# Patient Record
Sex: FEMALE | Race: BLACK OR AFRICAN AMERICAN | NOT HISPANIC OR LATINO | Employment: FULL TIME | URBAN - METROPOLITAN AREA
[De-identification: names, ages, dates, MRNs, and addresses within clinical notes are randomized per-mention and may not be internally consistent; named-entity substitution may affect disease eponyms.]

---

## 2017-02-13 ENCOUNTER — OFFICE VISIT (OUTPATIENT)
Dept: FAMILY MEDICINE | Facility: CLINIC | Age: 27
End: 2017-02-13
Payer: MEDICAID

## 2017-02-13 VITALS
SYSTOLIC BLOOD PRESSURE: 116 MMHG | HEIGHT: 64 IN | HEART RATE: 86 BPM | TEMPERATURE: 98 F | OXYGEN SATURATION: 97 % | DIASTOLIC BLOOD PRESSURE: 82 MMHG | WEIGHT: 172.38 LBS | BODY MASS INDEX: 29.43 KG/M2

## 2017-02-13 DIAGNOSIS — K64.4 EXTERNAL HEMORRHOID: ICD-10-CM

## 2017-02-13 DIAGNOSIS — Z23 FLU VACCINE NEED: ICD-10-CM

## 2017-02-13 DIAGNOSIS — J30.9 ALLERGIC RHINITIS, CAUSE UNSPECIFIED: ICD-10-CM

## 2017-02-13 DIAGNOSIS — S83.91XA SPRAIN OF RIGHT KNEE, UNSPECIFIED LIGAMENT, INITIAL ENCOUNTER: Primary | ICD-10-CM

## 2017-02-13 PROCEDURE — 99214 OFFICE O/P EST MOD 30 MIN: CPT | Mod: S$PBB,,, | Performed by: INTERNAL MEDICINE

## 2017-02-13 PROCEDURE — 99999 PR PBB SHADOW E&M-EST. PATIENT-LVL IV: CPT | Mod: PBBFAC,,, | Performed by: INTERNAL MEDICINE

## 2017-02-13 PROCEDURE — 90686 IIV4 VACC NO PRSV 0.5 ML IM: CPT | Mod: PBBFAC,PO | Performed by: INTERNAL MEDICINE

## 2017-02-13 PROCEDURE — 99214 OFFICE O/P EST MOD 30 MIN: CPT | Mod: PBBFAC,PO | Performed by: INTERNAL MEDICINE

## 2017-02-13 RX ORDER — FLUTICASONE PROPIONATE 50 MCG
1 SPRAY, SUSPENSION (ML) NASAL DAILY
Qty: 1 BOTTLE | Refills: 3 | Status: SHIPPED | OUTPATIENT
Start: 2017-02-13 | End: 2019-11-11

## 2017-02-13 RX ORDER — DICLOFENAC SODIUM 50 MG/1
50 TABLET, DELAYED RELEASE ORAL 2 TIMES DAILY
Qty: 60 TABLET | Refills: 1 | Status: SHIPPED | OUTPATIENT
Start: 2017-02-13 | End: 2019-07-01

## 2017-02-13 NOTE — MR AVS SNAPSHOT
[facility]  [address]  [place] [address]  Phone: [phone]  Fax: [phone]                  Mone Chandler   2017 3:00 PM   Office Visit    Description:  Female : 1990   Provider:  Myke Gardner MD   Department:  [facility]           Reason for Visit     Knee Pain     Hemorrhoids     Swelling           Diagnoses this Visit        Comments    Sprain of right knee, unspecified ligament, initial encounter    -  Primary     External hemorrhoid         Allergic rhinitis, cause unspecified         Flu vaccine need                To Do List           Goals (5 Years of Data)     None      Follow-Up and Disposition     Return if symptoms worsen or fail to improve.       These Medications        Disp Refills Start End    diclofenac (VOLTAREN) 50 MG EC tablet 60 tablet 1 2017     Take 1 tablet (50 mg total) by mouth 2 (two) times daily. Take with food - Oral    Pharmacy: [facility]/pharmacy #[id number] - [place] - [address] Ph #: [phone]       fluticasone (FLONASE) 50 mcg/actuation nasal spray 1 Bottle 3 2017     1 spray by Each Nare route once daily. - Each Nare    Pharmacy: [facility]/pharmacy #[id number] - [place] - [address] [facility] Ph #: [phone]         [facility] On [facility]     [facility] On [facility] Nurse Care Line - 24/7 Assistance  Registered nurses in the [facility] provide clinical advisement, health education, appointment booking, and other advisory services.  Call for this free service at [phone].             Medications           Message regarding Medications     Verify the changes and/or additions to your medication regime listed below are the same as discussed with your clinician today.  If any of these changes or additions are incorrect, please notify your healthcare provider.        START taking these NEW medications        Refills    diclofenac (VOLTAREN) 50 MG EC tablet 1    Sig: Take 1 tablet (50 mg total) by  "mouth 2 (two) times daily. Take with food    Class: Normal    Route: Oral    fluticasone (FLONASE) 50 mcg/actuation nasal spray 3    Si spray by Each Nare route once daily.    Class: Normal    Route: Each Nare           Verify that the below list of medications is an accurate representation of the medications you are currently taking.  If none reported, the list may be blank. If incorrect, please contact your healthcare provider. Carry this list with you in case of emergency.           Current Medications     diclofenac (VOLTAREN) 50 MG EC tablet Take 1 tablet (50 mg total) by mouth 2 (two) times daily. Take with food    fluticasone (FLONASE) 50 mcg/actuation nasal spray 1 spray by Each Nare route once daily.    ibuprofen (ADVIL,MOTRIN) 600 MG tablet Take 1 tablet (600 mg total) by mouth every 6 (six) hours as needed.    ORTHO TRI-CYCLEN LO, 28, 0.18/0.215 mg/ 0.25 mg-25 mcg tablet Take 1 tablet by mouth every morning.           Clinical Reference Information           Your Vitals Were     BP Pulse Temp Height Weight Last Period    116/82 (BP Location: Left arm, Patient Position: Sitting, BP Method: Manual) 86 97.9 °F (36.6 °C) (Oral) 5' 4" (1.626 m) 78.2 kg (172 lb 6.4 oz) 2017    SpO2 BMI             97% 29.59 kg/m2         Blood Pressure          Most Recent Value    BP  116/82      Allergies as of 2017     No Known Allergies      Immunizations Administered on Date of Encounter - 2017     Name Date Dose VIS Date Route    influenza - Quadrivalent - PF (ADULT)  Incomplete 0.5 mL 2015 Intramuscular      Orders Placed During Today's Visit      Normal Orders This Visit    Influenza - Quadrivalent (3 years & older) (PF)       Instructions    Use pre-bottled nasal saline at least once a day but as often as desired for comfort (if you are mixing your own solution, you MUST use either distilled water or boiled water that has been allowed to cool).  Blow your nose after you spray each nostril.  " AFTER using the nasal saline, use the nasal steroid in the following manner:    Place the tip of the bottle into your nostril aiming towards the outside corner of each eye.  You may find it beneficial to use the opposite hand for the nostril that you are spraying. Do not aim the bottle straight up your nose. [place] the medicine but do not perform a hard sniff when you do.  If you can taste the medication, then you have sniffed too hard.  Dab any medication that drips out of your nose but do not blow your nose as this will expel the medication.    You can try metamucil or Miralax to help with having regular BMs.      Ice the knee for 20 minutes once or twice a day and immediately after the march.  I would use a slip on compression knee brace to help with the swelling.  Stop the ibuprofen and start the diclofenac.             Language Assistance Services     ATTENTION: Language assistance services are available, free of charge. Please call [phone].      ATENCIÓN: Si alicia jay, tiene a nichols disposición servicios gratuitos de asistencia lingüística. Llame al [phone].     [facility] Ý: N?u b?n nói Ti?ng Vi?t, có các d?ch v? h? tr? ngôn ng? mi?n phí dành cho b?n. G?i s? [phone].         [facility] complies with applicable Federal civil rights laws and does not discriminate on the basis of race, color, national origin, age, disability, or sex.

## 2017-02-13 NOTE — PATIENT INSTRUCTIONS
Use pre-bottled nasal saline at least once a day but as often as desired for comfort (if you are mixing your own solution, you MUST use either distilled water or boiled water that has been allowed to cool).  Blow your nose after you spray each nostril.  AFTER using the nasal saline, use the nasal steroid in the following manner:    Place the tip of the bottle into your nostril aiming towards the outside corner of each eye.  You may find it beneficial to use the opposite hand for the nostril that you are spraying. Do not aim the bottle straight up your nose. [place] the medicine but do not perform a hard sniff when you do.  If you can taste the medication, then you have sniffed too hard.  Dab any medication that drips out of your nose but do not blow your nose as this will expel the medication.    You can try metamucil or Miralax to help with having regular BMs.      Ice the knee for 20 minutes once or twice a day and immediately after the march.  I would use a slip on compression knee brace to help with the swelling.  Stop the ibuprofen and start the diclofenac.

## 2017-02-13 NOTE — PROGRESS NOTES
"Chief Complaint  Chief Complaint   Patient presents with    Knee Pain     Right/ 2 weeks    Hemorrhoids    Swelling     Hand/ left       HPI  Mone Chandler is a 26 y.o. female with medical diagnoses as listed in the medical history and problem list that presents for R knee pain, cough, congestion, & hemmorhoids.  Pt is known to me with her last appointment 8/30/2016.  Pt states her main complaint is R knee pain s/p fall 1.5wks ago. Pt has used ice and ibuprofen at home. Pt states [place] and wanting to make sure knee is ok. Pt also complaining of hemorrhoid pain when wiping with toilet tissue x1 week ago which is now improved. Denies any blood in stool or in toilet paper. Pt denies pain when using the restroom, but only painful when wiping. Pt reports she does strain sometimes. Reports combination of both hard stools and diarrhea. Pt complaining of cough, congestion, and runny nose x2weeks. Pt states she coughed up dark red blood x3 occurrences. Also complaining of swelling to L thumb which has never gone down since trigger finger surgery in 2014. Pt also complains of a intermittent "twisting" throat and chest pain that lasts a couple of seconds at a time and comes and goes for the past two weeks now. Pt states this is an achy pain, which is staying about the same and not getting worse. Pt denies any burning in chest or pain after eating. Pt denies SOB, fever, diaphoresis.       PAST MEDICAL HISTORY:  Past Medical History   Diagnosis Date    Allergic rhinitis, cause unspecified 6/17/2015    ADRIANO (generalized anxiety disorder) 6/17/2015     ADRIANO-7 score of 17        PAST SURGICAL HISTORY:  Past Surgical History   Procedure Laterality Date    Thumb Bilateral     Trigger finger release Bilateral      thumbs       SOCIAL HISTORY:  Social History     Social History    Marital status: Single     Spouse name: N/A    Number of children: N/A    Years of education: N/A "     Occupational History    Not on file.     Social History Main Topics    Smoking status: Never Smoker    Smokeless tobacco: Not on file    Alcohol use No    Drug use: No    Sexual activity: Not on file     Other Topics Concern    Not on file     Social History Narrative    No narrative on file       FAMILY HISTORY:  Family History   Problem Relation Age of Onset    Hypertension Mother     Diverticulitis Father        ALLERGIES AND MEDICATIONS: updated and reviewed.  Review of patient's allergies indicates:  No Known Allergies  Current Outpatient Prescriptions   Medication Sig Dispense Refill    diclofenac (VOLTAREN) 50 MG EC tablet Take 1 tablet (50 mg total) by mouth 2 (two) times daily. Take with food 60 tablet 1    fluticasone (FLONASE) 50 mcg/actuation nasal spray 1 spray by Each Nare route once daily. 1 Bottle 3    ibuprofen (ADVIL,MOTRIN) 600 MG tablet Take 1 tablet (600 mg total) by mouth every 6 (six) hours as needed. 30 tablet 0    ORTHO TRI-CYCLEN LO, 28, 0.18/0.215 mg/ 0.25 mg-25 mcg tablet Take 1 tablet by mouth every morning.  5     No current facility-administered medications for this visit.          ROS  Review of Systems   Constitutional: Negative for chills, fever and unexpected weight change.   HENT: Positive for congestion, postnasal drip, rhinorrhea and sinus pressure. Negative for ear pain, hearing loss, nosebleeds, sore throat and trouble swallowing.    Eyes: Negative for discharge, redness and visual disturbance.   Respiratory: Positive for cough and chest tightness (with anxiety). Negative for shortness of breath and wheezing.    Cardiovascular: Negative for chest pain, palpitations and leg swelling.   Gastrointestinal: Positive for constipation and diarrhea. Negative for abdominal pain, nausea and vomiting.   Endocrine: Negative for polydipsia, polyphagia and polyuria.   Genitourinary: Negative for decreased urine volume, dysuria and hematuria.   Musculoskeletal: Positive for  "arthralgias (R knee ), joint swelling (R knee & L thumb) and myalgias (R knee).   Skin: Negative for color change and rash.   Neurological: Negative for dizziness, weakness, light-headedness and headaches.   Psychiatric/Behavioral: Negative for decreased concentration, dysphoric mood, sleep disturbance and suicidal ideas. The patient is nervous/anxious ("around certain people").            Physical Exam  Vitals:    02/13/17 1526   BP: 116/82   BP Location: Left arm   Patient Position: Sitting   BP Method: Manual   Pulse: 86   Temp: 97.9 °F (36.6 °C)   TempSrc: Oral   SpO2: 97%   Weight: 78.2 kg (172 lb 6.4 oz)   Height: 5' 4" (1.626 m)    Body mass index is 29.59 kg/(m^2).  Weight: 78.2 kg (172 lb 6.4 oz)   Height: 5' 4" (162.6 cm)   General appearance: alert, appears stated age and cooperative  Head: Normocephalic, without obvious abnormality, atraumatic  Eyes: PERRL, conjunctiva clear, EOMI  Ears: normal TM's and external ear canals both ears  Nose: clear discharge, moderate congestion, turbinates red, swollen, edematous, inflamed  Throat: lips, mucosa, and tongue normal; teeth and gums normal  Neck: no adenopathy, no carotid bruit, no JVD, supple, symmetrical, trachea midline and thyroid not enlarged, symmetric, no tenderness/mass/nodules  Lungs: clear to auscultation bilaterally  Heart: regular rate and rhythm, S1, S2 normal, no murmur, click, rub or gallop  Abdomen: soft, non-tender; bowel sounds normal; no masses,  no organomegaly  Pulses: 2+ and symmetric  Skin: Skin color, texture, turgor normal. No rashes or lesions  Neurologic: Alert and oriented X 3, normal strength and tone. Normal symmetric reflexes. Normal coordination and gait  Rectum: External skin tag present      Health Maintenance       Date Due Completion Date    Pap Smear 9/6/2011 ---    Influenza Vaccine 8/1/2016 ---    TETANUS VACCINE 7/1/2018 7/1/2008            Assessment & Plan  Problem List Items Addressed This Visit        Pulmonary    " Allergic rhinitis, cause unspecified  I taught the patient the correct technique for nasal spray use.      Relevant Medications    fluticasone (FLONASE) 50 mcg/actuation nasal spray      Other Visit Diagnoses     Sprain of right knee, unspecified ligament, initial encounter    -  Primary  Ice 20 minutes BID & immediately after marching in [facility]. Diclofenac ordered for pain and inflammation. Educated to  compression knee brace.    Relevant Medications    diclofenac (VOLTAREN) 50 MG EC tablet    External hemorrhoid      Monitor. No need for removal. No pain at this time.     Flu vaccine need      Flu Vacc given.    Relevant Orders    Influenza - Quadrivalent (3 years & older) (PF)            Health Maintenance reviewed, flu vacc given today.    Follow-up: Return if symptoms worsen or fail to improve.

## 2017-07-18 ENCOUNTER — TELEPHONE (OUTPATIENT)
Dept: FAMILY MEDICINE | Facility: CLINIC | Age: 27
End: 2017-07-18

## 2017-07-18 NOTE — TELEPHONE ENCOUNTER
----- Message from Audra Brown sent at 7/18/2017  2:30 PM CDT -----  Contact: self  Pt needs to speak with a nurse in regards to getting a physical... Please call [phone].. Thanks

## 2017-08-09 ENCOUNTER — OFFICE VISIT (OUTPATIENT)
Dept: FAMILY MEDICINE | Facility: CLINIC | Age: 27
End: 2017-08-09
Payer: MEDICAID

## 2017-08-09 ENCOUNTER — LAB VISIT (OUTPATIENT)
Dept: LAB | Facility: HOSPITAL | Age: 27
End: 2017-08-09
Attending: INTERNAL MEDICINE
Payer: MEDICAID

## 2017-08-09 VITALS
DIASTOLIC BLOOD PRESSURE: 70 MMHG | BODY MASS INDEX: 29.27 KG/M2 | SYSTOLIC BLOOD PRESSURE: 120 MMHG | WEIGHT: 171.44 LBS | HEART RATE: 80 BPM | OXYGEN SATURATION: 99 % | HEIGHT: 64 IN | TEMPERATURE: 98 F

## 2017-08-09 DIAGNOSIS — Z11.3 ROUTINE SCREENING FOR STI (SEXUALLY TRANSMITTED INFECTION): ICD-10-CM

## 2017-08-09 DIAGNOSIS — M25.562 LEFT KNEE PAIN, UNSPECIFIED CHRONICITY: Primary | ICD-10-CM

## 2017-08-09 DIAGNOSIS — E28.2 PCOS (POLYCYSTIC OVARIAN SYNDROME): ICD-10-CM

## 2017-08-09 PROCEDURE — 99214 OFFICE O/P EST MOD 30 MIN: CPT | Mod: S$PBB,,, | Performed by: INTERNAL MEDICINE

## 2017-08-09 PROCEDURE — 86703 HIV-1/HIV-2 1 RESULT ANTBDY: CPT

## 2017-08-09 PROCEDURE — 99999 PR PBB SHADOW E&M-EST. PATIENT-LVL III: CPT | Mod: PBBFAC,,, | Performed by: INTERNAL MEDICINE

## 2017-08-09 PROCEDURE — 3008F BODY MASS INDEX DOCD: CPT | Mod: ,,, | Performed by: INTERNAL MEDICINE

## 2017-08-09 PROCEDURE — 36415 COLL VENOUS BLD VENIPUNCTURE: CPT | Mod: PO

## 2017-08-09 PROCEDURE — 86592 SYPHILIS TEST NON-TREP QUAL: CPT

## 2017-08-09 NOTE — ASSESSMENT & PLAN NOTE
Not currently taking any medications for this.  Counseled on need for strict barrier method even with PCOS

## 2017-08-09 NOTE — PROGRESS NOTES
Chief Complaint  Chief Complaint   Patient presents with    Annual Exam       HPI  Moneodette Chandler is a 26 y.o. female with medical diagnoses as listed in the medical history and problem list that presents for routine physical.  Pt is known to me with her last appointment 2/13/2017.  She has some questions about PCOS and Plan B.  She is still having some right knee pain that is overall improved from the last visit.  She has needed to use Plan B in the past but none recently.  She informed me today that she has PCOS and is trying to avoid taking OCPs for that reason.  She is currently celibate.  Would like routin screening done.  No dysuria, hematuria, vaginal discharge, genital lesions.        PAST MEDICAL HISTORY:  Past Medical History:   Diagnosis Date    Allergic rhinitis, cause unspecified 6/17/2015    ADRIANO (generalized anxiety disorder) 6/17/2015    ADRIANO-7 score of 17     PCOS (polycystic ovarian syndrome)        PAST SURGICAL HISTORY:  Past Surgical History:   Procedure Laterality Date    thumb Bilateral     TRIGGER FINGER RELEASE Bilateral     thumbs       SOCIAL HISTORY:  Social History     Social History    Marital status: Single     Spouse name: N/A    Number of children: N/A    Years of education: N/A     Occupational History    Not on file.     Social History Main Topics    Smoking status: Never Smoker    Smokeless tobacco: Never Used    Alcohol use No    Drug use: No    Sexual activity: Not on file     Other Topics Concern    Not on file     Social History Narrative    No narrative on file       FAMILY HISTORY:  Family History   Problem Relation Age of Onset    Hypertension Mother     Diverticulitis Father        ALLERGIES AND MEDICATIONS: updated and reviewed.  Review of patient's allergies indicates:  No Known Allergies  Current Outpatient Prescriptions   Medication Sig Dispense Refill    diclofenac (VOLTAREN) 50 MG EC tablet Take 1 tablet (50 mg total) by mouth 2 (two) times daily.  "Take with food 60 tablet 1    fluticasone (FLONASE) 50 mcg/actuation nasal spray 1 spray by Each Nare route once daily. 1 Bottle 3    ibuprofen (ADVIL,MOTRIN) 600 MG tablet Take 1 tablet (600 mg total) by mouth every 6 (six) hours as needed. 30 tablet 0     No current facility-administered medications for this visit.          ROS  Review of Systems   Constitutional: Negative for chills, fever and unexpected weight change.   HENT: Negative for congestion, ear pain, hearing loss, rhinorrhea, sore throat and trouble swallowing.    Eyes: Negative for discharge, redness and visual disturbance.   Respiratory: Negative for cough, chest tightness, shortness of breath and wheezing.    Cardiovascular: Negative for chest pain, palpitations and leg swelling.   Gastrointestinal: Negative for abdominal pain, constipation, diarrhea, nausea and vomiting.   Endocrine: Negative for polydipsia, polyphagia and polyuria.   Genitourinary: Negative for decreased urine volume, dysuria and hematuria.   Musculoskeletal: Positive for arthralgias. Negative for joint swelling and myalgias.   Skin: Negative for color change and rash.   Neurological: Negative for dizziness, weakness, light-headedness and headaches.   Psychiatric/Behavioral: Negative for decreased concentration, dysphoric mood, sleep disturbance and suicidal ideas.           Physical Exam  Vitals:    08/09/17 1134   BP: 120/70   BP Location: Left arm   Patient Position: Sitting   BP Method: Manual   Pulse: 80   Temp: 98.3 °F (36.8 °C)   SpO2: 99%   Weight: 77.7 kg (171 lb 6.5 oz)   Height: 5' 4" (1.626 m)    Body mass index is 29.42 kg/m².  Weight: 77.7 kg (171 lb 6.5 oz)   Height: 5' 4" (162.6 cm)   General appearance: alert, appears stated age, cooperative and no distress  Head: Normocephalic, without obvious abnormality, atraumatic  Eyes: PERRL EOMI conjunctiva clear  Neck: no adenopathy, no carotid bruit, no JVD, supple, symmetrical, trachea midline and thyroid not enlarged, " symmetric, no tenderness/mass/nodules  Back: symmetric, no curvature. ROM normal. No CVA tenderness.  Lungs: clear to auscultation bilaterally  Heart: regular rate and rhythm, S1, S2 normal, no murmur, click, rub or gallop  Abdomen: soft, non-tender; bowel sounds normal; no masses,  no organomegaly  Extremities: extremities normal, atraumatic, no cyanosis or edema  Pulses: 2+ and symmetric  Neurologic: Grossly normal      Health Maintenance       Date Due Completion Date    Pap Smear 09/06/2011 ---    Influenza Vaccine 08/01/2017 2/13/2017    TETANUS VACCINE 07/01/2018 7/1/2008            Assessment & Plan  Problem List Items Addressed This Visit        Endocrine    PCOS (polycystic ovarian syndrome)    Current Assessment & Plan     Not currently taking any medications for this.  Counseled on need for strict barrier method even with PCOS           Other Visit Diagnoses     Left knee pain, unspecified chronicity    -  Primary  -    Continue with PT that she already has set up.  NSAIDs PRN    Routine screening for STI (sexually transmitted infection)    -  Routine screen per CDC guidelines.     Relevant Orders    RPR    HIV-1 and HIV-2 antibodies    C. trachomatis/N. gonorrhoeae by AMP DNA Urine            Health Maintenance reviewed, as above.  Flu shot in oct.    Follow-up: Return in about 1 year (around 8/9/2018) for Routine Physical.

## 2017-08-10 LAB
HIV 1+2 AB+HIV1 P24 AG SERPL QL IA: NEGATIVE
RPR SER QL: NORMAL

## 2017-08-10 NOTE — PROGRESS NOTES
Your lab results are normal.  Please continue your current medications and doses.  Please feel free to contact me with any questions or concerns.    Sincerely,  Myke Gardner  [email or web address]

## 2017-11-21 ENCOUNTER — NURSE TRIAGE (OUTPATIENT)
Dept: ADMINISTRATIVE | Facility: CLINIC | Age: 27
End: 2017-11-21

## 2017-11-21 NOTE — TELEPHONE ENCOUNTER
"    Reason for Disposition   Patient wants to be seen    Protocols used: ST LYMPH NODES - [id number]    Pt reports some swelling around her neck- from what I can gather she is talking about swollen lymph nodes. Denies sore throat or swelling of throat or tongue. Denies difficulty breathing. Pt states that she feels like she has a "balloon in her neck". Denies fever. Pt is requesting to be seen today. Sent to scheduling for assistance.    Please contact patient to advise  "

## 2017-11-21 NOTE — TELEPHONE ENCOUNTER
Spoke with pt she states that the nurse that she spoke with was not helpful and she didn't feel like the nurse should have told her that her complaints were not an emergency and that she should not have been offered an appt in Feb.  States that the nurse just should have told her to go to the urgent care if should was not able to see Dr. Gardner today, pt states that she will go to the urgent care if she is still have symptoms

## 2018-08-22 ENCOUNTER — TELEPHONE (OUTPATIENT)
Dept: FAMILY MEDICINE | Facility: CLINIC | Age: 28
End: 2018-08-22

## 2018-08-22 NOTE — TELEPHONE ENCOUNTER
"Spoke with patient to schedule an appointment with Shellie Huerta NP due to no available appts with dr Gardner til January. Patient stated " If she could not get an appointment to see Dr Gardner. She will be changing her PCP".  "

## 2018-08-22 NOTE — TELEPHONE ENCOUNTER
----- Message from Sabrina Metz sent at 8/22/2018  9:30 AM CDT -----  Contact: Self/[phone]  Patient called upset because there's not a sooner appointment for her Physical. She would like to speak to the staff to schedule a sooner appointment. Thank you.

## 2018-09-17 ENCOUNTER — OFFICE VISIT (OUTPATIENT)
Dept: URGENT CARE | Facility: CLINIC | Age: 28
End: 2018-09-17
Payer: MEDICAID

## 2018-09-17 ENCOUNTER — CLINICAL SUPPORT (OUTPATIENT)
Dept: OCCUPATIONAL MEDICINE | Facility: CLINIC | Age: 28
End: 2018-09-17
Payer: MEDICAID

## 2018-09-17 VITALS
TEMPERATURE: 98 F | HEART RATE: 80 BPM | HEIGHT: 64 IN | DIASTOLIC BLOOD PRESSURE: 78 MMHG | SYSTOLIC BLOOD PRESSURE: 118 MMHG | WEIGHT: 163 LBS | RESPIRATION RATE: 19 BRPM | OXYGEN SATURATION: 98 % | BODY MASS INDEX: 27.83 KG/M2

## 2018-09-17 DIAGNOSIS — S80.12XA CONTUSION OF LEFT LEG, INITIAL ENCOUNTER: ICD-10-CM

## 2018-09-17 DIAGNOSIS — Z11.1 ENCOUNTER FOR PPD TEST: ICD-10-CM

## 2018-09-17 DIAGNOSIS — S80.12XA LEG HEMATOMA, LEFT, INITIAL ENCOUNTER: Primary | ICD-10-CM

## 2018-09-17 PROCEDURE — 86580 TB INTRADERMAL TEST: CPT | Mod: S$GLB,,, | Performed by: PHYSICIAN ASSISTANT

## 2018-09-17 PROCEDURE — 99214 OFFICE O/P EST MOD 30 MIN: CPT | Mod: S$GLB,,, | Performed by: PHYSICIAN ASSISTANT

## 2018-09-17 RX ORDER — NAPROXEN 500 MG/1
500 TABLET ORAL 2 TIMES DAILY WITH MEALS
Qty: 20 TABLET | Refills: 0 | Status: SHIPPED | OUTPATIENT
Start: 2018-09-17 | End: 2018-09-27

## 2018-09-17 NOTE — PROGRESS NOTES
"Subjective:       Patient ID: Mone Chandler is a 28 y.o. female.    Vitals:  height is 5' 4" (1.626 m) and weight is 73.9 kg (163 lb). Her oral temperature is 98.2 °F (36.8 °C). Her blood pressure is 118/78 and her pulse is 80. Her respiration is 19 and oxygen saturation is 98%.     Chief Complaint: PPD Placement and Leg Pain (lower left leg )    Leg Pain    The incident occurred more than 1 week ago (2 weeks ago). The incident occurred at home. There was no injury mechanism (pulled into chair). The pain is present in the left leg (lower). Quality: stinging  The pain is at a severity of 8/10. The pain is severe. The pain has been constant since onset. She reports no foreign bodies present. Nothing aggravates the symptoms. She has tried acetaminophen for the symptoms. The treatment provided no relief.     Review of Systems   Constitution: Negative for chills and fever.   HENT: Negative for sore throat.    Eyes: Negative for blurred vision.   Cardiovascular: Negative for chest pain.   Respiratory: Negative for shortness of breath.    Skin: Negative for rash.   Musculoskeletal: Negative for back pain and joint pain.   Gastrointestinal: Negative for abdominal pain, diarrhea, nausea and vomiting.   Neurological: Negative for headaches.   Psychiatric/Behavioral: The patient is not nervous/anxious.        Objective:      Physical Exam   Constitutional: She is oriented to person, place, and time. She appears well-developed and well-nourished. She is cooperative.  Non-toxic appearance. She does not appear ill. No distress.   HENT:   Head: Normocephalic and atraumatic. Head is without abrasion, without contusion and without laceration.   Right Ear: Hearing, tympanic membrane, external ear and ear canal normal. No hemotympanum.   Left Ear: Hearing, tympanic membrane, external ear and ear canal normal. No hemotympanum.   Nose: Nose normal. No mucosal edema, rhinorrhea or nasal deformity. No epistaxis. Right sinus exhibits no " maxillary sinus tenderness and no frontal sinus tenderness. Left sinus exhibits no maxillary sinus tenderness and no frontal sinus tenderness.   Mouth/Throat: Uvula is midline, oropharynx is clear and moist and mucous membranes are normal. No trismus in the jaw. Normal dentition. No uvula swelling. No posterior oropharyngeal erythema.   Eyes: Conjunctivae, EOM and lids are normal. Pupils are equal, round, and reactive to light. Right eye exhibits no discharge. Left eye exhibits no discharge. No scleral icterus.   Sclera clear bilat   Neck: Trachea normal, normal range of motion, full passive range of motion without pain and phonation normal. Neck supple. No spinous process tenderness and no muscular tenderness present. No neck rigidity. No tracheal deviation present.   Cardiovascular: Normal rate, regular rhythm, normal heart sounds, intact distal pulses and normal pulses.   Pulmonary/Chest: Effort normal and breath sounds normal. No respiratory distress.   Abdominal: Soft. Normal appearance and bowel sounds are normal. She exhibits no distension, no pulsatile midline mass and no mass. There is no tenderness.   Musculoskeletal: Normal range of motion. She exhibits no deformity.        Left knee: She exhibits normal range of motion, no swelling, no effusion, no ecchymosis, no deformity, no laceration, no erythema, normal alignment, no LCL laxity, normal patellar mobility, no bony tenderness, normal meniscus and no MCL laxity. No tenderness found.        Left ankle: She exhibits normal range of motion, no swelling, no ecchymosis, no deformity, no laceration and normal pulse. No tenderness. Achilles tendon normal.        Left lower leg: She exhibits tenderness and edema. She exhibits no bony tenderness, no swelling, no deformity and no laceration.        Legs:       Left foot: There is normal range of motion, no tenderness, no bony tenderness, no swelling, normal capillary refill, no crepitus, no deformity and no  laceration.   Neurological: She is alert and oriented to person, place, and time. She has normal strength. No cranial nerve deficit or sensory deficit. She exhibits normal muscle tone. She displays no seizure activity. Coordination normal. GCS eye subscore is 4. GCS verbal subscore is 5. GCS motor subscore is 6.   Skin: Skin is warm, dry and intact. Capillary refill takes less than 2 seconds. No abrasion, no bruising, no burn, no ecchymosis and no laceration noted. She is not diaphoretic. No pallor.   Psychiatric: She has a normal mood and affect. Her speech is normal and behavior is normal. Judgment and thought content normal. Cognition and memory are normal.   Nursing note and vitals reviewed.      Assessment:       1. Leg hematoma, left, initial encounter    2. Contusion of left leg, initial encounter        Plan:         Leg hematoma, left, initial encounter    Contusion of left leg, initial encounter  -     naproxen (NAPROSYN) 500 MG tablet; Take 1 tablet (500 mg total) by mouth 2 (two) times daily with meals. for 10 days  Dispense: 20 tablet; Refill: 0          Lower Extremity Contusion  You have a contusion (bruise) of a lower extremity (leg, knee, ankle, foot, or toe). Symptoms include pain, swelling, and skin discoloration. No bones are broken. This injury may take from a few days to a few weeks to heal.  During that time, the bruise may change from reddish in color, to purple-blue, to green-yellow, to yellow-brown.  Home care  · Unless another medicine was prescribed, you can take acetaminophen, ibuprofen, or naproxen to control pain. (If you have chronic liver or kidney disease or ever had a stomach ulcer or gastrointestinal bleeding, talk with your doctor before using these medicines.)  · Elevate the injured area to reduce pain and swelling. As much as possible, sit or lie down with the injured area raised about the level of your heart. This is especially important during the first 48 hours.  · Ice the  injured area to help reduce pain and swelling. Wrap a cold source (ice pack or ice cubes in a plastic bag) in a thin towel. Apply to the bruised area for 20 minutes every 1 to 2 hours the first day. Continue this 3 to 4 times a day until the pain and swelling goes away.  · If crutches have been advised, do not bear full weight on the injured leg until you can do so without pain. You may return to sports when you are able to put full weight and impact on the injured leg without pain.  Follow up  Follow up with your healthcare provider or our staff as advised. Call if you are not improving within the next 1 to 2 weeks.  When to seek medical advice   Call your healthcare provider right away if any of these occur:  · Increased pain or swelling  · Foot or toes become cold, blue, numb or tingly  · Signs of infection: Warmth, drainage, or increased redness or pain around the injury  · Inability to move the injured area   · Frequent bruising for unknown reasons  Date Last Reviewed: 2/1/2017 © 2000-2017 [employer]. [address] [place] [address]. All rights reserved. This information is not intended as a substitute for professional medical care. Always follow your healthcare professional's instructions.      Please follow up with your Primary care provider within 2-5 days if your signs and symptoms have not resolved or worsen.     If your condition worsens or fails to improve we recommend that you receive another evaluation at the emergency room immediately or contact your primary medical clinic to discuss your concerns.   You must understand that you have received an Urgent Care treatment only and that you may be released before all of your medical problems are known or treated. You, the patient, will arrange for follow up care as instructed.     RED FLAGS/WARNING SYMPTOMS DISCUSSED WITH PATIENT THAT WOULD WARRANT EMERGENT MEDICAL ATTENTION. PATIENT VERBALIZED UNDERSTANDING.       09/20/2018 3:55  PM Patient return to clinic to have PPD test read with all paperwork. PPD was administered at this visit but the order was not placed. Placing order now, adding results, and scanned in the paperwork into media. Lise Hernandez

## 2018-09-20 ENCOUNTER — TELEPHONE (OUTPATIENT)
Dept: URGENT CARE | Facility: CLINIC | Age: 28
End: 2018-09-20

## 2018-09-20 LAB
TB INDURATION - 48 HR READ: NORMAL MM
TB INDURATION - 72 HR READ: 0 MM
TB SKIN TEST - 48 HR READ: NORMAL
TB SKIN TEST - 72 HR READ: NEGATIVE

## 2018-10-11 ENCOUNTER — OFFICE VISIT (OUTPATIENT)
Dept: URGENT CARE | Facility: CLINIC | Age: 28
End: 2018-10-11
Payer: MEDICAID

## 2018-10-11 VITALS
HEART RATE: 87 BPM | BODY MASS INDEX: 27.83 KG/M2 | HEIGHT: 64 IN | TEMPERATURE: 98 F | RESPIRATION RATE: 18 BRPM | SYSTOLIC BLOOD PRESSURE: 129 MMHG | WEIGHT: 163 LBS | DIASTOLIC BLOOD PRESSURE: 84 MMHG | OXYGEN SATURATION: 100 %

## 2018-10-11 DIAGNOSIS — Z20.2 ENCOUNTER FOR ASSESSMENT OF STD EXPOSURE: Primary | ICD-10-CM

## 2018-10-11 DIAGNOSIS — K64.4 EXTERNAL HEMORRHOID: ICD-10-CM

## 2018-10-11 LAB
B-HCG UR QL: NEGATIVE
BILIRUB UR QL STRIP: NEGATIVE
CTP QC/QA: YES
GLUCOSE UR QL STRIP: NEGATIVE
KETONES UR QL STRIP: NEGATIVE
LEUKOCYTE ESTERASE UR QL STRIP: POSITIVE
PH, POC UA: 7.5 (ref 5–8)
POC BLOOD, URINE: NEGATIVE
POC NITRATES, URINE: NEGATIVE
PROT UR QL STRIP: NEGATIVE
SP GR UR STRIP: 1.01 (ref 1–1.03)
UROBILINOGEN UR STRIP-ACNC: NORMAL (ref 0.1–1.1)

## 2018-10-11 PROCEDURE — 81025 URINE PREGNANCY TEST: CPT | Mod: S$GLB,,, | Performed by: PHYSICIAN ASSISTANT

## 2018-10-11 PROCEDURE — 81003 URINALYSIS AUTO W/O SCOPE: CPT | Mod: QW,S$GLB,, | Performed by: PHYSICIAN ASSISTANT

## 2018-10-11 PROCEDURE — 99214 OFFICE O/P EST MOD 30 MIN: CPT | Mod: 25,S$GLB,, | Performed by: PHYSICIAN ASSISTANT

## 2018-10-11 PROCEDURE — 99000 SPECIMEN HANDLING OFFICE-LAB: CPT | Mod: S$GLB,,, | Performed by: PHYSICIAN ASSISTANT

## 2018-10-11 RX ORDER — HYDROCORTISONE ACETATE 25 MG/1
25 SUPPOSITORY RECTAL 2 TIMES DAILY PRN
Qty: 4 SUPPOSITORY | Refills: 0 | Status: SHIPPED | OUTPATIENT
Start: 2018-10-11 | End: 2019-10-11

## 2018-10-11 RX ORDER — NITROFURANTOIN 25; 75 MG/1; MG/1
100 CAPSULE ORAL 2 TIMES DAILY
Qty: 14 CAPSULE | Refills: 0 | Status: SHIPPED | OUTPATIENT
Start: 2018-10-11 | End: 2018-10-18

## 2018-10-11 RX ORDER — HYDROCORTISONE 25 MG/G
CREAM TOPICAL 2 TIMES DAILY
Qty: 20 G | Refills: 0 | Status: SHIPPED | OUTPATIENT
Start: 2018-10-11 | End: 2020-10-15

## 2018-10-11 NOTE — PROGRESS NOTES
"Subjective:       Patient ID: Mone Chandler is a 28 y.o. female.    Vitals:  height is 5' 4" (1.626 m) and weight is 73.9 kg (163 lb). Her temperature is 98.3 °F (36.8 °C). Her blood pressure is 129/84 and her pulse is 87. Her respiration is 18 and oxygen saturation is 100%.     Chief Complaint: Hemorrhoids and Exposure to STD    Concerned about STD exposure and wants hemorrhoids checked.       Exposure to STD    The patient's primary symptoms include genital lesions. The patient's pertinent negatives include no discharge, dyspareunia, dysuria, genital itching, genital rash or pelvic pain. This is a new problem. The current episode started 1 to 4 weeks ago. The problem has been gradually worsening. The vaginal discharge was normal. Pertinent negatives include no abdominal pain, fever or sore throat. Treatments tried: Reymundo Pearls. The treatment provided mild relief. Risk factors include history of STDs.     Review of Systems   Constitution: Negative for chills and fever.   HENT: Negative for sore throat.    Eyes: Negative for blurred vision.   Cardiovascular: Negative for chest pain.   Respiratory: Negative for shortness of breath.    Skin: Positive for itching. Negative for rash.   Musculoskeletal: Negative for back pain and joint pain.   Gastrointestinal: Negative for abdominal pain, diarrhea, nausea and vomiting.   Genitourinary: Positive for genital sores. Negative for dyspareunia, dysuria, missed menses, non-menstrual bleeding and pelvic pain.   Neurological: Negative for headaches.   Psychiatric/Behavioral: The patient is not nervous/anxious.        Objective:      Physical Exam   Constitutional: She is oriented to person, place, and time. She appears well-developed and well-nourished. She is cooperative.  Non-toxic appearance. She does not appear ill. No distress.   HENT:   Head: Normocephalic and atraumatic.   Right Ear: Hearing, tympanic membrane, external ear and ear canal normal.   Left Ear: Hearing, " tympanic membrane, external ear and ear canal normal.   Nose: Nose normal. No mucosal edema, rhinorrhea or nasal deformity. No epistaxis. Right sinus exhibits no maxillary sinus tenderness and no frontal sinus tenderness. Left sinus exhibits no maxillary sinus tenderness and no frontal sinus tenderness.   Mouth/Throat: Uvula is midline, oropharynx is clear and moist and mucous membranes are normal. No trismus in the jaw. Normal dentition. No uvula swelling. No posterior oropharyngeal erythema.   Eyes: Conjunctivae and lids are normal. Right eye exhibits no discharge. Left eye exhibits no discharge. No scleral icterus.   Sclera clear bilat   Neck: Trachea normal, normal range of motion, full passive range of motion without pain and phonation normal. Neck supple.   Cardiovascular: Normal rate, regular rhythm, normal heart sounds, intact distal pulses and normal pulses.   Pulmonary/Chest: Effort normal and breath sounds normal. No respiratory distress.   Abdominal: Soft. Normal appearance and bowel sounds are normal. She exhibits no distension, no pulsatile midline mass and no mass. There is no tenderness. There is no CVA tenderness. Hernia confirmed negative in the right inguinal area and confirmed negative in the left inguinal area.   Genitourinary: Vagina normal. Rectal exam shows external hemorrhoid. Rectal exam shows no internal hemorrhoid, no fissure, no mass, no tenderness, anal tone normal and guaiac negative stool.       Pelvic exam was performed with patient supine. No labial fusion. There is no rash, tenderness, lesion or injury on the right labia. There is no rash, tenderness, lesion or injury on the left labia. Right adnexum displays no mass, no tenderness and no fullness. Left adnexum displays no mass, no tenderness and no fullness.   Musculoskeletal: Normal range of motion. She exhibits no edema or deformity.   Lymphadenopathy: No inguinal adenopathy noted on the right or left side.   Neurological: She  is alert and oriented to person, place, and time. She exhibits normal muscle tone. Coordination normal.   Skin: Skin is warm, dry and intact. She is not diaphoretic. No pallor.   Psychiatric: She has a normal mood and affect. Her speech is normal and behavior is normal. Judgment and thought content normal. Cognition and memory are normal.   Nursing note and vitals reviewed.      Assessment:       1. Encounter for assessment of STD exposure    2. External hemorrhoid        Plan:         Encounter for assessment of STD exposure  -     POCT Urinalysis, Dipstick, Automated, W/O Scope  -     POCT urine pregnancy  -     C. trachomatis/N. gonorrhoeae by AMP DNA    External hemorrhoid  -     hydrocortisone (ANUSOL-HC) 25 mg suppository; Place 1 suppository (25 mg total) rectally 2 (two) times daily as needed for Hemorrhoids.  Dispense: 4 suppository; Refill: 0  -     hydrocortisone 2.5 % cream; Apply topically 2 (two) times daily. for 10 days  Dispense: 20 g; Refill: 0          Treating Hemorrhoids: Self-Care    Follow your healthcare providers advice about caring for your hemorrhoids at home. Some treatments help relieve symptoms right away. Others involve making changes in your diet and exercise habits. These can help ease constipation and prevent hemorrhoid symptoms from coming back.  Relieving symptoms  Your healthcare provider may prescribe anti-inflammatory medicine to help ease your symptoms. The following tips will also help relieve pain and swelling.  · Take sitz baths. Taking a sitz bath means sitting in a few inches of warm bath water. Soaking for 10 minutes twice a day can provide welcome relief from painful hemorrhoids. It can also help the area stay clean.  · Develop good bowel habits. Use the bathroom when you need to. Dont ignore the urge to move your bowels. This can lead to constipation, hard stools, and straining. Also, dont read while on the toilet. Sit only as long as needed. Wipe gently with soft,  unscented toilet tissue or baby wipes.  · Use ice packs. Placing an ice pack on a thrombosed external hemorrhoid can help relieve pain right away. It will also help reduce the blood clot. Use the ice for 15 to 20 minutes at a time. Keep a cloth between the ice and your skin to prevent skin damage.  · Use other measures. Laxatives and enemas can help ease constipation. But use them only on your healthcare providers advice. For symptom relief, try using cotton pads soaked in witch hazel. These are available at most drugstores. Over-the-counter hemorrhoid ointments and petroleum jelly can also provide relief.  Add fiber to your diet  Adding fiber to your diet can help relieve constipation by making stools softer and easier to pass. To increase your fiber intake, your healthcare provider may recommend a bulking agent, such as psyllium. This is a high-fiber supplement available at most grocery stores and drugstores. Eating more fiber-rich foods will also help. There are two types of fiber:  · Insoluble fiber is the main ingredient in bulking agents. Its also found in foods such as wheat bran, whole-grain breads, fresh fruits, and vegetables.  · Soluble fiber is found in foods such as oat bran. Although soluble fiber is good for you, it may not ease constipation as much as foods high in insoluble fiber.  Drink more water  Along with a high-fiber diet, drinking more water can help ease constipation. This is because insoluble fiber absorbs water, making stools soft and bulky. Be sure to drink plenty of water throughout the day. Drinking fruit juices, such as prune juice or apple juice, can also help prevent constipation.  Get more exercise  Regular exercise aids digestion and helps prevent constipation. Its also great for your health. So talk with your healthcare provider about starting an exercise program. Low-impact activities, such as swimming or walking, are good places to start. Take it easy at first. And remember  to drink plenty of water when you exercise.  High-fiber foods  High-fiber foods offer many benefits. By making your stools softer, they help heal and prevent swollen hemorrhoids. They may also help reduce the risk of colon and rectal cancer. Best of all, theyre usually low in calories and taste great. Here are some examples of fiber-rich foods.  · Whole grains, such as wheat bran, corn bran, and brown rice.  · Vegetables, especially carrots, broccoli, cabbage, and peas.  · Fruits, such as apples, bananas, raisins, peaches, and pears.  · Nuts and legumes, especially peanuts, lentils, and kidney beans.  Easy ways to add fiber  The tips below offer some simple ways to add more high-fiber foods to your meals.  · Start your day with a high-fiber breakfast. Eat a wheat bran cereal along with a sliced banana. Or, try peanut butter on whole-wheat toast.  · Eat carrot sticks for snacks. Theyre easy to prepare, taste great, and are low in calories.  · Use whole-grain breads instead of white bread for sandwiches.  · Eat fruits for treats. Try an apple and some raisins instead of a candy bar.   Date Last Reviewed: 7/1/2016  © 7088-7173 [employer]. [address] [place] [address], [place] [address]. All rights reserved. This information is not intended as a substitute for professional medical care. Always follow your healthcare professional's instructions.      Please follow up with your Primary care provider within 2-5 days if your signs and symptoms have not resolved or worsen.     If your condition worsens or fails to improve we recommend that you receive another evaluation at the emergency room immediately or contact your primary medical clinic to discuss your concerns.   You must understand that you have received an Urgent Care treatment only and that you may be released before all of your medical problems are known or treated. You, the patient, will arrange for follow up care as instructed.     RED FLAGS/WARNING  SYMPTOMS DISCUSSED WITH PATIENT THAT WOULD WARRANT EMERGENT MEDICAL ATTENTION. PATIENT VERBALIZED UNDERSTANDING.

## 2018-10-11 NOTE — PATIENT INSTRUCTIONS
Treating Hemorrhoids: Self-Care    Follow your healthcare providers advice about caring for your hemorrhoids at home. Some treatments help relieve symptoms right away. Others involve making changes in your diet and exercise habits. These can help ease constipation and prevent hemorrhoid symptoms from coming back.  Relieving symptoms  Your healthcare provider may prescribe anti-inflammatory medicine to help ease your symptoms. The following tips will also help relieve pain and swelling.  · Take sitz baths. Taking a sitz bath means sitting in a few inches of warm bath water. Soaking for 10 minutes twice a day can provide welcome relief from painful hemorrhoids. It can also help the area stay clean.  · Develop good bowel habits. Use the bathroom when you need to. Dont ignore the urge to move your bowels. This can lead to constipation, hard stools, and straining. Also, dont read while on the toilet. Sit only as long as needed. Wipe gently with soft, unscented toilet tissue or baby wipes.  · Use ice packs. Placing an ice pack on a thrombosed external hemorrhoid can help relieve pain right away. It will also help reduce the blood clot. Use the ice for 15 to 20 minutes at a time. Keep a cloth between the ice and your skin to prevent skin damage.  · Use other measures. Laxatives and enemas can help ease constipation. But use them only on your healthcare providers advice. For symptom relief, try using cotton pads soaked in witch hazel. These are available at most drugstores. Over-the-counter hemorrhoid ointments and petroleum jelly can also provide relief.  Add fiber to your diet  Adding fiber to your diet can help relieve constipation by making stools softer and easier to pass. To increase your fiber intake, your healthcare provider may recommend a bulking agent, such as psyllium. This is a high-fiber supplement available at most grocery stores and drugstores. Eating more fiber-rich foods will also help. There are two  types of fiber:  · Insoluble fiber is the main ingredient in bulking agents. Its also found in foods such as wheat bran, whole-grain breads, fresh fruits, and vegetables.  · Soluble fiber is found in foods such as oat bran. Although soluble fiber is good for you, it may not ease constipation as much as foods high in insoluble fiber.  Drink more water  Along with a high-fiber diet, drinking more water can help ease constipation. This is because insoluble fiber absorbs water, making stools soft and bulky. Be sure to drink plenty of water throughout the day. Drinking fruit juices, such as prune juice or apple juice, can also help prevent constipation.  Get more exercise  Regular exercise aids digestion and helps prevent constipation. Its also great for your health. So talk with your healthcare provider about starting an exercise program. Low-impact activities, such as swimming or walking, are good places to start. Take it easy at first. And remember to drink plenty of water when you exercise.  High-fiber foods  High-fiber foods offer many benefits. By making your stools softer, they help heal and prevent swollen hemorrhoids. They may also help reduce the risk of colon and rectal cancer. Best of all, theyre usually low in calories and taste great. Here are some examples of fiber-rich foods.  · Whole grains, such as wheat bran, corn bran, and brown rice.  · Vegetables, especially carrots, broccoli, cabbage, and peas.  · Fruits, such as apples, bananas, raisins, peaches, and pears.  · Nuts and legumes, especially peanuts, lentils, and kidney beans.  Easy ways to add fiber  The tips below offer some simple ways to add more high-fiber foods to your meals.  · Start your day with a high-fiber breakfast. Eat a wheat bran cereal along with a sliced banana. Or, try peanut butter on whole-wheat toast.  · Eat carrot sticks for snacks. Theyre easy to prepare, taste great, and are low in calories.  · Use whole-grain breads  instead of white bread for sandwiches.  · Eat fruits for treats. Try an apple and some raisins instead of a candy bar.   Date Last Reviewed: 7/1/2016  © 7542-7694 [employer]. [address] [place] [address], [place] [address]. All rights reserved. This information is not intended as a substitute for professional medical care. Always follow your healthcare professional's instructions.      Please follow up with your Primary care provider within 2-5 days if your signs and symptoms have not resolved or worsen.     If your condition worsens or fails to improve we recommend that you receive another evaluation at the emergency room immediately or contact your primary medical clinic to discuss your concerns.   You must understand that you have received an Urgent Care treatment only and that you may be released before all of your medical problems are known or treated. You, the patient, will arrange for follow up care as instructed.     RED FLAGS/WARNING SYMPTOMS DISCUSSED WITH PATIENT THAT WOULD WARRANT EMERGENT MEDICAL ATTENTION. PATIENT VERBALIZED UNDERSTANDING.

## 2018-10-13 LAB
C TRACH RRNA SPEC QL NAA+PROBE: NEGATIVE
N GONORRHOEA RRNA SPEC QL NAA+PROBE: NEGATIVE

## 2018-10-15 ENCOUNTER — TELEPHONE (OUTPATIENT)
Dept: URGENT CARE | Facility: CLINIC | Age: 28
End: 2018-10-15

## 2018-10-15 NOTE — TELEPHONE ENCOUNTER
----- Message from Orly Coburn PA-C sent at 10/13/2018  1:53 PM CDT -----  Please call the patient regarding her negative gonorrhea and chlamydia

## 2019-03-11 ENCOUNTER — TELEPHONE (OUTPATIENT)
Dept: FAMILY MEDICINE | Facility: CLINIC | Age: 29
End: 2019-03-11

## 2019-03-11 NOTE — TELEPHONE ENCOUNTER
----- Message from Sabrina Metz sent at 3/11/2019 12:36 PM CDT -----  Contact: Self/[phone]  Type:  Sooner Appointment Request    Patient is requesting a sooner appointment.  Patient declined first available appointment listed as well as another facility and provider .  Patient will not accept being placed on the waitlist and is requesting a message be sent to doctor.    Name of Caller: Patient     When is the first available appointment? 04/29/19    Symptoms: Annual    Best Call Back Number: [phone]    Thank you.

## 2019-04-29 ENCOUNTER — LAB VISIT (OUTPATIENT)
Dept: LAB | Facility: HOSPITAL | Age: 29
End: 2019-04-29
Attending: INTERNAL MEDICINE
Payer: COMMERCIAL

## 2019-04-29 ENCOUNTER — OFFICE VISIT (OUTPATIENT)
Dept: FAMILY MEDICINE | Facility: CLINIC | Age: 29
End: 2019-04-29
Payer: COMMERCIAL

## 2019-04-29 VITALS
OXYGEN SATURATION: 98 % | WEIGHT: 158.31 LBS | HEIGHT: 64 IN | TEMPERATURE: 98 F | DIASTOLIC BLOOD PRESSURE: 70 MMHG | BODY MASS INDEX: 27.03 KG/M2 | SYSTOLIC BLOOD PRESSURE: 100 MMHG | HEART RATE: 91 BPM

## 2019-04-29 DIAGNOSIS — F41.1 GAD (GENERALIZED ANXIETY DISORDER): ICD-10-CM

## 2019-04-29 DIAGNOSIS — Z00.00 ROUTINE MEDICAL EXAM: Primary | ICD-10-CM

## 2019-04-29 DIAGNOSIS — Z11.3 ROUTINE SCREENING FOR STI (SEXUALLY TRANSMITTED INFECTION): ICD-10-CM

## 2019-04-29 DIAGNOSIS — M79.644 BILATERAL THUMB PAIN: ICD-10-CM

## 2019-04-29 DIAGNOSIS — M79.645 BILATERAL THUMB PAIN: ICD-10-CM

## 2019-04-29 PROCEDURE — 86592 SYPHILIS TEST NON-TREP QUAL: CPT

## 2019-04-29 PROCEDURE — 99395 PREV VISIT EST AGE 18-39: CPT | Mod: S$GLB,,, | Performed by: INTERNAL MEDICINE

## 2019-04-29 PROCEDURE — 86703 HIV-1/HIV-2 1 RESULT ANTBDY: CPT

## 2019-04-29 PROCEDURE — 87491 CHLMYD TRACH DNA AMP PROBE: CPT

## 2019-04-29 PROCEDURE — 99999 PR PBB SHADOW E&M-EST. PATIENT-LVL III: CPT | Mod: PBBFAC,,, | Performed by: INTERNAL MEDICINE

## 2019-04-29 PROCEDURE — 99999 PR PBB SHADOW E&M-EST. PATIENT-LVL III: ICD-10-PCS | Mod: PBBFAC,,, | Performed by: INTERNAL MEDICINE

## 2019-04-29 PROCEDURE — 99395 PR PREVENTIVE VISIT,EST,18-39: ICD-10-PCS | Mod: S$GLB,,, | Performed by: INTERNAL MEDICINE

## 2019-04-29 PROCEDURE — 84443 ASSAY THYROID STIM HORMONE: CPT

## 2019-04-29 NOTE — PATIENT INSTRUCTIONS
"Below is a list of free Apps that you may find helpful (some of them may not apply to you since this is a general list of helpful Apps):    Android & Apple users:  EatFit - Created by [facility] nutritionists.  Tons of great recipes, links to >100 restaurants in town with healthy choices, and shopping guides.    Fooducate - Helps with healthy diet and weight loss  Shop Well - Scan barcodes to foods to see if it is healthy or not.  It will also suggest healthier alternatives  Lose It - Calorie tracking and goal setting for weight loss.  Peer support is also available  Calorie Counter and Diet Tracker by MyFitnessPal - Helps count calories for food intake and calories burned during exercise  PopsuCoull Active - has pictures and videos of preloaded workout routines    Headspace - Guides your through meditation.  The first 10 programs are free.  Stop, Think, and Breath - customizable meditation tyron with ability to perform "check-ins"          "

## 2019-04-29 NOTE — LETTER
April 29, 2019    Dr. Yair Rg               [facility]  [address]  [place] [address]  Phone: [phone]  Fax: [phone] April 29, 2019     Patient: Mone Chandler    YOB: 1990   Date of Visit: 4/29/2019       To Whom It May Concern:    We are seeing Mone Chandler in the clinic at [facility]. Myke Gardner  is the patient's PCP.  She/He has an outstanding lab/procedure at the time we reviewed his/her chart.  To help with our Health Maintenance records will you please supply the following report(s)        (  )  MAMMOGRAM                                             (  )  COLONOSCOPY        ( X )  PAP SMEAR                                                 (  )  OUTSIDE LAB RESULTS      (  )  DEXA SCAN                                                 (  )  EYE EXAM            (  )  FOOT EXAM                                                 (  )  ENTIRE RECORD      (  )  OUTSIDE IMMUNIZATIONS                        (  )  _______________          Please Fax to [facility], [address], [phone]      If any questions please contact [place] at [phone]        If you have any questions or concerns, please don't hesitate to call.    Sincerely,    Myke Gardner MD

## 2019-04-29 NOTE — PROGRESS NOTES
Assessment & Plan  Problem List Items Addressed This Visit        Psychiatric    ADRIANO (generalized anxiety disorder)    Overview     ADRIANO-7 score of 19         Current Assessment & Plan     Discussed that ADRIANO-7 score is suggestive of a need for medication but the patient would like to get back into her coping mechanisms instead of medication at this time.  Will follow this up in 3 months           Other Visit Diagnoses     Routine medical exam    -  Primary  -    Discussed healthy diet, regular exercise, necessary labs, age appropriate cancer screening, and routine vaccinations.       Routine screening for STI (sexually transmitted infection)    -  Screen for STI    Relevant Orders    C. trachomatis/N. gonorrhoeae by AMP DNA    HIV 1/2 Ag/Ab (4th Gen)    RPR    Bilateral thumb pain    -  Referred for PT/OT eval.  H/o bilateral thumb trigger release in 2014 with some persistent symptoms.     Relevant Orders    Ambulatory Referral to Physical/Occupational Therapy            Health Maintenance reviewed, requesting Pap.    Follow-up: Follow up in about 3 months (around 7/29/2019) for anxiety follow up.  ______________________________________________________________________    Chief Complaint  Chief Complaint   Patient presents with    Annual Exam       HPI  Mone June Chandler is a 28 y.o. female with medical diagnoses as listed in the medical history and problem list that presents for routine physical.  Pt is known to me with her last appointment 08/2017.      Feels she has some issues with feeling a knot when she swallows that is in her neck.  No true dysphagia and not particular food items that are identified.  No unintentional weight loss or odynophagia.     Had some blood with BMs.  Feels her hemorrhoid is flaring some.  Some constipation.  Some diarrhea that seems to be related to lactose intake.  Never bloody diarrhea    Would like STI screening today.      Anxiety has not been well controlled.  Reports she was  doing well in the past with coping mechanisms and herbal remedies. Has not been doing these recently.     GAD7 4/29/2019   ADRIANO-7 Score 19       PAST MEDICAL HISTORY:  Past Medical History:   Diagnosis Date    Allergic rhinitis, cause unspecified 6/17/2015    ADRIANO (generalized anxiety disorder) 6/17/2015    ADRIANO-7 score of 17     PCOS (polycystic ovarian syndrome)        PAST SURGICAL HISTORY:  Past Surgical History:   Procedure Laterality Date    thumb Bilateral     TRIGGER FINGER RELEASE Bilateral     thumbs       SOCIAL HISTORY:  Social History     Socioeconomic History    Marital status: Single     Spouse name: Not on file    Number of children: Not on file    Years of education: Not on file    Highest education level: Not on file   Occupational History    Not on file   Social Needs    Financial resource strain: Not on file    Food insecurity:     Worry: Not on file     Inability: Not on file    Transportation needs:     Medical: Not on file     Non-medical: Not on file   Tobacco Use    Smoking status: Never Smoker    Smokeless tobacco: Never Used   Substance and Sexual Activity    Alcohol use: No    Drug use: No    Sexual activity: Not on file   Lifestyle    Physical activity:     Days per week: Not on file     Minutes per session: Not on file    Stress: Not on file   Relationships    Social connections:     Talks on phone: Not on file     Gets together: Not on file     Attends Yazidi service: Not on file     Active member of club or organization: Not on file     Attends meetings of clubs or organizations: Not on file     Relationship status: Not on file   Other Topics Concern    Not on file   Social History Narrative    Not on file       FAMILY HISTORY:  Family History   Problem Relation Age of Onset    Hypertension Mother     Diverticulitis Father        ALLERGIES AND MEDICATIONS: updated and reviewed.  Review of patient's allergies indicates:  No Known Allergies  Current Outpatient  "Medications   Medication Sig Dispense Refill    diclofenac (VOLTAREN) 50 MG EC tablet Take 1 tablet (50 mg total) by mouth 2 (two) times daily. Take with food 60 tablet 1    fluticasone (FLONASE) 50 mcg/actuation nasal spray 1 spray by Each Nare route once daily. 1 Bottle 3    hydrocortisone (ANUSOL-HC) 25 mg suppository Place 1 suppository (25 mg total) rectally 2 (two) times daily as needed for Hemorrhoids. 4 suppository 0    hydrocortisone 2.5 % cream Apply topically 2 (two) times daily. for 10 days 20 g 0    ibuprofen (ADVIL,MOTRIN) 600 MG tablet Take 1 tablet (600 mg total) by mouth every 6 (six) hours as needed. 30 tablet 0     No current facility-administered medications for this visit.          ROS  Review of Systems   Constitutional: Negative for chills, fever and unexpected weight change.   HENT: Negative for congestion, ear pain, hearing loss, rhinorrhea, sore throat and trouble swallowing.    Eyes: Negative for discharge, redness and visual disturbance.   Respiratory: Negative for cough, chest tightness, shortness of breath and wheezing.    Cardiovascular: Negative for chest pain, palpitations and leg swelling.   Gastrointestinal: Positive for anal bleeding, constipation and diarrhea (with lactose). Negative for abdominal pain, nausea and vomiting.   Endocrine: Negative for polydipsia, polyphagia and polyuria.   Genitourinary: Negative for decreased urine volume, dysuria and hematuria.   Musculoskeletal: Negative for arthralgias, joint swelling and myalgias.   Skin: Negative for color change and rash.   Neurological: Negative for dizziness, weakness, light-headedness and headaches.   Psychiatric/Behavioral: Negative for decreased concentration, dysphoric mood, sleep disturbance and suicidal ideas. The patient is nervous/anxious.            Physical Exam  Vitals:    04/29/19 1524   BP: 100/70   Pulse: 91   Temp: 98.3 °F (36.8 °C)   SpO2: 98%   Weight: 71.8 kg (158 lb 4.6 oz)   Height: 5' 4" (1.626 m) " "   Body mass index is 27.17 kg/m².  Weight: 71.8 kg (158 lb 4.6 oz)   Height: 5' 4" (162.6 cm)   Physical Exam   Constitutional: She is oriented to person, place, and time. She appears well-developed and well-nourished. No distress.   HENT:   Head: Normocephalic and atraumatic.   Right Ear: Tympanic membrane, external ear and ear canal normal.   Left Ear: Tympanic membrane, external ear and ear canal normal.   Nose: Nose normal. Right sinus exhibits no maxillary sinus tenderness and no frontal sinus tenderness. Left sinus exhibits no maxillary sinus tenderness and no frontal sinus tenderness.   Mouth/Throat: Uvula is midline, oropharynx is clear and moist and mucous membranes are normal. No tonsillar exudate.   Eyes: Pupils are equal, round, and reactive to light. Conjunctivae, EOM and lids are normal. No scleral icterus.   Neck: Full passive range of motion without pain. Neck supple. No JVD present. No spinous process tenderness and no muscular tenderness present. Carotid bruit is not present. No thyromegaly present.   Cardiovascular: Normal rate, regular rhythm, S1 normal, S2 normal and intact distal pulses. Exam reveals no S3, no S4 and no friction rub.   No murmur heard.  Pulmonary/Chest: Effort normal and breath sounds normal. She has no wheezes. She has no rhonchi. She has no rales.   Abdominal: Soft. Bowel sounds are normal. She exhibits no distension. There is no hepatosplenomegaly. There is no tenderness. There is no rebound and no CVA tenderness.   Musculoskeletal: Normal range of motion. She exhibits no edema or tenderness.   Lymphadenopathy:        Head (right side): No submental and no submandibular adenopathy present.        Head (left side): No submental and no submandibular adenopathy present.     She has no cervical adenopathy.   Neurological: She is alert and oriented to person, place, and time. Coordination normal.   Motor grossly intact.  Sensory grossly intact.  Symmetric facial movements palate " elevated symmetrically tongue midline     Skin: Skin is warm and dry. No rash noted. No cyanosis. Nails show no clubbing.   Psychiatric: She has a normal mood and affect. Her speech is normal and behavior is normal. Thought content normal. Cognition and memory are normal.           Health Maintenance       Date Due Completion Date    TETANUS VACCINE 07/01/2018 7/1/2008    Influenza Vaccine 08/01/2018 2/13/2017    Pap Smear 01/11/2019 1/11/2016

## 2019-04-29 NOTE — ASSESSMENT & PLAN NOTE
Discussed that ADRIANO-7 score is suggestive of a need for medication but the patient would like to get back into her coping mechanisms instead of medication at this time.  Will follow this up in 3 months   Sukhi Guevara), Internal Medicine; Nephrology  [address] [place] [address]  Phone: [phone]  Fax: [phone]    Haris Shafer), Cardiovascular Disease; Internal Medicine  [address] [place] [address]  Phone: [phone]  Fax: [phone]    PMD: SHARAN Singer,   Phone: (   )    -  Fax: (   )    -

## 2019-04-30 LAB
HIV 1+2 AB+HIV1 P24 AG SERPL QL IA: NEGATIVE
RPR SER QL: NORMAL
TSH SERPL DL<=0.005 MIU/L-ACNC: 1.26 UIU/ML (ref 0.4–4)

## 2019-04-30 NOTE — PROGRESS NOTES
Your lab results are normal.  Please feel free to contact me with any questions or concerns.    Sincerely,  Myke Gardner  [email or web address]

## 2019-05-01 LAB
C TRACH DNA SPEC QL NAA+PROBE: NOT DETECTED
N GONORRHOEA DNA SPEC QL NAA+PROBE: NOT DETECTED

## 2019-05-01 NOTE — PROGRESS NOTES
Your urine results are normal.  Please feel free to contact me with any questions or concerns.    Sincerely,  Myke Gardner  [email or web address]

## 2019-05-03 ENCOUNTER — PATIENT MESSAGE (OUTPATIENT)
Dept: FAMILY MEDICINE | Facility: CLINIC | Age: 29
End: 2019-05-03

## 2019-05-03 DIAGNOSIS — E66.3 OVERWEIGHT (BMI 25.0-29.9): Primary | ICD-10-CM

## 2019-05-03 DIAGNOSIS — F41.1 GAD (GENERALIZED ANXIETY DISORDER): ICD-10-CM

## 2019-05-06 ENCOUNTER — PATIENT MESSAGE (OUTPATIENT)
Dept: FAMILY MEDICINE | Facility: CLINIC | Age: 29
End: 2019-05-06

## 2019-05-14 ENCOUNTER — CLINICAL SUPPORT (OUTPATIENT)
Dept: REHABILITATION | Facility: HOSPITAL | Age: 29
End: 2019-05-14
Attending: INTERNAL MEDICINE
Payer: COMMERCIAL

## 2019-05-14 DIAGNOSIS — G89.29 CHRONIC THUMB PAIN, BILATERAL: ICD-10-CM

## 2019-05-14 DIAGNOSIS — M79.645 CHRONIC THUMB PAIN, BILATERAL: ICD-10-CM

## 2019-05-14 DIAGNOSIS — M79.644 CHRONIC THUMB PAIN, BILATERAL: ICD-10-CM

## 2019-05-14 PROCEDURE — 97140 MANUAL THERAPY 1/> REGIONS: CPT | Mod: PO

## 2019-05-14 PROCEDURE — 97165 OT EVAL LOW COMPLEX 30 MIN: CPT | Mod: PO

## 2019-05-14 NOTE — PLAN OF CARE
[facility] Occupational Therapy  Hand and Wrist  Evaluation     Date: 5/14/2019  Name: Mone Chandler  Clinic Number: [id number]    Therapy Diagnosis:   Encounter Diagnosis   Name Primary?    Chronic thumb pain, bilateral      Physician: Myke Gardner MD    Physician Orders: Evaluate and treat ; 2x/wk x 6 wks , Modalities prn  Medical Diagnosis: M79.644,M79.645 (ICD-10-CM) - Bilateral thumb pain  Evaluation Date: 5/14/2019  Insurance Authorization Expiration date : 12-31-19  Plan of Care Certification Period: 5-14-19 to 6-15-19   Date of Return to MD: 7-30-19     Visit # / Visits authorized: 1 / 20  Time In:5pm  Time Out: 6pm  Total Billable Time: 60 minutes    Precautions:  Standard    Subjective       Involved Side: (B) thumbs  Dominant Side: Right  Date of Onset: patient had (B)  trigger thumb in 2014 by Dr. Cabello   History of Current Condition/Mechanism of Injury: patient reports having trigger thumb as a child and finally had surgery in 2014 , but feel she still has knots and afraid to do anything that will  Cause her thumbs   Imaging: please see image report   Surgical Procedure and Date: 2014     Past Medical History/Physical Systems Review:   Mone Chandler  has a past medical history of Allergic rhinitis, cause unspecified, ADRIANO (generalized anxiety disorder), and PCOS (polycystic ovarian syndrome).    Mone Chandler  has a past surgical history that includes thumb (Bilateral) and Trigger finger release (Bilateral).    Mone has a current medication list which includes the following prescription(s): diclofenac, fluticasone propionate, hydrocortisone, hydrocortisone, and ibuprofen.    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     Current FunctionalStatus   Home/Living environment : lives with their family      Limitation of Functional Status as follows:   ADLs/IADLs:     - Feeding: l    - Bathing: l    - Dressing/Grooming: l    - Driving: l     Leisure: Pole dancing - had to stop due to fear of  re-injury   Objective       Observation:   Skin intact    Sensation: Median Nerve Distribution   5/14/2019 5/14/2019    Left Right   Monofilament Testing     Normal 1.65-2.83 Present Present   Diminished Light Touch 3.22-3.61 Present Present   Diminished Protective 3.84-4.31 Present Present   Loss of Protective 4.56-6.65 Present Present   Untestable >6.65 Present Present       Wound Assessment:   Healed scars , significant scar tissue on A2 pulleys of thumb         Range of Motion:    Active   5/14/2019   Thumb- right                           MP Ext/Flex wnl/wnl                         IP Ext/Flex Wnl/80       THUMB- left                           MP Ext/Flex wnl/wnl                         IP Ext/Flex wnl/75                         Anders ABD wnl                         Radial ABD wnl                         Opposition wnl     Comments:  EPL: 3/5  MMT       Strength: (PEDRO LUIS Dynamometer in lbs.) Average 3 trials, Position II:     5/14/2019 5/14/2019   Rung2 Right  Left   PEDRO LUIS # 1 35# 312#       Pinch Strength (Measured in psi)     5/14/2019 5/14/2019    Right Left   Key Pinch 5 psi 8 psi   3pt Pinch 2 psi 2 psi   2pt Pinch 0 psi 1 psi       Outcome Measure: FOTO- not supplied from      Treatment     Treatment Time In/out  (separate from total time) : 10 minutes at the end of the hour     Home Exercise Program/Education:  Issued HEP (see patient instructions in EMR) and educated on modality use for pain management .  "Exercises were reviewed and Mone was able to demonstrate them prior to the end of the session.   Pt received a written copy of exercises to perform at home. Mone demonstrated fair  understanding of the education provided.  Pt was advised to perform these exercises free of pain, and to stop performing them if pain occurs.    Patient/Family Education: role of OT, goals for OT, double booking , scheduling/cancellations - pt verbalized understanding. Discussed insurance limitations with patient.    Additional Education provided: role of CHT/OT, goals for CHT/OT, discussed insurance limitation with patient- patient verbalized understanding         Assessment     This 28 y.o. female referred to Outpatient Occupational Therapy with diagnosis of   Encounter Diagnosis   Name Primary?    Chronic thumb pain, bilateral     presents with limitations as described in problem list. Patient can benefit from Occupational Therapy services for Ultrasound, moist heat, PROM, AAROM, AROM, Theraputic exercises, home exercise program provied with written instructions and strengthening and Orthosis, if deemed necessary . The following goals were discussed with the patient and she is in agreement with them as to be addressed in the treatment plan.     Problem List:   Decreased function of Right and left thumb , Decreased ROM, surgical  Significant scar tissue in volar thumb   Patient was  fearful during pole dancing that her thumbs would "pop out of place" so she discontinued.     Anticipated barriers to occupational therapy: chronic trigger thumb, surgery 2014 and therapy received,      Pt has no cultural, educational or language barriers to learning provided.        Profile and History Assessment of Occupational Performance Level of Clinical Decision Making Complexity Score   Occupational Profile:   Mone Chandler is a 28 y.o. female who lives with their family and is currently employed Mone Chandler has difficulty " with  ADLs and IADLs as listed previously, which  affecting his/her daily functional abilities.      Comorbidities:    has a past medical history of Allergic rhinitis, cause unspecified, ADRIANO (generalized anxiety disorder), and PCOS (polycystic ovarian syndrome).    Medical and Therapy History Review:   Brief               Performance Deficits    Physical:  Joint Mobility   Strength  Pinch Strength  Pain    Cognitive:  No Deficits    Psychosocial:    No Deficits     Clinical Decision Making:  low    Assessment Process:  Problem-Focused Assessments    Modification/Need for Assistance:  Not Necessary    Intervention Selection:  Limited Treatment Options       low  Based on PMHX, co morbidities , data from assessments and functional level of assistance required with task and clinical presentation directly impacting function.         The following goals were discussed with the patient and patient is in agreement with them as to be addressed in the treatment plan.     Goals:       Goals to be met in 4  weeks:    1) Patient to be IND with HEP and modalities for pain managment.  2) Patient will  Increase Active Range of motion 5-10 degrees in hand/wrist to increase functional hand use for work related task.   3)Pt will increase  strength 10-20 lbs. to improve functional grasp during pole  dancing   4) Pt will increase pinch strength in all 3 limited positions by 1-3 psi to assist with her grasping and opening bottles        Plan     Plan    Certification Period/Plan of care expiration: 5/14/2019 to 6/14/19 .    Outpatient Occupational Therapy 1 times weekly for 4 weeks to include the following interventions: Paraffin, Fluidotherapy, Manual therapy/joint mobilizations, US 3 mhz, Therapeutic exercises/activities., Strengthening and Scar Management.      Heidi Baltazar, MOT,  OTR/L, CHT  Occupational therapist, Certified Hand Therapist

## 2019-05-14 NOTE — PATIENT INSTRUCTIONS
[facility]  OCCUPATIONAL THERAPY  HOME EXERCISE PROGRAM         Place pad of fingertip on scar area. Apply steady downward pressure while moving in circular fashion. Use another fin-duke on top to assist. Repeat until entire scar has been covered.  Repeat for 5-10 minutes. Do 3-5 sessions per day.  Copyright © [facility]. All rights reserved.     -supplied tennis ball for scar massage       IP Flexion (Active Blocked)        Brace thumb below tip joint. Bend joint as far as possible.  Repeat __15__ times. Do __5__ sessions per day.    Copyright © [employer]. All rights reserved.   Composite Extension (Active)        Bring thumb up and out in hitchhiker position. Hold __3__ seconds.  Repeat ___15_ times. Do __5__ sessions per day.    Copyright © [employer]. All rights reserved.   MP Extension (Active)        With palm on table, lift thumb up. Hold _3___ seconds. Relax and lower thumb.  Repeat __15__ times. Do _5___ sessions per day.

## 2019-05-23 ENCOUNTER — PATIENT MESSAGE (OUTPATIENT)
Dept: FAMILY MEDICINE | Facility: CLINIC | Age: 29
End: 2019-05-23

## 2019-05-27 ENCOUNTER — PATIENT MESSAGE (OUTPATIENT)
Dept: FAMILY MEDICINE | Facility: CLINIC | Age: 29
End: 2019-05-27

## 2019-06-04 ENCOUNTER — CLINICAL SUPPORT (OUTPATIENT)
Dept: REHABILITATION | Facility: HOSPITAL | Age: 29
End: 2019-06-04
Attending: INTERNAL MEDICINE
Payer: COMMERCIAL

## 2019-06-04 DIAGNOSIS — M79.644 CHRONIC THUMB PAIN, BILATERAL: ICD-10-CM

## 2019-06-04 DIAGNOSIS — M79.645 CHRONIC THUMB PAIN, BILATERAL: ICD-10-CM

## 2019-06-04 DIAGNOSIS — G89.29 CHRONIC THUMB PAIN, BILATERAL: ICD-10-CM

## 2019-06-04 PROCEDURE — 97110 THERAPEUTIC EXERCISES: CPT | Mod: PO

## 2019-06-04 NOTE — PROGRESS NOTES
"                            Occupational Therapy Daily Treatment Note       Date: 6/4/2019  Name: Mone Chandler  Clinic Number: [id number]    Therapy Diagnosis:   Encounter Diagnosis   Name Primary?    Chronic thumb pain, bilateral      Physician: Myke Gardner MD       Physician Orders: Evaluate and treat ; 2x/wk x 6 wks , Modalities prn  Medical Diagnosis: M79.644,M79.645 (ICD-10-CM) - Bilateral thumb pain  Evaluation Date: 5/14/2019  Insurance Authorization Expiration date : 12-31-19  Plan of Care Certification Period: 5-14-19 to 6-15-19   Date of Return to MD: 7-30-19      Visit # / Visits authorized: 1 / 20    Time In: 4:30 pm   Time Out:  5pm  Total Billable Time: 30 minutes     Precautions:  Standard     Subjective     Pt reports:  " I have not been doing the exercises like I should. I would like me to show me how to do the exercises."   she was not compliant with home exercise program given last session.   Response to previous treatment: no difference in motion or scar.       Pain: 0/10  Location: { (B) thumbs     Objective          Mone   received the following manual therapy techniques to increase joint mobilization and soft tissue mobilization for  20 minutes:     -Performed scar massage to (B) thumbs area for 15 minutes with min vibrator to decrease dense adhesions and improve tensile glide.      -Performed manual therapy techniques for thumb extension area including joint mobilizations  5 minutes to increase joint mobility.     Mone  participated in dynamic functional therapeutic exercises to improve functional performance while increasing strength, endurance, ROM,  and flexibility  for 15  minutes, including:  - went over HEP from last week   - went over new HEP        Home Exercises and Education Provided     Education provided:  - discussed insurance limitation  - Progress towards goals   - continue prior HEP and new HEP supplied 6/4/19.       Written Home Exercises Provided: " yes.  Exercises were reviewed and Mone was able to demonstrate them prior to the end of the session.  Mone demonstrated good  understanding of the HEP provided.   .   See EMR under Patient Instructions for exercises provided 6/4/2019. Continue with prior HEP      Assessment     Pt would continue to benefit from skilled OT. Pt has not been compliant with HEP. Supplied putty today with new HEP to attempt to increase pinch and  strength. Pt was highly encouraged to perform HEP . Pt reported to  Start today.     Mone is not  progressing well towards her goals and there are no updates to goals at this time. Pt prognosis is Good.   The patient demonstrated proper understanding  of each exercise.Pt required verbal and tactile cues for all HEP .  Pt  will continue to benefit from skilled OT intervention.     Anticipated barriers to occupational therapy: not performing HEP, concern with her thumbs in childhood. Trigger finger repair in 2014 and now has significant amount of scar tissue. Never went to therapy due to money constraints.     Pt's spiritual, cultural and educational needs considered and pt agreeable to plan of care and goals.    Goals:    Goals to be met in 4  weeks:     1) Patient to be IND with HEP and modalities for pain managment.  2) Patient will  Increase Active Range of motion 5-10 degrees in hand/wrist to increase functional hand use for work related task.   3)Pt will increase  strength 10-20 lbs. to improve functional grasp during pole  dancing   4) Pt will increase pinch strength in all 3 limited positions by 1-3 psi to assist with her grasping and opening bottles         Plan      Certification period: 5-14-19  to 6-14-19 for  to be seen  for  4 weeks   Updates/Grading for next session:  and pinch strengthening       Heidi Baltazar, MOT, OTR/L, CHT

## 2019-06-18 ENCOUNTER — CLINICAL SUPPORT (OUTPATIENT)
Dept: REHABILITATION | Facility: HOSPITAL | Age: 29
End: 2019-06-18
Attending: INTERNAL MEDICINE
Payer: COMMERCIAL

## 2019-06-18 DIAGNOSIS — M79.645 CHRONIC THUMB PAIN, BILATERAL: ICD-10-CM

## 2019-06-18 DIAGNOSIS — G89.29 CHRONIC THUMB PAIN, BILATERAL: ICD-10-CM

## 2019-06-18 DIAGNOSIS — M79.644 CHRONIC THUMB PAIN, BILATERAL: ICD-10-CM

## 2019-06-18 PROCEDURE — 97018 PARAFFIN BATH THERAPY: CPT | Mod: PO,59

## 2019-06-18 PROCEDURE — 97140 MANUAL THERAPY 1/> REGIONS: CPT | Mod: PO

## 2019-06-18 PROCEDURE — 97110 THERAPEUTIC EXERCISES: CPT | Mod: PO

## 2019-06-18 NOTE — PROGRESS NOTES
"                            Occupational Therapy Daily Treatment Note       Date: 6/18/2019  Name: Mone Chandler  Clinic Number: [id number]    Therapy Diagnosis:   Encounter Diagnosis   Name Primary?    Chronic thumb pain, bilateral      Physician: Myke Gardner MD       Physician Orders: Evaluate and treat ; 2x/wk x 6 wks , Modalities prn  Medical Diagnosis: M79.644,M79.645 (ICD-10-CM) - Bilateral thumb pain  Evaluation Date: 5/14/2019  Insurance Authorization Expiration date : 12-31-19  Plan of Care Certification Period: 5-14-19 to 6-15-19   Date of Return to MD: 7-30-19      Visit # / Visits authorized: 3 / 20    Time In: 4:30 pm   Time Out:  5:30 pm  Total Billable Time: 30 minutes     Precautions:  Standard     Subjective     Pt reports:  " I feel like the knots change after I am done with you. They feel smoother. I just have not had time to do my exercises."   she was not compliant with home exercise program.   Response to previous treatment: no difference in motion or scar.       Pain: 0/10  Location:  (B) thumbs     Objective     Patient received paraffin bath to (B)  hand(s) for 8 minutes to increase blood flow, circulation, pain management and for tissue elasticity prior to therex.        Mone   received the following manual therapy techniques to increase joint mobilization and soft tissue mobilization for  20 minutes:     -Performed scar massage with MV , then scar extractor  to (B) thumbs area for 15 minutes with min vibrator to decrease dense adhesions and improve tensile glide.      -Performed manual therapy techniques for thumb extension area including joint mobilizations  5 minutes to increase joint mobility.     Mone  participated in dynamic functional therapeutic exercises to improve functional performance while increasing strength, endurance, ROM,  and flexibility  for 15  minutes, including:  - red power web for finger extension x 2 minutes  - CHG @ 35# x 2 minutes  - red putty " for gripping x 2 minutes   - 2pt, 3pt, lateral pinch x 2 minutes  - IP flexion and then MP flexion using red clothespin x 2 minutes     Home Exercises and Education Provided     Education provided:  - discussed insurance limitation  - Progress towards goals   - continue prior HEP and new HEP supplied 6/4/19.       Written Home Exercises Provided: yes.  Exercises were reviewed and Mone was able to demonstrate them prior to the end of the session.  Mone demonstrated good  understanding of the HEP provided.    .   See EMR under Patient Instructions for exercises provided 6/4/2019. Continue with prior HEP      Assessment     Pt would continue to benefit from skilled OT. Pt has not been compliant with HEP, has not used putty .  Supplied firm red sponge for  strength. Pt with better performance today and less nodules in thumbs after exercises.     Mone is not  progressing well towards her goals and there are no updates to goals at this time. Pt prognosis is Good.   The patient demonstrated proper understanding  of each exercise.Pt required verbal and tactile cues for all HEP .  Pt  will continue to benefit from skilled OT intervention.     Anticipated barriers to occupational therapy: not performing HEP, concern with her thumbs in childhood. Trigger finger repair in 2014 and now has significant amount of scar tissue. Never went to therapy due to money constraints.     Pt's spiritual, cultural and educational needs considered and pt agreeable to plan of care and goals.    Goals:    Goals to be met in 4  weeks:     1) Patient to be IND with HEP and modalities for pain managment.  2) Patient will  Increase Active Range of motion 5-10 degrees in hand/wrist to increase functional hand use for work related task.   3)Pt will increase  strength 10-20 lbs. to improve functional grasp during pole  dancing   4) Pt will increase pinch strength in all 3 limited positions by 1-3 psi to assist with her grasping and  opening bottles         Plan      Certification period: 5-14-19  to 6-14-19 for  to be seen  for  4 weeks. Pt has missed 2 visits and rescheduled    Updates/Grading for next session:  and pinch strengthening       Heidi Baltazar, MOT, OTR/L, CHT

## 2019-06-26 ENCOUNTER — TELEPHONE (OUTPATIENT)
Dept: ORTHOPEDICS | Facility: CLINIC | Age: 29
End: 2019-06-26

## 2019-06-26 NOTE — TELEPHONE ENCOUNTER
Called patient in regard to upcoming appt with Dr. St on 7/1/19 at [facility]. Informed her of the need for x-ray prior to the appt and she refused saying that she has no time between now and the appointment to get the x-ray done. Did confirm that she will be present for the clinic appt.

## 2019-06-28 ENCOUNTER — PATIENT OUTREACH (OUTPATIENT)
Dept: ADMINISTRATIVE | Facility: OTHER | Age: 29
End: 2019-06-28

## 2019-07-01 ENCOUNTER — OFFICE VISIT (OUTPATIENT)
Dept: ORTHOPEDICS | Facility: CLINIC | Age: 29
End: 2019-07-01
Payer: MEDICAID

## 2019-07-01 VITALS
DIASTOLIC BLOOD PRESSURE: 72 MMHG | HEIGHT: 64 IN | HEART RATE: 93 BPM | WEIGHT: 158.31 LBS | BODY MASS INDEX: 27.03 KG/M2 | SYSTOLIC BLOOD PRESSURE: 111 MMHG

## 2019-07-01 DIAGNOSIS — G89.29 CHRONIC THUMB PAIN, BILATERAL: Primary | ICD-10-CM

## 2019-07-01 DIAGNOSIS — M79.644 CHRONIC THUMB PAIN, BILATERAL: Primary | ICD-10-CM

## 2019-07-01 DIAGNOSIS — M79.645 CHRONIC THUMB PAIN, BILATERAL: Primary | ICD-10-CM

## 2019-07-01 PROCEDURE — 99203 PR OFFICE/OUTPT VISIT, NEW, LEVL III, 30-44 MIN: ICD-10-PCS | Mod: S$PBB,,, | Performed by: ORTHOPAEDIC SURGERY

## 2019-07-01 PROCEDURE — 99203 OFFICE O/P NEW LOW 30 MIN: CPT | Mod: S$PBB,,, | Performed by: ORTHOPAEDIC SURGERY

## 2019-07-01 PROCEDURE — 99213 OFFICE O/P EST LOW 20 MIN: CPT | Mod: PBBFAC | Performed by: ORTHOPAEDIC SURGERY

## 2019-07-01 PROCEDURE — 99999 PR PBB SHADOW E&M-EST. PATIENT-LVL III: CPT | Mod: PBBFAC,,, | Performed by: ORTHOPAEDIC SURGERY

## 2019-07-01 PROCEDURE — 99999 PR PBB SHADOW E&M-EST. PATIENT-LVL III: ICD-10-PCS | Mod: PBBFAC,,, | Performed by: ORTHOPAEDIC SURGERY

## 2019-07-01 RX ORDER — DICLOFENAC SODIUM 10 MG/G
2 GEL TOPICAL DAILY
Qty: 100 G | Refills: 0 | Status: SHIPPED | OUTPATIENT
Start: 2019-07-01 | End: 2019-08-07 | Stop reason: SDUPTHER

## 2019-07-01 NOTE — PROGRESS NOTES
[facility]  History & Physical  Orthopedics    SUBJECTIVE:      Chief Complaint: s/p trigger finger releases of b/l thumbs in 2014    Referring Provider: Self, Aaareferral       History of Present Illness:  Patient is a 28 y.o. right hand dominant female who presents today with complaints of pain and hesitation to fully range her bilateral thumbs. She had bilateral trigger thumb releases in 2014. She states that she did not go to therapy for a while afterward because she could not afford it, but that she has been going to OT recently due to pain in her thumbs. She states that she does not feel the thumbs trigger as they had in the past, but that she does not feel she has full ROM. She has hesitation to full range her thumbs due to fear of pain. She was told by the therapist that she has a buildup of scar tissue and she is wondering what options she has for treatment. She refused xrays today.     The patient is a/an student.    Onset of symptoms/DOI was 5 years ago.    Symptoms are aggravated by activity and movement.    Symptoms are alleviated by rest.    Symptoms consist of pain and decreased ROM.    The patient rates their pain as a 3/10.    Attempted treatment(s) and/or interventions include physical and/or occupational therapy and surgical intervention.     The patient denies any fevers, chills, N/V, D/C and presents for evaluation.       Past Medical History:   Diagnosis Date    Allergic rhinitis, cause unspecified 6/17/2015    ADRIANO (generalized anxiety disorder) 6/17/2015    ADRIANO-7 score of 17     PCOS (polycystic ovarian syndrome)      Past Surgical History:   Procedure Laterality Date    thumb Bilateral     TRIGGER FINGER RELEASE Bilateral     thumbs     Review of patient's allergies indicates:  No Known Allergies  Social History     Social History Narrative    Not on file     Family History   Problem Relation Age of Onset    Hypertension Mother     Diverticulitis Father   "        Current Outpatient Medications:     diclofenac (VOLTAREN) 50 MG EC tablet, Take 1 tablet (50 mg total) by mouth 2 (two) times daily. Take with food, Disp: 60 tablet, Rfl: 1    fluticasone (FLONASE) 50 mcg/actuation nasal spray, 1 spray by Each Nare route once daily., Disp: 1 Bottle, Rfl: 3    hydrocortisone (ANUSOL-HC) 25 mg suppository, Place 1 suppository (25 mg total) rectally 2 (two) times daily as needed for Hemorrhoids., Disp: 4 suppository, Rfl: 0    hydrocortisone 2.5 % cream, Apply topically 2 (two) times daily. for 10 days, Disp: 20 g, Rfl: 0    ibuprofen (ADVIL,MOTRIN) 600 MG tablet, Take 1 tablet (600 mg total) by mouth every 6 (six) hours as needed., Disp: 30 tablet, Rfl: 0      Review of Systems:  Constitutional: no fever or chills  Eyes: no visual changes  ENT: no nasal congestion or sore throat  Respiratory: no cough or shortness of breath  Cardiovascular: no chest pain  Gastrointestinal: no nausea or vomiting, tolerating diet  Musculoskeletal: pain, soreness and decreased ROM    OBJECTIVE:      Vital Signs (Most Recent):  Vitals:    07/01/19 1412   BP: 111/72   BP Location: Left arm   Patient Position: Sitting   BP Method: Medium (Automatic)   Pulse: 93   Weight: 71.8 kg (158 lb 4.6 oz)   Height: 5' 4" (1.626 m)     Body mass index is 27.17 kg/m².      Physical Exam:  Constitutional: The patient appears well-developed and well-nourished. No distress.   Head: Normocephalic and atraumatic.   Nose: Nose normal.   Eyes: Conjunctivae and EOM are normal.   Neck: No tracheal deviation present.   Cardiovascular: Normal rate and intact distal pulses.    Pulmonary/Chest: Effort normal. No respiratory distress.   Abdominal: There is no guarding.   Neurological: The patient is alert.   Psychiatric: The patient has a normal mood and affect.     Bilateral Hand/Wrist Examination:    Observation/Inspection:  Swelling  none    Deformity  none  Discoloration  none "     Scars   present    Atrophy  none    HAND/WRIST EXAMINATION:  Finkelstein's Test   Neg  Snuff box tenderness   Neg  Paredes's Test    Neg  Hook of Hamate Tenderness  Neg  CMC grind    Neg  Circumduction test   Neg    Neurovascular Exam:  Digits WWP, brisk CR < 3s throughout  NVI motor/LTS to M/R/U nerves, radial pulse 2+  Tinel's Test - Carpal Tunnel  Neg  Tinel's Test - Cubital Tunnel  Neg  Phalen's Test    Neg  Median Nerve Compression Test Neg    ROM hand/wrist/elbow full, painless      Diagnostic Results:     Xray - none  EMG - none    ASSESSMENT/PLAN:      28 y.o. yo female with bilateral thumb pain s/p bilateral trigger release in 2014  1) Continue OT  2) NSAIDs for pain  3) Patient willing to give OT and NSAIDs trial a chance before undergoing injections  4) follow up prn        Paul St M.D.

## 2019-07-02 ENCOUNTER — OFFICE VISIT (OUTPATIENT)
Dept: FAMILY MEDICINE | Facility: CLINIC | Age: 29
End: 2019-07-02
Payer: COMMERCIAL

## 2019-07-02 VITALS
DIASTOLIC BLOOD PRESSURE: 82 MMHG | OXYGEN SATURATION: 97 % | BODY MASS INDEX: 27.18 KG/M2 | WEIGHT: 159.19 LBS | HEART RATE: 96 BPM | HEIGHT: 64 IN | SYSTOLIC BLOOD PRESSURE: 110 MMHG | TEMPERATURE: 98 F

## 2019-07-02 DIAGNOSIS — M25.561 CHRONIC PAIN OF RIGHT KNEE: Primary | ICD-10-CM

## 2019-07-02 DIAGNOSIS — G89.29 CHRONIC PAIN OF RIGHT KNEE: Primary | ICD-10-CM

## 2019-07-02 PROCEDURE — 99999 PR PBB SHADOW E&M-EST. PATIENT-LVL IV: CPT | Mod: PBBFAC,,, | Performed by: NURSE PRACTITIONER

## 2019-07-02 PROCEDURE — 3008F PR BODY MASS INDEX (BMI) DOCUMENTED: ICD-10-PCS | Mod: CPTII,S$GLB,, | Performed by: NURSE PRACTITIONER

## 2019-07-02 PROCEDURE — 3008F BODY MASS INDEX DOCD: CPT | Mod: CPTII,S$GLB,, | Performed by: NURSE PRACTITIONER

## 2019-07-02 PROCEDURE — 99214 PR OFFICE/OUTPT VISIT, EST, LEVL IV, 30-39 MIN: ICD-10-PCS | Mod: S$GLB,,, | Performed by: NURSE PRACTITIONER

## 2019-07-02 PROCEDURE — 99214 OFFICE O/P EST MOD 30 MIN: CPT | Mod: S$GLB,,, | Performed by: NURSE PRACTITIONER

## 2019-07-02 PROCEDURE — 99999 PR PBB SHADOW E&M-EST. PATIENT-LVL IV: ICD-10-PCS | Mod: PBBFAC,,, | Performed by: NURSE PRACTITIONER

## 2019-07-02 NOTE — PROGRESS NOTES
History of Present Illness   Mone Chandler is a 28 y.o. woman with medical history as listed below who presents today for evaluation of right knee pain, chronic. The pain is intermittent and is worse after physical activity. She does exercise regular and has been a runner and dancer for some years now. She reports hearing popping at times when she initiates walking. She will have swelling and tenderness after activity. She has tried using a brace but has not found one that is comfortable. She denies obvious deformity, redness, warmth, numbness, tingling, or new injury or trauma. She has not tried OTC medication for symptoms. She has no additional complaints and is otherwise healthy on today's visit.    Past Medical History:   Diagnosis Date    Allergic rhinitis, cause unspecified 6/17/2015    ADRIANO (generalized anxiety disorder) 6/17/2015    ADRIANO-7 score of 17     PCOS (polycystic ovarian syndrome)        Past Surgical History:   Procedure Laterality Date    thumb Bilateral     TRIGGER FINGER RELEASE Bilateral     thumbs       Social History     Socioeconomic History    Marital status: Single     Spouse name: Not on file    Number of children: Not on file    Years of education: Not on file    Highest education level: Not on file   Occupational History    Not on file   Social Needs    Financial resource strain: Not on file    Food insecurity:     Worry: Not on file     Inability: Not on file    Transportation needs:     Medical: Not on file     Non-medical: Not on file   Tobacco Use    Smoking status: Never Smoker    Smokeless tobacco: Never Used   Substance and Sexual Activity    Alcohol use: No    Drug use: No    Sexual activity: Not on file   Lifestyle    Physical activity:     Days per week: Not on file     Minutes per session: Not on file    Stress: Not on file   Relationships    Social connections:     Talks on phone: Not on file     Gets together: Not on file     Attends Congregational  "service: Not on file     Active member of club or organization: Not on file     Attends meetings of clubs or organizations: Not on file     Relationship status: Not on file   Other Topics Concern    Not on file   Social History Narrative    Not on file       Family History   Problem Relation Age of Onset    Hypertension Mother     Diverticulitis Father        Review of Systems  Review of Systems   Constitutional: Negative for fever.   Musculoskeletal: Positive for joint pain. Negative for falls.   Skin: Negative for itching and rash.   Neurological: Negative for tingling, sensory change and focal weakness.     A complete review of systems was otherwise negative.    Physical Exam  /82   Pulse 96   Temp 98.4 °F (36.9 °C) (Oral)   Ht 5' 4" (1.626 m)   Wt 72.2 kg (159 lb 2.8 oz)   LMP 06/24/2019   SpO2 97%   BMI 27.32 kg/m²   General appearance: alert, appears stated age, cooperative and no distress  Lungs: clear to auscultation bilaterally  Heart: regular rate and rhythm, S1, S2 normal, no murmur, click, rub or gallop  Extremities: extremities normal, atraumatic, no cyanosis or edema  Pulses: 2+ and symmetric  Skin: Skin color, texture, turgor normal. No rashes or lesions  Neurologic: Grossly normal  Musculoskeletal: Bilateral knee with no swelling, obvious deformity, crepitus, or TTP. Negative for instability, negative anterior and posterior drawer sign. Distal sensation, ROM, and pulses intact.    Assessment/Plan  Chronic pain of right knee  Provided patient with knee brace to wear during activity.  We discussed maintaining proper body mechanics when exercising and lifting weights.  After running try elevation, ice, and NSAID for the pain.  If no improvement, or becomes more persistent, consider imaging and/or physical therapy.  RTC PRN.    Patient has verbalized understanding and is in agreement with plan of care.    Follow up if symptoms worsen or fail to improve.  "

## 2019-07-02 NOTE — LETTER
July 2, 2019      [facility]  [address] [place] [address]  Phone: [phone]  Fax: [phone]       Patient: Mone Chandler   YOB: 1990  Date of Visit: 07/02/2019    To Whom It May Concern:    Lucía Chandler  was at [facility] on 07/02/2019. She may return to work/school on 07/02/2019 with no restrictions. If you have any questions or concerns, or if I can be of further assistance, please do not hesitate to contact me.    Sincerely,      Shellie Huerta NP

## 2019-07-02 NOTE — PATIENT INSTRUCTIONS
Knee Pain  Knee pain is very common. Its especially common in active people who put a lot of pressure on their knees, like runners. It affects women more often than men.  Your kneecap (patella) is a thick, round bone. It covers and protects the front portion of your knee joint. It moves along a groove in your thighbone (femur) as part of the patellofemoral joint. A layer of cartilage surrounds the underside of your kneecap. This layer protects it from grinding against your femur.  When this cartilage softens and breaks down, it can cause knee pain. This is partly because of repetitive stress. The stress irritates the lining of the joint. This causes pain in the underlying bone.  What causes knee pain?  Many things can cause knee pain. You may have more than one cause. Some of these include:  · Overuse of the knee joint  · The kneecap doesnt line up with the tissue around it  · Damage to small nerves in the area  · Damage to the ligament-like structure that holds the kneecap in place (retinaculum)  · Breakdown of the bone under the cartilage  · Swelling in the soft tissues around the kneecap  · Injury  You might be more likely to have knee pain if you:  · Exercise a lot  · Recently increased the intensity of your workouts  · Have a body mass index (BMI) greater than 25  · Have poor alignment of your kneecap  · Walk with your feet turned overly outward or inward  · Have weakness in surrounding muscle groups (inner quad or hip adductor muscles)  · Have too much tightness in surrounding muscle groups (hamstrings or iliotibial band)  · Have a recent history of injury to the area  · Are female  Symptoms of knee pain  This type of knee pain is a dull, aching pain in the front of the knee in the area under and around the kneecap. This pain may start quickly or slowly. Your pain might be worse when you squat, run, or sit for a long time. You might also sometimes feel like your knee is giving out. You may have symptoms in  one or both of your knees.  Diagnosing knee pain  Your healthcare provider will ask about your medical history and your symptoms. Be sure to describe any activities that make your knee pain worse. He or she will look at your knee. This will include tests of your range of motion, strength, and areas of pain of your knee. Your knee alignment will be checked.  Your healthcare provider will need to rule out other causes of your knee pain, such as arthritis. You may need an imaging test, such as an X-ray or MRI.  Treatment for knee pain  Treatments that can help ease your symptoms may include:  · Avoiding activities for a while that make your pain worse, returning to activity over time  · Icing the outside of your knee when it causes you pain  · Taking over-the-counter pain medicine  · Wearing a knee brace or taping your knee to support it  · Wearing special shoe inserts to help keep your feet in the proper alignment  · Doing special exercises to stretch and strengthen the muscles around your hip and your knee  These steps help most people manage knee pain. But some cases of knee pain need to be treated with surgery. You may need surgery right away. Or you may need it later if other treatments dont work. Your healthcare provider may refer you to an orthopedic surgeon. He or she will talk with you about your choices.  Preventing knee pain  Losing weight and correcting excess muscle tightness or muscle weakness may help lower your risk.  In some cases, you can prevent knee pain. To help prevent a flare-up of knee pain, you do these things:  · Regularly do all the exercises your doctor or physical therapist advises  · Support your knee as advised by your doctor or physical therapist  · Increase training gradually, and ease up on training when needed  · Have an expert check your gait for running or other sporting activities  · Stretch properly before and after exercise  · Replace your running shoes regularly  · Lose excess  weight     When to call your healthcare provider  Call your healthcare provider right away if:  · Your symptoms dont get better after a few weeks of treatment  · You have any new symptoms   Date Last Reviewed: 4/1/2017  © 7462-2105 [employer]. [address] [place] [address], [place] [address]. All rights reserved. This information is not intended as a substitute for professional medical care. Always follow your healthcare professional's instructions.

## 2019-07-30 ENCOUNTER — OFFICE VISIT (OUTPATIENT)
Dept: FAMILY MEDICINE | Facility: CLINIC | Age: 29
End: 2019-07-30
Payer: COMMERCIAL

## 2019-07-30 ENCOUNTER — DOCUMENTATION ONLY (OUTPATIENT)
Dept: REHABILITATION | Facility: HOSPITAL | Age: 29
End: 2019-07-30

## 2019-07-30 VITALS
HEIGHT: 64 IN | OXYGEN SATURATION: 98 % | HEART RATE: 89 BPM | TEMPERATURE: 98 F | DIASTOLIC BLOOD PRESSURE: 80 MMHG | SYSTOLIC BLOOD PRESSURE: 110 MMHG | WEIGHT: 161.81 LBS | BODY MASS INDEX: 27.63 KG/M2

## 2019-07-30 DIAGNOSIS — K59.01 SLOW TRANSIT CONSTIPATION: Primary | ICD-10-CM

## 2019-07-30 DIAGNOSIS — K64.9 HEMORRHOIDS, UNSPECIFIED HEMORRHOID TYPE: ICD-10-CM

## 2019-07-30 DIAGNOSIS — F41.1 GAD (GENERALIZED ANXIETY DISORDER): ICD-10-CM

## 2019-07-30 DIAGNOSIS — Z23 NEED FOR TDAP VACCINATION: ICD-10-CM

## 2019-07-30 PROCEDURE — 90715 TDAP VACCINE 7 YRS/> IM: CPT | Mod: S$GLB,,, | Performed by: INTERNAL MEDICINE

## 2019-07-30 PROCEDURE — 99999 PR PBB SHADOW E&M-EST. PATIENT-LVL III: ICD-10-PCS | Mod: PBBFAC,,, | Performed by: INTERNAL MEDICINE

## 2019-07-30 PROCEDURE — 90471 TDAP VACCINE GREATER THAN OR EQUAL TO 7YO IM: ICD-10-PCS | Mod: S$GLB,,, | Performed by: INTERNAL MEDICINE

## 2019-07-30 PROCEDURE — 3008F BODY MASS INDEX DOCD: CPT | Mod: CPTII,S$GLB,, | Performed by: INTERNAL MEDICINE

## 2019-07-30 PROCEDURE — 3008F PR BODY MASS INDEX (BMI) DOCUMENTED: ICD-10-PCS | Mod: CPTII,S$GLB,, | Performed by: INTERNAL MEDICINE

## 2019-07-30 PROCEDURE — 90715 TDAP VACCINE GREATER THAN OR EQUAL TO 7YO IM: ICD-10-PCS | Mod: S$GLB,,, | Performed by: INTERNAL MEDICINE

## 2019-07-30 PROCEDURE — 99999 PR PBB SHADOW E&M-EST. PATIENT-LVL III: CPT | Mod: PBBFAC,,, | Performed by: INTERNAL MEDICINE

## 2019-07-30 PROCEDURE — 99214 PR OFFICE/OUTPT VISIT, EST, LEVL IV, 30-39 MIN: ICD-10-PCS | Mod: 25,S$GLB,, | Performed by: INTERNAL MEDICINE

## 2019-07-30 PROCEDURE — 90471 IMMUNIZATION ADMIN: CPT | Mod: S$GLB,,, | Performed by: INTERNAL MEDICINE

## 2019-07-30 PROCEDURE — 99214 OFFICE O/P EST MOD 30 MIN: CPT | Mod: 25,S$GLB,, | Performed by: INTERNAL MEDICINE

## 2019-07-30 NOTE — PATIENT INSTRUCTIONS
If hydration and increased dietary fiber doesn't fix the constipation, or if it causes bloating, you can use docusate sodium tablets or Mirilax to soften the stools.

## 2019-07-30 NOTE — PROGRESS NOTES
"Assessment & Plan  Problem List Items Addressed This Visit        Psychiatric    ADRIANO (generalized anxiety disorder) (Chronic)    Overview     ADRIANO-7 score of 19 ->> 13         Current Assessment & Plan     Improved.  Will follow up in 3 months to decide about SSRI.             GI    Slow transit constipation - Primary (Chronic)    Current Assessment & Plan     Start stool softening regimen with increased hydration & dietary fiber.  If this doesn't work or causes bloating, change to stool softeners           Other Visit Diagnoses     Hemorrhoids, unspecified hemorrhoid type    -  Counseled on stool softening as above.  Discussed Epsom salt soaks as well.      Need for Tdap vaccination    -  vaccinate    Relevant Orders    Tdap Vaccine            Health Maintenance reviewed, Had recent Pap smear with Dr. Rg at [facility].    Follow-up: Follow up in about 3 months (around 10/30/2019) for ADRIANO follow up.  ______________________________________________________________________    Chief Complaint  Chief Complaint   Patient presents with    Hemorrhoids    Anxiety       HPI  Mone June Chandler is a 28 y.o. female with medical diagnoses as listed in the medical history and problem list that presents for anxiety and hemorrhoids follow up.  Pt is known to me with her last appointment 7/2/2019.      We discussed her GAD7 score of 19 at the LOV and she elected to work on strengthening her coping mechanisms. She has left her job and is now in a new living environment which she feels is better.  Now working on her career decision.  She is concerned about her "focus" issue.  Reports that she has noticed problems with focus for cooking and readings.  She is trying a natural supplement from [employer] (Brahmi bacopa).    GAD7 7/30/2019   ADRIANO-7 Score 13       She is still having hemorrhoidal issues.  She is having some pain that was associated with bathing.  She has been straining for BMs still.  No stool softening regimen or sitz " baths.        PAST MEDICAL HISTORY:  Past Medical History:   Diagnosis Date    Allergic rhinitis, cause unspecified 6/17/2015    ADRIANO (generalized anxiety disorder) 6/17/2015    ADRIANO-7 score of 17     PCOS (polycystic ovarian syndrome)        PAST SURGICAL HISTORY:  Past Surgical History:   Procedure Laterality Date    thumb Bilateral     TRIGGER FINGER RELEASE Bilateral     thumbs       SOCIAL HISTORY:  Social History     Socioeconomic History    Marital status: Single     Spouse name: Not on file    Number of children: Not on file    Years of education: Not on file    Highest education level: Not on file   Occupational History    Not on file   Social Needs    Financial resource strain: Not on file    Food insecurity:     Worry: Not on file     Inability: Not on file    Transportation needs:     Medical: Not on file     Non-medical: Not on file   Tobacco Use    Smoking status: Never Smoker    Smokeless tobacco: Never Used   Substance and Sexual Activity    Alcohol use: No    Drug use: No    Sexual activity: Not on file   Lifestyle    Physical activity:     Days per week: Not on file     Minutes per session: Not on file    Stress: Not on file   Relationships    Social connections:     Talks on phone: Not on file     Gets together: Not on file     Attends Shinto service: Not on file     Active member of club or organization: Not on file     Attends meetings of clubs or organizations: Not on file     Relationship status: Not on file   Other Topics Concern    Not on file   Social History Narrative    Not on file       FAMILY HISTORY:  Family History   Problem Relation Age of Onset    Hypertension Mother     Diverticulitis Father        ALLERGIES AND MEDICATIONS: updated and reviewed.  Review of patient's allergies indicates:  No Known Allergies  Current Outpatient Medications   Medication Sig Dispense Refill    diclofenac sodium (VOLTAREN) 1 % Gel Apply 2 g topically once daily. 100 g 0     "fluticasone (FLONASE) 50 mcg/actuation nasal spray 1 spray by Each Nare route once daily. 1 Bottle 3    hydrocortisone (ANUSOL-HC) 25 mg suppository Place 1 suppository (25 mg total) rectally 2 (two) times daily as needed for Hemorrhoids. 4 suppository 0    hydrocortisone 2.5 % cream Apply topically 2 (two) times daily. for 10 days 20 g 0    ibuprofen (ADVIL,MOTRIN) 600 MG tablet Take 1 tablet (600 mg total) by mouth every 6 (six) hours as needed. 30 tablet 0     No current facility-administered medications for this visit.          ROS  Review of Systems   Constitutional: Negative for chills, fever and unexpected weight change.   Respiratory: Negative for cough, chest tightness, shortness of breath and wheezing.    Cardiovascular: Negative for chest pain, palpitations and leg swelling.   Gastrointestinal: Positive for constipation and rectal pain. Negative for abdominal pain, diarrhea, nausea and vomiting.   Genitourinary: Negative for decreased urine volume, dysuria and hematuria.   Musculoskeletal: Negative for arthralgias, joint swelling and myalgias.   Skin: Negative for color change and rash.   Neurological: Negative for dizziness, weakness, light-headedness and headaches.   Psychiatric/Behavioral: Positive for confusion. Negative for decreased concentration, dysphoric mood, sleep disturbance and suicidal ideas. The patient is nervous/anxious.            Physical Exam  Vitals:    07/30/19 0815   BP: 110/80   Pulse: 89   Temp: 98.3 °F (36.8 °C)   SpO2: 98%   Weight: 73.4 kg (161 lb 13.1 oz)   Height: 5' 4" (1.626 m)    Body mass index is 27.78 kg/m².  Weight: 73.4 kg (161 lb 13.1 oz)   Height: 5' 4" (162.6 cm)   Physical Exam   Constitutional: She is oriented to person, place, and time. She appears well-developed and well-nourished. No distress.   Cardiovascular: Normal rate.   Pulmonary/Chest: Effort normal. No respiratory distress.   Genitourinary: Rectal exam shows external hemorrhoid. Rectal exam shows no " fissure.   Genitourinary Comments: ABEL Mcfarland present for exam   Neurological: She is alert and oriented to person, place, and time.   Symmetric facial movements palate elevated symmetrically tongue midline            Health Maintenance       Date Due Completion Date    TETANUS VACCINE 07/01/2018 7/1/2008    Pap Smear 01/11/2019 1/11/2016    Influenza Vaccine 09/13/2019 (Originally 8/1/2019) 2/13/2017

## 2019-07-30 NOTE — ASSESSMENT & PLAN NOTE
Start stool softening regimen with increased hydration & dietary fiber.  If this doesn't work or causes bloating, change to stool softeners

## 2019-08-01 ENCOUNTER — TELEPHONE (OUTPATIENT)
Dept: FAMILY MEDICINE | Facility: CLINIC | Age: 29
End: 2019-08-01

## 2019-08-01 NOTE — TELEPHONE ENCOUNTER
----- Message from Emani Fajardo sent at 8/1/2019  4:06 PM CDT -----  Contact: Patient ph [phone]  Type: Patient Call Back    Who called: Patient    What is the request in detail: Patient had a tetanus shot on 07-30-19. States she notice a bump and itching where she received the shot. She's wondering if this is a normal reaction. Would like to speak to nurse about this. Please call.    Would the patient rather a call back or a response via [facility]? Call back    Best call back number: [phone]

## 2019-08-01 NOTE — TELEPHONE ENCOUNTER
Informed pt bump and itching site is normal per Dr Coyle. To use an ice pack. If itching worsen to call office back.  Pt verbalized understanding.

## 2019-08-07 DIAGNOSIS — M79.644 CHRONIC THUMB PAIN, BILATERAL: ICD-10-CM

## 2019-08-07 DIAGNOSIS — G89.29 CHRONIC THUMB PAIN, BILATERAL: ICD-10-CM

## 2019-08-07 DIAGNOSIS — M79.645 CHRONIC THUMB PAIN, BILATERAL: ICD-10-CM

## 2019-08-07 RX ORDER — DICLOFENAC SODIUM 10 MG/G
2 GEL TOPICAL DAILY
Qty: 100 G | Refills: 0 | Status: SHIPPED | OUTPATIENT
Start: 2019-08-07 | End: 2022-04-18

## 2019-08-16 ENCOUNTER — OFFICE VISIT (OUTPATIENT)
Dept: FAMILY MEDICINE | Facility: CLINIC | Age: 29
End: 2019-08-16
Payer: MEDICAID

## 2019-08-16 VITALS
HEIGHT: 64 IN | HEART RATE: 79 BPM | DIASTOLIC BLOOD PRESSURE: 80 MMHG | WEIGHT: 158.75 LBS | OXYGEN SATURATION: 99 % | BODY MASS INDEX: 27.1 KG/M2 | SYSTOLIC BLOOD PRESSURE: 120 MMHG | TEMPERATURE: 98 F

## 2019-08-16 DIAGNOSIS — R59.1 LYMPHADENOPATHY: Primary | ICD-10-CM

## 2019-08-16 DIAGNOSIS — Z83.6: ICD-10-CM

## 2019-08-16 PROCEDURE — 99999 PR PBB SHADOW E&M-EST. PATIENT-LVL III: ICD-10-PCS | Mod: PBBFAC,,, | Performed by: FAMILY MEDICINE

## 2019-08-16 PROCEDURE — 99213 PR OFFICE/OUTPT VISIT, EST, LEVL III, 20-29 MIN: ICD-10-PCS | Mod: S$PBB,,, | Performed by: FAMILY MEDICINE

## 2019-08-16 PROCEDURE — 99213 OFFICE O/P EST LOW 20 MIN: CPT | Mod: S$PBB,,, | Performed by: FAMILY MEDICINE

## 2019-08-16 PROCEDURE — 99213 OFFICE O/P EST LOW 20 MIN: CPT | Mod: PBBFAC,PO | Performed by: FAMILY MEDICINE

## 2019-08-16 PROCEDURE — 99999 PR PBB SHADOW E&M-EST. PATIENT-LVL III: CPT | Mod: PBBFAC,,, | Performed by: FAMILY MEDICINE

## 2019-08-16 NOTE — PROGRESS NOTES
Office Visit    Patient Name: Mone Chandler    : 1990  MRN: [id number]      Assessment/Plan:  Mone Chandler is a 28 y.o. female who presents today for :    Lymphadenopathy  Family history of URI (upper respiratory infection)    -AFVSS - reassurance provided and advised supportive care, should be self limited and resolve on its own.  f/u as needed     This note was created by combination of typed  and Dragon dictation.  Transcription errors may be present.  If there are any questions, please contact me.      ----------------------------------------------------------------------------------------------------------------------      HPI:      Mone is a 28 y.o. female with      Patient Active Problem List   Diagnosis    ADRIANO (generalized anxiety disorder)    Allergic rhinitis, cause unspecified    PCOS (polycystic ovarian syndrome)    Chronic thumb pain, bilateral    Slow transit constipation     This patient is new to me      Patient presents today for :  Mass  in her left throat that started this morning after she woke up. She has no recent URI Sx but states that she's not sure if her boyfriend may have been sick. She denies any Sx today - no F/C/N/V/sore throat/ear pain/coughing/nasal congestion. She just wants to get checked to make sure she doesn't have any issues.       Additional ROS    No dysphagia  No CP/SOB/palpitations/swelling  No nausea/vomiting/abd pain/no diarrhea  No rashes      Patient Active Problem List   Diagnosis    ADRIANO (generalized anxiety disorder)    Allergic rhinitis, cause unspecified    PCOS (polycystic ovarian syndrome)    Chronic thumb pain, bilateral    Slow transit constipation       Current Medications  Medications reviewed and updated.       Current Outpatient Medications:     diclofenac sodium (VOLTAREN) 1 % Gel, APPLY 2 G TOPICALLY ONCE DAILY., Disp: 100 g, Rfl: 0    fluticasone (FLONASE) 50 mcg/actuation nasal spray, 1 spray by Each Nare route  once daily., Disp: 1 Bottle, Rfl: 3    hydrocortisone (ANUSOL-HC) 25 mg suppository, Place 1 suppository (25 mg total) rectally 2 (two) times daily as needed for Hemorrhoids., Disp: 4 suppository, Rfl: 0    hydrocortisone 2.5 % cream, Apply topically 2 (two) times daily. for 10 days, Disp: 20 g, Rfl: 0    ibuprofen (ADVIL,MOTRIN) 600 MG tablet, Take 1 tablet (600 mg total) by mouth every 6 (six) hours as needed., Disp: 30 tablet, Rfl: 0    Past Surgical History:   Procedure Laterality Date    thumb Bilateral     TRIGGER FINGER RELEASE Bilateral     thumbs       Family History   Problem Relation Age of Onset    Hypertension Mother     Diverticulitis Father        Social History     Socioeconomic History    Marital status: Single     Spouse name: Not on file    Number of children: Not on file    Years of education: Not on file    Highest education level: Not on file   Occupational History    Not on file   Social Needs    Financial resource strain: Not on file    Food insecurity:     Worry: Not on file     Inability: Not on file    Transportation needs:     Medical: Not on file     Non-medical: Not on file   Tobacco Use    Smoking status: Never Smoker    Smokeless tobacco: Never Used   Substance and Sexual Activity    Alcohol use: No    Drug use: No    Sexual activity: Not on file   Lifestyle    Physical activity:     Days per week: Not on file     Minutes per session: Not on file    Stress: Not on file   Relationships    Social connections:     Talks on phone: Not on file     Gets together: Not on file     Attends Bahai service: Not on file     Active member of club or organization: Not on file     Attends meetings of clubs or organizations: Not on file     Relationship status: Not on file   Other Topics Concern    Not on file   Social History Narrative    Not on file             Allergies   Review of patient's allergies indicates:  No Known Allergies          Review of Systems  See  "HPI      Physical Exam  /80   Pulse 79   Temp 97.8 °F (36.6 °C) (Oral)   Ht 5' 4" (1.626 m)   Wt 72 kg (158 lb 11.7 oz)   SpO2 99%   BMI 27.25 kg/m²     GEN: NAD, well developed  HEENT: NCAT, PERRLA, EOMI, sclera clear, anicteric, TM clear bilaterally with normal light reflex, mild nasal turbinate swelling, MMM with no lesions, O/P clear - no tonsillar swelling/discharge, +mild drainage, +minimal clear nasal discharge, no frontal/maxillary TTP, No trismus/uvula deviation  NECK: normal, supple with midline trachea, + left sided LAD with mild TTP, no thyromegaly  LUNGS: CTAB, no w/r/r, no increased work of breathing  HEART: RRR, normal S1 and S2, no m/r/g  ABD: s/nt/nd, NABS  SKIN: normal turgor, no rashes, no other lesions.   PSYCH: AOx3, appropriate mood and affect    "

## 2019-09-05 ENCOUNTER — OFFICE VISIT (OUTPATIENT)
Dept: FAMILY MEDICINE | Facility: CLINIC | Age: 29
End: 2019-09-05
Payer: MEDICAID

## 2019-09-05 VITALS
OXYGEN SATURATION: 99 % | DIASTOLIC BLOOD PRESSURE: 70 MMHG | HEART RATE: 79 BPM | BODY MASS INDEX: 27.25 KG/M2 | SYSTOLIC BLOOD PRESSURE: 110 MMHG | TEMPERATURE: 99 F | WEIGHT: 159.63 LBS | HEIGHT: 64 IN

## 2019-09-05 DIAGNOSIS — H61.20 IMPACTED CERUMEN, UNSPECIFIED LATERALITY: ICD-10-CM

## 2019-09-05 DIAGNOSIS — H61.21 HEARING LOSS OF RIGHT EAR DUE TO CERUMEN IMPACTION: Primary | ICD-10-CM

## 2019-09-05 PROCEDURE — 99214 OFFICE O/P EST MOD 30 MIN: CPT | Mod: S$PBB,,, | Performed by: NURSE PRACTITIONER

## 2019-09-05 PROCEDURE — 99999 PR PBB SHADOW E&M-EST. PATIENT-LVL IV: ICD-10-PCS | Mod: PBBFAC,,, | Performed by: NURSE PRACTITIONER

## 2019-09-05 PROCEDURE — 99214 OFFICE O/P EST MOD 30 MIN: CPT | Mod: PBBFAC,PO | Performed by: NURSE PRACTITIONER

## 2019-09-05 PROCEDURE — 99999 PR PBB SHADOW E&M-EST. PATIENT-LVL IV: CPT | Mod: PBBFAC,,, | Performed by: NURSE PRACTITIONER

## 2019-09-05 PROCEDURE — 99214 PR OFFICE/OUTPT VISIT, EST, LEVL IV, 30-39 MIN: ICD-10-PCS | Mod: S$PBB,,, | Performed by: NURSE PRACTITIONER

## 2019-09-05 RX ORDER — CIPROFLOXACIN HYDROCHLORIDE 3 MG/ML
1 SOLUTION/ DROPS OPHTHALMIC EVERY 4 HOURS
Qty: 10 ML | Refills: 0 | Status: SHIPPED | OUTPATIENT
Start: 2019-09-05 | End: 2022-04-18

## 2019-09-05 NOTE — PROGRESS NOTES
Subjective:       Patient ID: Mone Chandler is a 29 y.o. female.    Chief Complaint: Otalgia    Ear Fullness    There is pain in the right ear. This is a new problem. The current episode started yesterday. The problem occurs constantly. The problem has been unchanged. There has been no fever. The pain is at a severity of 1/10. The pain is mild. Pertinent negatives include no abdominal pain, coughing, diarrhea, ear discharge, headaches, hearing loss, neck pain, rash, rhinorrhea, sore throat or vomiting. She has tried nothing for the symptoms.     Review of Systems   Constitutional: Negative for chills, diaphoresis, fatigue and fever.   HENT: Negative for ear discharge, ear pain, hearing loss, rhinorrhea and sore throat.         Ear fullness of right ear      Respiratory: Negative for cough.    Gastrointestinal: Negative for abdominal pain, diarrhea and vomiting.   Musculoskeletal: Negative for neck pain.   Skin: Negative for rash.   Neurological: Negative for weakness and headaches.   Hematological: Negative for adenopathy. Does not bruise/bleed easily.       Objective:      Physical Exam   Constitutional: She is oriented to person, place, and time. Vital signs are normal. She appears well-developed and well-nourished.   HENT:   Head: Normocephalic and atraumatic.   Right Ear: Tympanic membrane is erythematous.   Left Ear: Tympanic membrane, external ear and ear canal normal.   Nose: Nose normal.   Mouth/Throat: Oropharynx is clear and moist. No oropharyngeal exudate.   Auditory canal(s) of right ear is  completely obstructed with cerumen. After ear irrigation TM appears red      Cardiovascular: Normal rate, regular rhythm and normal heart sounds.   Pulmonary/Chest: Effort normal and breath sounds normal.   Neurological: She is alert and oriented to person, place, and time.   Skin: Skin is warm, dry and intact.   Psychiatric: She has a normal mood and affect.       Assessment:       1. Hearing loss of right ear  due to cerumen impaction    2. Impacted cerumen, unspecified laterality        Plan:       Mone was seen today for otalgia.    Diagnoses and all orders for this visit:    Hearing loss of right ear due to cerumen impaction  -     carbamide peroxide (DEBROX) 6.5 % otic solution; Place 5 drops into the right ear 2 (two) times daily.  -     Ambulatory referral to ENT  -     Ear wax removal    Impacted cerumen, unspecified laterality  -     Ear wax removal    Other orders  -     ciprofloxacin HCl (CILOXAN) 0.3 % ophthalmic solution; Place 1 drop into the left eye every 4 (four) hours.

## 2019-09-05 NOTE — PATIENT INSTRUCTIONS
Impacted Earwax    Impacted earwax is a buildup of the natural wax in the ear (cerumen). Impacted earwax is very common. It can cause symptoms such as hearing loss. It can also stop a doctor doing an exam of your ear.  Understanding earwax  Tiny glands in your ear make substances that combine with dead skin cells to form earwax. Earwax helps protect your ear canal from water, dirt, infection, and injury. Over time, earwax travels from the inner part of your ear canal to the entrance of the canal. Then it falls away naturally. But in some cases, it cant travel to the entrance of the canal. This may be because of a health condition or objects put in the ear. With age, earwax tends to become harder and less fluid. Older adults are more likely to have problems with earwax buildup.  What causes impacted earwax?  Earwax can build up because of many health conditions. Some cause a physical blockage. Others cause too much earwax to be made. Health conditions that can cause earwax buildup include:  · Bony blockage in the ear (osteoma or exostoses)  · Infections, such as swimmers ear (external otitis)  · Skin disease, such as eczema  · Autoimmune diseases, such as lupus  · A narrowed ear canal from birth, chronic inflammation, or injury  · Too much earwax because of injury  · Too much earwax because of  water in the ear canal  Objects repeatedly placed in the ear can also cause impacted earwax. For example, putting cotton swabs in the ear may push the wax deeper into the ear. Over time, this may cause blockage. Hearing aids, swimming plugs, and swim molds can cause the same problem when used again and again.  In some cases, the cause of impacted earwax is not known.  Symptoms of impacted earwax  Excess earwax usually does not cause any symptoms, unless there is a large amount of buildup. Then it may cause symptoms such as:  · Hearing loss  · Earache  · Sense of ear fullness  · Itching in the ear  · Dizziness  · Ringing in  the ears  · Cough  Treatment for impacted earwax  If you dont have symptoms, you may not need treatment. Often the earwax goes away on its own with time. If you have symptoms, you may have 1 or more treatments such as:  · Ear drops. These help to soften the earwax. This helps it leave the ear over time.  · Rinsing (irrigation) of the ear canal with water. This is done in a doctors office.  · Removal of the earwax with small tools. This is also done in a doctors office.  In rare cases, some treatments for earwax removal may cause complications such as:  · Swimmers ear (otitis external)  · Earache  · Short-term hearing loss  · Dizziness  · Water trapped in the ear canal  · Hole in the eardrum  · Ringing in the ears  · Bleeding from the ear  Talk with your health care provider about which risks apply most to you.  Dont use these at home  Health care providers do not advise use of ear candles or ear vacuum kits. These methods are not shown to work.   Preventing impacted earwax  You may not be able to prevent impacted earwax if you have a health condition that causes it, such as eczema. In other cases, you may be able to prevent earwax buildup by:  · Using ear drops once a week  · Having routine cleaning of the ear about every 6 months  · Not using cotton swabs in the ear  When to call the health care provider  Call your health care provider right away if you have severe symptoms after earwax removal. These may include bleeding or severe ear pain.   Date Last Reviewed: 3/19/2015  © 6177-7805 [employer]. [address] [place] [address], [place] [address]. All rights reserved. This information is not intended as a substitute for professional medical care. Always follow your healthcare professional's instructions.

## 2019-09-06 ENCOUNTER — OFFICE VISIT (OUTPATIENT)
Dept: FAMILY MEDICINE | Facility: CLINIC | Age: 29
End: 2019-09-06
Payer: MEDICAID

## 2019-09-06 VITALS
TEMPERATURE: 99 F | OXYGEN SATURATION: 99 % | SYSTOLIC BLOOD PRESSURE: 128 MMHG | HEIGHT: 64 IN | WEIGHT: 159.63 LBS | HEART RATE: 69 BPM | DIASTOLIC BLOOD PRESSURE: 70 MMHG | BODY MASS INDEX: 27.25 KG/M2

## 2019-09-06 DIAGNOSIS — N89.8 VAGINAL DISCHARGE: ICD-10-CM

## 2019-09-06 DIAGNOSIS — N76.0 ACUTE VAGINITIS: Primary | ICD-10-CM

## 2019-09-06 PROCEDURE — 87491 CHLMYD TRACH DNA AMP PROBE: CPT | Mod: 59

## 2019-09-06 PROCEDURE — 99214 OFFICE O/P EST MOD 30 MIN: CPT | Mod: PBBFAC,PO | Performed by: NURSE PRACTITIONER

## 2019-09-06 PROCEDURE — 87481 CANDIDA DNA AMP PROBE: CPT | Mod: 59

## 2019-09-06 PROCEDURE — 87661 TRICHOMONAS VAGINALIS AMPLIF: CPT

## 2019-09-06 PROCEDURE — 99999 PR PBB SHADOW E&M-EST. PATIENT-LVL IV: ICD-10-PCS | Mod: PBBFAC,,, | Performed by: NURSE PRACTITIONER

## 2019-09-06 PROCEDURE — 99214 OFFICE O/P EST MOD 30 MIN: CPT | Mod: S$PBB,,, | Performed by: NURSE PRACTITIONER

## 2019-09-06 PROCEDURE — 99999 PR PBB SHADOW E&M-EST. PATIENT-LVL IV: CPT | Mod: PBBFAC,,, | Performed by: NURSE PRACTITIONER

## 2019-09-06 PROCEDURE — 99214 PR OFFICE/OUTPT VISIT, EST, LEVL IV, 30-39 MIN: ICD-10-PCS | Mod: S$PBB,,, | Performed by: NURSE PRACTITIONER

## 2019-09-06 PROCEDURE — 87801 DETECT AGNT MULT DNA AMPLI: CPT

## 2019-09-06 RX ORDER — FLUCONAZOLE 150 MG/1
150 TABLET ORAL ONCE
Qty: 1 TABLET | Refills: 0 | Status: SHIPPED | OUTPATIENT
Start: 2019-09-06 | End: 2019-09-06

## 2019-09-06 RX ORDER — NYSTATIN 100000 U/G
CREAM TOPICAL 2 TIMES DAILY
Qty: 30 G | Refills: 0 | Status: SHIPPED | OUTPATIENT
Start: 2019-09-06 | End: 2022-04-18

## 2019-09-06 NOTE — PATIENT INSTRUCTIONS
Candida Vaginal Infection    You have a Candida vaginal infection. This is also known as a yeast infection. It is most often caused by a type of yeast (fungus) called Candida. Candida are normally found in the vagina. But if they increase in number, this can lead to infection and cause symptoms.  Symptoms of a yeast infection can include:  · Clumpy or thin, white discharge, which may look like cottage cheese  · Itching or burning  · Burning with urination  Certain factors can make a yeast infection more likely. These can include:  · Taking certain medicines, such as antibiotics or birth control pills  · Pregnancy  · Diabetes  · Weakened immune system  A yeast infection is most often treated with antifungal medicine. This may be given as a vaginal cream or pills you take by mouth. Treatment may last for about 1 to 7 days. Women with severe or recurrent infections may need longer courses of treatment.  Home care  · If youre prescribed medicine, be sure to use it as directed. Finish all of the medicine, even if your symptoms go away. Note: Dont try to treat yourself using over-the-counter products without talking to your provider first. He or she will let you know if this is a good option for you.  · Ask your provider what steps you can take to help reduce your risk of having a yeast infection in the future.  Follow-up care  Follow up with your healthcare provider, or as directed.  When to seek medical advice  Call your healthcare provider right away if:  · You have a fever of 100.4ºF (38ºC) or higher, or as directed by your provider.  · Your symptoms worsen, or they dont go away within a few days of starting treatment.  · You have new pain in the lower belly or pelvic region.  · You have side effects that bother you or a reaction to the cream or pills youre prescribed.  · You or any partners you have sex with have new symptoms, such as a rash, joint pain, or sores.  Date Last Reviewed: 7/30/2015  © 9007-7675 [employer]. [address] [place] [address], [place] [address]. All rights reserved. This information is not intended as a substitute for professional medical care. Always follow your healthcare professional's instructions.

## 2019-09-06 NOTE — LETTER
September 6, 2019      [facility]  [address]  [place] [id number]  Phone: [phone]  Fax: [phone]       Patient: Mone Chandler   YOB: 1990  Date of Visit: 09/06/2019    To Whom It May Concern:    Lucía Chandler  was at [facility] on 09/06/2019. She may return to work/school on 09/06/2019 with no restrictions. If you have any questions or concerns, or if I can be of further assistance, please do not hesitate to contact me.    Sincerely,      Ellen morales MA

## 2019-09-06 NOTE — PROGRESS NOTES
Subjective:       Patient ID: Mone Chandler is a 29 y.o. female.    Chief Complaint: Vaginal Itching    Vaginal Itching   The patient's primary symptoms include genital itching and vaginal discharge. The patient's pertinent negatives include no genital lesions, genital odor, genital rash, missed menses, pelvic pain or vaginal bleeding. This is a new problem. The current episode started in the past 7 days. The problem occurs constantly. The problem has been unchanged. The patient is experiencing no pain. The problem affects both sides. She is not pregnant. Pertinent negatives include no abdominal pain, anorexia, back pain, chills, constipation, diarrhea, discolored urine, dysuria, fever, flank pain, frequency, headaches, hematuria, joint pain, joint swelling, nausea, painful intercourse, rash, sore throat, urgency or vomiting. Associated symptoms comments: Labia irritation   . The vaginal discharge was white, thick and copious. There has been no bleeding. She has not been passing clots. She has not been passing tissue. Nothing aggravates the symptoms. She has tried nothing for the symptoms. The treatment provided no relief. She is sexually active. No, her partner does not have an STD. Her menstrual history has been regular.     Review of Systems   Constitutional: Negative for chills and fever.   HENT: Negative for sore throat.    Gastrointestinal: Negative for abdominal pain, anorexia, constipation, diarrhea, nausea and vomiting.   Genitourinary: Positive for vaginal discharge. Negative for dysuria, flank pain, frequency, hematuria, missed menses, pelvic pain and urgency.   Musculoskeletal: Negative for back pain and joint pain.   Skin: Negative for rash.   Neurological: Negative for headaches.       Objective:      Physical Exam   Constitutional: She is oriented to person, place, and time. Vital signs are normal. She appears well-developed and well-nourished.   Cardiovascular: Normal rate, regular rhythm and  normal heart sounds.   Pulmonary/Chest: Effort normal and breath sounds normal.   Abdominal: Soft. Bowel sounds are normal. There is no tenderness.   Genitourinary: Uterus normal. Pelvic exam was performed with patient supine. There is no rash or tenderness on the right labia. There is no rash or tenderness on the left labia. Cervix exhibits discharge. Cervix exhibits no motion tenderness and no friability. Right adnexum displays no tenderness. Left adnexum displays no tenderness. There is erythema in the vagina. No tenderness or bleeding in the vagina. Vaginal discharge found.   Neurological: She is alert and oriented to person, place, and time.   Skin: Skin is warm, dry and intact. Capillary refill takes less than 2 seconds.   Psychiatric: She has a normal mood and affect.       Assessment:       1. Acute vaginitis    2. Vaginal discharge        Plan:       Mone was seen today for vaginal itching.    Diagnoses and all orders for this visit:    Acute vaginitis  -     Vaginosis Screen by DNA Probe  -     nystatin (MYCOSTATIN) cream; Apply topically 2 (two) times daily.  -     fluconazole (DIFLUCAN) 150 MG Tab; Take 1 tablet (150 mg total) by mouth once. for 1 dose  -     C. trachomatis/N. gonorrhoeae by AMP DNA [facility]; Urine    Vaginal discharge  -     Vaginosis Screen by DNA Probe  -     nystatin (MYCOSTATIN) cream; Apply topically 2 (two) times daily.  -     fluconazole (DIFLUCAN) 150 MG Tab; Take 1 tablet (150 mg total) by mouth once. for 1 dose  -     C. trachomatis/N. gonorrhoeae by AMP DNA [facility]; Urine    Home care  · If youre prescribed medicine, be sure to use it as directed. Finish all of the medicine, even if your symptoms go away. Note: Dont try to treat yourself using over-the-counter products without talking to your provider first. He or she will let you know if this is a good option for you.  · Ask your provider what steps you can take to help reduce your risk of having a yeast infection in  the future.  Follow-up care  Follow up with your healthcare provider, or as directed.  When to seek medical advice  Call your healthcare provider right away if:  · You have a fever of 100.4ºF (38ºC) or higher, or as directed by your provider.  · Your symptoms worsen, or they dont go away within a few days of starting treatment.  · You have new pain in the lower belly or pelvic region.  · You have side effects that bother you or a reaction to the cream or pills youre prescribed.  · You or any partners you have sex with have new symptoms, such as a rash, joint pain, or sores.  Date Last Reviewed: 7/30/2015  © 4570-8876 [employer]. [address] [place] [address], [place] [address]. All rights reserved. This information is not intended as a substitute for professional medical care. Always follow your healthcare professional's instructions.

## 2019-09-07 LAB
BACTERIAL VAGINOSIS DNA: NEGATIVE
C TRACH DNA SPEC QL NAA+PROBE: NOT DETECTED
CANDIDA GLABRATA DNA: NEGATIVE
CANDIDA KRUSEI DNA: NEGATIVE
CANDIDA RRNA VAG QL PROBE: NEGATIVE
N GONORRHOEA DNA SPEC QL NAA+PROBE: NOT DETECTED
T VAGINALIS RRNA GENITAL QL PROBE: NEGATIVE

## 2019-10-12 ENCOUNTER — PATIENT OUTREACH (OUTPATIENT)
Dept: ADMINISTRATIVE | Facility: OTHER | Age: 29
End: 2019-10-12

## 2019-10-15 ENCOUNTER — OFFICE VISIT (OUTPATIENT)
Dept: OTOLARYNGOLOGY | Facility: CLINIC | Age: 29
End: 2019-10-15
Payer: MEDICAID

## 2019-10-15 ENCOUNTER — CLINICAL SUPPORT (OUTPATIENT)
Dept: AUDIOLOGY | Facility: CLINIC | Age: 29
End: 2019-10-15
Payer: MEDICAID

## 2019-10-15 VITALS — HEIGHT: 64 IN | WEIGHT: 165 LBS | BODY MASS INDEX: 28.17 KG/M2

## 2019-10-15 DIAGNOSIS — R09.81 NASAL CONGESTION: ICD-10-CM

## 2019-10-15 DIAGNOSIS — J30.9 ALLERGIC RHINITIS, UNSPECIFIED SEASONALITY, UNSPECIFIED TRIGGER: Primary | ICD-10-CM

## 2019-10-15 DIAGNOSIS — R09.A2 GLOBUS PHARYNGEUS: ICD-10-CM

## 2019-10-15 DIAGNOSIS — J35.2 ADENOID HYPERTROPHY: ICD-10-CM

## 2019-10-15 DIAGNOSIS — J34.3 HYPERTROPHY OF INFERIOR NASAL TURBINATE: ICD-10-CM

## 2019-10-15 DIAGNOSIS — H93.293 ABNORMAL AUDITORY PERCEPTION, BILATERAL: Primary | ICD-10-CM

## 2019-10-15 DIAGNOSIS — R09.82 POSTNASAL DRIP: ICD-10-CM

## 2019-10-15 DIAGNOSIS — R49.0 HOARSE VOICE QUALITY: ICD-10-CM

## 2019-10-15 PROCEDURE — 99204 OFFICE O/P NEW MOD 45 MIN: CPT | Mod: 25,S$GLB,, | Performed by: OTOLARYNGOLOGY

## 2019-10-15 PROCEDURE — 31575 DIAGNOSTIC LARYNGOSCOPY: CPT | Mod: S$GLB,,, | Performed by: OTOLARYNGOLOGY

## 2019-10-15 PROCEDURE — 92550 TYMPANOMETRY & REFLEX THRESH: CPT | Mod: S$GLB,,, | Performed by: AUDIOLOGIST

## 2019-10-15 PROCEDURE — 99204 PR OFFICE/OUTPT VISIT, NEW, LEVL IV, 45-59 MIN: ICD-10-PCS | Mod: 25,S$GLB,, | Performed by: OTOLARYNGOLOGY

## 2019-10-15 PROCEDURE — 31575 LARYNGOSCOPY: ICD-10-PCS | Mod: S$GLB,,, | Performed by: OTOLARYNGOLOGY

## 2019-10-15 PROCEDURE — 92550 PR TYMPANOMETRY AND REFLEX THRESHOLD MEASUREMENTS: ICD-10-PCS | Mod: S$GLB,,, | Performed by: AUDIOLOGIST

## 2019-10-15 PROCEDURE — 92557 COMPREHENSIVE HEARING TEST: CPT | Mod: S$GLB,,, | Performed by: AUDIOLOGIST

## 2019-10-15 PROCEDURE — 31231 NASAL ENDOSCOPY DX: CPT | Mod: 59,S$GLB,, | Performed by: OTOLARYNGOLOGY

## 2019-10-15 PROCEDURE — 92557 PR COMPREHENSIVE HEARING TEST: ICD-10-PCS | Mod: S$GLB,,, | Performed by: AUDIOLOGIST

## 2019-10-15 PROCEDURE — 31231 NASAL/SINUS ENDOSCOPY: ICD-10-PCS | Mod: 59,S$GLB,, | Performed by: OTOLARYNGOLOGY

## 2019-10-15 RX ORDER — FLUTICASONE PROPIONATE 50 MCG
2 SPRAY, SUSPENSION (ML) NASAL DAILY
Qty: 16 G | Refills: 2 | Status: SHIPPED | OUTPATIENT
Start: 2019-10-15 | End: 2019-11-11

## 2019-10-15 NOTE — LETTER
October 15, 2019      Jossue Dutta, NP  [address] [facility] [address]           [facility]  [address]  [place] [address]  Phone: [phone]          Patient: Mone Chandler   MR Number: [id number]   YOB: 1990   Date of Visit: 10/15/2019       Dear Jossue Dutta:    Thank you for referring Mone Chandler to me for evaluation. Attached you will find relevant portions of my assessment and plan of care.    If you have questions, please do not hesitate to call me. I look forward to following Mone Chandler along with you.    Sincerely,    Pancho Cm MD    Enclosure  CC:  No Recipients    If you would like to receive this communication electronically, please contact [email or web address] or [phone] to request more information on [employer] access.    For providers and/or their staff who would like to refer a patient to [facility], please contact us through our one-stop-shop provider referral line, [facility] , at [phone].    If you feel you have received this communication in error or would no longer like to receive these types of communications, please e-mail [email or web address]

## 2019-10-15 NOTE — PROGRESS NOTES
Click on link to view Audiogram  Document on 10/15/2019  3:42 PM by Yue Ruano MA CCC-A: Audiogram    Mone Chandler was seen today for an Audiologic evaluation for hearing loss.    Tympanometry revealed a Type As tymp, AD and a Type A tymp, AS.  The Audiogram revealed hearing within normal limits bilaterally.  It is recommended that the patient follow up if she notices any changes in her hearing.

## 2019-10-15 NOTE — PROGRESS NOTES
Subjective:      Mone Chandler is a 29 y.o. female who was referred to me by Jossue Dutta in consultation initially for concern of bilateral cerumen impaction, but this was cleaned out at an urgent care prior to her visit today.  She would still like to have her ears checked to make sure all the cerumen was removed. But given that this problem was seemingly addressed she has specifically interested in evaluation of some hoarseness of her voice, feeling of a lump in her throat and excessive mucus when swallowing, nasal congestion with rhinorrhea and postnasal drip, and concern for possible allergies contributing to her symptoms.     She has had no drainage from her ears, ear pain, significant decrease in her hearing, dizziness, or any history of ear surgeries.  She experiences some ear fullness bilaterally. She has had audiometric evaluation which was performed today and reviewed demonstrating normal hearing bilaterally.    She reports no significant history of past sinus infections and denies discolored nasal drainage as well as facial pressure and pain.  She reports previously having a balloon sinuplasty procedure performed which she fell may have helped her breathe somewhat better through her nose for temporary of time afterwards.  She is currently on no sinonasal medications including no nasal sprays or irrigations.  She reports being told in the past that she may have allergies and she is unsure of whether she had allergy testing.  She does report that an oral antihistamine had been recommended to her in the past but she has not been taking this.  She does not notice any clear seasonal or environmental triggers for symptoms.  She denies a history of asthma.    In regard to her throat she constantly feels that there is mucus draining in her throat which she attributes to postnasal drip but also feels frequently like there is a lump in her throat when swallowing.  She denies significant issues with  gastroesophageal reflux and has not had any treatment with medication for reflux.  She specifically denies symptoms of heartburn.  She has never had an EGD.  She has noted some hoarseness of her voice which occurs along with these other symptoms of globus sensation, excess mucus in her throat and sensation of postnasal drip.  She enjoys singing and therefore is particularly concerned with symptoms related to her voice.    SNOT-22 score = 80, NOSE score = 13%, ETDQ-7 score = 21     Patient reported quality of life symptoms are dominated by sleep and psychologic domains.     Past Medical History  She has a past medical history of Allergic rhinitis, cause unspecified, ADRIANO (generalized anxiety disorder), and PCOS (polycystic ovarian syndrome).    Past Surgical History  She has a past surgical history that includes thumb (Bilateral); Trigger finger release (Bilateral); and Nasal endoscopy w/ ballon sinuplasty (Bilateral, 2017).    Family History  Her family history includes Diverticulitis in her father; Hypertension in her mother.    Social History  She reports that she has never smoked. She has never used smokeless tobacco. She reports that she does not drink alcohol or use drugs.    Allergies  She has No Known Allergies.    Medications   She has a current medication list which includes the following prescription(s): diclofenac sodium, hydrocortisone, ibuprofen, carbamide peroxide, ciprofloxacin hcl, fluticasone propionate, fluticasone propionate, and nystatin.    Review of Systems  Review of Systems   Constitutional: Negative.    HENT: Positive for dental problem, postnasal drip, rhinorrhea, tinnitus, trouble swallowing and voice change.    Eyes: Negative.    Respiratory: Negative.    Cardiovascular: Negative.    Gastrointestinal: Negative.    Endocrine: Negative.    Genitourinary: Negative.    Musculoskeletal: Positive for arthralgias and neck pain.   Skin: Negative.    Allergic/Immunologic: Negative.    Neurological:  "Negative.    Hematological: Negative.    Psychiatric/Behavioral: Positive for decreased concentration and sleep disturbance. The patient is nervous/anxious.        Objective:     Ht 5' 4" (1.626 m)   Wt 74.8 kg (165 lb)   BMI 28.32 kg/m²        Constitutional:   Vital signs are normal. She appears well-developed and well-nourished. No distress. Normal speech.      Head:  Normocephalic and atraumatic. No skin lesions. Salivary glands normal.  Facial strength is normal.      Ears:  Hearing normal to normal and whispered voice; external ear normal without scars, lesions, or masses; ear canal, tympanic membrane, and middle ear normal..   Right Ear: No drainage. Tympanic membrane is not perforated, not retracted and not bulging. No middle ear effusion.   Left Ear: No drainage. Tympanic membrane is not perforated, not retracted and not bulging.  No middle ear effusion.     Nose:  Nose normal including turbinates, nasal mucosa, sinuses and nasal septum. Mucosal edema and rhinorrhea present. No polyps. Turbinate hypertrophy.  Right sinus exhibits no maxillary sinus tenderness and no frontal sinus tenderness. Left sinus exhibits no maxillary sinus tenderness and no frontal sinus tenderness.     Mouth/Throat  Oropharynx clear and moist without lesions or asymmetry, normal uvula midline and lips, teeth, and gums normal.     Neck:  Neck normal without thyromegaly masses, asymmetry, normal tracheal structure, crepitus, and tenderness, thyroid normal, trachea normal, phonation normal, full range of motion with neck supple and no adenopathy. No stridor present.     Pulmonary/Chest:   Effort normal. No stridor.     Psychiatric:   She has a normal mood and affect. Her speech is normal and behavior is normal.     Neurological:   She has neurological normal, alert and oriented.     Skin:   No abrasions, lacerations, lesions, or rashes.       Procedure    Nasal endoscopy performed.  See procedure note.    Flexible laryngoscopy " performed.  See procedure note.    Data Reviewed    WBC (K/uL)   Date Value   08/30/2016 4.93     Eosinophil% (%)   Date Value   08/30/2016 0.6     Eos # (K/uL)   Date Value   08/30/2016 0.0     Platelets (K/uL)   Date Value   08/30/2016 278     Glucose (mg/dL)   Date Value   08/30/2016 74     No results found for: IGE    No sinus imaging available.    Audiogram performed today was personally reviewed and interpreted demonstrating normal hearing bilaterally with right sided Type As tymp, and left sided Type A tymp.  The results of this audiometric testing are copied below followed by report of the audiologist which was also reviewed.          Mone Chandler was seen today for an Audiologic evaluation for hearing loss.     Tympanometry revealed a Type As tymp, AD and a Type A tymp, AS.  The Audiogram revealed hearing within normal limits bilaterally.  It is recommended that the patient follow up if she notices any changes in her hearing.     Assessment:     1. Allergic rhinitis, unspecified seasonality, unspecified trigger    2. Nasal congestion    3. Hypertrophy of inferior nasal turbinate    4. Adenoid hypertrophy    5. Postnasal drip    6. Globus pharyngeus    7. Hoarse voice quality      Plan:     I had a long discussion with the patient regarding her condition and the further workup and management options.      Regarding her concern for cerumen impaction this appears to have already been completely removed bilaterally and her ears are clean on examination today.  Furthermore she has no evidence of hearing loss with normal hearing bilaterally on her audiometric testing performed today.    Regarding her symptoms of nasal congestion, rhinorrhea, and postnasal drip she is found to have bilateral inferior turbinate hypertrophy, mucosal edema consistent with rhinitis, and overall narrow internal valve area bilaterally. Notably she also has large adenoid tissue present which contributes to approximately obstruction of  2/3 of her nasopharyngeal airway and although not completely obstructing her eustachian tube orifice on either side may also contribute to some eustachian tube dysfunction.  She has an unclear history of allergy but given the appearance of her nasal mucosa which is boggy and edematous with clear mucoid drainage and no apparent allergy workup, the option of pursuing further allergy testing was discussed.  The patient would like to proceed with this allergy evaluation and consult has been placed. I have advised initiating appropriate medical therapy with topical nasal fluticasone to be used daily as well as nasal saline irrigations daily with instructions on use explained to the patient.  Additions of potential allergy focused medical management will be informed by allergy testing and further workup.    Regarding her globus sensation and hoarse voice, there are some findings on laryngoscopy today which are associated with gastroesophageal reflux disease, specifically post cricoid edema and erythema.  These findings by laryngoscopy are mild to moderate and although suggestive, certainly not diagnostic of gastroesophageal reflux disease. I discussed these findings with the patient and the potential contribution of reflux to her symptoms despite her lack of experiencing heartburn.  The option of a dressing reflux was discussed including medication and lifestyle modification.  At this time given her multiple issues evaluated today with significant nasal findings, she prefers to focus on the further workup and management of her sinonasal issues and reconsider reflux depending on her response to the medical therapies we have initiated.     I have advised the patient to return for re-evaluation and discussion of ongoing management plans once she has obtained allergy testing and completed an adequate trial of medical therapy for chronic rhinitis which may be allergic in nature.  Her enlarged adenoid was also discussed as a  potential contribution to her symptoms which may need to be considered if surgical options are necessary due to failure to obtain adequate improvement with medical management.    No follow-ups on file.

## 2019-10-16 NOTE — PROCEDURES
Nasal/sinus endoscopy  Date/Time: 10/15/2019 4:00 PM  Performed by: Pancho Cm MD  Authorized by: Pancho Cm MD     Consent Done?:  Yes (Verbal)  Anesthesia:     Local anesthetic:  4% Xylocaine spray with Srinivasa-Synephrine    Patient tolerance:  Patient tolerated the procedure well with no immediate complications  Nose:     Procedure Performed:  Nasal Endoscopy  External:      No external nasal deformity  Intranasal:      Mucosa no polyps     Mucosa ulcers not present     No mucosa lesions present     Enlarged turbinates     Septum gross deformity  Nasopharynx:      No mucosa lesions     Adenoids present     Posterior choanae patent     Eustachian tube patent     Diagnostic Nasal Endoscopy   The flexible fiberoptic endoscope was utilized to evaluate the sinonasal cavities, mucosa, sinus ostia and turbinates. Verbal consent was obtained after describing the indication for this diagnostic procedure and potential risks. Topical anesthetic and decongestant spray was applied in the bilateral nares. The scope was placed in the patient's left anterior nares and advanced through the nasal cavity carefully examining structures including the nasal septum, nasal turbinates, osteomeatal complex, olfactory recess, sinus ostia, as well as the nasopharynx and eustachian tube orifice at the torus tubarius. The scope was then placed in the patient's right anterior nares and an identical diagnostic evaluation performed on this contralateral side. The patient tolerated the procedure well. Findings were explained to the patient, with all questions answered, and documented below.     Right nasal endoscopy:   -mucosal edema  -inferior turbinate hypertrophy present  -no purulence, but mucoid clear nasal discharge  -no nasal polyps present  -no septal perforation  -no eustachian tube obstruction  -large adenoid partially obstructing nasopharynx     Left nasal endoscopy:  -septal deviation impacting middle meatus  -mucosal  edema  -inferior turbinate hypertrophy present  -no purulence, but mucoid clear nasal discharge  -no nasal polyps present  -no septal perforation  -no eustachian tube obstruction  -large adenoid partially obstructing nasopharynx     Summary of key findings:  Diffuse mucosal edema is present with mucoid nasal secretions bilaterally.  Bilateral inferior turbinate hypertrophy is present.  There is some septal deviation present.  There are no polyps or purulence with no evidence of acute sinusitis.  Large adenoid is noted.

## 2019-10-16 NOTE — PROCEDURES
Laryngoscopy  Date/Time: 10/15/2019 4:00 PM  Performed by: Pancho Cm MD  Authorized by: Pancho Cm MD     Consent Done?:  Yes (Verbal)  Anesthesia:     Local anesthetic:  4% Xylocaine spray with Srinivasa-Synephrine    Patient tolerance:  Patient tolerated the procedure well with no immediate complications    Decongestion performed?: Yes    Laryngoscopy:     Areas examined:  Nasopharynx, oropharynx, hypopharynx, larynx, vocal cords and nasal cavities    Laryngoscope size:  3 mm  Nose External:      No external nasal deformity  Nose Intranasal:      Mucosa no polyps     Mucosa ulcers not present     Enlarged turbinates  Nasopharynx:      No mucosa lesions     Adenoids present     Posterior choanae patent     Eustachian tube patent  Larynx/hypopharynx:      No epiglottis lesions     No epiglottis edema     No AE folds lesions     No vocal cord polyps     Equal and normal bilateral     No hypopharynx lesions     No piriform sinus pooling     No piriform sinus lesions     Post cricoid edema     Post cricoid erythema     After appropriate consent, a fiberoptic laryngoscope was introduced into the right side of the nose and the nasal passage was examined en route to the nasopharynx which was then examined. The nasopharynx contained a large hypertrophied adenoid with partial obstruction of the superior portion of the nasopharyngeal airway; no other concerning findings. The scope was flexed to examine the eustachian tube orifice which was patent ipsilaterally as well as contralaterally. Further examination revealed the base of tongue, vallecula, piriform sinuses, laryngeal and lingual surfaces of the epiglottis, arytenoids and interarytenoid area, posterior pharyngeal wall, true vocal cords, false vocal cords, and ventricle as well as the upper trachea which could be seen all to be normal with exception of post cricoid edema and erythema which was noted. The patient was asked to vocalize to examine the adduction  and symmetry of vocal fold mobility and closure, then to sniff alowing examination of vocal fold abduction. Tongue protrusion allowed for complete evaluation of the valleculae. The exam was well tolerated. Findings were explained to the patient.    Summary of findings:  Large adenoid tissue with partial obstruction of the nasopharynx.  Mild to moderate post cricoid edema and erythema suggestive of gastroesophageal reflux.  Exam with otherwise normal findings.

## 2019-11-11 ENCOUNTER — PATIENT OUTREACH (OUTPATIENT)
Dept: ADMINISTRATIVE | Facility: OTHER | Age: 29
End: 2019-11-11

## 2019-11-11 ENCOUNTER — OFFICE VISIT (OUTPATIENT)
Dept: FAMILY MEDICINE | Facility: CLINIC | Age: 29
End: 2019-11-11
Payer: MEDICAID

## 2019-11-11 VITALS
DIASTOLIC BLOOD PRESSURE: 80 MMHG | WEIGHT: 166.69 LBS | BODY MASS INDEX: 28.46 KG/M2 | OXYGEN SATURATION: 99 % | HEART RATE: 94 BPM | HEIGHT: 64 IN | SYSTOLIC BLOOD PRESSURE: 110 MMHG | TEMPERATURE: 98 F

## 2019-11-11 DIAGNOSIS — J02.9 SORE THROAT: Primary | ICD-10-CM

## 2019-11-11 DIAGNOSIS — R09.82 PND (POST-NASAL DRIP): ICD-10-CM

## 2019-11-11 DIAGNOSIS — R05.9 COUGH: ICD-10-CM

## 2019-11-11 LAB
CTP QC/QA: YES
S PYO RRNA THROAT QL PROBE: NEGATIVE

## 2019-11-11 PROCEDURE — 99213 OFFICE O/P EST LOW 20 MIN: CPT | Mod: PBBFAC,PO | Performed by: FAMILY MEDICINE

## 2019-11-11 PROCEDURE — 87880 STREP A ASSAY W/OPTIC: CPT | Mod: PBBFAC,PO | Performed by: FAMILY MEDICINE

## 2019-11-11 PROCEDURE — 99999 PR PBB SHADOW E&M-EST. PATIENT-LVL III: ICD-10-PCS | Mod: PBBFAC,,, | Performed by: FAMILY MEDICINE

## 2019-11-11 PROCEDURE — 99214 PR OFFICE/OUTPT VISIT, EST, LEVL IV, 30-39 MIN: ICD-10-PCS | Mod: S$PBB,,, | Performed by: FAMILY MEDICINE

## 2019-11-11 PROCEDURE — 99999 PR PBB SHADOW E&M-EST. PATIENT-LVL III: CPT | Mod: PBBFAC,,, | Performed by: FAMILY MEDICINE

## 2019-11-11 PROCEDURE — 99214 OFFICE O/P EST MOD 30 MIN: CPT | Mod: S$PBB,,, | Performed by: FAMILY MEDICINE

## 2019-11-11 RX ORDER — LORATADINE 10 MG/1
10 TABLET ORAL DAILY
Qty: 30 TABLET | Refills: 2 | Status: SHIPPED | OUTPATIENT
Start: 2019-11-11 | End: 2021-12-13 | Stop reason: ALTCHOICE

## 2019-11-11 RX ORDER — PENICILLIN V POTASSIUM 500 MG/1
500 TABLET, FILM COATED ORAL 2 TIMES DAILY
Qty: 20 TABLET | Refills: 0 | Status: SHIPPED | OUTPATIENT
Start: 2019-11-11 | End: 2022-04-18

## 2019-11-11 RX ORDER — FLUTICASONE PROPIONATE 50 MCG
2 SPRAY, SUSPENSION (ML) NASAL DAILY
Qty: 1 BOTTLE | Refills: 2 | Status: SHIPPED | OUTPATIENT
Start: 2019-11-11 | End: 2021-12-13

## 2019-11-11 RX ORDER — PROMETHAZINE HYDROCHLORIDE AND DEXTROMETHORPHAN HYDROBROMIDE 6.25; 15 MG/5ML; MG/5ML
5 SYRUP ORAL 3 TIMES DAILY PRN
Qty: 118 ML | Refills: 1 | Status: SHIPPED | OUTPATIENT
Start: 2019-11-11 | End: 2022-04-18

## 2019-11-11 NOTE — LETTER
November 11, 2019      Mone Chandler  [address]  [place] [address]             [place] [id number] [facility] [address]  Phone: [phone]  Fax: [phone] Patient: Mone Chandler  MRN: [id number]  YOB: 1990  Date of Visit: 11/11/2019    To Whom It May Concern:    Mone Giles was seen in the Clinic on 11/11/2019. She may return to work/school on 11/14/2019 with no restrictions. If you have any questions or concerns, or if I can be of further assistance, please do not hesitate to contact my office.    Sincerely,          Tha Davidson MD  [facility]

## 2019-11-11 NOTE — PROGRESS NOTES
Office Visit    Patient Name: Mone Chandler    : 1990  MRN: [id number]      Assessment/Plan:  Mone Chandler is a 29 y.o. female who presents today for :    Sore throat  -  neg   POCT rapid strep A  -     benzocaine-menthol 15-3.6 mg Lozg; 1 each by Mucous Membrane route every 4 to 6 hours as needed.  Dispense: 18 lozenge; Refill: 1  -     phenol (THROAT SPRAY) 1.4 % SprA; by Mucous Membrane route every 2 (two) hours.  Dispense: 177 mL; Refill: 0  -     penicillin v potassium (VEETID) 500 MG tablet; Take 1 tablet (500 mg total) by mouth 2 (two) times daily.  Dispense: 20 tablet; Refill: 0  PND (post-nasal drip)  -     loratadine (CLARITIN) 10 mg tablet; Take 1 tablet (10 mg total) by mouth once daily.  Dispense: 30 tablet; Refill: 2  -     fluticasone propionate (FLONASE) 50 mcg/actuation nasal spray; 2 sprays (100 mcg total) by Each Nostril route once daily.  Dispense: 1 Bottle; Refill: 2  Cough  -     promethazine-dextromethorphan (PROMETHAZINE-DM) 6.25-15 mg/5 mL Syrp; Take 5 mLs by mouth 3 (three) times daily as needed (cough).  Dispense: 118 mL; Refill: 1  -neg rapid strep, but given duration and Sx, concern for bacterial infection - will start empiric Abx. Counseled pt that cough may persist for up to 2-3 weeks. Advised to continue supportive therapy and symptom management. May try Nasal Saline Rinses. Cepacol prn for sore throat. Claritin PRN for drainage  -advised to use air humidifier/filter at home, changing AC filters regularly, avoid second-hand smoking  -     Tylenol every 4-6 hours as needed for fever, headaches, sore throat, ear pain, bodyaches, and/or nasal/sinus inflammation  -RTC if Sx worsens or call clinic back if pt has any concerns.              Follow up for worsening Sx. Urgent care/ED precautions provided.       This note was created by combination of typed  and Dragon dictation.  Transcription errors may be present.  If there are any questions, please contact  me.      ----------------------------------------------------------------------------------------------------------------------    HPI:  Patient Care Team:  Myke Gardner MD as PCP - General (Internal Medicine)  Yair Rg MD as Obstetrician (Obstetrics)    Mone is a 29 y.o. female with      Patient Active Problem List   Diagnosis    ADRIANO (generalized anxiety disorder)    Allergic rhinitis, cause unspecified    PCOS (polycystic ovarian syndrome)    Chronic thumb pain, bilateral    Slow transit constipation         Patient presents today for :  Sore Throat  and congestion/runny nose for the past 3-4 days, with occasional   mild productive cough of clear phlegm, +drainage. Pt denies facial pressure and pain, but feels that it hurts to swallow solid foods. She denies any sick contact with similar Sx.  She has not tried OTC meds at home. +b/l ear discomfort and subjective fever and chills, as well as generalized body aches.    Additional ROS    + dysphagia  No CP/SOB/palpitations/swelling  No nausea/vomiting/abd pain/no diarrhea  No rashes      Patient Active Problem List   Diagnosis    ADRIANO (generalized anxiety disorder)    Allergic rhinitis, cause unspecified    PCOS (polycystic ovarian syndrome)    Chronic thumb pain, bilateral    Slow transit constipation       Current Medications  Medications reviewed and updated.       Current Outpatient Medications:     carbamide peroxide (DEBROX) 6.5 % otic solution, Place 5 drops into the right ear 2 (two) times daily., Disp: 22 mL, Rfl: 0    ciprofloxacin HCl (CILOXAN) 0.3 % ophthalmic solution, Place 1 drop into the left eye every 4 (four) hours., Disp: 10 mL, Rfl: 0    diclofenac sodium (VOLTAREN) 1 % Gel, APPLY 2 G TOPICALLY ONCE DAILY., Disp: 100 g, Rfl: 0    hydrocortisone 2.5 % cream, Apply topically 2 (two) times daily. for 10 days, Disp: 20 g, Rfl: 0    ibuprofen (ADVIL,MOTRIN) 600 MG tablet, Take 1 tablet (600 mg total) by mouth every 6  (six) hours as needed., Disp: 30 tablet, Rfl: 0    nystatin (MYCOSTATIN) cream, Apply topically 2 (two) times daily., Disp: 30 g, Rfl: 0    benzocaine-menthol 15-3.6 mg Lozg, 1 each by Mucous Membrane route every 4 to 6 hours as needed., Disp: 18 lozenge, Rfl: 1    fluticasone propionate (FLONASE) 50 mcg/actuation nasal spray, 2 sprays (100 mcg total) by Each Nostril route once daily., Disp: 1 Bottle, Rfl: 2    loratadine (CLARITIN) 10 mg tablet, Take 1 tablet (10 mg total) by mouth once daily., Disp: 30 tablet, Rfl: 2    penicillin v potassium (VEETID) 500 MG tablet, Take 1 tablet (500 mg total) by mouth 2 (two) times daily., Disp: 20 tablet, Rfl: 0    phenol (THROAT SPRAY) 1.4 % SprA, by Mucous Membrane route every 2 (two) hours., Disp: 177 mL, Rfl: 0    promethazine-dextromethorphan (PROMETHAZINE-DM) 6.25-15 mg/5 mL Syrp, Take 5 mLs by mouth 3 (three) times daily as needed (cough)., Disp: 118 mL, Rfl: 1    Past Surgical History:   Procedure Laterality Date    NASAL ENDOSCOPY W/ BALLON SINUPLASTY Bilateral 2017    thumb Bilateral     TRIGGER FINGER RELEASE Bilateral     thumbs       Family History   Problem Relation Age of Onset    Hypertension Mother     Diverticulitis Father        Social History     Socioeconomic History    Marital status: Single     Spouse name: Not on file    Number of children: Not on file    Years of education: Not on file    Highest education level: Not on file   Occupational History    Not on file   Social Needs    Financial resource strain: Not on file    Food insecurity:     Worry: Not on file     Inability: Not on file    Transportation needs:     Medical: Not on file     Non-medical: Not on file   Tobacco Use    Smoking status: Never Smoker    Smokeless tobacco: Never Used   Substance and Sexual Activity    Alcohol use: No    Drug use: No    Sexual activity: Not on file   Lifestyle    Physical activity:     Days per week: Not on file     Minutes per session:  "Not on file    Stress: Not on file   Relationships    Social connections:     Talks on phone: Not on file     Gets together: Not on file     Attends [employer] service: Not on file     Active member of club or organization: Not on file     Attends meetings of clubs or organizations: Not on file     Relationship status: Not on file   Other Topics Concern    Not on file   Social History Narrative    Not on file             Allergies   Review of patient's allergies indicates:  No Known Allergies          Review of Systems  See HPI      Physical Exam  /80 (BP Location: Left arm, Patient Position: Sitting, BP Method: Medium (Manual))   Pulse 94   Temp 98 °F (36.7 °C) (Oral)   Ht 5' 4" (1.626 m)   Wt 75.6 kg (166 lb 10.7 oz)   LMP 10/31/2019   SpO2 99%   BMI 28.61 kg/m²       GEN: NAD, well developed  HEENT: NCAT, PERRLA, EOMI, sclera clear, anicteric, TM clear bilaterally with normal light reflex, mild nasal turbinate swelling, MMM with no lesions, O/P with moderate erythema but no tonsillar swelling/discharge,no trismus, no uvula deviation, +mild drainage  NECK: normal, supple with midline trachea, no LAD, no thyromegaly  LUNGS: CTAB, no w/r/r, no increased work of breathing  HEART: RRR, normal S1 and S2, no m/r/g  ABD: s/nt/nd, NABS  SKIN: normal turgor, no rashes, no other lesions.   PSYCH: AOx3, appropriate mood and affect    "

## 2019-11-24 ENCOUNTER — OFFICE VISIT (OUTPATIENT)
Dept: URGENT CARE | Facility: CLINIC | Age: 29
End: 2019-11-24
Payer: MEDICAID

## 2019-11-24 VITALS
TEMPERATURE: 98 F | OXYGEN SATURATION: 100 % | HEIGHT: 64 IN | RESPIRATION RATE: 18 BRPM | HEART RATE: 91 BPM | BODY MASS INDEX: 28.34 KG/M2 | SYSTOLIC BLOOD PRESSURE: 120 MMHG | DIASTOLIC BLOOD PRESSURE: 75 MMHG | WEIGHT: 166 LBS

## 2019-11-24 DIAGNOSIS — J02.9 VIRAL PHARYNGITIS: Primary | ICD-10-CM

## 2019-11-24 DIAGNOSIS — N89.8 VAGINAL IRRITATION: ICD-10-CM

## 2019-11-24 PROCEDURE — 99214 OFFICE O/P EST MOD 30 MIN: CPT | Mod: S$GLB,,, | Performed by: NURSE PRACTITIONER

## 2019-11-24 PROCEDURE — 99214 PR OFFICE/OUTPT VISIT, EST, LEVL IV, 30-39 MIN: ICD-10-PCS | Mod: S$GLB,,, | Performed by: NURSE PRACTITIONER

## 2019-11-24 NOTE — PROGRESS NOTES
"Subjective:       Patient ID: Mone Chandler is a 29 y.o. female.    Vitals:  height is 5' 4" (1.626 m) and weight is 75.3 kg (166 lb). Her temperature is 97.6 °F (36.4 °C). Her blood pressure is 120/75 and her pulse is 91. Her respiration is 18 and oxygen saturation is 100%.     Chief Complaint: Vaginal Pain    Pt c/o possible ingrown hair on the left side that has been hurting since yesterday. She states she was with an ex not too long ago and did use protection but then this came about and she is unsure if it's an ingrown hair or something more. She states she had a friend look and he thinks it looks like an ingrown hair. She also states she see's a little discharge yeast like that she normally gets after sexual activity and it is normal for her.  Patient reports she do not want to do any STD testing but just want to be checked out  Pt also c/o sore throat not going away for which she was seen for and given a few medications. She was swabbed for flu and strep, all negative but it's still hurting.  Denies any sore throat today    Vaginal Pain   The patient's primary symptoms include vaginal discharge. This is a new problem. The current episode started yesterday. The problem occurs constantly. The pain is mild. The problem affects the left side. She is not pregnant. Associated symptoms include a sore throat. Pertinent negatives include no chills, diarrhea, dysuria, fever, frequency, headaches, nausea, rash, urgency or vomiting. There has been no bleeding. She has not been passing clots. She has not been passing tissue. She has tried nothing for the symptoms. It is unknown whether or not her partner has an STD.       Constitution: Negative for chills, fatigue and fever.   HENT: Positive for sore throat. Negative for congestion.    Neck: Negative for painful lymph nodes.   Cardiovascular: Negative for chest pain and leg swelling.   Eyes: Negative for double vision and blurred vision.   Respiratory: Negative for " cough and shortness of breath.    Gastrointestinal: Negative for nausea, vomiting and diarrhea.   Genitourinary: Positive for vaginal pain and vaginal discharge. Negative for dysuria, frequency, urgency and history of kidney stones.   Musculoskeletal: Negative for joint pain, joint swelling, muscle cramps and muscle ache.   Skin: Negative for color change, pale, rash and bruising.   Allergic/Immunologic: Negative for seasonal allergies.   Neurological: Negative for dizziness, history of vertigo, light-headedness, passing out and headaches.   Hematologic/Lymphatic: Negative for swollen lymph nodes.   Psychiatric/Behavioral: Negative for nervous/anxious, sleep disturbance and depression. The patient is not nervous/anxious.        Objective:      Physical Exam   Constitutional: She is oriented to person, place, and time. She appears well-developed and well-nourished.   HENT:   Head: Normocephalic and atraumatic.   Right Ear: External ear normal.   Left Ear: External ear normal.   Nose: Nose normal. No mucosal edema, rhinorrhea or nasal deformity. No epistaxis. Right sinus exhibits no maxillary sinus tenderness and no frontal sinus tenderness. Left sinus exhibits no maxillary sinus tenderness and no frontal sinus tenderness.   Mouth/Throat: Oropharynx is clear and moist and mucous membranes are normal. No oropharyngeal exudate, posterior oropharyngeal edema, posterior oropharyngeal erythema, tonsillar abscesses or cobblestoning.   Eyes: Lids are normal.   Neck: Trachea normal, normal range of motion and phonation normal. Neck supple.   Cardiovascular: Normal pulses.   Pulmonary/Chest: Effort normal.   Abdominal: Soft. Normal appearance and bowel sounds are normal. She exhibits no distension. There is no tenderness. There is no CVA tenderness.   Genitourinary: Pelvic exam was performed with patient in the knee-chest position. No labial fusion. There is no rash, tenderness, lesion or injury on the right labia. There is  tenderness (mild, no erythema, warmth or swelling or lesion noted) on the left labia. There is no rash, lesion or injury on the left labia.   Genitourinary Comments: No active discharge noted  Cherry in room for exam     Lymphadenopathy:     She has no cervical adenopathy.        Right cervical: No superficial cervical adenopathy present.       Left cervical: No superficial cervical adenopathy present.   Neurological: She is alert and oriented to person, place, and time.   Skin: Skin is warm, dry and intact.   Psychiatric: She has a normal mood and affect. Her speech is normal and behavior is normal. Cognition and memory are normal.   Nursing note and vitals reviewed.      Instructed patient to follow up with primary/OB gyn if symptom worsens or does not improve. patient verbalized understanding and in agreement with current plan of care.  Assessment:       1. Viral pharyngitis    2. Vaginal irritation        Plan:         Viral pharyngitis    Vaginal irritation      Patient Instructions     Sore throat recommendations: Warm fluids, warm salt water gargles, throat lozenges, tea, honey, soup, rest, hydration.  Please return here or go to the Emergency Department for any concerns or worsening of condition.  If you were prescribed antibiotics, please take them to completion.  If you were prescribed a narcotic medication, do not drive or operate heavy equipment or machinery while taking these medications.  Please follow up with your primary care doctor or specialist as needed.  If you  smoke, please stop smoking.  Self-Care for Sore Throats    Sore throats happen for many reasons, such as colds, allergies, and infections caused by viruses or bacteria. In any case, your throat becomes red and sore. Your goal for self-care is to reduce your discomfort while giving your throat a chance to heal.  Moisten and soothe your throat  Tips include the following:  · Try a sip of water first thing after waking up.  · Keep your throat  moist by drinking 6 or more glasses of clear liquids every day.  · Run a cool-air humidifier in your room overnight.  · Avoid cigarette smoke.   · Suck on throat lozenges, cough drops, hard candy, ice chips, or frozen fruit-juice bars. Use the sugar-free versions if your diet or medical condition requires them.  Gargle to ease irritation  Gargling every hour or 2 can ease irritation. Try gargling with 1 of these solutions:  · 1/4 teaspoon of salt in 1/2 cup of warm water  · An over-the-counter anesthetic gargle  Use medicine for more relief  Over-the-counter medicine can reduce sore throat symptoms. Ask your pharmacist if you have questions about which medicine to use:  · Ease pain with anesthetic sprays. Aspirin or an aspirin substitute also helps. Remember, never give aspirin to anyone 18 or younger, or if you are already taking blood thinners.   · For sore throats caused by allergies, try antihistamines to block the allergic reaction.  · Remember: unless a sore throat is caused by a bacterial infection, antibiotics wont help you.  Prevent future sore throats  Prevention tips include the following:  · Stop smoking or reduce contact with secondhand smoke. Smoke irritates the tender throat lining.  · Limit contact with pets and with allergy-causing substances, such as pollen and mold.  · When youre around someone with a sore throat or cold, wash your hands often to keep viruses or bacteria from spreading.  · Dont strain your vocal cords.  Call your healthcare provider  Contact your healthcare provider if you have:  · A temperature over 101°F (38.3°C)  · White spots on the throat  · Great difficulty swallowing  · Trouble breathing  · A skin rash  · Recent exposure to someone else with strep bacteria  · Severe hoarseness and swollen glands in the neck or jaw   Date Last Reviewed: 8/1/2016  © 2593-8652 [employer]. [address] [place] [address], [place] [address]. All rights reserved. This information is not  intended as a substitute for professional medical care. Always follow your healthcare professional's instructions.        Vaginal Infection: Understanding the Vaginal Environment  The vagina is a canal. It connects the uterus (womb) to the outside of the body. It is home to many types of bacteria and other tiny organisms. These different bacteria most often stay balanced in number. This keeps the vagina healthy. If the balance changes, it can cause infection.   A healthy environment  Many types of bacteria are present in a healthy vagina. When balanced, they dont cause problems. Small amounts of yeast may also be present without causing problems. The most common type of bacteria in the vagina is lactobacillus. It helps keep the vagina at a low pH. A low pH keeps bad bacteria from taking over.  Normal vaginal discharge  The vagina makes fluid. It is sent out as discharge. This also keeps the vagina healthy. Normal discharge can be clear, white, or yellowish. Most women find that normal discharge varies in amount and color through the month.  An unhealthy environment  The vaginal environment may get out of balance. This may result in a vaginal infection. There are a few reasons this can happen. The pH may have changed. The amount of one organism, such as yeast, may increase. Or an outside organism may get into the vagina and throw off the balance:  · Bacterial vaginosis (BV). BV is due to an imbalance in the normal bacteria in the vagina. Lactobacillus bacteria decrease. As a result, the numbers of bad bacteria increase.  · Candidiasis (yeast infection). Yeast is a type of fungus. A yeast infection occurs when yeast cells in the vagina increase. They then attack vaginal tissues. A type of yeast called Candida albicans is often involved.  · Trichomoniasis (trich). Trich is a parasite. It is passed from one person to another during sex. Men with trich often dont have any symptoms. In women, it can take weeks or  months before symptoms appear.  Date Last Reviewed: 3/1/2017  © 1418-4714 [employer], [employer]. [address] [place] [address], [place] [address]. All rights reserved. This information is not intended as a substitute for professional medical care. Always follow your healthcare professional's instructions.

## 2019-11-24 NOTE — PATIENT INSTRUCTIONS
Sore throat recommendations: Warm fluids, warm salt water gargles, throat lozenges, tea, honey, soup, rest, hydration.  Please return here or go to the Emergency Department for any concerns or worsening of condition.  If you were prescribed antibiotics, please take them to completion.  If you were prescribed a narcotic medication, do not drive or operate heavy equipment or machinery while taking these medications.  Please follow up with your primary care doctor or specialist as needed.  If you  smoke, please stop smoking.  Self-Care for Sore Throats    Sore throats happen for many reasons, such as colds, allergies, and infections caused by viruses or bacteria. In any case, your throat becomes red and sore. Your goal for self-care is to reduce your discomfort while giving your throat a chance to heal.  Moisten and soothe your throat  Tips include the following:  · Try a sip of water first thing after waking up.  · Keep your throat moist by drinking 6 or more glasses of clear liquids every day.  · Run a cool-air humidifier in your room overnight.  · Avoid cigarette smoke.   · Suck on throat lozenges, cough drops, hard candy, ice chips, or frozen fruit-juice bars. Use the sugar-free versions if your diet or medical condition requires them.  Gargle to ease irritation  Gargling every hour or 2 can ease irritation. Try gargling with 1 of these solutions:  · 1/4 teaspoon of salt in 1/2 cup of warm water  · An over-the-counter anesthetic gargle  Use medicine for more relief  Over-the-counter medicine can reduce sore throat symptoms. Ask your pharmacist if you have questions about which medicine to use:  · Ease pain with anesthetic sprays. Aspirin or an aspirin substitute also helps. Remember, never give aspirin to anyone 18 or younger, or if you are already taking blood thinners.   · For sore throats caused by allergies, try antihistamines to block the allergic reaction.  · Remember: unless a sore throat is caused by a  bacterial infection, antibiotics wont help you.  Prevent future sore throats  Prevention tips include the following:  · Stop smoking or reduce contact with secondhand smoke. Smoke irritates the tender throat lining.  · Limit contact with pets and with allergy-causing substances, such as pollen and mold.  · When youre around someone with a sore throat or cold, wash your hands often to keep viruses or bacteria from spreading.  · Dont strain your vocal cords.  Call your healthcare provider  Contact your healthcare provider if you have:  · A temperature over 101°F (38.3°C)  · White spots on the throat  · Great difficulty swallowing  · Trouble breathing  · A skin rash  · Recent exposure to someone else with strep bacteria  · Severe hoarseness and swollen glands in the neck or jaw   Date Last Reviewed: 8/1/2016 © 2000-2017 [employer]. [address] [place] [address]. All rights reserved. This information is not intended as a substitute for professional medical care. Always follow your healthcare professional's instructions.        Vaginal Infection: Understanding the Vaginal Environment  The vagina is a canal. It connects the uterus (womb) to the outside of the body. It is home to many types of bacteria and other tiny organisms. These different bacteria most often stay balanced in number. This keeps the vagina healthy. If the balance changes, it can cause infection.   A healthy environment  Many types of bacteria are present in a healthy vagina. When balanced, they dont cause problems. Small amounts of yeast may also be present without causing problems. The most common type of bacteria in the vagina is lactobacillus. It helps keep the vagina at a low pH. A low pH keeps bad bacteria from taking over.  Normal vaginal discharge  The vagina makes fluid. It is sent out as discharge. This also keeps the vagina healthy. Normal discharge can be clear, white, or yellowish. Most women find that normal  discharge varies in amount and color through the month.  An unhealthy environment  The vaginal environment may get out of balance. This may result in a vaginal infection. There are a few reasons this can happen. The pH may have changed. The amount of one organism, such as yeast, may increase. Or an outside organism may get into the vagina and throw off the balance:  · Bacterial vaginosis (BV). BV is due to an imbalance in the normal bacteria in the vagina. Lactobacillus bacteria decrease. As a result, the numbers of bad bacteria increase.  · Candidiasis (yeast infection). Yeast is a type of fungus. A yeast infection occurs when yeast cells in the vagina increase. They then attack vaginal tissues. A type of yeast called Candida albicans is often involved.  · Trichomoniasis (trich). Trich is a parasite. It is passed from one person to another during sex. Men with trich often dont have any symptoms. In women, it can take weeks or months before symptoms appear.  Date Last Reviewed: 3/1/2017  © 4972-4353 [employer], [employer]. [address] [place] [address], [place] [address]. All rights reserved. This information is not intended as a substitute for professional medical care. Always follow your healthcare professional's instructions.

## 2019-12-23 ENCOUNTER — TELEPHONE (OUTPATIENT)
Dept: FAMILY MEDICINE | Facility: CLINIC | Age: 29
End: 2019-12-23

## 2019-12-23 NOTE — TELEPHONE ENCOUNTER
Patient states that she wanted to be seen by today because she is having some kind of reaction and she wanted a provider to see while it was just starting to happen. Notified the patient that we do not have any openings for today and tomorrow clinic will be closing at noon. Patient states that she does not want to wait. I notified the patient that she should go to UC if she believes this is an urgent matter. Patient verbalized understanding stating that she will go to the UC.

## 2019-12-23 NOTE — TELEPHONE ENCOUNTER
----- Message from Shanika jordinrojas sent at 12/23/2019  1:02 PM CST -----  Type:  Patient Returning Call    Who Called: pt     Who Left Message for Patient: Soco    Does the patient know what this is regarding?: earlier appt request    Would the patient rather a call back or a response via [facility]? Call back     Best Call Back Number: [phone]    Additional Information:

## 2019-12-23 NOTE — TELEPHONE ENCOUNTER
----- Message from Kesha Cornell sent at 12/23/2019 11:52 AM CST -----  Contact: pt  Type: Patient Call Back    Who called:pt    What is the request in detail:pt is having patches popping up or nerve on the inside of her cheek. She is requesting to be seen today. Nothing available. Call pt    Can the clinic reply by [facility]?    Would the patient rather a call back or a response via [facility]? call    Best call back number:[phone]    Additional Information:

## 2019-12-26 ENCOUNTER — TELEPHONE (OUTPATIENT)
Dept: FAMILY MEDICINE | Facility: CLINIC | Age: 29
End: 2019-12-26

## 2019-12-26 NOTE — TELEPHONE ENCOUNTER
----- Message from Janet Gamble sent at 12/26/2019  8:42 AM CST -----  Contact: Mone [phone]  Type:  Same Day Appointment Request    Caller is requesting a same day appointment.  Caller declined first available   appointment listed below.      Name of Caller: Mone     When is the first available appointment? 01/13/2020    Symptoms: allergic reaction/ rash on face and mouth is turning red    Would the patient rather a call back or a response via [facility]? Call back     Best Call Back Number: [phone]

## 2019-12-26 NOTE — TELEPHONE ENCOUNTER
This nurse returned pt's phone call, pt states she wants to be seen today for a rash. Pt informed that no OV appointment times are available for today. Pt given address and number to [facility]. Pt states she will go to Urgent care.

## 2020-01-28 ENCOUNTER — TELEPHONE (OUTPATIENT)
Dept: FAMILY MEDICINE | Facility: CLINIC | Age: 30
End: 2020-01-28

## 2020-01-28 NOTE — TELEPHONE ENCOUNTER
Ok.  WIll place referral once we hear from her insurance.      I would not recommend drawing an insulin level.  If she would like to discuss any concerns regarding prediabetes or diabetes, would recommend sched an OV with me or Shellie.     Thank you,  Valeriano

## 2020-01-28 NOTE — TELEPHONE ENCOUNTER
Pt. Will call her insurance to check if they cover message therapy,and  would like a referral sent .pt. Wants lab orders to check her insulin levels

## 2020-01-28 NOTE — TELEPHONE ENCOUNTER
----- Message from Evelyn Dakotah sent at 1/27/2020  3:21 PM CST -----  Contact: Self  [phone]  Type: Patient Call Back    Who called: Self    What is the request in detail: pt is calling in regards to getting orders to get her insulin levels checked and she wants to know if her insurance would cover massage therapy and if so can she get a referral for somewhere    Can the clinic reply by [facility]? Call back    Would the patient rather a call back or a response via [facility]? Call back    Best call back number: [phone]

## 2020-02-26 ENCOUNTER — PATIENT MESSAGE (OUTPATIENT)
Dept: FAMILY MEDICINE | Facility: CLINIC | Age: 30
End: 2020-02-26

## 2020-02-26 ENCOUNTER — OFFICE VISIT (OUTPATIENT)
Dept: FAMILY MEDICINE | Facility: CLINIC | Age: 30
End: 2020-02-26
Payer: MEDICAID

## 2020-02-26 ENCOUNTER — LAB VISIT (OUTPATIENT)
Dept: LAB | Facility: HOSPITAL | Age: 30
End: 2020-02-26
Payer: MEDICAID

## 2020-02-26 VITALS
BODY MASS INDEX: 26.91 KG/M2 | OXYGEN SATURATION: 97 % | HEIGHT: 64 IN | DIASTOLIC BLOOD PRESSURE: 72 MMHG | TEMPERATURE: 98 F | HEART RATE: 98 BPM | SYSTOLIC BLOOD PRESSURE: 118 MMHG | WEIGHT: 157.63 LBS

## 2020-02-26 DIAGNOSIS — E28.2 PCOS (POLYCYSTIC OVARIAN SYNDROME): Primary | ICD-10-CM

## 2020-02-26 DIAGNOSIS — K64.4 EXTERNAL HEMORRHOID: ICD-10-CM

## 2020-02-26 DIAGNOSIS — E28.2 PCOS (POLYCYSTIC OVARIAN SYNDROME): ICD-10-CM

## 2020-02-26 DIAGNOSIS — F41.1 GAD (GENERALIZED ANXIETY DISORDER): Chronic | ICD-10-CM

## 2020-02-26 LAB
ALBUMIN SERPL BCP-MCNC: 3.6 G/DL (ref 3.5–5.2)
ALP SERPL-CCNC: 73 U/L (ref 55–135)
ALT SERPL W/O P-5'-P-CCNC: 12 U/L (ref 10–44)
ANION GAP SERPL CALC-SCNC: 4 MMOL/L (ref 8–16)
AST SERPL-CCNC: 19 U/L (ref 10–40)
BILIRUB SERPL-MCNC: 0.7 MG/DL (ref 0.1–1)
BUN SERPL-MCNC: 9 MG/DL (ref 6–20)
CALCIUM SERPL-MCNC: 8.8 MG/DL (ref 8.7–10.5)
CHLORIDE SERPL-SCNC: 108 MMOL/L (ref 95–110)
CO2 SERPL-SCNC: 26 MMOL/L (ref 23–29)
CREAT SERPL-MCNC: 0.7 MG/DL (ref 0.5–1.4)
EST. GFR  (AFRICAN AMERICAN): >60 ML/MIN/1.73 M^2
EST. GFR  (NON AFRICAN AMERICAN): >60 ML/MIN/1.73 M^2
ESTIMATED AVG GLUCOSE: 91 MG/DL (ref 68–131)
GLUCOSE SERPL-MCNC: 75 MG/DL (ref 70–110)
HBA1C MFR BLD HPLC: 4.8 % (ref 4–5.6)
POTASSIUM SERPL-SCNC: 4.3 MMOL/L (ref 3.5–5.1)
PROT SERPL-MCNC: 7.1 G/DL (ref 6–8.4)
SODIUM SERPL-SCNC: 138 MMOL/L (ref 136–145)

## 2020-02-26 PROCEDURE — 99214 PR OFFICE/OUTPT VISIT, EST, LEVL IV, 30-39 MIN: ICD-10-PCS | Mod: S$PBB,,, | Performed by: NURSE PRACTITIONER

## 2020-02-26 PROCEDURE — 99999 PR PBB SHADOW E&M-EST. PATIENT-LVL V: CPT | Mod: PBBFAC,,, | Performed by: NURSE PRACTITIONER

## 2020-02-26 PROCEDURE — 80053 COMPREHEN METABOLIC PANEL: CPT

## 2020-02-26 PROCEDURE — 99999 PR PBB SHADOW E&M-EST. PATIENT-LVL V: ICD-10-PCS | Mod: PBBFAC,,, | Performed by: NURSE PRACTITIONER

## 2020-02-26 PROCEDURE — 36415 COLL VENOUS BLD VENIPUNCTURE: CPT | Mod: PO

## 2020-02-26 PROCEDURE — 83036 HEMOGLOBIN GLYCOSYLATED A1C: CPT

## 2020-02-26 PROCEDURE — 99215 OFFICE O/P EST HI 40 MIN: CPT | Mod: PBBFAC,PO | Performed by: NURSE PRACTITIONER

## 2020-02-26 PROCEDURE — 99214 OFFICE O/P EST MOD 30 MIN: CPT | Mod: S$PBB,,, | Performed by: NURSE PRACTITIONER

## 2020-02-26 RX ORDER — HYDROCORTISONE 25 MG/G
CREAM TOPICAL 2 TIMES DAILY
Qty: 20 G | Refills: 3 | Status: SHIPPED | OUTPATIENT
Start: 2020-02-26 | End: 2022-04-18

## 2020-02-26 NOTE — PATIENT INSTRUCTIONS
Treating Hemorrhoids: Self-Care    Follow your healthcare providers advice about caring for your hemorrhoids at home. Some treatments help relieve symptoms right away. Others involve making changes in your diet and exercise habits. These can help ease constipation and prevent hemorrhoid symptoms from coming back.  Relieving symptoms  Your healthcare provider may prescribe anti-inflammatory medicine to help ease your symptoms. The following tips will also help relieve pain and swelling.  · Take sitz baths. Taking a sitz bath means sitting in a few inches of warm bath water. Soaking for 10 minutes twice a day can provide welcome relief from painful hemorrhoids. It can also help the area stay clean.  · Develop good bowel habits. Use the bathroom when you need to. Dont ignore the urge to move your bowels. This can lead to constipation, hard stools, and straining. Also, dont read while on the toilet. Sit only as long as needed. Wipe gently with soft, unscented toilet tissue or baby wipes.  · Use ice packs. Placing an ice pack on a thrombosed external hemorrhoid can help relieve pain right away. It will also help reduce the blood clot. Use the ice for 15 to 20 minutes at a time. Keep a cloth between the ice and your skin to prevent skin damage.  · Use other measures. Laxatives and enemas can help ease constipation. But use them only on your healthcare providers advice. For symptom relief, try using cotton pads soaked in witch hazel. These are available at most drugstores. Over-the-counter hemorrhoid ointments and petroleum jelly can also provide relief.  Add fiber to your diet  Adding fiber to your diet can help relieve constipation by making stools softer and easier to pass. To increase your fiber intake, your healthcare provider may recommend a bulking agent, such as psyllium. This is a high-fiber supplement available at most grocery stores and drugstores. Eating more fiber-rich foods will also help. There are two  types of fiber:  · Insoluble fiber is the main ingredient in bulking agents. Its also found in foods such as wheat bran, whole-grain breads, fresh fruits, and vegetables.  · Soluble fiber is found in foods such as oat bran. Although soluble fiber is good for you, it may not ease constipation as much as foods high in insoluble fiber.  Drink more water  Along with a high-fiber diet, drinking more water can help ease constipation. This is because insoluble fiber absorbs water, making stools soft and bulky. Be sure to drink plenty of water throughout the day. Drinking fruit juices, such as prune juice or apple juice, can also help prevent constipation.  Get more exercise  Regular exercise aids digestion and helps prevent constipation. Its also great for your health. So talk with your healthcare provider about starting an exercise program. Low-impact activities, such as swimming or walking, are good places to start. Take it easy at first. And remember to drink plenty of water when you exercise.  High-fiber foods  High-fiber foods offer many benefits. By making your stools softer, they help heal and prevent swollen hemorrhoids. They may also help reduce the risk of colon and rectal cancer. Best of all, theyre usually low in calories and taste great. Here are some examples of fiber-rich foods.  · Whole grains, such as wheat bran, corn bran, and brown rice.  · Vegetables, especially carrots, broccoli, cabbage, and peas.  · Fruits, such as apples, bananas, raisins, peaches, and pears.  · Nuts and legumes, especially peanuts, lentils, and kidney beans.  Easy ways to add fiber  The tips below offer some simple ways to add more high-fiber foods to your meals.  · Start your day with a high-fiber breakfast. Eat a wheat bran cereal along with a sliced banana. Or, try peanut butter on whole-wheat toast.  · Eat carrot sticks for snacks. Theyre easy to prepare, taste great, and are low in calories.  · Use whole-grain breads  instead of white bread for sandwiches.  · Eat fruits for treats. Try an apple and some raisins instead of a candy bar.   Date Last Reviewed: 7/1/2016  © 6554-8596 [employer]. [address] [place] [address], [place] [address]. All rights reserved. This information is not intended as a substitute for professional medical care. Always follow your healthcare professional's instructions.

## 2020-02-26 NOTE — PROGRESS NOTES
"History of Present Illness   Mone Chandler is a 29 y.o. woman with medical history as listed below who presents today to discuss screening for T2DM. She has a history of PCOS- managed by her OBGYN. She reports that her holistic  recommended that she have her insulin levels checked to determine the "best diet for her." She denies polyuria, polydipsia, polyphagia, blurred vision, or other symptoms. She would also like to discuss treatment for external hemorrhoid unrelieved with OTC treatment and soaking in a warm bath. She has no additional complaints and is otherwise healthy on today's visit.    Past Medical History:   Diagnosis Date    Allergic rhinitis, cause unspecified 6/17/2015    ADRIANO (generalized anxiety disorder) 6/17/2015    ADRIANO-7 score of 17     PCOS (polycystic ovarian syndrome)        Past Surgical History:   Procedure Laterality Date    NASAL ENDOSCOPY W/ BALLON SINUPLASTY Bilateral 2017    thumb Bilateral     TRIGGER FINGER RELEASE Bilateral     thumbs       Social History     Socioeconomic History    Marital status: Single     Spouse name: Not on file    Number of children: Not on file    Years of education: Not on file    Highest education level: Not on file   Occupational History    Not on file   Social Needs    Financial resource strain: Somewhat hard    Food insecurity:     Worry: Sometimes true     Inability: Sometimes true    Transportation needs:     Medical: No     Non-medical: No   Tobacco Use    Smoking status: Never Smoker    Smokeless tobacco: Never Used   Substance and Sexual Activity    Alcohol use: No     Frequency: Monthly or less     Drinks per session: 1 or 2     Binge frequency: Never    Drug use: No    Sexual activity: Not on file   Lifestyle    Physical activity:     Days per week: 0 days     Minutes per session: 60 min    Stress: Not on file   Relationships    Social connections:     Talks on phone: More than three times a week     Gets together: " "More than three times a week     Attends Buddhism service: Not on file     Active member of club or organization: Yes     Attends meetings of clubs or organizations: More than 4 times per year     Relationship status: Never    Other Topics Concern    Not on file   Social History Narrative    Not on file       Family History   Problem Relation Age of Onset    Hypertension Mother     Diverticulitis Father        Review of Systems  Review of Systems   Constitutional: Negative for malaise/fatigue and weight loss.   Eyes: Negative for blurred vision and double vision.   Cardiovascular: Negative for chest pain and palpitations.   Gastrointestinal: Negative for blood in stool and melena.   Genitourinary: Negative for dysuria, frequency and hematuria.   Skin: Negative for rash.   Neurological: Negative for dizziness, loss of consciousness and headaches.   Endo/Heme/Allergies: Negative for polydipsia.     A complete review of systems was otherwise negative.    Physical Exam  /72   Pulse 98   Temp 98.1 °F (36.7 °C) (Oral)   Ht 5' 4" (1.626 m)   Wt 71.5 kg (157 lb 10.1 oz)   LMP 02/12/2020 (Exact Date)   SpO2 97%   BMI 27.06 kg/m²   General appearance: alert, appears stated age, cooperative and no distress  Lungs: clear to auscultation bilaterally  Heart: regular rate and rhythm, S1, S2 normal, no murmur, click, rub or gallop  Neurologic: Grossly normal  Rectal: Non-thrombosed external hemorrhoid present without evidence for fissure    Assessment/Plan  PCOS (polycystic ovarian syndrome)  We discussed that I do not recommend we check and insulin level.  We will check a CMP and hemoglobin A1c.  Defer treatment of PCOS to her OBGYN team.  -     Comprehensive metabolic panel; Future; Expected date: 02/26/2020  -     Hemoglobin A1c; Future; Expected date: 02/26/2020    External hemorrhoid  Treatment as listed below.  Referral to colorectal surgery to discuss removal.  Handout provided on tips to manage at " home.  -     hydrocortisone 2.5 % cream; Apply topically 2 (two) times daily.  Dispense: 20 g; Refill: 3  -     Ambulatory referral/consult to Colorectal Surgery; Future; Expected date: 03/04/2020    ADRIANO (generalized anxiety disorder)  The current medical regimen is effective;  continue present plan and medications.    Patient has verbalized understanding and is in agreement with plan of care.    Follow up if symptoms worsen or fail to improve.

## 2020-02-28 ENCOUNTER — PATIENT MESSAGE (OUTPATIENT)
Dept: FAMILY MEDICINE | Facility: CLINIC | Age: 30
End: 2020-02-28

## 2020-02-28 DIAGNOSIS — M54.2 CHRONIC NECK PAIN: ICD-10-CM

## 2020-02-28 DIAGNOSIS — M54.50 CHRONIC BILATERAL LOW BACK PAIN WITHOUT SCIATICA: Primary | ICD-10-CM

## 2020-02-28 DIAGNOSIS — G89.29 CHRONIC BILATERAL LOW BACK PAIN WITHOUT SCIATICA: Primary | ICD-10-CM

## 2020-02-28 DIAGNOSIS — G89.29 CHRONIC NECK PAIN: ICD-10-CM

## 2020-03-20 ENCOUNTER — PATIENT MESSAGE (OUTPATIENT)
Dept: SURGERY | Facility: CLINIC | Age: 30
End: 2020-03-20

## 2020-03-20 ENCOUNTER — TELEPHONE (OUTPATIENT)
Dept: SURGERY | Facility: CLINIC | Age: 30
End: 2020-03-20

## 2020-03-23 ENCOUNTER — DOCUMENTATION ONLY (OUTPATIENT)
Dept: REHABILITATION | Facility: HOSPITAL | Age: 30
End: 2020-03-23

## 2020-03-23 NOTE — PROGRESS NOTES
Patient: Mone Chandler  Date: 3/23/2020  MRN: [id number]    Spoke with patient due to therapy following updates regarding COVID-19 closely and taking every precaution to ensure the safety of our patients, staff and community.  In an abundance of caution and in an effort to help reduce risk and limit community spread, we have decided to temporarily postpone appointments for patients who may be at increased risk to attend in-person therapy or where therapy is not critically needed at this time All patient questions were answered. Also stated to patient that we are exploring virtual methods of providing care and will be in touch over the next few weeks. Patient verbalized understanding to all.    Kodi Roman PT    3/23/2020

## 2020-05-11 ENCOUNTER — TELEPHONE (OUTPATIENT)
Dept: FAMILY MEDICINE | Facility: CLINIC | Age: 30
End: 2020-05-11

## 2020-05-11 DIAGNOSIS — L60.0 INGROWN TOENAIL: Primary | ICD-10-CM

## 2020-05-11 NOTE — TELEPHONE ENCOUNTER
----- Message from Divine Kramer sent at 5/11/2020  8:22 AM CDT -----  Contact: LAVELLE CALLEJAS [[id number]]  Name of Who is Calling: LAVELLE CALLEJAS [[id number]]      What is the request in detail: Pt is calling to speak to staff in regards to scheduling an tyron.... Please call to further assist .       Can the clinic reply by [facility]: N       [facility]: [phone]

## 2020-05-11 NOTE — TELEPHONE ENCOUNTER
Spoke with pt pt was asking to have an ingrown toenail removed in office, informed her that this is not aservice that is usually done by dr in office that a visit with podiatry maybe needed. Please advise

## 2020-05-14 ENCOUNTER — TELEPHONE (OUTPATIENT)
Dept: SURGERY | Facility: CLINIC | Age: 30
End: 2020-05-14

## 2020-05-18 ENCOUNTER — TELEPHONE (OUTPATIENT)
Dept: PODIATRY | Facility: CLINIC | Age: 30
End: 2020-05-18

## 2020-05-18 NOTE — TELEPHONE ENCOUNTER
----- Message from Pamela Renteria sent at 5/18/2020 10:22 AM CDT -----  Contact: pt  Pt would like to receive a call back regarding rescheduling her appt. Please contact the pt to advise.    Contact info [phone]

## 2020-05-19 LAB — HCV AB SERPL QL IA: NEGATIVE

## 2020-06-24 ENCOUNTER — OFFICE VISIT (OUTPATIENT)
Dept: SURGERY | Facility: CLINIC | Age: 30
End: 2020-06-24
Payer: MEDICAID

## 2020-06-24 VITALS
BODY MASS INDEX: 26.15 KG/M2 | WEIGHT: 152.31 LBS | HEART RATE: 83 BPM | SYSTOLIC BLOOD PRESSURE: 116 MMHG | DIASTOLIC BLOOD PRESSURE: 73 MMHG

## 2020-06-24 DIAGNOSIS — R15.0 INCOMPLETE DEFECATION: ICD-10-CM

## 2020-06-24 DIAGNOSIS — R15.1 FECAL SMEARING: ICD-10-CM

## 2020-06-24 PROCEDURE — 99999 PR PBB SHADOW E&M-EST. PATIENT-LVL III: CPT | Mod: PBBFAC,,, | Performed by: COLON & RECTAL SURGERY

## 2020-06-24 PROCEDURE — 99999 PR PBB SHADOW E&M-EST. PATIENT-LVL III: ICD-10-PCS | Mod: PBBFAC,,, | Performed by: COLON & RECTAL SURGERY

## 2020-06-24 PROCEDURE — 99213 OFFICE O/P EST LOW 20 MIN: CPT | Mod: PBBFAC | Performed by: COLON & RECTAL SURGERY

## 2020-06-24 PROCEDURE — 99203 OFFICE O/P NEW LOW 30 MIN: CPT | Mod: S$PBB,,, | Performed by: COLON & RECTAL SURGERY

## 2020-06-24 PROCEDURE — 99203 PR OFFICE/OUTPT VISIT, NEW, LEVL III, 30-44 MIN: ICD-10-PCS | Mod: S$PBB,,, | Performed by: COLON & RECTAL SURGERY

## 2020-06-24 NOTE — LETTER
June 26, 2020      Shellie Huerta, WENDY  [address]  [place] [address]           [facility]  [address]  [facility] [address]  Phone: [phone]          Patient: Mone Chandler   MR Number: [id number]   YOB: 1990   Date of Visit: 6/24/2020       Dear Shellie Huerta:    Thank you for referring Mone Chandler to me for evaluation. Attached you will find relevant portions of my assessment and plan of care.    If you have questions, please do not hesitate to call me. I look forward to following Mone Chandler along with you.    Sincerely,    IVELISSE Dominguez MD    Enclosure  CC:  No Recipients    If you would like to receive this communication electronically, please contact [email or web address] or [phone] to request more information on [email or web address] Link access.    For providers and/or their staff who would like to refer a patient to [facility], please contact us through our one-stop-shop provider referral line, [facility], at [phone].    If you feel you have received this communication in error or would no longer like to receive these types of communications, please e-mail [email or web address]

## 2020-06-24 NOTE — PATIENT INSTRUCTIONS
High fiber diet sheet  High fiber diet - 25 grams per day.  Emphasis on whole grain breads such as double fiber wheat bread and high fiber cereals and bars.    Drink 8 glasses of water per day 64 - 96 oz per day  Fiber supplements such as KONSYL, METAMUCIL can be taken 1-2 x per day  Regular exercise - 30 min brisk walking 5 days per week  Metamucil daily  Calmoseptime ointment    OV 6 weeks

## 2020-06-24 NOTE — PROGRESS NOTES
CRS Office Visit History and Physical    Referring Md:   Shellie Huerta Np  [address] [facility]  [place] [address]    SUBJECTIVE:     Reason for referral: External hemorrhoid    Chief Complaint: anal pain    History of Present Illness:  Patient is a 29 y.o. female new to this practice who presents on referral by Shellie Huerta NP for external hemorrhoid.  Pain at small hemorrhoid externally.  Better with Prep H but no longer helping.  BMs daily.  Intermittent loose stools.  Not consistent pain with BMs.  Occasional blood on TP.  Occasional constipation.   Very sensitive.  Sometimes very painful just to clean with water.        Past Medical History:   Diagnosis Date    Allergic rhinitis, cause unspecified 6/17/2015    ADRIANO (generalized anxiety disorder) 6/17/2015    ADRIANO-7 score of 17     PCOS (polycystic ovarian syndrome)        Past Surgical History:   Procedure Laterality Date    NASAL ENDOSCOPY W/ BALLON SINUPLASTY Bilateral 2017    thumb Bilateral     TRIGGER FINGER RELEASE Bilateral     thumbs       Review of patient's allergies indicates:  No Known Allergies    Current Outpatient Medications on File Prior to Visit   Medication Sig Dispense Refill    benzocaine-menthol 15-3.6 mg Lozg 1 each by Mucous Membrane route every 4 to 6 hours as needed. 18 lozenge 1    carbamide peroxide (DEBROX) 6.5 % otic solution Place 5 drops into the right ear 2 (two) times daily. 22 mL 0    ciprofloxacin HCl (CILOXAN) 0.3 % ophthalmic solution Place 1 drop into the left eye every 4 (four) hours. 10 mL 0    diclofenac sodium (VOLTAREN) 1 % Gel APPLY 2 G TOPICALLY ONCE DAILY. 100 g 0    fluticasone propionate (FLONASE) 50 mcg/actuation nasal spray 2 sprays (100 mcg total) by Each Nostril route once daily. 1 Bottle 2    hydrocortisone 2.5 % cream Apply topically 2 (two) times daily. for 10 days 20 g 0    hydrocortisone 2.5 % cream Apply topically 2 (two) times daily. 20 g 3    ibuprofen (ADVIL,MOTRIN) 600 MG  tablet Take 1 tablet (600 mg total) by mouth every 6 (six) hours as needed. 30 tablet 0    loratadine (CLARITIN) 10 mg tablet Take 1 tablet (10 mg total) by mouth once daily. 30 tablet 2    nystatin (MYCOSTATIN) cream Apply topically 2 (two) times daily. 30 g 0    penicillin v potassium (VEETID) 500 MG tablet Take 1 tablet (500 mg total) by mouth 2 (two) times daily. 20 tablet 0    phenol (THROAT SPRAY) 1.4 % SprA by Mucous Membrane route every 2 (two) hours. 177 mL 0    promethazine-dextromethorphan (PROMETHAZINE-DM) 6.25-15 mg/5 mL Syrp Take 5 mLs by mouth 3 (three) times daily as needed (cough). 118 mL 1     No current facility-administered medications on file prior to visit.        Family History   Problem Relation Age of Onset    Hypertension Mother     Diverticulitis Father        Social History     Tobacco Use    Smoking status: Never Smoker    Smokeless tobacco: Never Used   Substance Use Topics    Alcohol use: No     Frequency: Monthly or less     Drinks per session: 1 or 2     Binge frequency: Never    Drug use: No        Review of Systems:  ROS:  GENERAL: No fever, chills, fatigability or weight loss.  Integument:  No rashes, redness, icterus  CHEST: Denies BATES, cyanosis, wheezing, cough and sputum production.  CARDIOVASCULAR: Denies chest pain, PND, orthopnea or reduced exercise tolerance.  GI:  Denies abd pain, dysphagia, nausea, vomiting, no hematemesis, no rectal pain  : Denies burning on urination, no hematuria, no bacteriuria  MSK:  No deformities, swelling, joint pain swelling  Neurologic:  No HAs, seizures, weakness, paresthesias, gait problems      OBJECTIVE:     Vital Signs (Most Recent)  There were no vitals taken for this visit.    Physical Exam:  General: Black or  female in no distress   Skin/ Sclera anicteric  HEENT: anicteric, normocephalic, extraocular movements intact   Neck trachea midline  Chest symmetric, nl excursions, no retractions  Respiratory:  respirations are even and unlabored  COR RRR     Anorectal:   Inspection       Residual stool on anal skin.    MARIO:  nl tone, no masses, good squeeze, nl relaxation with Valsalva  Extremities: Warm dry and intact.  NO CCE  Neuro: alert and oriented x 4.  Moves all extremities.         ASSESSMENT/PLAN:   Anal pain due to fecal residue  Incomplete BMs  smal anal skin tag anteriorly      Recommend  Anoscopy  High fiber diet sheet  Metamucil daily  Calmoseptime oint BID  OV 6 weeks

## 2020-07-10 PROBLEM — R15.0 INCOMPLETE DEFECATION: Status: ACTIVE | Noted: 2020-07-10

## 2020-07-10 PROBLEM — R15.1 FECAL SMEARING: Status: ACTIVE | Noted: 2020-07-10

## 2020-08-14 DIAGNOSIS — Z11.59 NEED FOR HEPATITIS C SCREENING TEST: ICD-10-CM

## 2020-08-31 ENCOUNTER — OFFICE VISIT (OUTPATIENT)
Dept: FAMILY MEDICINE | Facility: CLINIC | Age: 30
End: 2020-08-31
Payer: MEDICAID

## 2020-08-31 VITALS
OXYGEN SATURATION: 97 % | WEIGHT: 149.38 LBS | DIASTOLIC BLOOD PRESSURE: 70 MMHG | TEMPERATURE: 98 F | HEART RATE: 89 BPM | HEIGHT: 64 IN | BODY MASS INDEX: 25.5 KG/M2 | SYSTOLIC BLOOD PRESSURE: 100 MMHG

## 2020-08-31 DIAGNOSIS — E28.2 PCOS (POLYCYSTIC OVARIAN SYNDROME): Chronic | ICD-10-CM

## 2020-08-31 DIAGNOSIS — F41.1 GAD (GENERALIZED ANXIETY DISORDER): Chronic | ICD-10-CM

## 2020-08-31 DIAGNOSIS — E66.3 OVERWEIGHT (BMI 25.0-29.9): Chronic | ICD-10-CM

## 2020-08-31 PROCEDURE — 99214 OFFICE O/P EST MOD 30 MIN: CPT | Mod: S$PBB,,, | Performed by: INTERNAL MEDICINE

## 2020-08-31 PROCEDURE — 99214 OFFICE O/P EST MOD 30 MIN: CPT | Mod: PBBFAC,PO | Performed by: INTERNAL MEDICINE

## 2020-08-31 PROCEDURE — 99999 PR PBB SHADOW E&M-EST. PATIENT-LVL IV: CPT | Mod: PBBFAC,,, | Performed by: INTERNAL MEDICINE

## 2020-08-31 PROCEDURE — 99999 PR PBB SHADOW E&M-EST. PATIENT-LVL IV: ICD-10-PCS | Mod: PBBFAC,,, | Performed by: INTERNAL MEDICINE

## 2020-08-31 PROCEDURE — 99214 PR OFFICE/OUTPT VISIT, EST, LEVL IV, 30-39 MIN: ICD-10-PCS | Mod: S$PBB,,, | Performed by: INTERNAL MEDICINE

## 2020-08-31 NOTE — PROGRESS NOTES
Assessment & Plan  Problem List Items Addressed This Visit        Psychiatric    ADRIANO (generalized anxiety disorder) (Chronic)    Overview     ADRIANO-7 score of 19 ->> 13         Current Assessment & Plan     Doing well overall.  She will let me know if we need to consider treatment options other than her current coping mechanisms.             Endocrine    PCOS (polycystic ovarian syndrome) (Chronic)    Current Assessment & Plan     Followed by OBGYN.  Monitor          Overweight (BMI 25.0-29.9) (Chronic)    Current Assessment & Plan     Down nearly 20#. Keep up the great work!                  Health Maintenance reviewed, Pap with GYN.    Follow-up: Follow up in about 1 year (around 8/31/2021) for Routine Physical.  ______________________________________________________________________    Chief Complaint  Chief Complaint   Patient presents with    PCOS    Anxiety    Weight Loss    Follow-up       HPI  Mone Chandler is a 29 y.o. female with medical diagnoses as listed in the medical history and problem list that presents for PCOS anxiety and weight loss follow up.  Pt is known to me with her last appointment 02/2020.      She has lost almost 20# with healthy lifestyle modification.      Anxiety has been improving as well.  Working on coping mechanisms and heaathy lifestyle here as well.     She is followed by GYN still.  Was found to have uterine polyp.  Doing well otherwise.       PAST MEDICAL HISTORY:  Past Medical History:   Diagnosis Date    Allergic rhinitis, cause unspecified 6/17/2015    ADRIANO (generalized anxiety disorder) 6/17/2015    ADRIANO-7 score of 17     PCOS (polycystic ovarian syndrome)        PAST SURGICAL HISTORY:  Past Surgical History:   Procedure Laterality Date    NASAL ENDOSCOPY W/ BALLON SINUPLASTY Bilateral 2017    thumb Bilateral     TRIGGER FINGER RELEASE Bilateral     thumbs       SOCIAL HISTORY:  Social History     Socioeconomic History    Marital status: Single     Spouse name:  Not on file    Number of children: Not on file    Years of education: Not on file    Highest education level: Not on file   Occupational History    Not on file   Social Needs    Financial resource strain: Somewhat hard    Food insecurity     Worry: Sometimes true     Inability: Sometimes true    Transportation needs     Medical: No     Non-medical: No   Tobacco Use    Smoking status: Never Smoker    Smokeless tobacco: Never Used   Substance and Sexual Activity    Alcohol use: No     Frequency: Monthly or less     Drinks per session: 1 or 2     Binge frequency: Never    Drug use: No    Sexual activity: Not on file   Lifestyle    Physical activity     Days per week: 0 days     Minutes per session: 60 min    Stress: Not on file   Relationships    Social connections     Talks on phone: More than three times a week     Gets together: More than three times a week     Attends Confucianist service: Not on file     Active member of club or organization: Yes     Attends meetings of clubs or organizations: More than 4 times per year     Relationship status: Never    Other Topics Concern    Not on file   Social History Narrative    Not on file       FAMILY HISTORY:  Family History   Problem Relation Age of Onset    Hypertension Mother     Cancer Mother     Diverticulitis Father        ALLERGIES AND MEDICATIONS: updated and reviewed.  Review of patient's allergies indicates:  No Known Allergies  Current Outpatient Medications   Medication Sig Dispense Refill    benzocaine-menthol 15-3.6 mg Lozg 1 each by Mucous Membrane route every 4 to 6 hours as needed. (Patient not taking: Reported on 8/31/2020) 18 lozenge 1    carbamide peroxide (DEBROX) 6.5 % otic solution Place 5 drops into the right ear 2 (two) times daily. (Patient not taking: Reported on 8/31/2020) 22 mL 0    ciprofloxacin HCl (CILOXAN) 0.3 % ophthalmic solution Place 1 drop into the left eye every 4 (four) hours. (Patient not taking: Reported  on 8/31/2020) 10 mL 0    diclofenac sodium (VOLTAREN) 1 % Gel APPLY 2 G TOPICALLY ONCE DAILY. (Patient not taking: Reported on 8/31/2020) 100 g 0    fluticasone propionate (FLONASE) 50 mcg/actuation nasal spray 2 sprays (100 mcg total) by Each Nostril route once daily. (Patient not taking: Reported on 8/31/2020) 1 Bottle 2    hydrocortisone 2.5 % cream Apply topically 2 (two) times daily. for 10 days (Patient not taking: Reported on 8/31/2020) 20 g 0    hydrocortisone 2.5 % cream Apply topically 2 (two) times daily. (Patient not taking: Reported on 8/31/2020) 20 g 3    ibuprofen (ADVIL,MOTRIN) 600 MG tablet Take 1 tablet (600 mg total) by mouth every 6 (six) hours as needed. (Patient not taking: Reported on 8/31/2020) 30 tablet 0    loratadine (CLARITIN) 10 mg tablet Take 1 tablet (10 mg total) by mouth once daily. (Patient not taking: Reported on 8/31/2020) 30 tablet 2    nystatin (MYCOSTATIN) cream Apply topically 2 (two) times daily. (Patient not taking: Reported on 8/31/2020) 30 g 0    penicillin v potassium (VEETID) 500 MG tablet Take 1 tablet (500 mg total) by mouth 2 (two) times daily. (Patient not taking: Reported on 8/31/2020) 20 tablet 0    phenol (THROAT SPRAY) 1.4 % SprA by Mucous Membrane route every 2 (two) hours. (Patient not taking: Reported on 8/31/2020) 177 mL 0    promethazine-dextromethorphan (PROMETHAZINE-DM) 6.25-15 mg/5 mL Syrp Take 5 mLs by mouth 3 (three) times daily as needed (cough). (Patient not taking: Reported on 8/31/2020) 118 mL 1     No current facility-administered medications for this visit.          ROS  Review of Systems   Constitutional: Negative for chills, fever and unexpected weight change.   HENT: Negative for congestion, ear pain, hearing loss, rhinorrhea, sore throat and trouble swallowing.    Eyes: Negative for discharge, redness and visual disturbance.   Respiratory: Negative for cough, chest tightness, shortness of breath and wheezing.    Cardiovascular:  "Negative for chest pain, palpitations and leg swelling.   Gastrointestinal: Negative for abdominal pain, constipation, diarrhea, nausea and vomiting.   Endocrine: Negative for polydipsia, polyphagia and polyuria.   Genitourinary: Negative for decreased urine volume, dysuria and hematuria.   Musculoskeletal: Negative for arthralgias, joint swelling and myalgias.   Skin: Negative for color change and rash.   Neurological: Negative for dizziness, weakness, light-headedness and headaches.   Psychiatric/Behavioral: Negative for decreased concentration, dysphoric mood, sleep disturbance and suicidal ideas. The patient is nervous/anxious (stable).            Physical Exam  Vitals:    08/31/20 1522   BP: 100/70   Pulse: 89   Temp: 98.1 °F (36.7 °C)   SpO2: 97%   Weight: 67.8 kg (149 lb 5.8 oz)   Height: 5' 4" (1.626 m)    Body mass index is 25.64 kg/m².  Weight: 67.8 kg (149 lb 5.8 oz)   Height: 5' 4" (162.6 cm)   Physical Exam  Constitutional:       General: She is not in acute distress.     Appearance: She is well-developed.   HENT:      Head: Normocephalic and atraumatic.   Eyes:      General: Lids are normal. No scleral icterus.     Conjunctiva/sclera: Conjunctivae normal.      Pupils: Pupils are equal, round, and reactive to light.   Neck:      Musculoskeletal: Full passive range of motion without pain and neck supple.      Thyroid: No thyromegaly.      Vascular: No carotid bruit or JVD.   Cardiovascular:      Rate and Rhythm: Normal rate and regular rhythm.      Pulses: Normal pulses.      Heart sounds: Normal heart sounds. No murmur. No friction rub. No S3 or S4 sounds.    Pulmonary:      Effort: Pulmonary effort is normal.      Breath sounds: Normal breath sounds. No wheezing, rhonchi or rales.   Abdominal:      General: Bowel sounds are normal.      Palpations: Abdomen is soft.      Tenderness: There is no abdominal tenderness.   Musculoskeletal:         General: No tenderness.      Right lower leg: No edema.      " Left lower leg: No edema.   Skin:     General: Skin is warm and dry.      Findings: No rash.   Neurological:      Mental Status: She is alert and oriented to person, place, and time.   Psychiatric:         Speech: Speech normal.         Behavior: Behavior normal.         Thought Content: Thought content normal.             Health Maintenance       Date Due Completion Date    Hepatitis C Screening 1990 ---    Pap Smear 01/11/2019 1/11/2016    Influenza Vaccine (1) 09/01/2020 10/15/2019    TETANUS VACCINE 07/30/2029 7/30/2019

## 2020-08-31 NOTE — ASSESSMENT & PLAN NOTE
Doing well overall.  She will let me know if we need to consider treatment options other than her current coping mechanisms.

## 2020-09-14 ENCOUNTER — PATIENT MESSAGE (OUTPATIENT)
Dept: FAMILY MEDICINE | Facility: CLINIC | Age: 30
End: 2020-09-14

## 2020-09-14 DIAGNOSIS — K62.89 ANAL PAIN: Primary | ICD-10-CM

## 2020-09-14 RX ORDER — MENTHOL AND ZINC OXIDE .44; 20.625 G/100G; G/100G
OINTMENT TOPICAL
Qty: 113 G | Refills: 11 | Status: SHIPPED | OUTPATIENT
Start: 2020-09-14 | End: 2022-04-18

## 2021-04-14 ENCOUNTER — OFFICE VISIT (OUTPATIENT)
Dept: URGENT CARE | Facility: CLINIC | Age: 31
End: 2021-04-14
Payer: COMMERCIAL

## 2021-04-14 VITALS
OXYGEN SATURATION: 98 % | RESPIRATION RATE: 16 BRPM | TEMPERATURE: 98 F | DIASTOLIC BLOOD PRESSURE: 73 MMHG | HEART RATE: 77 BPM | BODY MASS INDEX: 25.97 KG/M2 | SYSTOLIC BLOOD PRESSURE: 110 MMHG | HEIGHT: 64 IN | WEIGHT: 152.13 LBS

## 2021-04-14 DIAGNOSIS — H00.024 HORDEOLUM INTERNUM LEFT UPPER EYELID: Primary | ICD-10-CM

## 2021-04-14 PROCEDURE — 99213 OFFICE O/P EST LOW 20 MIN: CPT | Mod: S$GLB,,, | Performed by: PHYSICIAN ASSISTANT

## 2021-04-14 PROCEDURE — 99213 PR OFFICE/OUTPT VISIT, EST, LEVL III, 20-29 MIN: ICD-10-PCS | Mod: S$GLB,,, | Performed by: PHYSICIAN ASSISTANT

## 2021-04-14 RX ORDER — ERYTHROMYCIN 5 MG/G
OINTMENT OPHTHALMIC EVERY 6 HOURS
Qty: 1 BOTTLE | Refills: 0 | Status: SHIPPED | OUTPATIENT
Start: 2021-04-14 | End: 2021-04-14

## 2021-04-14 RX ORDER — ERYTHROMYCIN 5 MG/G
OINTMENT OPHTHALMIC EVERY 6 HOURS
Qty: 1 BOTTLE | Refills: 0 | Status: SHIPPED | OUTPATIENT
Start: 2021-04-14 | End: 2021-04-19

## 2021-06-22 LAB — PAP SMEAR: NORMAL

## 2021-07-19 ENCOUNTER — OFFICE VISIT (OUTPATIENT)
Dept: URGENT CARE | Facility: CLINIC | Age: 31
End: 2021-07-19
Payer: COMMERCIAL

## 2021-07-19 VITALS
WEIGHT: 148 LBS | DIASTOLIC BLOOD PRESSURE: 81 MMHG | HEIGHT: 64 IN | SYSTOLIC BLOOD PRESSURE: 117 MMHG | OXYGEN SATURATION: 95 % | RESPIRATION RATE: 20 BRPM | TEMPERATURE: 99 F | HEART RATE: 78 BPM | BODY MASS INDEX: 25.27 KG/M2

## 2021-07-19 DIAGNOSIS — U07.1 COVID-19 VIRUS INFECTION: Primary | ICD-10-CM

## 2021-07-19 LAB
CTP QC/QA: YES
SARS-COV-2 RDRP RESP QL NAA+PROBE: POSITIVE

## 2021-07-19 PROCEDURE — U0002: ICD-10-PCS | Mod: QW,S$GLB,, | Performed by: FAMILY MEDICINE

## 2021-07-19 PROCEDURE — 99213 PR OFFICE/OUTPT VISIT, EST, LEVL III, 20-29 MIN: ICD-10-PCS | Mod: S$GLB,,, | Performed by: FAMILY MEDICINE

## 2021-07-19 PROCEDURE — U0002 COVID-19 LAB TEST NON-CDC: HCPCS | Mod: QW,S$GLB,, | Performed by: FAMILY MEDICINE

## 2021-07-19 PROCEDURE — 3008F PR BODY MASS INDEX (BMI) DOCUMENTED: ICD-10-PCS | Mod: CPTII,S$GLB,, | Performed by: FAMILY MEDICINE

## 2021-07-19 PROCEDURE — 99213 OFFICE O/P EST LOW 20 MIN: CPT | Mod: S$GLB,,, | Performed by: FAMILY MEDICINE

## 2021-07-19 PROCEDURE — 3008F BODY MASS INDEX DOCD: CPT | Mod: CPTII,S$GLB,, | Performed by: FAMILY MEDICINE

## 2021-07-20 ENCOUNTER — TELEPHONE (OUTPATIENT)
Dept: ADMINISTRATIVE | Facility: CLINIC | Age: 31
End: 2021-07-20

## 2021-07-20 ENCOUNTER — PATIENT MESSAGE (OUTPATIENT)
Dept: ADMINISTRATIVE | Facility: CLINIC | Age: 31
End: 2021-07-20

## 2021-07-23 ENCOUNTER — TELEPHONE (OUTPATIENT)
Dept: ADMINISTRATIVE | Facility: CLINIC | Age: 31
End: 2021-07-23

## 2021-07-23 ENCOUNTER — PATIENT MESSAGE (OUTPATIENT)
Dept: ADMINISTRATIVE | Facility: CLINIC | Age: 31
End: 2021-07-23

## 2021-07-30 ENCOUNTER — IMMUNIZATION (OUTPATIENT)
Dept: INTERNAL MEDICINE | Facility: CLINIC | Age: 31
End: 2021-07-30
Payer: COMMERCIAL

## 2021-07-30 DIAGNOSIS — Z23 NEED FOR VACCINATION: Primary | ICD-10-CM

## 2021-07-30 PROCEDURE — 0011A COVID-19, MRNA, LNP-S, PF, 100 MCG/0.5 ML DOSE VACCINE: CPT | Mod: PBBFAC | Performed by: INTERNAL MEDICINE

## 2021-08-18 ENCOUNTER — NURSE TRIAGE (OUTPATIENT)
Dept: ADMINISTRATIVE | Facility: CLINIC | Age: 31
End: 2021-08-18

## 2021-10-04 ENCOUNTER — PATIENT MESSAGE (OUTPATIENT)
Dept: ADMINISTRATIVE | Facility: HOSPITAL | Age: 31
End: 2021-10-04

## 2021-10-12 ENCOUNTER — TELEPHONE (OUTPATIENT)
Dept: SURGERY | Facility: CLINIC | Age: 31
End: 2021-10-12

## 2021-11-18 ENCOUNTER — OFFICE VISIT (OUTPATIENT)
Dept: FAMILY MEDICINE | Facility: CLINIC | Age: 31
End: 2021-11-18
Payer: COMMERCIAL

## 2021-11-18 VITALS
TEMPERATURE: 98 F | SYSTOLIC BLOOD PRESSURE: 102 MMHG | DIASTOLIC BLOOD PRESSURE: 60 MMHG | OXYGEN SATURATION: 98 % | HEIGHT: 64 IN | BODY MASS INDEX: 26.98 KG/M2 | WEIGHT: 158.06 LBS | HEART RATE: 81 BPM

## 2021-11-18 DIAGNOSIS — S99.922A INJURY OF TOE ON LEFT FOOT, INITIAL ENCOUNTER: ICD-10-CM

## 2021-11-18 DIAGNOSIS — K59.01 SLOW TRANSIT CONSTIPATION: Chronic | ICD-10-CM

## 2021-11-18 DIAGNOSIS — Z00.00 ROUTINE MEDICAL EXAM: Primary | ICD-10-CM

## 2021-11-18 DIAGNOSIS — Z11.59 NEED FOR HEPATITIS C SCREENING TEST: ICD-10-CM

## 2021-11-18 DIAGNOSIS — F41.1 GAD (GENERALIZED ANXIETY DISORDER): Chronic | ICD-10-CM

## 2021-11-18 PROCEDURE — 99999 PR PBB SHADOW E&M-EST. PATIENT-LVL IV: CPT | Mod: PBBFAC,,, | Performed by: INTERNAL MEDICINE

## 2021-11-18 PROCEDURE — 99395 PREV VISIT EST AGE 18-39: CPT | Mod: S$GLB,,, | Performed by: INTERNAL MEDICINE

## 2021-11-18 PROCEDURE — 99999 PR PBB SHADOW E&M-EST. PATIENT-LVL IV: ICD-10-PCS | Mod: PBBFAC,,, | Performed by: INTERNAL MEDICINE

## 2021-11-18 PROCEDURE — 99395 PR PREVENTIVE VISIT,EST,18-39: ICD-10-PCS | Mod: S$GLB,,, | Performed by: INTERNAL MEDICINE

## 2021-11-19 ENCOUNTER — PATIENT OUTREACH (OUTPATIENT)
Dept: ADMINISTRATIVE | Facility: HOSPITAL | Age: 31
End: 2021-11-19
Payer: COMMERCIAL

## 2021-11-19 RX ORDER — ETONOGESTREL AND ETHINYL ESTRADIOL VAGINAL RING .015; .12 MG/D; MG/D
RING VAGINAL
COMMUNITY
Start: 2021-07-26 | End: 2023-06-12

## 2021-12-02 ENCOUNTER — PATIENT MESSAGE (OUTPATIENT)
Dept: FAMILY MEDICINE | Facility: CLINIC | Age: 31
End: 2021-12-02
Payer: COMMERCIAL

## 2021-12-13 ENCOUNTER — OFFICE VISIT (OUTPATIENT)
Dept: FAMILY MEDICINE | Facility: CLINIC | Age: 31
End: 2021-12-13
Payer: COMMERCIAL

## 2021-12-13 DIAGNOSIS — M79.675 PAIN OF TOE OF LEFT FOOT: Primary | ICD-10-CM

## 2021-12-13 DIAGNOSIS — J30.9 ALLERGIC RHINITIS, UNSPECIFIED SEASONALITY, UNSPECIFIED TRIGGER: ICD-10-CM

## 2021-12-13 PROCEDURE — 99213 PR OFFICE/OUTPT VISIT, EST, LEVL III, 20-29 MIN: ICD-10-PCS | Mod: 95,,, | Performed by: INTERNAL MEDICINE

## 2021-12-13 PROCEDURE — 99213 OFFICE O/P EST LOW 20 MIN: CPT | Mod: 95,,, | Performed by: INTERNAL MEDICINE

## 2021-12-13 RX ORDER — FLUTICASONE PROPIONATE 50 MCG
1 SPRAY, SUSPENSION (ML) NASAL DAILY
COMMUNITY
Start: 2021-12-13 | End: 2022-04-06

## 2021-12-13 RX ORDER — CETIRIZINE HYDROCHLORIDE 10 MG/1
10 TABLET ORAL NIGHTLY
Refills: 0 | COMMUNITY
Start: 2021-12-13 | End: 2023-06-12

## 2021-12-23 ENCOUNTER — TELEPHONE (OUTPATIENT)
Dept: FAMILY MEDICINE | Facility: CLINIC | Age: 31
End: 2021-12-23
Payer: COMMERCIAL

## 2021-12-23 DIAGNOSIS — R51.9 NONINTRACTABLE HEADACHE, UNSPECIFIED CHRONICITY PATTERN, UNSPECIFIED HEADACHE TYPE: Primary | ICD-10-CM

## 2021-12-28 ENCOUNTER — HOSPITAL ENCOUNTER (OUTPATIENT)
Dept: RADIOLOGY | Facility: HOSPITAL | Age: 31
Discharge: HOME | End: 2021-12-28
Attending: INTERNAL MEDICINE
Payer: COMMERCIAL

## 2021-12-28 DIAGNOSIS — M79.675 PAIN OF TOE OF LEFT FOOT: ICD-10-CM

## 2021-12-28 PROCEDURE — 73660 X-RAY EXAM OF TOE(S): CPT | Mod: TC,FY,PO

## 2021-12-28 PROCEDURE — 73660 XR TOE 2 VIEW: ICD-10-PCS | Mod: 26,LT,, | Performed by: RADIOLOGY

## 2021-12-28 PROCEDURE — 73660 X-RAY EXAM OF TOE(S): CPT | Mod: 26,LT,, | Performed by: RADIOLOGY

## 2022-01-13 ENCOUNTER — TELEPHONE (OUTPATIENT)
Dept: FAMILY MEDICINE | Facility: CLINIC | Age: 32
End: 2022-01-13
Payer: COMMERCIAL

## 2022-01-13 NOTE — TELEPHONE ENCOUNTER
----- Message from Michael Conrad MA sent at 1/13/2022  4:25 PM CST -----  Type: Patient Call Back    Who called:self    What is the request in detail:pt. Would like for a nurse to give her a call in reference to some additional testing she feels is needed in regards to her health...    Can the clinic reply by [facility]?no    Would the patient rather a call back or a response via [facility]? yes    Best call back number:[phone] (home)

## 2022-01-13 NOTE — TELEPHONE ENCOUNTER
Pt is requesting to have genetic testing done.  She would like to know her risk for development different types of disorders/cancer.

## 2022-01-14 NOTE — TELEPHONE ENCOUNTER
This would be done by a genetic counselor.     Perhaps we should have a virtual visit to discuss this further before proceeding with this referral.     Thank you,  Valeriano

## 2022-01-18 ENCOUNTER — TELEPHONE (OUTPATIENT)
Dept: SURGERY | Facility: CLINIC | Age: 32
End: 2022-01-18
Payer: COMMERCIAL

## 2022-01-18 ENCOUNTER — PATIENT MESSAGE (OUTPATIENT)
Dept: FAMILY MEDICINE | Facility: CLINIC | Age: 32
End: 2022-01-18
Payer: COMMERCIAL

## 2022-01-18 NOTE — TELEPHONE ENCOUNTER
tHe patient is having gas and pain in the rectum after eating. Will try to schedule an appointment with Dr. Dominguez.

## 2022-01-21 ENCOUNTER — TELEPHONE (OUTPATIENT)
Dept: FAMILY MEDICINE | Facility: CLINIC | Age: 32
End: 2022-01-21
Payer: COMMERCIAL

## 2022-01-24 ENCOUNTER — OFFICE VISIT (OUTPATIENT)
Dept: FAMILY MEDICINE | Facility: CLINIC | Age: 32
End: 2022-01-24
Payer: COMMERCIAL

## 2022-01-24 DIAGNOSIS — Z80.7 FAMILY HISTORY OF MULTIPLE MYELOMA: ICD-10-CM

## 2022-01-24 DIAGNOSIS — L29.9 ITCHY SCALP: Primary | ICD-10-CM

## 2022-01-24 DIAGNOSIS — J30.9 ALLERGIC RHINITIS, UNSPECIFIED SEASONALITY, UNSPECIFIED TRIGGER: Chronic | ICD-10-CM

## 2022-01-24 PROCEDURE — 1159F PR MEDICATION LIST DOCUMENTED IN MEDICAL RECORD: ICD-10-PCS | Mod: CPTII,95,, | Performed by: INTERNAL MEDICINE

## 2022-01-24 PROCEDURE — 99213 OFFICE O/P EST LOW 20 MIN: CPT | Mod: 95,,, | Performed by: INTERNAL MEDICINE

## 2022-01-24 PROCEDURE — 1159F MED LIST DOCD IN RCRD: CPT | Mod: CPTII,95,, | Performed by: INTERNAL MEDICINE

## 2022-01-24 PROCEDURE — 1160F RVW MEDS BY RX/DR IN RCRD: CPT | Mod: CPTII,95,, | Performed by: INTERNAL MEDICINE

## 2022-01-24 PROCEDURE — 1160F PR REVIEW ALL MEDS BY PRESCRIBER/CLIN PHARMACIST DOCUMENTED: ICD-10-PCS | Mod: CPTII,95,, | Performed by: INTERNAL MEDICINE

## 2022-01-24 PROCEDURE — 99213 PR OFFICE/OUTPT VISIT, EST, LEVL III, 20-29 MIN: ICD-10-PCS | Mod: 95,,, | Performed by: INTERNAL MEDICINE

## 2022-01-24 NOTE — PROGRESS NOTES
Assessment & Plan  Problem List Items Addressed This Visit        Other    Allergic rhinitis, cause unspecified (Chronic)  -    Will refer to ENT at her request as she is planning some heavier use of her voice.     Overview     Dx updated per 2019 IMO Load         Relevant Orders    Ambulatory referral/consult to ENT      Other Visit Diagnoses     Itchy scalp    -  Primary  -    Will refer to dermatology at her request    Relevant Orders    Ambulatory referral/consult to Dermatology    Family history of multiple myeloma  -    Briefly discussed role of a genetic counselor.  I recommended that they discuss this with her mother's oncologist to see if this is something they advise.      Will be happy to refer her if so. I did recommend against commercial genetic test kits without a clear reason as I have concerns about this causing increased anxiety and stress            Health Maintenance reviewed, flu vaccine recommended.    The patient location is:  Patient Home   The chief complaint leading to consultation is: noted below  Visit type: Virtual visit with synchronous audio and video  Total time spent with patient: 10  Each patient to whom he or she provides medical services by telemedicine is:  (1) informed of the relationship between the physician and patient and the respective role of any other health care provider with respect to management of the patient; and (2) notified that he or she may decline to receive medical services by telemedicine and may withdraw from such care at any time.    Follow-up: No follow-ups on file.    ______________________________________________________________________    Chief Complaint  Chief Complaint   Patient presents with    itchy scalp    Allergies       HPI  Mone Watkins Ramesh is a 31 y.o. female with multiple medical diagnoses as listed in the medical history and problem list that presents for itchy scalp and allrgies via virtual visit.  Pt is known to me with their last  appointment 12/13/2021.      She reports having some itching of her scalp.  No new hair products.  Would like to see derm.     She also is requesting to see ENT.  Having some PND for some time.  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     Also has questions about genetic testing.  Mom has MM and wanted her family members to be tested.        PAST MEDICAL HISTORY:  Past Medical History:   Diagnosis Date    Allergic rhinitis, cause unspecified 6/17/2015    ADRIANO (generalized anxiety disorder) 6/17/2015    ADRIANO-7 score of 17     PCOS (polycystic ovarian syndrome)        PAST SURGICAL HISTORY:  Past Surgical History:   Procedure Laterality Date    NASAL ENDOSCOPY W/ BALLON SINUPLASTY Bilateral 2017    thumb Bilateral     TRIGGER FINGER RELEASE Bilateral     thumbs       SOCIAL HISTORY:  Social History     Socioeconomic History    Marital status: Single   Tobacco Use    Smoking status: Never Smoker    Smokeless tobacco: Never Used   Substance and Sexual Activity    Alcohol use: No    Drug use: No       FAMILY HISTORY:  Family History   Problem Relation Age of Onset    Hypertension Mother     Cancer Mother     Diverticulitis Father        ALLERGIES AND MEDICATIONS: updated and reviewed.  Review of patient's allergies indicates:  No Known Allergies  Current Outpatient Medications   Medication Sig Dispense Refill    benzocaine-menthol 15-3.6 mg Lozg 1 each by Mucous Membrane route every 4 to 6 hours as needed. (Patient not taking: Reported on 8/31/2020) 18 lozenge 1    carbamide peroxide (DEBROX) 6.5 % otic solution Place 5 drops into the right ear 2 (two) times daily. (Patient not taking: Reported on 8/31/2020) 22 mL 0    cetirizine (ZYRTEC) 10 MG tablet Take 1 tablet (10 mg total) by mouth every evening.  0    ciprofloxacin HCl (CILOXAN) 0.3 % ophthalmic solution Place 1 drop into the left eye every 4 (four) hours. (Patient not taking: Reported on  8/31/2020) 10 mL 0    diclofenac sodium (VOLTAREN) 1 % Gel APPLY 2 G TOPICALLY ONCE DAILY. (Patient not taking: Reported on 8/31/2020) 100 g 0    etonogestreL-ethinyl estradioL (NUVARING) 0.12-0.015 mg/24 hr vaginal ring Place vaginally.      fluticasone propionate (FLONASE) 50 mcg/actuation nasal spray 1 spray (50 mcg total) by Each Nostril route once daily.      hydrocortisone 2.5 % cream Apply topically 2 (two) times daily. (Patient not taking: Reported on 8/31/2020) 20 g 3    ibuprofen (ADVIL,MOTRIN) 600 MG tablet Take 1 tablet (600 mg total) by mouth every 6 (six) hours as needed. (Patient not taking: Reported on 8/31/2020) 30 tablet 0    menthol-zinc oxide (CALMOSEPTINE) 0.44-20.6 % Oint Apply topically as needed. Anal pain (Patient not taking: Reported on 4/14/2021) 113 g 11    nystatin (MYCOSTATIN) cream Apply topically 2 (two) times daily. (Patient not taking: Reported on 8/31/2020) 30 g 0    penicillin v potassium (VEETID) 500 MG tablet Take 1 tablet (500 mg total) by mouth 2 (two) times daily. (Patient not taking: Reported on 8/31/2020) 20 tablet 0    phenol (THROAT SPRAY) 1.4 % SprA by Mucous Membrane route every 2 (two) hours. (Patient not taking: Reported on 8/31/2020) 177 mL 0    promethazine-dextromethorphan (PROMETHAZINE-DM) 6.25-15 mg/5 mL Syrp Take 5 mLs by mouth 3 (three) times daily as needed (cough). (Patient not taking: Reported on 8/31/2020) 118 mL 1    pulse oximeter (PULSE OXIMETER) device by Apply Externally route 2 (two) times a day. Use twice daily at 8 AM and 3 PM and [facility]. 1 each 0     No current facility-administered medications for this visit.         ROS  Review of Systems   Constitutional: Negative for chills, fatigue and fever.   HENT: Positive for postnasal drip.    Musculoskeletal: Negative for arthralgias.           Physical Exam  Physical Exam  Constitutional:       General: She is not in acute distress.     Appearance: She is  well-developed and well-nourished.   HENT:      Head: Normocephalic and atraumatic.   Eyes:      Extraocular Movements: EOM normal.      Conjunctiva/sclera: Conjunctivae normal.   Pulmonary:      Effort: Pulmonary effort is normal. No respiratory distress.   Neurological:      Mental Status: She is alert and oriented to person, place, and time.      Comments: Symmetric facial movements   Psychiatric:         Mood and Affect: Mood and affect normal.         Behavior: Behavior normal.         Thought Content: Thought content normal.           Health Maintenance       Date Due Completion Date    Influenza Vaccine (1) 09/01/2021 10/15/2019    COVID-19 Vaccine (3 - Booster for Moderna series) 02/27/2022 8/27/2021    Cervical Cancer Screening 06/22/2024 6/22/2021    TETANUS VACCINE 07/30/2029 7/30/2019

## 2022-01-25 ENCOUNTER — TELEPHONE (OUTPATIENT)
Dept: FAMILY MEDICINE | Facility: CLINIC | Age: 32
End: 2022-01-25
Payer: COMMERCIAL

## 2022-02-13 ENCOUNTER — HOSPITAL ENCOUNTER (EMERGENCY)
Facility: HOSPITAL | Age: 32
Discharge: HOME | End: 2022-02-13
Attending: EMERGENCY MEDICINE
Payer: MEDICAID

## 2022-02-13 VITALS
OXYGEN SATURATION: 100 % | WEIGHT: 150 LBS | RESPIRATION RATE: 16 BRPM | SYSTOLIC BLOOD PRESSURE: 117 MMHG | TEMPERATURE: 98 F | BODY MASS INDEX: 25.75 KG/M2 | HEART RATE: 74 BPM | DIASTOLIC BLOOD PRESSURE: 68 MMHG

## 2022-02-13 DIAGNOSIS — R00.2 PALPITATIONS: ICD-10-CM

## 2022-02-13 DIAGNOSIS — R51.9 NONINTRACTABLE HEADACHE, UNSPECIFIED CHRONICITY PATTERN, UNSPECIFIED HEADACHE TYPE: Primary | ICD-10-CM

## 2022-02-13 LAB
B-HCG UR QL: NEGATIVE
CTP QC/QA: YES

## 2022-02-13 PROCEDURE — 93005 ELECTROCARDIOGRAM TRACING: CPT

## 2022-02-13 PROCEDURE — 96372 THER/PROPH/DIAG INJ SC/IM: CPT

## 2022-02-13 PROCEDURE — 81025 URINE PREGNANCY TEST: CPT | Performed by: EMERGENCY MEDICINE

## 2022-02-13 PROCEDURE — 93010 EKG 12-LEAD: ICD-10-PCS | Mod: ,,, | Performed by: INTERNAL MEDICINE

## 2022-02-13 PROCEDURE — 63600175 PHARM REV CODE 636 W HCPCS: Performed by: EMERGENCY MEDICINE

## 2022-02-13 PROCEDURE — 99284 EMERGENCY DEPT VISIT MOD MDM: CPT | Mod: 25

## 2022-02-13 PROCEDURE — 93010 ELECTROCARDIOGRAM REPORT: CPT | Mod: ,,, | Performed by: INTERNAL MEDICINE

## 2022-02-13 PROCEDURE — 25000003 PHARM REV CODE 250: Performed by: EMERGENCY MEDICINE

## 2022-02-13 RX ORDER — PROCHLORPERAZINE EDISYLATE 5 MG/ML
10 INJECTION INTRAMUSCULAR; INTRAVENOUS ONCE
Status: COMPLETED | OUTPATIENT
Start: 2022-02-13 | End: 2022-02-13

## 2022-02-13 RX ORDER — DIPHENHYDRAMINE HCL 25 MG
25 CAPSULE ORAL
Status: COMPLETED | OUTPATIENT
Start: 2022-02-13 | End: 2022-02-13

## 2022-02-13 RX ORDER — PROCHLORPERAZINE MALEATE 10 MG
10 TABLET ORAL EVERY 6 HOURS PRN
Qty: 12 TABLET | Refills: 0 | Status: SHIPPED | OUTPATIENT
Start: 2022-02-13 | End: 2022-02-16

## 2022-02-13 RX ORDER — KETOROLAC TROMETHAMINE 30 MG/ML
30 INJECTION, SOLUTION INTRAMUSCULAR; INTRAVENOUS
Status: COMPLETED | OUTPATIENT
Start: 2022-02-13 | End: 2022-02-13

## 2022-02-13 RX ADMIN — PROCHLORPERAZINE EDISYLATE 10 MG: 5 INJECTION INTRAMUSCULAR; INTRAVENOUS at 08:02

## 2022-02-13 RX ADMIN — DIPHENHYDRAMINE HYDROCHLORIDE 25 MG: 25 CAPSULE ORAL at 08:02

## 2022-02-13 RX ADMIN — KETOROLAC TROMETHAMINE 30 MG: 30 INJECTION, SOLUTION INTRAMUSCULAR at 08:02

## 2022-02-14 NOTE — ED PROVIDER NOTES
Encounter Date: 2/13/2022       History     Chief Complaint   Patient presents with    Headache     C/o headache and palpations that began last night, palpations have since resolved; -vision changes. Reports taking tylenol and ibuprofen today with no relief;  last dose ibuprofen at approx 3pm.      Mone Chandler is a 31 y.o. female who  has a past medical history of Allergic rhinitis, cause unspecified (6/17/2015), ADRIANO (generalized anxiety disorder) (6/17/2015), and PCOS (polycystic ovarian syndrome).    The patient presents to the ED due to headaches and palpitations.  Patient reports history of migraines and states her headache feels similar to her migraines except for she is having palpitations.  She reports her palpitations are notice mostly when she goes from sitting to standing.  She currently has no palpitations chest pain or dizziness.  Her palpitations are associated with dizziness however she did not fall or pass out.  Her headache was not relieved with Tylenol ibuprofen it feels like pressure and is worse with light.  She reports lots of stress lately.        Review of patient's allergies indicates:  No Known Allergies  Past Medical History:   Diagnosis Date    Allergic rhinitis, cause unspecified 6/17/2015    ADRIANO (generalized anxiety disorder) 6/17/2015    ADRIANO-7 score of 17     PCOS (polycystic ovarian syndrome)      Past Surgical History:   Procedure Laterality Date    NASAL ENDOSCOPY W/ BALLON SINUPLASTY Bilateral 2017    thumb Bilateral     TRIGGER FINGER RELEASE Bilateral     thumbs     Family History   Problem Relation Age of Onset    Hypertension Mother     Cancer Mother     Diverticulitis Father      Social History     Tobacco Use    Smoking status: Never Smoker    Smokeless tobacco: Never Used   Substance Use Topics    Alcohol use: No    Drug use: No     Review of Systems   Constitutional: Negative for chills and fever.   HENT: Negative for sore throat.    Respiratory: Negative  for cough and shortness of breath.    Cardiovascular: Positive for palpitations. Negative for chest pain.   Gastrointestinal: Negative for nausea and vomiting.   Genitourinary: Negative for dysuria, frequency and urgency.   Musculoskeletal: Negative for back pain, neck pain and neck stiffness.   Skin: Negative for rash and wound.   Neurological: Positive for headaches. Negative for syncope and weakness.   Hematological: Does not bruise/bleed easily.   Psychiatric/Behavioral: Negative for agitation, behavioral problems and confusion.       Physical Exam     Initial Vitals [02/13/22 2011]   BP Pulse Resp Temp SpO2   131/74 77 18 98.6 °F (37 °C) 100 %      MAP       --         Physical Exam    Constitutional:  Non-toxic appearance. No distress.   HENT:   Head: Normocephalic and atraumatic.   Eyes: Conjunctivae and EOM are normal. Pupils are equal, round, and reactive to light. Right eye exhibits no nystagmus. Left eye exhibits no nystagmus.   Neck: Neck supple.   Cardiovascular: Regular rhythm, S1 normal and S2 normal.   No murmur heard.  Pulmonary/Chest: Breath sounds normal. No respiratory distress. She has no wheezes. She has no rales.   Abdominal: Abdomen is soft. She exhibits no distension. There is no abdominal tenderness.   Musculoskeletal:      Cervical back: Neck supple.     Neurological: She is alert. No cranial nerve deficit. GCS eye subscore is 4. GCS verbal subscore is 5. GCS motor subscore is 6.   CN 2-12 intact  Finger to nose within normal limits  Negative romberg  Gait normal  Equal strength to bilateral upper extremities, SILT  Equal strength to bilateral lower extremities, SILT     Skin: Skin is warm. No rash noted.   Psychiatric: She has a normal mood and affect. Her behavior is normal.         ED Course   Procedures  Labs Reviewed   POCT URINE PREGNANCY          Imaging Results    None          Medications   ketorolac injection 30 mg (30 mg Intramuscular Given 2/13/22 2057)   prochlorperazine  injection Soln 10 mg (10 mg Intramuscular Given 2/13/22 2058)   diphenhydrAMINE capsule 25 mg (25 mg Oral Given 2/13/22 2057)     Medical Decision Making:   Differential Diagnosis:   Differential Diagnosis includes, but is not limited to:  Ischemic stroke, hemorrhagic stroke, subarachnoid hemorrhage/ruptured aneurysm, intracranial lesion/mass, meningitis/encephalitis, epidural hematoma, subdural hematoma, pseudotumor cerebri, venous sinus thrombosis, CO poisoning, hypertensive encephalopathy, MI/ACS, head trauma/contusion, concussion, sinus headache, dehydration, anxiety, medication non-compliance, primary headache (tension/cluster/migraine).    ED Management:  Patient reports still having some headache however it is improved from from before she does not feel the fullness in her she did earlier.  Her EKG sinus rhythm.  She has no murmur she is PERC negative and she has no shortness of breath and her lungs are clear.  Suspect component of orthostatic lightheadedness a given that her palpitations start with change in positions.      Her neuro exam is nonfocal.  Will discharge with symptomatic treatment.  I recommend she follow-up with her PCP in the next couple of days.  Return precautions discussed for worsening symptoms or any other concerns.      After complete evaluation, including thorough history and physical exam, the patient's symptoms are most likely due to primary headache syndrome (including, but not limited to, tension/cluster/migraine headache). The patient's headaches are similar in character to prior. The history does not suggest sudden/maximal onset of pain consistent with SAH/intracranial bleed. Physical exam is benign without focal weakness, sensory deficit, or cerebellar signs to suggest stroke or intracranial mass. There is no meningismus, fever, or evidence of infection to suggest meningitis/encephalitis. The patient was treated with supportive care  and improved.    After taking into careful  account the historical factors and physical exam findings of the patient's presentation today, in conjunction with the empirical and objective data obtained on ED workup, no acute emergent medical condition has been identified. The patient appears to be low risk for an emergent medical condition and I feel it is safe and appropriate at this time for the patient to be discharged to follow-up as detailed in their discharge instructions for reevaluation and possible continued outpatient workup and management. I have discussed the specifics of the workup with the patient and the patient has verbalized understanding of the details of the workup, the diagnosis, the treatment plan, and the need for outpatient follow-up.  Although the patient has no emergent etiology today this does not preclude the development of an emergent condition so in addition, I have advised the patient that they can return to the ED and/or activate EMS at any time with worsening of their symptoms, change of their symptoms, or with any other medical complaint.  The patient remained comfortable and stable during their visit in the ED.  Discharge and follow-up instructions discussed with the patient who expressed understanding and willingness to comply with my recommendations.                 ED Course as of 02/14/22 0428   Sun Feb 13, 2022 2041 Patient palpitations and headache.  Neuro exam nonfocal.  Will plan to treat symptoms obtain EKG to evaluate for dysrhythmia and will reassess. [RN]   2107 EKG:  Rate 83. Normal sinus rhythm.  No STEMI.  No ectopy [RN]      ED Course User Index  [RN] Kal Hair Jr., MD             Clinical Impression:   Final diagnoses:  [R00.2] Palpitations  [R51.9] Nonintractable headache, unspecified chronicity pattern, unspecified headache type (Primary)          ED Disposition Condition    Discharge Stable        ED Prescriptions     Medication Sig Dispense Start Date End Date Auth. Provider    prochlorperazine  (COMPAZINE) 10 MG tablet Take 1 tablet (10 mg total) by mouth every 6 (six) hours as needed. 12 tablet 2/13/2022 2/16/2022 Kal Hair Jr., MD        Follow-up Information     Follow up With Specialties Details Why Contact Info    Myke Gardner MD Internal Medicine Schedule an appointment as soon as possible for a visit in 3 days  [address] [facility] [id number] [phone]      [place] - Emergency Dept Emergency Medicine  If symptoms worsen, As needed [address] [facility] [address] [phone]          Portions of this note were dictated using voice recognition software and may contain dictation related errors in spelling/grammar/syntax not found on text review       Kal Hair Jr., MD  02/14/22 0427

## 2022-03-30 ENCOUNTER — TELEPHONE (OUTPATIENT)
Dept: OTOLARYNGOLOGY | Facility: CLINIC | Age: 32
End: 2022-03-30
Payer: COMMERCIAL

## 2022-03-30 ENCOUNTER — PATIENT MESSAGE (OUTPATIENT)
Dept: FAMILY MEDICINE | Facility: CLINIC | Age: 32
End: 2022-03-30
Payer: COMMERCIAL

## 2022-03-30 DIAGNOSIS — R49.9 VOICE COMPLAINT: Primary | ICD-10-CM

## 2022-03-30 NOTE — TELEPHONE ENCOUNTER
----- Message from Tammie Chavira sent at 3/30/2022 11:12 AM CDT -----  Regarding: speak with nurse  Contact: patient  [phone]     please call patient being referred over waiting on a call back to schedule appointment thanks.

## 2022-04-06 ENCOUNTER — OFFICE VISIT (OUTPATIENT)
Dept: OTOLARYNGOLOGY | Facility: CLINIC | Age: 32
End: 2022-04-06
Payer: COMMERCIAL

## 2022-04-06 VITALS — DIASTOLIC BLOOD PRESSURE: 75 MMHG | SYSTOLIC BLOOD PRESSURE: 118 MMHG | HEART RATE: 88 BPM

## 2022-04-06 DIAGNOSIS — R23.8 SKIN SENSITIVITY: Primary | ICD-10-CM

## 2022-04-06 DIAGNOSIS — R09.82 POST-NASAL DRIP: ICD-10-CM

## 2022-04-06 DIAGNOSIS — R49.9 VOICE COMPLAINT: ICD-10-CM

## 2022-04-06 PROCEDURE — 99999 PR PBB SHADOW E&M-EST. PATIENT-LVL III: ICD-10-PCS | Mod: PBBFAC,,, | Performed by: NURSE PRACTITIONER

## 2022-04-06 PROCEDURE — 3074F SYST BP LT 130 MM HG: CPT | Mod: CPTII,,, | Performed by: NURSE PRACTITIONER

## 2022-04-06 PROCEDURE — 31579 LARYNGOSCOPY TELESCOPIC: CPT | Mod: S$GLB,,, | Performed by: NURSE PRACTITIONER

## 2022-04-06 PROCEDURE — 99213 OFFICE O/P EST LOW 20 MIN: CPT | Mod: PBBFAC | Performed by: NURSE PRACTITIONER

## 2022-04-06 PROCEDURE — 3078F PR MOST RECENT DIASTOLIC BLOOD PRESSURE < 80 MM HG: ICD-10-PCS | Mod: CPTII,,, | Performed by: NURSE PRACTITIONER

## 2022-04-06 PROCEDURE — 3078F DIAST BP <80 MM HG: CPT | Mod: CPTII,,, | Performed by: NURSE PRACTITIONER

## 2022-04-06 PROCEDURE — 31579 LARYNGOSCOPY TELESCOPIC: CPT | Mod: PBBFAC | Performed by: NURSE PRACTITIONER

## 2022-04-06 PROCEDURE — 99214 OFFICE O/P EST MOD 30 MIN: CPT | Mod: 25,S$PBB,, | Performed by: NURSE PRACTITIONER

## 2022-04-06 PROCEDURE — 1159F PR MEDICATION LIST DOCUMENTED IN MEDICAL RECORD: ICD-10-PCS | Mod: CPTII,,, | Performed by: NURSE PRACTITIONER

## 2022-04-06 PROCEDURE — 31579 PR LARYNGOSCOPY, FLEX/RIGID TELESCOPIC, W/STROBOSCOPY: ICD-10-PCS | Mod: S$GLB,,, | Performed by: NURSE PRACTITIONER

## 2022-04-06 PROCEDURE — 3074F PR MOST RECENT SYSTOLIC BLOOD PRESSURE < 130 MM HG: ICD-10-PCS | Mod: CPTII,,, | Performed by: NURSE PRACTITIONER

## 2022-04-06 PROCEDURE — 1159F MED LIST DOCD IN RCRD: CPT | Mod: CPTII,,, | Performed by: NURSE PRACTITIONER

## 2022-04-06 PROCEDURE — 99999 PR PBB SHADOW E&M-EST. PATIENT-LVL III: CPT | Mod: PBBFAC,,, | Performed by: NURSE PRACTITIONER

## 2022-04-06 PROCEDURE — 99214 PR OFFICE/OUTPT VISIT, EST, LEVL IV, 30-39 MIN: ICD-10-PCS | Mod: 25,S$PBB,, | Performed by: NURSE PRACTITIONER

## 2022-04-06 RX ORDER — FLUTICASONE PROPIONATE 50 MCG
2 SPRAY, SUSPENSION (ML) NASAL DAILY
Qty: 16 G | Refills: 12 | Status: SHIPPED | OUTPATIENT
Start: 2022-04-06 | End: 2022-05-06

## 2022-04-06 NOTE — PROGRESS NOTES
Subjective:       Patient ID: Mone Chandler is a 31 y.o. female.    Chief Complaint: vocal     HPI     Mone Chandler is a 31 y.o. female who has been referred by Dr. Gardner for hoarseness. She reports hoarseness off and on for the past 3 months. Her voice is not progressively worsening over this time. There are pitch breaks when singing. There is not vocal fatigue. She  denies dysphagia, odynophagia, throat pain, and otalgia.  There is no hemoptysis or hematemesis.  She is breathing well. She reports throat clearing and cough. She feels a frequent sensation of mucus in her throat. This is worse after eating. She does not take anything for this. She denies alf heartburn and reflux. She is a non smoker.    She does report seasonal and environmental allergies. She reports skin sensitivities as well. She would like to see allergy for testing.      Past Medical History:   Diagnosis Date    Allergic rhinitis, cause unspecified 6/17/2015    ADRIANO (generalized anxiety disorder) 6/17/2015    ADRIANO-7 score of 17     PCOS (polycystic ovarian syndrome)        Past Surgical History:   Procedure Laterality Date    NASAL ENDOSCOPY W/ BALLON SINUPLASTY Bilateral 2017    thumb Bilateral     TRIGGER FINGER RELEASE Bilateral     thumbs         Current Outpatient Medications:     benzocaine-menthol 15-3.6 mg Lozg, 1 each by Mucous Membrane route every 4 to 6 hours as needed. (Patient not taking: Reported on 8/31/2020), Disp: 18 lozenge, Rfl: 1    carbamide peroxide (DEBROX) 6.5 % otic solution, Place 5 drops into the right ear 2 (two) times daily. (Patient not taking: Reported on 8/31/2020), Disp: 22 mL, Rfl: 0    cetirizine (ZYRTEC) 10 MG tablet, Take 1 tablet (10 mg total) by mouth every evening., Disp: , Rfl: 0    ciprofloxacin HCl (CILOXAN) 0.3 % ophthalmic solution, Place 1 drop into the left eye every 4 (four) hours. (Patient not taking: Reported on 8/31/2020), Disp: 10 mL, Rfl: 0    diclofenac sodium  (VOLTAREN) 1 % Gel, APPLY 2 G TOPICALLY ONCE DAILY. (Patient not taking: Reported on 8/31/2020), Disp: 100 g, Rfl: 0    etonogestreL-ethinyl estradioL (NUVARING) 0.12-0.015 mg/24 hr vaginal ring, Place vaginally., Disp: , Rfl:     fluticasone propionate (FLONASE) 50 mcg/actuation nasal spray, 2 sprays (100 mcg total) by Each Nostril route once daily., Disp: 16 g, Rfl: 12    hydrocortisone 2.5 % cream, Apply topically 2 (two) times daily. (Patient not taking: Reported on 8/31/2020), Disp: 20 g, Rfl: 3    ibuprofen (ADVIL,MOTRIN) 600 MG tablet, Take 1 tablet (600 mg total) by mouth every 6 (six) hours as needed. (Patient not taking: Reported on 8/31/2020), Disp: 30 tablet, Rfl: 0    menthol-zinc oxide (CALMOSEPTINE) 0.44-20.6 % Oint, Apply topically as needed. Anal pain (Patient not taking: Reported on 4/14/2021), Disp: 113 g, Rfl: 11    nystatin (MYCOSTATIN) cream, Apply topically 2 (two) times daily. (Patient not taking: Reported on 8/31/2020), Disp: 30 g, Rfl: 0    penicillin v potassium (VEETID) 500 MG tablet, Take 1 tablet (500 mg total) by mouth 2 (two) times daily. (Patient not taking: Reported on 8/31/2020), Disp: 20 tablet, Rfl: 0    phenol (THROAT SPRAY) 1.4 % SprA, by Mucous Membrane route every 2 (two) hours. (Patient not taking: Reported on 8/31/2020), Disp: 177 mL, Rfl: 0    promethazine-dextromethorphan (PROMETHAZINE-DM) 6.25-15 mg/5 mL Syrp, Take 5 mLs by mouth 3 (three) times daily as needed (cough). (Patient not taking: Reported on 8/31/2020), Disp: 118 mL, Rfl: 1    pulse oximeter (PULSE OXIMETER) device, by Apply Externally route 2 (two) times a day. Use twice daily at 8 AM and 3 PM and record the value in MyChart as directed., Disp: 1 each, Rfl: 0    Review of patient's allergies indicates:  No Known Allergies    Social History     Socioeconomic History    Marital status: Single   Tobacco Use    Smoking status: Never Smoker    Smokeless tobacco: Never Used   Substance and Sexual  Activity    Alcohol use: No    Drug use: No       Family History   Problem Relation Age of Onset    Hypertension Mother     Cancer Mother     Diverticulitis Father        Review of Systems   Constitutional: Negative for appetite change, chills, diaphoresis, fatigue, fever and unexpected weight change.   HENT: Negative for nasal congestion, dental problem, drooling, ear discharge, ear pain, facial swelling, hearing loss, mouth sores, nosebleeds, postnasal drip, rhinorrhea, sinus pressure/congestion, sneezing, sore throat, tinnitus, trouble swallowing and voice change.    Eyes: Negative for pain, discharge, redness and itching.   Respiratory: Negative for cough and shortness of breath.    Cardiovascular: Negative for chest pain.   Gastrointestinal: Negative for abdominal distention, abdominal pain, diarrhea, nausea and vomiting.   Endocrine: Negative for cold intolerance and heat intolerance.   Genitourinary: Negative for difficulty urinating.   Musculoskeletal: Negative for neck pain and neck stiffness.   Integumentary:  Negative for rash.   Neurological: Negative for dizziness, weakness and headaches.   Hematological: Negative for adenopathy.         Objective:      Physical Exam  Vitals reviewed.   Constitutional:       General: She is not in acute distress.     Appearance: She is well-developed. She is not ill-appearing or diaphoretic.   HENT:      Head: Normocephalic and atraumatic.      Jaw: No trismus.      Right Ear: Hearing and external ear normal.      Left Ear: Hearing and external ear normal.      Nose: Mucosal edema and rhinorrhea present. No nasal deformity. Rhinorrhea is clear.      Right Turbinates: Swollen and pale.      Left Turbinates: Swollen and pale.      Right Sinus: No maxillary sinus tenderness or frontal sinus tenderness.      Left Sinus: No maxillary sinus tenderness or frontal sinus tenderness.      Mouth/Throat:      Lips: Pink. No lesions.      Mouth: Mucous membranes are moist.  Mucous membranes are not pale, not dry and not cyanotic. No oral lesions.      Dentition: Normal dentition. Does not have dentures. No dental caries.      Tongue: No lesions. Tongue does not deviate from midline.      Palate: No mass and lesions.      Pharynx: Uvula midline. No oropharyngeal exudate, posterior oropharyngeal erythema or uvula swelling.      Tonsils: No tonsillar abscesses.      Comments: Flexible Laryngeal Videostroboscopy (67081): Laryngeal videostroboscopy is indicated to assess laryngeal vibratory biomechanics and vocal fold oscillation, which cannot be assessed with a plain light examination. This was carried out transnasally with a distal chip videoendoscope.  After verbal consent was obtained, the patient was positioned and the nose was topically decongested with 1% phenylephrine and topically anesthetized with 4% lidocaine. The endoscope was passed through the most patent nasal cavity and positioned to image the nasopharynx, larynx, and hypopharynx in detail. The following features were examined: nasopharyngeal, laryngeal, hypopharyngeal masses; velopharyngeal strength, closure, and symmetry of motion; vocal fold range and symmetry of motion; laryngeal mucosal edema, erythema, inflammation, and hydration; salivary pooling; and gross laryngeal sensation. During phonation, the vocal folds were assessed for glottal closure; mucosal wave; vocal fold lesions; vibratory periodicity, amplitude, and phase symmetry; and vertical height match. The equipment was removed. The patient tolerated the procedure well without complication. All findings were normal except:  There is thick mucus in the nasopharynx.  There is cobblestoning of the posterior pharyngeal wall.  Vocal cord exam is normal.    Eyes:      General: No scleral icterus.        Right eye: No discharge.         Left eye: No discharge.      Conjunctiva/sclera: Conjunctivae normal.   Neck:      Thyroid: No thyroid mass or thyromegaly.       Vascular: No JVD.      Trachea: Trachea and phonation normal. No tracheal tenderness or tracheal deviation.      Comments: Salivary glands - there are no lesions or asymmetric findings in the submandibular or parotid glands    Cardiovascular:      Rate and Rhythm: Normal rate.   Pulmonary:      Effort: Pulmonary effort is normal. No respiratory distress.      Breath sounds: No stridor.   Musculoskeletal:      Cervical back: Normal range of motion and neck supple.   Lymphadenopathy:      Head:      Right side of head: No submental, submandibular, tonsillar or preauricular adenopathy.      Left side of head: No submental, submandibular, tonsillar or preauricular adenopathy.      Cervical: No cervical adenopathy.   Skin:     General: Skin is warm and dry.      Coloration: Skin is not pale.      Findings: No erythema or rash.   Neurological:      Mental Status: She is alert and oriented to person, place, and time.      Cranial Nerves: No cranial nerve deficit.   Psychiatric:         Behavior: Behavior normal. Behavior is cooperative.         Thought Content: Thought content normal.         Assessment:       Problem List Items Addressed This Visit        ENT    Post-nasal drip     Discussed that intermittent hoarseness may be due to post nasal drip. Discussed using nasal saline and Flonase nasal spray. If hoarseness persists despite mucus control, will have patient follow up with speech for voice therapy. Questions answered.            Relevant Orders    Ambulatory referral/consult to Allergy    Voice complaint       Derm    Skin sensitivity - Primary    Relevant Orders    Ambulatory referral/consult to Allergy          Plan:       Problem List Items Addressed This Visit        ENT    Post-nasal drip     Discussed that intermittent hoarseness may be due to post nasal drip. Discussed using nasal saline and Flonase nasal spray. If hoarseness persists despite mucus control, will have patient follow up with speech for voice  therapy. Questions answered.            Relevant Orders    Ambulatory referral/consult to Allergy    Voice complaint       Derm    Skin sensitivity - Primary    Relevant Orders    Ambulatory referral/consult to Allergy

## 2022-04-11 PROBLEM — R49.9 VOICE COMPLAINT: Status: ACTIVE | Noted: 2022-04-11

## 2022-04-11 PROBLEM — R09.82 POST-NASAL DRIP: Status: ACTIVE | Noted: 2022-04-11

## 2022-04-11 PROBLEM — R23.8 SKIN SENSITIVITY: Status: ACTIVE | Noted: 2022-04-11

## 2022-04-11 NOTE — ASSESSMENT & PLAN NOTE
Discussed that intermittent hoarseness may be due to post nasal drip. Discussed using nasal saline and Flonase nasal spray. If hoarseness persists despite mucus control, will have patient follow up with speech for voice therapy. Questions answered.

## 2022-04-14 ENCOUNTER — PATIENT OUTREACH (OUTPATIENT)
Dept: ADMINISTRATIVE | Facility: OTHER | Age: 32
End: 2022-04-14
Payer: COMMERCIAL

## 2022-04-14 NOTE — PROGRESS NOTES
Health Maintenance Due   Topic Date Due    Influenza Vaccine (1) 09/01/2021    COVID-19 Vaccine (3 - Booster for Moderna series) 01/27/2022     Updates were requested from care everywhere.  Chart was reviewed for overdue Proactive [facility] Encounters (JODEE) topics (CRS, Breast Cancer Screening, Eye exam)  Health Maintenance has been updated.  [facility] immunization registry triggered.  Immunizations were reconciled.

## 2022-04-18 ENCOUNTER — OFFICE VISIT (OUTPATIENT)
Dept: NEUROLOGY | Facility: CLINIC | Age: 32
End: 2022-04-18
Payer: COMMERCIAL

## 2022-04-18 ENCOUNTER — TELEPHONE (OUTPATIENT)
Dept: NEUROLOGY | Facility: CLINIC | Age: 32
End: 2022-04-18
Payer: MEDICAID

## 2022-04-18 VITALS
HEIGHT: 64 IN | WEIGHT: 150 LBS | HEART RATE: 65 BPM | DIASTOLIC BLOOD PRESSURE: 68 MMHG | SYSTOLIC BLOOD PRESSURE: 105 MMHG | BODY MASS INDEX: 25.61 KG/M2

## 2022-04-18 DIAGNOSIS — R41.840 INATTENTION: ICD-10-CM

## 2022-04-18 DIAGNOSIS — G43.009 MIGRAINE WITHOUT AURA AND WITHOUT STATUS MIGRAINOSUS, NOT INTRACTABLE: Primary | ICD-10-CM

## 2022-04-18 PROCEDURE — 1160F RVW MEDS BY RX/DR IN RCRD: CPT | Mod: CPTII,S$GLB,, | Performed by: PSYCHIATRY & NEUROLOGY

## 2022-04-18 PROCEDURE — 99204 OFFICE O/P NEW MOD 45 MIN: CPT | Mod: S$GLB,,, | Performed by: PSYCHIATRY & NEUROLOGY

## 2022-04-18 PROCEDURE — 3074F PR MOST RECENT SYSTOLIC BLOOD PRESSURE < 130 MM HG: ICD-10-PCS | Mod: CPTII,S$GLB,, | Performed by: PSYCHIATRY & NEUROLOGY

## 2022-04-18 PROCEDURE — 99214 OFFICE O/P EST MOD 30 MIN: CPT | Mod: PBBFAC,PO | Performed by: PSYCHIATRY & NEUROLOGY

## 2022-04-18 PROCEDURE — 99999 PR PBB SHADOW E&M-EST. PATIENT-LVL IV: ICD-10-PCS | Mod: PBBFAC,,, | Performed by: PSYCHIATRY & NEUROLOGY

## 2022-04-18 PROCEDURE — 1159F PR MEDICATION LIST DOCUMENTED IN MEDICAL RECORD: ICD-10-PCS | Mod: CPTII,S$GLB,, | Performed by: PSYCHIATRY & NEUROLOGY

## 2022-04-18 PROCEDURE — 99204 PR OFFICE/OUTPT VISIT, NEW, LEVL IV, 45-59 MIN: ICD-10-PCS | Mod: S$GLB,,, | Performed by: PSYCHIATRY & NEUROLOGY

## 2022-04-18 PROCEDURE — 3078F DIAST BP <80 MM HG: CPT | Mod: CPTII,S$GLB,, | Performed by: PSYCHIATRY & NEUROLOGY

## 2022-04-18 PROCEDURE — 3008F PR BODY MASS INDEX (BMI) DOCUMENTED: ICD-10-PCS | Mod: CPTII,S$GLB,, | Performed by: PSYCHIATRY & NEUROLOGY

## 2022-04-18 PROCEDURE — 3078F PR MOST RECENT DIASTOLIC BLOOD PRESSURE < 80 MM HG: ICD-10-PCS | Mod: CPTII,S$GLB,, | Performed by: PSYCHIATRY & NEUROLOGY

## 2022-04-18 PROCEDURE — 3074F SYST BP LT 130 MM HG: CPT | Mod: CPTII,S$GLB,, | Performed by: PSYCHIATRY & NEUROLOGY

## 2022-04-18 PROCEDURE — 1159F MED LIST DOCD IN RCRD: CPT | Mod: CPTII,S$GLB,, | Performed by: PSYCHIATRY & NEUROLOGY

## 2022-04-18 PROCEDURE — 1160F PR REVIEW ALL MEDS BY PRESCRIBER/CLIN PHARMACIST DOCUMENTED: ICD-10-PCS | Mod: CPTII,S$GLB,, | Performed by: PSYCHIATRY & NEUROLOGY

## 2022-04-18 PROCEDURE — 3008F BODY MASS INDEX DOCD: CPT | Mod: CPTII,S$GLB,, | Performed by: PSYCHIATRY & NEUROLOGY

## 2022-04-18 PROCEDURE — 99999 PR PBB SHADOW E&M-EST. PATIENT-LVL IV: CPT | Mod: PBBFAC,,, | Performed by: PSYCHIATRY & NEUROLOGY

## 2022-04-18 RX ORDER — RIZATRIPTAN BENZOATE 10 MG/1
10 TABLET, ORALLY DISINTEGRATING ORAL
Qty: 9 TABLET | Refills: 5 | Status: SHIPPED | OUTPATIENT
Start: 2022-04-18 | End: 2023-06-12

## 2022-04-18 NOTE — PROGRESS NOTES
NEUROLOGY HEADACHE NOTE?     CHIEF COMPLAINT?   Ms. Mone Chandler chief concern is uncontrolled headache.     Name of the referring physician Yuliana Song, DO     Name of primary care provider Myke Gardner MD      Subjective:    HPI:  Miss Chandler presents today to discuss her ongoing headache symptoms.  Though infrequent, she experiences significant and long-lasting headache episodes that are impacting her life.  Symptoms have been generally stable in description for many years, but she has taken renewed interest and getting them under control.    She also wishes to discuss initiating a referral to Psychiatry or Psychology for evaluation of possible adult ADHD.    Age of onset: late teens/early 20's  When did they become more of a problem: early 20's  # of Total headache days per month: 2-4  # of Migraine days per month: 2-4  Intensity of average and most severe headaches: 6.5/10, 7/10  Duration of headache pain: >4hrs-2days  Quality of pain: Pulsating/Throbbing, stinging  Laterality: unilateral, not side locked, usually right    Location: right temporal and radiates back to occipital and neck   Photophobia and phonophobia: yes  Nausea and vomiting: no  Causes avoidance of physical activity: yes  Worsened by physical activity: no  Preventive Medications failed: none  Acute medications failed: none  OTC Medications: ibuprofen (1-3 days per month)  Hx of (Bolded items are positive): depression, anxiety, fibromyalgia, autoimmune disorder, head trauma, neurological infection, glaucoma, asthma, nephrolithiasis, MI, stroke  Is medication overuse contributing to the patient's current migraine burden: no  Aura: no  Autonomic symptoms: none  Family Hx of migraines: unsure  Latest Imaging: none    Current HA Regimen:  OTC meds     PAST MEDICAL HISTORY:  Past Medical History:   Diagnosis Date    Allergic rhinitis, cause unspecified 6/17/2015    ADRIANO (generalized anxiety disorder) 6/17/2015    ADRIANO-7 score of 17      PCOS (polycystic ovarian syndrome)        PAST SURGICAL HISTORY:  Past Surgical History:   Procedure Laterality Date    NASAL ENDOSCOPY W/ BALLON SINUPLASTY Bilateral 2017    thumb Bilateral     TRIGGER FINGER RELEASE Bilateral     thumbs       CURRENT MEDS:  Current Outpatient Medications   Medication Sig Dispense Refill    cetirizine (ZYRTEC) 10 MG tablet Take 1 tablet (10 mg total) by mouth every evening.  0    etonogestreL-ethinyl estradioL (NUVARING) 0.12-0.015 mg/24 hr vaginal ring Place vaginally.      fluticasone propionate (FLONASE) 50 mcg/actuation nasal spray 2 sprays (100 mcg total) by Each Nostril route once daily. 16 g 12    pulse oximeter (PULSE OXIMETER) device by Apply Externally route 2 (two) times a day. Use twice daily at 8 AM and 3 PM and record the value in [employer] as directed. 1 each 0    rizatriptan (MAXALT-MLT) 10 MG disintegrating tablet Take 1 tablet (10 mg total) by mouth as needed for Migraine. May repeat in 2 hours if needed. Max 20mg/24hrs. 9 tablet 5     No current facility-administered medications for this visit.       ALLERGIES:  Review of patient's allergies indicates:  No Known Allergies    FAMILY HISTORY:  Family History   Problem Relation Age of Onset    Hypertension Mother     Cancer Mother     Diverticulitis Father        SOCIAL HISTORY:  Social History     Tobacco Use    Smoking status: Never Smoker    Smokeless tobacco: Never Used   Substance Use Topics    Alcohol use: No    Drug use: No       Review of Systems:  Gen: no fever, no chills, no generalized feeling of weakness   HEENT: no double vision, no blurred vision, no eye pain, no eye exudates. no nasal congestion,   no traumatic injury of head, no neck pain, no neck stiffness. no photophobia or phonophobia at this time. ?    Heart: no chest pain, no SOB    Lungs: no SOB, no cough    MSK: no weakness of legs, intact ROM    ABD: no abd pain, no N/V/D/C, no difficulty with defecation .    Extremities: No  "leg pain, no edema.    Objective:  PHYSICAL EXAMINATION??   Mone Chandler is a 31 y.o. female patient who has the following vital signs with   Vitals:    04/18/22 1012   BP: 105/68   Pulse: 65   Weight: 68 kg (150 lb)   Height: 5' 4" (1.626 m)   , body surface area is 1.75 meters squared.     General:  Female in NAD, alert and awake, Aox3, well groomed. ?    ? ?    HEENT: Head is NC/AT EOMI, pupil size: 4 mm B/L, no nystagmus noted; hearing grossly intact b/l.    Neck: Supple. no nuchal rigidity.      Cardiovascular: well perfused, no cyanosis        Respiratory: Symmetric chest rise noted       Musculoskeletal: Muscle tone noted to be adequate for patient age, muscle mass is WNL. No spontaneous movements or fasciculations noted during this examination.       Extremities: No pedal edema or calf tenderness. No cogwheel rigidity noted on B/L UE extremities.       Neurological Examination.    Mental status: AA&O x3; Affect/mood is euthymic/congruent; no aphasia noted during examination. Patient answers simple questions appropriately & follows simple commands; no dysarthria or expressive aphasia; no mecca-neglect or extinction. Vocabulary/word finding: excellent.       Cranial Nerves: II-XII grossly intact. .       Muscle Function: Tone WNL and Muscle bulk WNL.  Moves bilateral upper and lower extremities symmetrically against gravity     Sensory: ?light touch is grossly intact in bilateral upper and lower extremities, face, and trunk.       Reflexes:  2/4 throughout     Gait: adequate casual gait with stride length and arm swing WNL.     REVIEW   We reviewed their current medical history, medications, allergies, past medical history, surgical history, social history, family history, and review of systems, and updated all the information.     ASSESSMENT AND PLAN   Kevin is a very pleasant, 31 y.o. with complicated diagnosis of     1. Migraine without aura and without status migrainosus, not intractable  " Ambulatory referral/consult to Neurology    rizatriptan (MAXALT-MLT) 10 MG disintegrating tablet   2. Inattention  Ambulatory consult to Psychiatry     Mone has worsening headaches that meet International Headache Society Criteria for episodic migraine present less than 15 days month.   Medication overuse is not likely contributing to this patient's headaches.   Patient feels not only severe disabling pain, but is also unable to function due to headaches.     My approach to every patient is multimodal.   I focused our discussion on addressing modifiable risk factors and trying to decrease them.  After discussion with the patient, I recommend the followin. Preventive medications:    After discussing options and current headache frequency, we jointly decided to forego preventive medications at this time.     2. Acute (abortive) medications:      START:  Rizatriptan  RIZATRIPTAN (MAXALT) is used as migraine abortive used only when you feel a migraine coming. Use 10 mg at the onset of migraine; take another 10 mg in 1 hour if no pain relief. Rizatriptan is more effective if taken with 2 Aleve blue capsules (Naproxen 440mg).   Do NOT take Maxalt more than 6 days per month as this can cause rebound headache.   Do NOT take Maxalt within 24 hours of taking any other triptan or within 24 hours of taking any ergotamine medication such as DHE or Migranal.    Pregnancy and Breastfeeding: FDA pregnancy category C. This means that it is not known whether this drug will harm an unborn baby.    It is not known whether this medication passes into breast milk.    Potential side effects:   Check with your doctor right away if any of the following side effects continue for more than 1 hour. Even if they go away in less than 1 hour, check with your doctor before using any more sumatriptan if any of the following side effects occur: chest pain (mild), heaviness, tightness, or pressure in chest and/or neck ,difficulty in  swallowing ,pounding heartbeat , rash, hives, itching, or bumps on skin     3. Natural approaches to increasing brain energy and serotonin:    SAMe 200 mg a day    Vitamin B2 400 mg daily    Vitamin B12 1000 mcg daily    Getting early morning light to increase natural serotonin, melatonin. Could also consider light therapy box, 10,000 LUX.     4. Headache prevention and acute treatment over-the counter supplements    Boswellia 800 mg 2-3 times per day, example brand Sia's, natural anti inflammatory herb    Sleep? modulation- melatonin at night 5 to 10 mg 30 minutes prior to sleep    Feverfew Tea 1-3 cups per day. Can get from [employer] or [email or web address]- A Natural anti inflammatory approach that does not cause further sensitization of the system.    Magnesium Citrate 250 mg daily, slowly increase up to 500-1000 mg daily. Side effect may include diarrhea, so we recommend stopping at whatever dose is comfortable for your body without causing this side effect. This supplement can be very helpful for preventing headache, preventing migraine aura, calming nerves, muscles and even mood. Recommend starting slowly, as it can also lead to increased bowel movements or diarrhea.?     5. DIET: I recommend a diet that heavily favors fruits and vegetables while reducing carbs. More information is available upon request.     6. EXERCISE: Gentle movement exercises, concentrate on combination of muscle building and aerobic. I also recommend adding gentle Yoga therapy. The goal is 150 minutes per week of moderate to rigorous exercise, but you should start at your own pace and progress slowly and safely as discussed today.    7. ANXIETY/STRESS- Control stressors with yoga, meditation, counseling, or other methods of mindfulness.     8. SLEEP- aim for 7-8 hrs of restful sleep per night.     9. FUN- Increase fun time spend with friends and family     10. Psychiatry consultation for concerns the patient has over the  potential for adult ADHD.     Benefits, side effects, and alternatives were discussed extensively with the patient.?     Mone verbally endorsed understanding of all the recommendations.?     she is going to follow up with me in the future as needed?     I spent a total of 45 minutes on the day of the visit. This includes face to face time and non-face to face time preparing to see the patient (eg, review of tests), obtaining and/or reviewing separately obtained history, documenting clinical information in the electronic or other health record, independently interpreting results and communicating results to the patient/family/caregiver, or care coordinator.    A dictation device was used to produce this documentation which can sometimes result in grammatical errors or the replacement of similar sounding words.    Fredy Fernandez, DO

## 2022-04-18 NOTE — TELEPHONE ENCOUNTER
----- Message from Galina Youssef sent at 4/18/2022 10:00 AM CDT -----  Regarding: 10 minutes late  Contact: [phone]  Patient is running late for their appointment and would like to know if they can still be seen.   Would the patient rather a call back or a response via [facility]?    Best Call Back Number:  [phone]  Additional Information:

## 2022-10-12 ENCOUNTER — PATIENT MESSAGE (OUTPATIENT)
Dept: FAMILY MEDICINE | Facility: CLINIC | Age: 32
End: 2022-10-12
Payer: MEDICAID

## 2022-10-18 ENCOUNTER — OFFICE VISIT (OUTPATIENT)
Dept: FAMILY MEDICINE | Facility: CLINIC | Age: 32
End: 2022-10-18
Payer: COMMERCIAL

## 2022-10-18 VITALS
SYSTOLIC BLOOD PRESSURE: 110 MMHG | WEIGHT: 153.56 LBS | HEART RATE: 79 BPM | HEIGHT: 64 IN | BODY MASS INDEX: 26.21 KG/M2 | DIASTOLIC BLOOD PRESSURE: 70 MMHG | OXYGEN SATURATION: 97 % | TEMPERATURE: 98 F

## 2022-10-18 DIAGNOSIS — D50.9 MICROCYTIC ANEMIA: ICD-10-CM

## 2022-10-18 DIAGNOSIS — F41.1 GAD (GENERALIZED ANXIETY DISORDER): Chronic | ICD-10-CM

## 2022-10-18 DIAGNOSIS — K59.01 SLOW TRANSIT CONSTIPATION: Chronic | ICD-10-CM

## 2022-10-18 DIAGNOSIS — E28.2 PCOS (POLYCYSTIC OVARIAN SYNDROME): Chronic | ICD-10-CM

## 2022-10-18 DIAGNOSIS — E66.3 OVERWEIGHT (BMI 25.0-29.9): Chronic | ICD-10-CM

## 2022-10-18 DIAGNOSIS — Z00.00 ROUTINE MEDICAL EXAM: Primary | ICD-10-CM

## 2022-10-18 PROCEDURE — 1160F RVW MEDS BY RX/DR IN RCRD: CPT | Mod: CPTII,S$GLB,, | Performed by: INTERNAL MEDICINE

## 2022-10-18 PROCEDURE — 3074F SYST BP LT 130 MM HG: CPT | Mod: CPTII,S$GLB,, | Performed by: INTERNAL MEDICINE

## 2022-10-18 PROCEDURE — 1160F PR REVIEW ALL MEDS BY PRESCRIBER/CLIN PHARMACIST DOCUMENTED: ICD-10-PCS | Mod: CPTII,S$GLB,, | Performed by: INTERNAL MEDICINE

## 2022-10-18 PROCEDURE — 3074F PR MOST RECENT SYSTOLIC BLOOD PRESSURE < 130 MM HG: ICD-10-PCS | Mod: CPTII,S$GLB,, | Performed by: INTERNAL MEDICINE

## 2022-10-18 PROCEDURE — 3078F PR MOST RECENT DIASTOLIC BLOOD PRESSURE < 80 MM HG: ICD-10-PCS | Mod: CPTII,S$GLB,, | Performed by: INTERNAL MEDICINE

## 2022-10-18 PROCEDURE — 3078F DIAST BP <80 MM HG: CPT | Mod: CPTII,S$GLB,, | Performed by: INTERNAL MEDICINE

## 2022-10-18 PROCEDURE — 99999 PR PBB SHADOW E&M-EST. PATIENT-LVL V: CPT | Mod: PBBFAC,,, | Performed by: INTERNAL MEDICINE

## 2022-10-18 PROCEDURE — 99395 PREV VISIT EST AGE 18-39: CPT | Mod: S$GLB,,, | Performed by: INTERNAL MEDICINE

## 2022-10-18 PROCEDURE — 1159F PR MEDICATION LIST DOCUMENTED IN MEDICAL RECORD: ICD-10-PCS | Mod: CPTII,S$GLB,, | Performed by: INTERNAL MEDICINE

## 2022-10-18 PROCEDURE — 1159F MED LIST DOCD IN RCRD: CPT | Mod: CPTII,S$GLB,, | Performed by: INTERNAL MEDICINE

## 2022-10-18 PROCEDURE — 99395 PR PREVENTIVE VISIT,EST,18-39: ICD-10-PCS | Mod: S$GLB,,, | Performed by: INTERNAL MEDICINE

## 2022-10-18 PROCEDURE — 99999 PR PBB SHADOW E&M-EST. PATIENT-LVL V: ICD-10-PCS | Mod: PBBFAC,,, | Performed by: INTERNAL MEDICINE

## 2022-10-18 NOTE — PROGRESS NOTES
Assessment & Plan  Problem List Items Addressed This Visit          Psychiatric    ADRIANO (generalized anxiety disorder) (Chronic)    Overview     ADRIANO-7 score of 19 ->> 13         Current Assessment & Plan     Working on CAM and non-pharmaceutical approaches. Monitor             Endocrine    PCOS (polycystic ovarian syndrome) (Chronic)    Current Assessment & Plan     Needs OBGYN closer to her home.           Relevant Orders    Ambulatory referral/consult to Gynecology    Overweight (BMI 25.0-29.9) (Chronic)    Current Assessment & Plan     Will refer to [facility] at her request         Relevant Orders    Ambulatory referral/consult to [facility]       GI    Slow transit constipation (Chronic)    Overview     Improves with removing dairy         Current Assessment & Plan     Requesting referral to GI to discuss constipation and bloating.          Relevant Orders    Ambulatory referral/consult to Gastroenterology     Other Visit Diagnoses       Routine medical exam    -  Primary  -    Discussed healthy diet, regular exercise, necessary labs, age appropriate cancer screening, and routine vaccinations.       Microcytic anemia    -  Recheck labs    Relevant Orders    CBC Auto Differential    Iron and TIBC    Ferritin              Health Maintenance reviewed, declined flu shot today.    Follow-up: Follow up in about 1 year (around 10/18/2023) for Routine Physical.  ______________________________________________________________________    Chief Complaint  Chief Complaint   Patient presents with    Annual Exam       HPI  Mone June Chandler is a 32 y.o. female with medical diagnoses as listed in the medical history and problem list that presents for routine physical.  Pt is known to me with their last appointment 01/2022.      She is requesting to see a new GYN.  Her last MD retired.  Has PCOS and wants to talk about labs and coordinating this with a holistic type clinic.     Would like to talk to GI about her ongoing  constipation concerns and questions about food sensitivities.  No rashes.  No BRBPR, diarrhea.  + bloating.     Has some general questions about the safety and legality of CBD and THC. Feels she is getting conflicting information from those around her and wanted to check here.       PAST MEDICAL HISTORY:  Past Medical History:   Diagnosis Date    Allergic rhinitis, cause unspecified 6/17/2015    ADRIANO (generalized anxiety disorder) 6/17/2015    ADRIANO-7 score of 17     PCOS (polycystic ovarian syndrome)        PAST SURGICAL HISTORY:  Past Surgical History:   Procedure Laterality Date    NASAL ENDOSCOPY W/ BALLON SINUPLASTY Bilateral 2017    thumb Bilateral     TRIGGER FINGER RELEASE Bilateral     thumbs       SOCIAL HISTORY:  Social History     Socioeconomic History    Marital status: Single   Tobacco Use    Smoking status: Never    Smokeless tobacco: Never   Substance and Sexual Activity    Alcohol use: No    Drug use: No       FAMILY HISTORY:  Family History   Problem Relation Age of Onset    Hypertension Mother     Cancer Mother     Diverticulitis Father        ALLERGIES AND MEDICATIONS: updated and reviewed.  Review of patient's allergies indicates:  No Known Allergies  Current Outpatient Medications   Medication Sig Dispense Refill    cetirizine (ZYRTEC) 10 MG tablet Take 1 tablet (10 mg total) by mouth every evening. (Patient not taking: Reported on 10/18/2022)  0    etonogestreL-ethinyl estradioL (NUVARING) 0.12-0.015 mg/24 hr vaginal ring Place vaginally.      pulse oximeter (PULSE OXIMETER) device by Apply Externally route 2 (two) times a day. Use twice daily at 8 AM and 3 PM and record the value in MyChart as directed. (Patient not taking: Reported on 10/18/2022) 1 each 0    rizatriptan (MAXALT-MLT) 10 MG disintegrating tablet Take 1 tablet (10 mg total) by mouth as needed for Migraine. May repeat in 2 hours if needed. Max 20mg/24hrs. 9 tablet 5     No current facility-administered medications for this visit.  "        ROS  Review of Systems   Constitutional:  Negative for chills, fever and unexpected weight change.   HENT:  Negative for congestion, ear pain, hearing loss, rhinorrhea, sore throat and trouble swallowing.    Eyes:  Negative for discharge, redness and visual disturbance.   Respiratory:  Negative for cough, chest tightness, shortness of breath and wheezing.    Cardiovascular:  Negative for chest pain, palpitations and leg swelling.   Gastrointestinal:  Positive for constipation. Negative for abdominal pain, diarrhea, nausea and vomiting.   Endocrine: Negative for polydipsia, polyphagia and polyuria.   Genitourinary:  Negative for decreased urine volume, dysuria and hematuria.   Musculoskeletal:  Negative for arthralgias, joint swelling and myalgias.   Skin:  Negative for color change and rash.   Neurological:  Negative for dizziness, weakness, light-headedness and headaches.   Psychiatric/Behavioral:  Negative for decreased concentration, dysphoric mood, sleep disturbance and suicidal ideas.          Physical Exam  Vitals:    10/18/22 1451   BP: 110/70   Pulse: 79   Temp: 97.9 °F (36.6 °C)   SpO2: 97%   Weight: 69.7 kg (153 lb 8.8 oz)   Height: 5' 4" (1.626 m)    Body mass index is 26.36 kg/m².  Weight: 69.7 kg (153 lb 8.8 oz)   Height: 5' 4" (162.6 cm)   Physical Exam  Constitutional:       General: She is not in acute distress.     Appearance: She is well-developed.   HENT:      Head: Normocephalic and atraumatic.   Eyes:      General: Lids are normal. No scleral icterus.     Conjunctiva/sclera: Conjunctivae normal.      Pupils: Pupils are equal, round, and reactive to light.   Neck:      Thyroid: No thyromegaly.      Vascular: No carotid bruit or JVD.   Cardiovascular:      Rate and Rhythm: Normal rate and regular rhythm.      Pulses: Normal pulses.      Heart sounds: Normal heart sounds. No murmur heard.    No friction rub. No S3 or S4 sounds.   Pulmonary:      Effort: Pulmonary effort is normal.      " Breath sounds: Normal breath sounds. No wheezing, rhonchi or rales.   Abdominal:      General: Bowel sounds are normal.      Palpations: Abdomen is soft.      Tenderness: There is no abdominal tenderness.   Musculoskeletal:         General: No tenderness.      Cervical back: Full passive range of motion without pain and neck supple.      Right lower leg: No edema.      Left lower leg: No edema.   Skin:     General: Skin is warm and dry.      Findings: No rash.   Neurological:      Mental Status: She is alert and oriented to person, place, and time.   Psychiatric:         Speech: Speech normal.         Behavior: Behavior normal.         Thought Content: Thought content normal.           Health Maintenance         Date Due Completion Date    COVID-19 Vaccine (3 - Booster for Moderna series) 10/22/2021 8/27/2021    Influenza Vaccine (1) 09/01/2022 10/15/2019    Cervical Cancer Screening 06/22/2024 6/22/2021    TETANUS VACCINE 07/30/2029 7/30/2019

## 2022-10-31 ENCOUNTER — PATIENT MESSAGE (OUTPATIENT)
Dept: FAMILY MEDICINE | Facility: CLINIC | Age: 32
End: 2022-10-31
Payer: COMMERCIAL

## 2022-10-31 DIAGNOSIS — Z83.49 FAMILY HISTORY OF HYPERTHYROIDISM: Primary | ICD-10-CM

## 2022-11-30 ENCOUNTER — PATIENT MESSAGE (OUTPATIENT)
Dept: FAMILY MEDICINE | Facility: CLINIC | Age: 32
End: 2022-11-30
Payer: COMMERCIAL

## 2022-12-27 ENCOUNTER — OFFICE VISIT (OUTPATIENT)
Dept: OBSTETRICS AND GYNECOLOGY | Facility: CLINIC | Age: 32
End: 2022-12-27
Payer: COMMERCIAL

## 2022-12-27 ENCOUNTER — LAB VISIT (OUTPATIENT)
Dept: LAB | Facility: HOSPITAL | Age: 32
End: 2022-12-27
Attending: INTERNAL MEDICINE
Payer: COMMERCIAL

## 2022-12-27 VITALS — WEIGHT: 151.44 LBS | BODY MASS INDEX: 26 KG/M2 | DIASTOLIC BLOOD PRESSURE: 70 MMHG | SYSTOLIC BLOOD PRESSURE: 128 MMHG

## 2022-12-27 DIAGNOSIS — Z11.3 SCREENING EXAMINATION FOR STD (SEXUALLY TRANSMITTED DISEASE): ICD-10-CM

## 2022-12-27 DIAGNOSIS — R10.2 PELVIC PAIN: ICD-10-CM

## 2022-12-27 DIAGNOSIS — N89.8 VAGINAL IRRITATION: ICD-10-CM

## 2022-12-27 DIAGNOSIS — Z01.419 WELL WOMAN EXAM WITH ROUTINE GYNECOLOGICAL EXAM: Primary | ICD-10-CM

## 2022-12-27 DIAGNOSIS — Z83.49 FAMILY HISTORY OF HYPERTHYROIDISM: ICD-10-CM

## 2022-12-27 DIAGNOSIS — E28.2 PCOS (POLYCYSTIC OVARIAN SYNDROME): Chronic | ICD-10-CM

## 2022-12-27 LAB
T4 FREE SERPL-MCNC: 0.78 NG/DL (ref 0.71–1.51)
TSH SERPL DL<=0.005 MIU/L-ACNC: 1.6 UIU/ML (ref 0.4–4)

## 2022-12-27 PROCEDURE — 81514 NFCT DS BV&VAGINITIS DNA ALG: CPT | Performed by: OBSTETRICS & GYNECOLOGY

## 2022-12-27 PROCEDURE — 99999 PR PBB SHADOW E&M-EST. PATIENT-LVL IV: CPT | Mod: PBBFAC,,, | Performed by: OBSTETRICS & GYNECOLOGY

## 2022-12-27 PROCEDURE — 36415 COLL VENOUS BLD VENIPUNCTURE: CPT | Performed by: INTERNAL MEDICINE

## 2022-12-27 PROCEDURE — 99214 OFFICE O/P EST MOD 30 MIN: CPT | Mod: PBBFAC | Performed by: OBSTETRICS & GYNECOLOGY

## 2022-12-27 PROCEDURE — 87491 CHLMYD TRACH DNA AMP PROBE: CPT | Performed by: OBSTETRICS & GYNECOLOGY

## 2022-12-27 PROCEDURE — 3008F BODY MASS INDEX DOCD: CPT | Mod: CPTII,S$GLB,, | Performed by: OBSTETRICS & GYNECOLOGY

## 2022-12-27 PROCEDURE — 84439 ASSAY OF FREE THYROXINE: CPT | Performed by: INTERNAL MEDICINE

## 2022-12-27 PROCEDURE — 99385 PREV VISIT NEW AGE 18-39: CPT | Mod: S$GLB,,, | Performed by: OBSTETRICS & GYNECOLOGY

## 2022-12-27 PROCEDURE — 87591 N.GONORRHOEAE DNA AMP PROB: CPT | Performed by: OBSTETRICS & GYNECOLOGY

## 2022-12-27 PROCEDURE — 1160F RVW MEDS BY RX/DR IN RCRD: CPT | Mod: CPTII,S$GLB,, | Performed by: OBSTETRICS & GYNECOLOGY

## 2022-12-27 PROCEDURE — 1160F PR REVIEW ALL MEDS BY PRESCRIBER/CLIN PHARMACIST DOCUMENTED: ICD-10-PCS | Mod: CPTII,S$GLB,, | Performed by: OBSTETRICS & GYNECOLOGY

## 2022-12-27 PROCEDURE — 99385 PR PREVENTIVE VISIT,NEW,18-39: ICD-10-PCS | Mod: S$GLB,,, | Performed by: OBSTETRICS & GYNECOLOGY

## 2022-12-27 PROCEDURE — 84443 ASSAY THYROID STIM HORMONE: CPT | Performed by: INTERNAL MEDICINE

## 2022-12-27 PROCEDURE — 3074F PR MOST RECENT SYSTOLIC BLOOD PRESSURE < 130 MM HG: ICD-10-PCS | Mod: CPTII,S$GLB,, | Performed by: OBSTETRICS & GYNECOLOGY

## 2022-12-27 PROCEDURE — 3078F DIAST BP <80 MM HG: CPT | Mod: CPTII,S$GLB,, | Performed by: OBSTETRICS & GYNECOLOGY

## 2022-12-27 PROCEDURE — 3078F PR MOST RECENT DIASTOLIC BLOOD PRESSURE < 80 MM HG: ICD-10-PCS | Mod: CPTII,S$GLB,, | Performed by: OBSTETRICS & GYNECOLOGY

## 2022-12-27 PROCEDURE — 99999 PR PBB SHADOW E&M-EST. PATIENT-LVL IV: ICD-10-PCS | Mod: PBBFAC,,, | Performed by: OBSTETRICS & GYNECOLOGY

## 2022-12-27 PROCEDURE — 3008F PR BODY MASS INDEX (BMI) DOCUMENTED: ICD-10-PCS | Mod: CPTII,S$GLB,, | Performed by: OBSTETRICS & GYNECOLOGY

## 2022-12-27 PROCEDURE — 3074F SYST BP LT 130 MM HG: CPT | Mod: CPTII,S$GLB,, | Performed by: OBSTETRICS & GYNECOLOGY

## 2022-12-27 PROCEDURE — 84480 ASSAY TRIIODOTHYRONINE (T3): CPT | Performed by: INTERNAL MEDICINE

## 2022-12-27 PROCEDURE — 1159F PR MEDICATION LIST DOCUMENTED IN MEDICAL RECORD: ICD-10-PCS | Mod: CPTII,S$GLB,, | Performed by: OBSTETRICS & GYNECOLOGY

## 2022-12-27 PROCEDURE — 1159F MED LIST DOCD IN RCRD: CPT | Mod: CPTII,S$GLB,, | Performed by: OBSTETRICS & GYNECOLOGY

## 2022-12-27 NOTE — PROGRESS NOTES
History & Physical  Gynecology      SUBJECTIVE:     Chief Complaint: Annual Exam, Advice Only (PCOS, fertility), STD CHECK, and Contraception (Alternative birth control to help with hormone levels)       History of Present Illness:  Annual Exam-Premenopausal  Ms. Chandler is a 31 y/o female who presents for annual exam. The patient reports that she has a history of PCOS. She does not want to take birth control because she likes to be more holistic/natural. She reports that her LMP was 11/27 but her periods are irregular. She also complain of pelvic pain with intercourse and clitoral discomfort. The patient is sexually active. GYN screening history: last pap: approximate date 2021 and was normal. The patient wears seatbelts: yes. The patient participates in regular exercise: yes. The patient reports that there is not domestic violence in her life.      Review of patient's allergies indicates:  No Known Allergies    Past Medical History:   Diagnosis Date    Allergic rhinitis, cause unspecified 6/17/2015    ADRIANO (generalized anxiety disorder) 6/17/2015    ADRIANO-7 score of 17     PCOS (polycystic ovarian syndrome)      Past Surgical History:   Procedure Laterality Date    NASAL ENDOSCOPY W/ BALLON SINUPLASTY Bilateral 2017    thumb Bilateral     TRIGGER FINGER RELEASE Bilateral     thumbs     OB History    No obstetric history on file.       Family History   Problem Relation Age of Onset    Hypertension Mother     Cancer Mother     Diverticulitis Father      Social History     Tobacco Use    Smoking status: Never    Smokeless tobacco: Never   Substance Use Topics    Alcohol use: No    Drug use: No       Current Outpatient Medications   Medication Sig    etonogestreL-ethinyl estradioL (NUVARING) 0.12-0.015 mg/24 hr vaginal ring Place vaginally.    cetirizine (ZYRTEC) 10 MG tablet Take 1 tablet (10 mg total) by mouth every evening. (Patient not taking: Reported on 10/18/2022)    pulse oximeter (PULSE OXIMETER) device by Apply  Externally route 2 (two) times a day. Use twice daily at 8 AM and 3 PM and record the value in MyChart as directed. (Patient not taking: Reported on 10/18/2022)    rizatriptan (MAXALT-MLT) 10 MG disintegrating tablet Take 1 tablet (10 mg total) by mouth as needed for Migraine. May repeat in 2 hours if needed. Max 20mg/24hrs.     No current facility-administered medications for this visit.       Review of Systems:  Review of Systems   Constitutional:  Negative for chills and fever.   Eyes:  Negative for visual disturbance.   Respiratory:  Negative for cough and wheezing.    Cardiovascular:  Negative for chest pain and palpitations.   Gastrointestinal:  Negative for abdominal pain, nausea and vomiting.   Genitourinary:  Positive for pelvic pain and vaginal discharge. Negative for dysuria, frequency, hematuria, vaginal bleeding and vaginal pain.        Vaginal irritation   Neurological:  Negative for headaches.   Psychiatric/Behavioral:  Negative for depression.       OBJECTIVE:     Physical Exam:  Physical Exam  Vitals and nursing note reviewed. Exam conducted with a chaperone present.   Constitutional:       Appearance: She is well-developed.   Cardiovascular:      Rate and Rhythm: Normal rate.   Pulmonary:      Effort: Pulmonary effort is normal. No respiratory distress.   Chest:   Breasts:     Breasts are symmetrical.   Abdominal:      General: There is no distension.      Palpations: Abdomen is soft.      Tenderness: There is no abdominal tenderness.   Genitourinary:     Vagina: Vaginal discharge present.   Skin:     General: Skin is warm and dry.   Neurological:      Mental Status: She is alert and oriented to person, place, and time.         ASSESSMENT:       ICD-10-CM ICD-9-CM    1. Well woman exam with routine gynecological exam  Z01.419 V72.31       2. PCOS (polycystic ovarian syndrome)  E28.2 256.4 Ambulatory referral/consult to Gynecology      TESTOSTERONE      Estradiol      17-HYDROXYPROGESTERONE       DHEA-SULFATE      Follicle Stimulating Hormone      LUTEINIZING HORMONE      Progesterone      3. Pelvic pain  R10.2 [id number] Ambulatory referral/consult to Physical/Occupational Therapy      4. Vaginal irritation  N89.8 623.9 Vaginosis Screen by DNA Probe      C. trachomatis/N. gonorrhoeae by AMP DNA      5. Screening examination for STD (sexually transmitted disease)  Z11.3 V74.5 Vaginosis Screen by DNA Probe      C. trachomatis/N. gonorrhoeae by AMP DNA      HIV 1/2 Ag/Ab (4th Gen)      RPR      HERPES SIMPLEX 1&2 IGG      HERPES SIMPLEX 1 & 2 IGM         Plan:      Mone was seen today for annual exam, advice only, std check and contraception.    Diagnoses and all orders for this visit:    Well woman exam with routine gynecological exam  - Pap smear normal 2021    PCOS (polycystic ovarian syndrome)  -     Ambulatory referral/consult to Gynecology  -     TESTOSTERONE; Future  -     Estradiol; Future  -     17-HYDROXYPROGESTERONE; Future  -     DHEA-SULFATE; Future  -     Follicle Stimulating Hormone; Future  -     LUTEINIZING HORMONE; Future  -     Progesterone; Future    Pelvic pain  -     Ambulatory referral/consult to Physical/Occupational Therapy; Future    Vaginal irritation  -     Vaginosis Screen by DNA Probe  -     C. trachomatis/N. gonorrhoeae by AMP DNA    Screening examination for STD (sexually transmitted disease)  -     Vaginosis Screen by DNA Probe  -     C. trachomatis/N. gonorrhoeae by AMP DNA  -     HIV 1/2 Ag/Ab (4th Gen); Future  -     RPR; Future  -     HERPES SIMPLEX 1&2 IGG; Future  -     HERPES SIMPLEX 1 & 2 IGM; Future        Orders Placed This Encounter   Procedures    Vaginosis Screen by DNA Probe    C. trachomatis/N. gonorrhoeae by AMP DNA    HIV 1/2 Ag/Ab (4th Gen)    RPR    HERPES SIMPLEX 1&2 IGG    HERPES SIMPLEX 1 & 2 IGM    TESTOSTERONE    Estradiol    17-HYDROXYPROGESTERONE    DHEA-SULFATE    Follicle Stimulating Hormone    LUTEINIZING HORMONE    Progesterone    Ambulatory  referral/consult to Physical/Occupational Therapy       Follow up in about 1 year (around 12/27/2023) for Well Woman/Annual.    Counseling time: 30 minutes    Salo Bob

## 2022-12-28 LAB
C TRACH DNA SPEC QL NAA+PROBE: NOT DETECTED
N GONORRHOEA DNA SPEC QL NAA+PROBE: NOT DETECTED
T3 SERPL-MCNC: 85 NG/DL (ref 60–180)

## 2022-12-29 ENCOUNTER — PATIENT MESSAGE (OUTPATIENT)
Dept: OBSTETRICS AND GYNECOLOGY | Facility: CLINIC | Age: 32
End: 2022-12-29
Payer: COMMERCIAL

## 2022-12-29 DIAGNOSIS — B37.31 YEAST VAGINITIS: Primary | ICD-10-CM

## 2022-12-29 LAB
BACTERIAL VAGINOSIS DNA: NEGATIVE
CANDIDA GLABRATA DNA: NEGATIVE
CANDIDA KRUSEI DNA: NEGATIVE
CANDIDA RRNA VAG QL PROBE: POSITIVE
T VAGINALIS RRNA GENITAL QL PROBE: NEGATIVE

## 2022-12-29 RX ORDER — FLUCONAZOLE 150 MG/1
150 TABLET ORAL ONCE
Qty: 1 TABLET | Refills: 1 | Status: SHIPPED | OUTPATIENT
Start: 2022-12-29 | End: 2022-12-30

## 2022-12-30 DIAGNOSIS — B37.31 YEAST VAGINITIS: ICD-10-CM

## 2022-12-30 RX ORDER — FLUCONAZOLE 150 MG/1
150 TABLET ORAL ONCE
Qty: 1 TABLET | Refills: 1 | Status: SHIPPED | OUTPATIENT
Start: 2022-12-30 | End: 2022-12-30

## 2023-03-03 ENCOUNTER — CLINICAL SUPPORT (OUTPATIENT)
Dept: REHABILITATION | Facility: HOSPITAL | Age: 33
End: 2023-03-03
Payer: COMMERCIAL

## 2023-03-03 DIAGNOSIS — M62.89 PELVIC FLOOR DYSFUNCTION: ICD-10-CM

## 2023-03-03 DIAGNOSIS — R10.2 PELVIC PAIN: ICD-10-CM

## 2023-03-03 PROCEDURE — 97162 PT EVAL MOD COMPLEX 30 MIN: CPT | Mod: PO

## 2023-03-03 PROCEDURE — 97112 NEUROMUSCULAR REEDUCATION: CPT | Mod: PO

## 2023-03-03 PROCEDURE — 97535 SELF CARE MNGMENT TRAINING: CPT | Mod: PO

## 2023-03-03 NOTE — PATIENT INSTRUCTIONS
Home Exercise Program: 03/03/2023    360 Breathing Technique          Inhale long, slow and deep. You should feel as if your lower ribs are expanding in all directions like the way an umbrella opens. You should feel the belly, back and sides gently expand and you may notice a relaxation in the pelvic floor.     Continue to breathe like this for 5 minutes. Repeat 1-2 times/day.    DO THIS WHEN YOU ARE PEEING- LET'S SEE IF IT HELPS WITH YOUR SPLIT STREAM    Home Exercise Program: 03/03/2023    PERINEAL STRETCH / MASSAGE  1. Wash hands thoroughly with antibacterial soap. (Use a glove if you want)  2. Lay down comfortably with your back and head well supported by several pillows.  Or sit on toilet, leaning back against the tank.  3. Apply water-based lubricant or coconut oil on your thumbs and perineum.  4. Place your thumb just inside the vagina, toward the bottom.  5. Gently press downward, then slowly continue the stretch up both sides of the vaginal opening, holding for 10 seconds at each spot. (Go from 6 o'clock up to 9, then the other way to 3 o'clock)  6. The amount of pressure for the stretch may cause discomfort, but not excessive pain. Don't go above 4-5/10 pain during or after the stretching.  7. Focus on relaxed breathing and keeping the pelvic floor muscles dropped.   8. Continue for 5-10 minutes, every other day.    STOP if there is increased bleeding, an infection, or extreme pain.

## 2023-03-03 NOTE — LETTER
March 3, 2023      [facility]  [address]  [facility] [address]  Phone: [phone]       Patient: Mone Chandler   YOB: 1990  Date of Visit: 03/03/2023    To Whom It May Concern:    Lucía Chandler  was at [facility] on 03/03/2023. The patient may return to work/school on 3/3/2023 with no restrictions. If you have any questions or concerns, or if I can be of further assistance, please do not hesitate to contact me.    Sincerely,    Meri Aviles, PT, BCB-PMD

## 2023-03-03 NOTE — PROGRESS NOTES
[facility]  Pelvic Health Physical Therapy Initial Evaluation    Date: 3/3/2023   Name: Mone Chandler  Clinic Number: [id number]  Therapy Diagnosis:   Encounter Diagnosis   Name Primary?    Pelvic floor dysfunction      Physician: Salo Bob,*    Physician Orders: PT Eval and Treat    Medical Diagnosis from Referral: Pelvic pain [R10.2]  Evaluation Date: 3/3/2023  Authorization Period Expiration: pending  Plan of Care Expiration: 6/3/2023  Visit # / Visits authorized: 1/ pending    Time In: 1:15  Time Out: 2:45  Total Appointment Time (timed & untimed codes): 90 minutes    Precautions: universal    Subjective     Date of onset: age 23    History of current condition - Mone reports: pain with vaginal penetration- both pain at the entrance and deep.  She notes deep pressure with penetration and burning at the introitus.      OB/GYN History: G0; PCOS  Sexually active? Yes  Pain with vaginal exams, intercourse or tampon use? Yes    Bladder/Bowel History: trouble emptying bladder completely, constipation/straining for movement, and bifurcated urine stream  Frequency of urination:   Daytime: 7-8           Nighttime: 0-1  Difficulty initiating urine stream: No  Urine stream: splayed  Complete emptying: No- sometimes has to go back  Bladder leakage: No  Urinary Urgency: Yes, Able to delay the urge for at least 20 minute(s).  Frequency of bowel movements: 2-3 times a day  Difficulty initiating BM: No  Quality/Shape of BM: [place] Stool Chart 6-7, 4, and 3  Does Patient Feel Empty after BM? Yes  Fiber Supplements or Laxative Use? No  Colon leakage: No    Pain:  Location:  vagina   Current 0/10, worst 7/10, best 0/10   Description: Aching  Aggravating Factors/Activities that cause symptoms: Vaginal exam/provocation and penetration    Easing Factors: rest     Medical History: Mone  has a past medical history of Allergic rhinitis, cause unspecified (6/17/2015), ADRIANO (generalized anxiety  disorder) (6/17/2015), and PCOS (polycystic ovarian syndrome).     Surgical History: Mone Chandler  has a past surgical history that includes thumb (Bilateral); Trigger finger release (Bilateral); and Nasal endoscopy w/ ballon sinuplasty (Bilateral, 2017).    Medications: Mone has a current medication list which includes the following prescription(s): cetirizine, etonogestrel-ethinyl estradiol, pulse oximeter, and rizatriptan.    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    Prior Level of Function: could have intercourse without pain  Current Level of Function: pain with intercourse    Types of fluid intake: water and teas; some juices ; limits soda; almond milk  Diet: pescatarian   Habitus: well developed, well nourished  Abuse/Neglect: Yes pt currently in counseling      Pts goals: to be able to have intercourse without pain    OBJECTIVE     See EMR under MEDIA for written consent provided 3/3/2023  Chaperone: declined    ORTHO SCREEN  Posture in sitting: WNL  Posture in standing: WNL  Pelvic alignment: no sign of deviations noted in supine (R hip rotation noted in supine- increased turnout noted)    ABDOMINAL WALL ASSESSMENT  Abdominal strength: Rectus abdominus: 3/5     Transverse abdominus: good, isolated contraction noted  Scarring: none  Diastasis: absent       BREATHING MECHANICS ASSESSMENT   Thorax Assessment During Quiet Respiration: Decreased excursion of abdominal wall   Thorax Assessment During Deep Respiration: Decreased excursion of abdominal wall     VAGINAL PELVIC FLOOR EXAM    EXTERNAL ASSESSMENT  Introitus: WNL  Skin condition: WNL  Scarring: none   Sensation: WNL   Pain:  tenderness  with mobility of the tissues  of the posterior fourchette  Voluntary contraction: visible lift  Voluntary relaxation: visible drop  Involuntary contraction: visible lift  Bearing down: reflex tightening  Perineal descent: absent      INTERNAL ASSESSMENT  Pain: tender areas noted as follows: levator ani L>R; OI's not assessed today   Sensation: able to localized pressure appropriately   Vaginal vault: WNL   Muscle Bulk: mild hypertonus   Muscle Power: 2/5       Quality of contraction: slow rise   Specificity: WNL   Coordination: tends to hold breath during PFM contraction   Prolapse check: none    Limitation/Restriction for FOTO PFDI Urinary Survey    Therapist reviewed FOTO scores for Mone Chandler on 3/3/2023.   FOTO documents entered into [employer] - see Media section.    Limitation Score: 25%       TREATMENT     Treatment Time In: 2:15  Treatment Time Out: 2:45  Total Treatment time (time-based codes) separate from Evaluation: 30 minutes    Neuromuscular Re-education to develop Down training for 10 minutes including:   diaphragmatic breathing    Self-Care for 20 minutes including:   Instruction in perineal self massage; instruction in double voiding technique (breath) to reduce urine stream bifurcation.     Patient Education provided:   general anatomy/physiology of urinary/ bowel  system, benefits of treatment, risks of treatment, and alternative methods of treatment were discussed with the pt. Additionally, posture/body mechanices and use of lubricants and vaginal moisturizers  was reviewed.     Home Exercises provided:  Written Home Exercises provided: yes.  Exercises were reviewed and Mone was able to demonstrate them prior to the end of the session.    Mone demonstrated good  understanding of the education provided.     See EMR under Patient Instructions for exercises provided 3/3/2023.    Assessment     Mone is a 32 y.o. female referred to outpatient Physical Therapy with a medical diagnosis of Pelvic pain [R10.2]. Pt  presents with poor knowledge of body mechanics and posture, pelvic floor tenderness, increased tension of the pelvic muscles, and poor knowledge of vulvar irritants.       Pt prognosis is Good.   Pt will benefit from skilled outpatient Physical Therapy to address the deficits stated above and in the chart below, provide pt/family education, and to maximize pt's level of independence.     Plan of care discussed with patient: Yes  Pt's spiritual, cultural and educational needs considered and patient is agreeable to the plan of care and goals as stated below:     Anticipated Barriers for therapy: pt's work schedule    Medical Necessity is demonstrated by the following:    History  Co-morbidities and personal factors that may impact the plan of care Co-morbidities   PCOS;     Personal Factors  no deficits     moderate   Examination  Body structures and functions, activity limitations and participation restrictions that may impact the plan of care Body Regions/Systems/Functions:  poor knowledge of body mechanics and posture, pelvic floor tenderness, increased tension of the pelvic muscles, and poor knowledge of vulvar irritants     Activity Limitations:  intercourse/vaginal exam/tampon use without pain    Participation Restrictions:  relationship with spouse/partner    Activity limitations:   Learning and applying knowledge  no deficits    General Tasks and Commands  no deficits    Communication  no deficits    Mobility  no deficits    Self care  no deficits    Domestic Life  no deficits    Interactions/Relationships  no deficits    Life Areas  no deficits    Community and Social Life  no deficits       moderate   Clinical Presentation unstable clinical presentation with unpredictable characteristics high   Decision Making/ Complexity Score: moderate       Goals:    Long Term Goals: 12 weeks   Pt will be independent with double voiding techniques 100% of the time to ensure full bladder emptying and decrease pt's risk of  infection.   Pt will report successfully having intercourse with < or = 2/10 pain for an improvement in activity tolerance.   Pt/family will be independent with HEP for continued self-management of symptoms.  Pt I with use of vaginal dilator set or wand for self mgmt of symptoms.      Plan     Plan of care Certification: 3/3/2023 to 6/3/2023.    Outpatient Physical Therapy 1 times per 2 week(s)  for 12 weeks to include the following interventions: therapeutic exercises, therapeutic activity, neuromuscular re-education, manual therapy, modalities PRN, patient/family education, and self care/home management    Meri Aviles, PT, BCB-PMD

## 2023-03-21 ENCOUNTER — PATIENT MESSAGE (OUTPATIENT)
Dept: FAMILY MEDICINE | Facility: CLINIC | Age: 33
End: 2023-03-21
Payer: COMMERCIAL

## 2023-03-24 ENCOUNTER — TELEPHONE (OUTPATIENT)
Dept: OBSTETRICS AND GYNECOLOGY | Facility: CLINIC | Age: 33
End: 2023-03-24
Payer: COMMERCIAL

## 2023-03-24 NOTE — TELEPHONE ENCOUNTER
On call back to pt, calls going straight to vm. Unable to leave  due to vm full.     ----- Message from Christopher Pike sent at 3/24/2023 12:23 PM CDT -----  Regarding: Self [phone]  Type: Patient Call Back    Who called: Self     What is the request in detail: Pt called stating she has an issue she would like to talk to the doctor about. Didn't give any more information out over the phone     Can the clinic reply by [facility]? No     Would the patient rather a call back or a response via [facility]? Call back     Best call back number: [phone]     Additional Information:    Thank you.

## 2023-03-29 ENCOUNTER — PATIENT MESSAGE (OUTPATIENT)
Dept: FAMILY MEDICINE | Facility: CLINIC | Age: 33
End: 2023-03-29
Payer: COMMERCIAL

## 2023-03-31 ENCOUNTER — PATIENT MESSAGE (OUTPATIENT)
Dept: FAMILY MEDICINE | Facility: CLINIC | Age: 33
End: 2023-03-31
Payer: COMMERCIAL

## 2023-04-04 ENCOUNTER — PATIENT MESSAGE (OUTPATIENT)
Dept: REHABILITATION | Facility: HOSPITAL | Age: 33
End: 2023-04-04
Payer: COMMERCIAL

## 2023-05-03 ENCOUNTER — OFFICE VISIT (OUTPATIENT)
Dept: FAMILY MEDICINE | Facility: CLINIC | Age: 33
End: 2023-05-03
Payer: COMMERCIAL

## 2023-05-03 DIAGNOSIS — F41.1 GAD (GENERALIZED ANXIETY DISORDER): Chronic | ICD-10-CM

## 2023-05-03 DIAGNOSIS — R49.9 VOICE COMPLAINT: ICD-10-CM

## 2023-05-03 DIAGNOSIS — R09.82 POST-NASAL DRIP: Primary | ICD-10-CM

## 2023-05-03 PROCEDURE — 99214 OFFICE O/P EST MOD 30 MIN: CPT | Mod: 95,,, | Performed by: INTERNAL MEDICINE

## 2023-05-03 PROCEDURE — 1160F RVW MEDS BY RX/DR IN RCRD: CPT | Mod: CPTII,95,, | Performed by: INTERNAL MEDICINE

## 2023-05-03 PROCEDURE — 1159F PR MEDICATION LIST DOCUMENTED IN MEDICAL RECORD: ICD-10-PCS | Mod: CPTII,95,, | Performed by: INTERNAL MEDICINE

## 2023-05-03 PROCEDURE — 1160F PR REVIEW ALL MEDS BY PRESCRIBER/CLIN PHARMACIST DOCUMENTED: ICD-10-PCS | Mod: CPTII,95,, | Performed by: INTERNAL MEDICINE

## 2023-05-03 PROCEDURE — 1159F MED LIST DOCD IN RCRD: CPT | Mod: CPTII,95,, | Performed by: INTERNAL MEDICINE

## 2023-05-03 PROCEDURE — 99214 PR OFFICE/OUTPT VISIT, EST, LEVL IV, 30-39 MIN: ICD-10-PCS | Mod: 95,,, | Performed by: INTERNAL MEDICINE

## 2023-05-03 NOTE — PATIENT INSTRUCTIONS
The [facility] requires the patients the call and make their own appointments.  Please see the below phone numbers:    Adults - [phone]

## 2023-05-03 NOTE — ASSESSMENT & PLAN NOTE
Has had some higher episodes of stress recently.  Prefers to avoid pharmaceuticals if at all possible.  She has some increased concerns about her concentration and also cognition.  Now with some concerns about attention.  She would like to have an evaluation with psychiatry.  I think this would be appropriate to confirm her diagnosis of ADRIANO versus other DSM diagnoses

## 2023-05-03 NOTE — ASSESSMENT & PLAN NOTE
Some ongoing hoarseness and recent episode of straining followed by blood tinge in mucus.  This has since resolved.  Still with some hoarseness.  Previously eval'd by ENT.  Discussed follow-up appointment if she has return of symptoms.

## 2023-05-17 ENCOUNTER — PATIENT MESSAGE (OUTPATIENT)
Dept: OBSTETRICS AND GYNECOLOGY | Facility: CLINIC | Age: 33
End: 2023-05-17
Payer: COMMERCIAL

## 2023-05-17 DIAGNOSIS — R10.2 PELVIC PAIN: Primary | ICD-10-CM

## 2023-06-01 ENCOUNTER — PATIENT MESSAGE (OUTPATIENT)
Dept: REHABILITATION | Facility: HOSPITAL | Age: 33
End: 2023-06-01
Payer: COMMERCIAL

## 2023-06-07 NOTE — TELEPHONE ENCOUNTER
----- Message from Mary Kay Multani sent at 12/23/2019  4:26 PM CST -----  Contact: self [phone]  .Type:  Patient Returning Call    Who Called:  Self     Who Left Message for Patient:  Soco Mcfarland,    Does the patient know what this is regarding? appt     Would the patient rather a call back or a response via [facility]?  Call back     Best Call Back Number [phone]       History  Chief Complaint   Patient presents with   • Cough     Presents with foster agency - cough starting last night  Siblings with same  Child is an 9y/o female with unknown past medical history accompanied by  and supervisor from [employer] presenting with cough, congestion, and rhinorrhea  Per , the patient and his 3 siblings were acquired by foster care last evening as their family was experiencing homelessness  Siblings are expereincing similar symptoms  No medications administered  Unknown length of symptoms  Past medical history and vaccination status is unknown at this time  Patient is eating/drinking normally  No change in urine output  Denies fever, sore throat, ear pain, CP, SOB, N/V  None       History reviewed  No pertinent past medical history  History reviewed  No pertinent surgical history  History reviewed  No pertinent family history  I have reviewed and agree with the history as documented  E-Cigarette/Vaping     E-Cigarette/Vaping Substances          Review of Systems   Constitutional: Negative for activity change, appetite change, chills, fatigue and fever  HENT: Positive for congestion and rhinorrhea  Negative for ear pain and sore throat  Eyes: Negative for pain and visual disturbance  Respiratory: Positive for cough  Negative for shortness of breath  Cardiovascular: Negative for chest pain and palpitations  Gastrointestinal: Negative for abdominal pain, diarrhea and vomiting  Genitourinary: Negative for decreased urine volume and dysuria  Musculoskeletal: Negative for back pain and gait problem  Skin: Negative for color change and rash  Neurological: Negative for seizures and syncope  All other systems reviewed and are negative  Physical Exam  Physical Exam  Vitals and nursing note reviewed  Constitutional:       General: She is active  She is not in acute distress  Appearance: Normal appearance   She is well-developed and normal weight  She is not toxic-appearing  HENT:      Head: Normocephalic and atraumatic  Right Ear: Tympanic membrane, ear canal and external ear normal       Left Ear: Tympanic membrane, ear canal and external ear normal       Nose: Congestion and rhinorrhea present  Mouth/Throat:      Mouth: Mucous membranes are moist       Pharynx: Oropharynx is clear  No oropharyngeal exudate or posterior oropharyngeal erythema  Eyes:      General:         Right eye: No discharge  Left eye: No discharge  Extraocular Movements: Extraocular movements intact  Conjunctiva/sclera: Conjunctivae normal    Cardiovascular:      Rate and Rhythm: Normal rate and regular rhythm  Heart sounds: Normal heart sounds, S1 normal and S2 normal  No murmur heard  Pulmonary:      Effort: Pulmonary effort is normal  No respiratory distress, nasal flaring or retractions  Breath sounds: Normal breath sounds  No stridor or decreased air movement  No wheezing, rhonchi or rales  Abdominal:      General: Abdomen is flat  Bowel sounds are normal  There is no distension  Palpations: Abdomen is soft  Tenderness: There is no abdominal tenderness  Musculoskeletal:         General: No swelling  Normal range of motion  Cervical back: Normal range of motion and neck supple  No rigidity or tenderness  Lymphadenopathy:      Cervical: No cervical adenopathy  Skin:     General: Skin is warm and dry  Capillary Refill: Capillary refill takes less than 2 seconds  Findings: No rash  Neurological:      Mental Status: She is alert     Psychiatric:         Mood and Affect: Mood normal          Vital Signs  ED Triage Vitals   Temperature Pulse Respirations Blood Pressure SpO2   06/07/23 1333 06/07/23 1333 06/07/23 1333 06/07/23 1334 06/07/23 1333   97 8 °F (36 6 °C) 96 20 (!) 118/58 98 %      Temp src Heart Rate Source Patient Position - Orthostatic VS BP Location FiO2 (%)   -- 06/07/23 1333 -- -- --    Monitor         Pain Score       --                  Vitals:    06/07/23 1333 06/07/23 1334   BP:  (!) 118/58   Pulse: 96          Visual Acuity      ED Medications  Medications - No data to display    Diagnostic Studies  Results Reviewed     Procedure Component Value Units Date/Time    Bordetella pertussis / parapertussis PCR [[id number]] Updated: 06/07/23 1449    Lab Status: In process Specimen: Nasopharyngeal from Nose     FLU/RSV/COVID - if FLU/RSV clinically relevant [[id number]] Updated: 06/07/23 1438    Lab Status: In process Specimen: Nares from Nose                  No orders to display              Procedures  Procedures         ED Course                                             Medical Decision Making  Will send COVID-19, influenza, RSV and pertussis test   Explained to  that test results can take 24hrs to result and they will be contacted with positive results  Discussed follow up with family doctor  Given contact information for the [facility] to schedule appointment if she does not have a doctor  Discussed supportive care for viral URI  Discussed strict return precautions if symptoms worsen or new symptoms arise  Guardians states understanding and agrees with plan  Viral URI with cough: acute illness or injury  Amount and/or Complexity of Data Reviewed  Independent Historian: guardian and caregiver  Labs: ordered  Disposition  Final diagnoses:   Viral URI with cough     Time reflects when diagnosis was documented in both MDM as applicable and the Disposition within this note     Time User Action Codes Description Comment    6/7/2023  2:23 PM Sendy Phelps Add [J06 9] Viral URI with cough       ED Disposition     ED Disposition   Discharge    Condition   Stable    Date/Time   Wed Jun 7, 2023  2:23 PM    [place] discharge to home/self care                 Follow-up Information     Follow up With Specialties Details Why Contact Info Additional [facility] Schedule an appointment as soon as possible for a visit in 1 day  [address] [facility] [address]  [address] [facility], [address], [address] [place], [place], [address] Emergency Department Emergency Medicine  If symptoms worsen [facility] [address]  [address] [facility] Emergency Department, [address] [place] [address], [address]          There are no discharge medications for this patient  No discharge procedures on file      PDMP Review     None          ED Provider  Electronically Signed by           Jenelle Tobin PA-C  06/07/23 1613       Jenelle Tobin PA-C  06/07/23 1612

## 2023-06-09 ENCOUNTER — TELEPHONE (OUTPATIENT)
Dept: OBSTETRICS AND GYNECOLOGY | Facility: CLINIC | Age: 33
End: 2023-06-09
Payer: COMMERCIAL

## 2023-06-09 ENCOUNTER — OFFICE VISIT (OUTPATIENT)
Dept: URGENT CARE | Facility: CLINIC | Age: 33
End: 2023-06-09
Payer: COMMERCIAL

## 2023-06-09 VITALS
WEIGHT: 151 LBS | HEART RATE: 88 BPM | BODY MASS INDEX: 25.78 KG/M2 | RESPIRATION RATE: 15 BRPM | TEMPERATURE: 98 F | HEIGHT: 64 IN | OXYGEN SATURATION: 99 % | SYSTOLIC BLOOD PRESSURE: 123 MMHG | DIASTOLIC BLOOD PRESSURE: 84 MMHG

## 2023-06-09 DIAGNOSIS — N30.90 CYSTITIS: ICD-10-CM

## 2023-06-09 DIAGNOSIS — Z32.01 POSITIVE PREGNANCY TEST: Primary | ICD-10-CM

## 2023-06-09 LAB
B-HCG UR QL: POSITIVE
BILIRUB UR QL STRIP: NEGATIVE
CTP QC/QA: YES
GLUCOSE UR QL STRIP: NEGATIVE
KETONES UR QL STRIP: NEGATIVE
LEUKOCYTE ESTERASE UR QL STRIP: POSITIVE
PH, POC UA: 7
POC BLOOD, URINE: NEGATIVE
POC NITRATES, URINE: NEGATIVE
PROT UR QL STRIP: NEGATIVE
SP GR UR STRIP: 1.01 (ref 1–1.03)
UROBILINOGEN UR STRIP-ACNC: NORMAL (ref 0.1–1.1)

## 2023-06-09 PROCEDURE — 99214 PR OFFICE/OUTPT VISIT, EST, LEVL IV, 30-39 MIN: ICD-10-PCS | Mod: S$GLB,,, | Performed by: NURSE PRACTITIONER

## 2023-06-09 PROCEDURE — 81025 URINE PREGNANCY TEST: CPT | Mod: S$GLB,,, | Performed by: NURSE PRACTITIONER

## 2023-06-09 PROCEDURE — 81003 URINALYSIS AUTO W/O SCOPE: CPT | Mod: QW,S$GLB,, | Performed by: NURSE PRACTITIONER

## 2023-06-09 PROCEDURE — 81025 POCT URINE PREGNANCY: ICD-10-PCS | Mod: S$GLB,,, | Performed by: NURSE PRACTITIONER

## 2023-06-09 PROCEDURE — 87086 URINE CULTURE/COLONY COUNT: CPT | Performed by: NURSE PRACTITIONER

## 2023-06-09 PROCEDURE — 99214 OFFICE O/P EST MOD 30 MIN: CPT | Mod: S$GLB,,, | Performed by: NURSE PRACTITIONER

## 2023-06-09 PROCEDURE — 81003 POCT URINALYSIS, DIPSTICK, AUTOMATED, W/O SCOPE: ICD-10-PCS | Mod: QW,S$GLB,, | Performed by: NURSE PRACTITIONER

## 2023-06-09 RX ORDER — CEPHALEXIN 500 MG/1
500 CAPSULE ORAL EVERY 6 HOURS
Qty: 20 CAPSULE | Refills: 0 | Status: SHIPPED | OUTPATIENT
Start: 2023-06-09 | End: 2023-06-14

## 2023-06-09 NOTE — TELEPHONE ENCOUNTER
Pt calling to scheduled initial ob appt. LMP 5/16/2023. Appt scheduled with PA on 6/12/2023    ----- Message from Maria D Wakefield sent at 6/9/2023  9:10 AM CDT -----  Regarding: Request for Sooner Apt  Type:  Sooner Appointment Request    Patient is requesting a sooner appointment.  Patient declined first available appointment listed as well as another facility and provider .  Patient will not accept being placed on the waitlist and is requesting a message be sent to doctor.    Name of Caller: self     When is the first available appointment? 6/30    Symptoms: New pregnancy     Would the patient rather a call back or a response via [facility]? Call     Best Call Back Number: .[phone]

## 2023-06-09 NOTE — PROGRESS NOTES
"Subjective:      Patient ID: Mone Chandler is a 32 y.o. female.    Vitals:  height is 5' 4" (1.626 m) and weight is 68.5 kg (151 lb). Her temperature is 98.2 °F (36.8 °C). Her blood pressure is 123/84 and her pulse is 88. Her respiration is 15 and oxygen saturation is 99%.     Chief Complaint: Pregnancy Concerns    32 year old female presents today with concerns of being pregnant. Last menstrual cycle was  to . Breast tenderness, urinary frequency, and fatigue. States she gets these symptoms when she is ovulating so unsure. She has a lot of questions and concerns. Patient is confused if the tea she is drinking that contains CBD oil may contain a high level of Hcg.  Patient reports unprotected sex with her fiance, denies urinary urgency or dysuria, denies back pain or flank pain, patient reports she did 3 at home pregnancy test and all came back positive so she wanted to make sure, patient reports she is exploring other options of possible , denies abdominal pain, patient does have OBGYN but does not have appointment with denies fever, body aches or chills, denies cough, wheezing or shortness of breath, denies nausea, vomiting, diarrhea or abdominal pain, denies chest pain or dizziness positional lightheadedness, denies sore throat or trouble swallowing, denies loss of taste or smell, or any other symptoms        Urinary Frequency   This is a new problem. The current episode started in the past 7 days. The problem has been waxing and waning. The pain is at a severity of 0/10. The patient is experiencing no pain. There has been no fever. She is Sexually active. There is No history of pyelonephritis. Associated symptoms include frequency and a possible pregnancy. Pertinent negatives include no chills, discharge, vomiting or constipation. She has tried nothing for the symptoms.  Her later in the month,    Constitution: Negative for chills.   Gastrointestinal:  Negative for vomiting and " constipation.   Genitourinary:  Positive for frequency.    Objective:     Physical Exam   Constitutional: She is oriented to person, place, and time. She appears well-developed.   HENT:   Head: Normocephalic and atraumatic.   Ears:   Right Ear: External ear normal.   Left Ear: External ear normal.   Nose: Nose normal. No nasal deformity. No epistaxis.   Mouth/Throat: Oropharynx is clear and moist and mucous membranes are normal.   Eyes: Lids are normal.   Neck: Trachea normal and phonation normal. Neck supple.   Cardiovascular: Normal rate, regular rhythm and normal pulses.   Pulmonary/Chest: Effort normal and breath sounds normal.   Abdominal: Normal appearance and bowel sounds are normal. She exhibits no distension. Soft. There is no abdominal tenderness. There is no left CVA tenderness and no right CVA tenderness.   Neurological: She is alert and oriented to person, place, and time.   Skin: Skin is warm, dry and intact.   Psychiatric: Her speech is normal and behavior is normal.   Nursing note and vitals reviewed.  Results for orders placed or performed in visit on 06/09/23   POCT Urinalysis, Dipstick, Automated, W/O Scope   Result Value Ref Range    POC Blood, Urine Negative Negative    POC Bilirubin, Urine Negative Negative    POC Urobilinogen, Urine Normal 0.1 - 1.1    POC Ketones, Urine Negative Negative    POC Protein, Urine Negative Negative    POC Nitrates, Urine Negative Negative    POC Glucose, Urine Negative Negative    pH, UA 7.0     POC Specific Gravity, Urine 1.015 1.003 - 1.029    POC Leukocytes, Urine Positive (A) Negative   POCT urine pregnancy   Result Value Ref Range    POC Preg Test, Ur Positive (A) Negative     Acceptable Yes          Patient in no acute distress.  Vitals reassuring.  Discussed results/diagnosis/plan in depth with patient in clinic. Strict precautions given to patient to monitor for worsening signs and symptoms. Advised to follow up with primary.All questions  "answered. Strict ER precautions given. If your symptoms worsens or fail to improve you should go to the Emergency Room. Discharge and follow-up instructions given verbally/printed. Discharge and follow-up instructions discussed with the patient who expressed understanding and willingness to comply with my recommendations.Patient voiced understanding and in agreement with current treatment plan.     Please be advised this text was dictated with [employer] software and may contain errors due to translation.    Assessment:     1. Positive pregnancy test    2. Cystitis        Plan:       Positive pregnancy test  -     POCT Urinalysis, Dipstick, Automated, W/O Scope  -     POCT urine pregnancy    Cystitis  -     cephALEXin (KEFLEX) 500 MG capsule; Take 1 capsule (500 mg total) by mouth every 6 (six) hours. for 5 days  Dispense: 20 capsule; Refill: 0  -     Urine culture          Medical Decision Making:   Clinical Tests:   Lab Tests: Reviewed  Urgent Care Management:  Patient in no acute distress.  Vitals reassuring.  On exam, patient is nontoxic appearing and afebrile.  Lungs CTA.  Urinalysis and UPT as above.  Patient with positive pregnancy test.  Discussed with patient in detail.  Patient want to get the blood work done to confirm the pregnancy along with ultrasound.  I discussed with patient in detail that she needs to follow-up with OBGYN for further evaluation.  No abdominal pain.  No pelvic pain.  Patient is planning for possible  and stated, " I am exploring other options like ".  Urinalysis with 25 leukocytes.  Patient with symptom of urinary frequency.  No dysuria or urgency.  Urine culture sent off.  Will treat with Keflex for cystitis.  Will call Back results in  3-5 days for urine culture.  Medication prescribed and over-the-counter medication discussed with patient at length.  Proper hydration advised.  I reiterated the importance of further evaluation if no improvement symptoms and follow-up " with primary.         Patient Instructions   PLEASE READ YOUR DISCHARGE INSTRUCTIONS ENTIRELY AS IT CONTAINS IMPORTANT INFORMATION.      Take the antibiotics to completion.     Drink plenty of fluids, wipe front to back, take showers not baths, no scented soaps, wear breathable cotton underwear, urinate after sexual intercourse.     A urine culture was sent. You will be contacted once it results and appropriate action will be taken if needed.     Please go to the ER for worsening symptoms including fever, worsening flank pain, vomiting, etc.       Please return or see your primary care doctor if you develop new or worsening symptoms.     Please arrange follow up with your primary medical clinic as soon as possible. You must understand that you've received an Urgent Care treatment only and that you may be released before all of your medical problems are known or treated. You, the patient, will arrange for follow up as instructed. If your symptoms worsen or fail to improve you should go to the Emergency Room.  WE CANNOT RULE OUT ALL POSSIBLE CAUSES OF YOUR SYMPTOMS IN THE URGENT CARE SETTING PLEASE GO TO THE ER IF YOU FEELS YOUR CONDITION IS WORSENING OR YOU WOULD LIKE EMERGENT EVALUATION.

## 2023-06-10 LAB
BACTERIA UR CULT: NORMAL
BACTERIA UR CULT: NORMAL

## 2023-06-12 ENCOUNTER — INITIAL PRENATAL (OUTPATIENT)
Dept: OBSTETRICS AND GYNECOLOGY | Facility: CLINIC | Age: 33
End: 2023-06-12
Payer: COMMERCIAL

## 2023-06-12 ENCOUNTER — PATIENT MESSAGE (OUTPATIENT)
Dept: OBSTETRICS AND GYNECOLOGY | Facility: CLINIC | Age: 33
End: 2023-06-12

## 2023-06-12 ENCOUNTER — HOSPITAL ENCOUNTER (OUTPATIENT)
Dept: RADIOLOGY | Facility: HOSPITAL | Age: 33
Discharge: HOME | End: 2023-06-12
Attending: PHYSICIAN ASSISTANT
Payer: COMMERCIAL

## 2023-06-12 ENCOUNTER — TELEPHONE (OUTPATIENT)
Dept: OBSTETRICS AND GYNECOLOGY | Facility: CLINIC | Age: 33
End: 2023-06-12

## 2023-06-12 VITALS
DIASTOLIC BLOOD PRESSURE: 78 MMHG | BODY MASS INDEX: 27.17 KG/M2 | SYSTOLIC BLOOD PRESSURE: 118 MMHG | WEIGHT: 158.31 LBS

## 2023-06-12 DIAGNOSIS — R10.32 LLQ PAIN: ICD-10-CM

## 2023-06-12 DIAGNOSIS — E28.2 PCOS (POLYCYSTIC OVARIAN SYNDROME): Chronic | ICD-10-CM

## 2023-06-12 DIAGNOSIS — N91.4 SECONDARY OLIGOMENORRHEA: ICD-10-CM

## 2023-06-12 DIAGNOSIS — Z11.3 SCREENING FOR STD (SEXUALLY TRANSMITTED DISEASE): Primary | ICD-10-CM

## 2023-06-12 PROCEDURE — 76817 TRANSVAGINAL US OBSTETRIC: CPT | Mod: TC

## 2023-06-12 PROCEDURE — 99999 PR PBB SHADOW E&M-EST. PATIENT-LVL III: ICD-10-PCS | Mod: PBBFAC,,, | Performed by: PHYSICIAN ASSISTANT

## 2023-06-12 PROCEDURE — 0500F INITIAL PRENATAL CARE VISIT: CPT | Mod: CPTII,S$GLB,, | Performed by: PHYSICIAN ASSISTANT

## 2023-06-12 PROCEDURE — 76817 TRANSVAGINAL US OBSTETRIC: CPT | Mod: 26,,, | Performed by: RADIOLOGY

## 2023-06-12 PROCEDURE — 0500F PR INITIAL PRENATAL CARE VISIT: ICD-10-PCS | Mod: CPTII,S$GLB,, | Performed by: PHYSICIAN ASSISTANT

## 2023-06-12 PROCEDURE — 99999 PR PBB SHADOW E&M-EST. PATIENT-LVL III: CPT | Mod: PBBFAC,,, | Performed by: PHYSICIAN ASSISTANT

## 2023-06-12 PROCEDURE — 76801 OB US < 14 WKS SINGLE FETUS: CPT | Mod: 26,,, | Performed by: RADIOLOGY

## 2023-06-12 PROCEDURE — 76801 US OB <14 WEEKS, TRANSABDOM & TRANSVAG, SINGLE GESTATION (XPD): ICD-10-PCS | Mod: 26,,, | Performed by: RADIOLOGY

## 2023-06-12 PROCEDURE — 87591 N.GONORRHOEAE DNA AMP PROB: CPT | Performed by: PHYSICIAN ASSISTANT

## 2023-06-12 PROCEDURE — 81514 NFCT DS BV&VAGINITIS DNA ALG: CPT | Performed by: PHYSICIAN ASSISTANT

## 2023-06-12 PROCEDURE — 76817 US OB <14 WEEKS, TRANSABDOM & TRANSVAG, SINGLE GESTATION (XPD): ICD-10-PCS | Mod: 26,,, | Performed by: RADIOLOGY

## 2023-06-12 NOTE — PROGRESS NOTES
CC: Positive Pregnancy Test    HISTORY OF PRESENT ILLNESS:    Mone Chandler is a 32 y.o. female, No obstetric history on file.,  Presents today for a routine exam complaining of oligomenorrhea and positive home urine pregnancy test.  Pt  has h/o PCOS. Patient's last menstrual period was 05/15/2023.   She is not currently on any contraception. UPT is Positive.  Patient is ambivalent about pregnancy. Sexual Activity: single partner. Last period was normal.  She is not taking a PNV.  She denies nausea/vomiting. Current symptoms also include: breast tenderness. Denies vaginal bleeding and pelvic pain.  She has PMH of PCOS and bicornate uterus.   Denies PMH, abdominal surgeries, medications other than PNV, genetic family history (SC/CF). No personal h/o DM, MGMA with DM. No personal or family history of thyroid disease. Does not use alcohol, tobacco, or illicit drugs. Feels safe at home.   Reports she has family history of autism on maternal side.     OB history:   No obstetric history on file.  Prior pregnancies : none     Last Pap: 2021 result nilm     ROS:  GENERAL: No weight changes. No swelling. No fatigue. No fever.  CARDIOVASCULAR: No chest pain. No shortness of breath. No leg cramps.   NEUROLOGICAL: No headaches. No vision changes.  BREASTS: No pain. No lumps. No discharge.  ABDOMEN: No pain. No diarrhea. No constipation.  REPRODUCTIVE: No abnormal bleeding.   VULVA: No pain. No lesions. +itching.  VAGINA: No relaxation. No itching. No odor. . No lesions.+ irritation, discharge  URINARY: No incontinence. No nocturia. No frequency. No dysuria.    MEDICATIONS AND ALLERGIES:  Reviewed    COMPREHENSIVE GYN HISTORY:  PAP History: Denies abnormal Paps.  Infection History: Denies STDs. Denies PID.  Benign History: Denies uterine fibroids. Denies ovarian cysts. Denies endometriosis. Denies other conditions.  Cancer History: Denies cervical cancer. Denies uterine cancer or hyperplasia. Denies ovarian cancer. Denies  vulvar cancer or pre-cancer. Denies vaginal cancer or pre-cancer. Denies breast cancer. Denies colon cancer.  Sexual Activity History: Reports currently being sexually active  Menstrual History: None.  Contraception: None    LMP 05/15/2023     PE:  AFFECT: Calm, alert and oriented X 3. Interactive during exam  GENERAL: Appears well-nourished, well-developed, in no acute distress.  HEAD: Normocephalic, atruamatic  TEETH: Good dentition.  SKIN: Normal for race, warm, & dry. No lesions or rashes.  LUNGS: Easy and unlabored  ABDOMEN: Soft and nontender without masses or organomegally.  VULVA: No lesions, masses or tenderness.  VAGINA: Moist and well rugated without lesions +scant amount of thin yellow discharge.  CERVIX: Moist and pink without lesions, discharge or tenderness.   CLOSED OS.   UTERUS SIZE: 6 week size, nontender and without masses.  ADNEXA: No masses or tenderness.  EXTREMITIES: No cyanosis, clubbing or edema. No calf tenderness.  LYMPH NODES: No axillary or inguinal adenopathy.    PROCEDURES:  UPT Positive  Genprobe  Pap    ASSESSMENT/PLAN:  Amenorrhea  Positive urine pregnancy test (JONAH: , EGA: 3w6d based on LMP:23)    -  Routine prenatal care    Nausea and vomiting in pregnancy    -  Education regarding lifestyle and dietary modifications    -  Advised use of B6/Unisom. Pt will notify us if no relief/worsening symptoms, will consider alternative therapies prn    1st TRIMESTER COUNSELING: Discussed all, booklet provided:  Common complaints of pregnancy  HIV and other routine prenatal tests including  genetic screening  Risk factors identified by prenatal history  Oriented to practice - discussed anticipated course of prenatal care & indications for Ultrasound  Childbirth classes/Hospital facilities   Nutrition and weight gain counseling  -- Discussed IOM recommended weight gain of:          Underweight        Less than 18.5            28-40            Normal Weight     18.5-24.9                     25-35            Overweight          25-29.9                       15-25            Obese                  30 and greater             11-20    -- Discussed criteria for delivery at [facility] r/t excessive pre-preg weight or excessive weight gain:          Pre-pregnancy BMI over 40 or excess pregnancy weight gain defined as:          Pre-preg BMI < 18.5; Excess weight gain = > 60 pound          Pre-preg BMI 18.5-24.9;  Excess weight gain = > 53 pounds          Pre-preg BMI 25-29.9;  Excess weight gain = > 38 pounds          Pre-preg BMI > 30;  Excess weight gain = > 30 pounds  Toxoplasmosis precautions (Cats/Raw Meat)  Sexual activity and exercise  Environmental/Work hazards  Travel  Tobacco (Ask, Advise, Assess, Assist, and Arrange), as well as alcohol and drug use  Use of any medications (Including supplements, Vitamins, Herbs, or OTC Drugs)  Domestic violence  Seat belt use      TERATOLOGY COUNSELING:   Discussed indications and options for aneuploidy screening - pamphlets given  [facility] US ORDERED  Indications for low-dose aspirin use after 12 weeks for the prevention of pre-eclampsia reviewed.  For this patient, her high risk factors include None. Her moderate risk factors include:  nulliparity and Sociodemographic characteristics: Black or .  Patient is a candidate for low-dose aspirin and will begin taking it 12w  Anatomy  19-20 weeks   Routine serum and urine prenatal labs today  FOLLOW-UP in 4 weeks with Dr. bhakti Tan PAPilarC    OB/GYN

## 2023-06-12 NOTE — PATIENT INSTRUCTIONS
Clinic Location:   [address] [facility] [address] [place] [address]  [phone]    Your Pregnancy A to Z   Allergies - Seasonal   Benadryl   Claritin   Zyrtec   If you have a skin allergic reaction, call your doctor    Baby Movement Counts  Baby movement is an indicator of your baby's health and wellbeing. A movement may be a kick, stretch, or turn. You will begin counting baby movements after you reach 28 weeks gestation. Do these counts twice a day (AM and PM). Several things can affect your baby's activity, such as see (20-40 minutes time), your blood sugar levels, smoking, noise level, drugs, gestational age, placental location, decreasing space in the uterus, and time of the day.   Call your doctor if your baby has NOT had 5 movements in 1 hour or 10 movements in 2 hours or there is a significant decrease in your baby's movements.     Backache  Almost all pregnant women have backaches in pregnancy. These are often related to stretching of ligaments that hold the uterus in place. Backaches can also be cause by stretching of the back muscles that support the weight as your baby grows. Here are some comfort measures:   Maternity Belt   Warm heating pad   Warm bath   Regular strength Tylenol   Massage     Breastfeeding  Your doctor should discuss breastfeeding before delivery. It is the best nutrition for your baby. It also protects your baby from illnesses. Studies have shown that  babies get sick less often. When they reach school age,  babies perform better in school. Newborns tolerate breast milk better than formula. It also decreases you bleeding after delivery by shrinking your uterus. It is a natural process, but often takes some time for you and your new infant to get the hang of things. Here are some helpful hints:   Try breastfeeding as soon as baby is born. Starting immediately increases the success of breastfeeding and creates a bond between you and your baby.   Do not get discouraged! Most women  don't produce a significant amount of milk until days 3- after delivery. Continue taking your prenatal vitamins while breastfeeding. Stay well hydrated by drinking 8-10 glasses of water every day.   When breastfeeding, if you have fever, redness of breasts or fullness that isn't relieved by pumping or expressing your milk, contact your doctor. Breast feeding is not recommended for women with certain medical conditions.     Breast Tenderness   This is common in pregnancy, especially in the beginning. The tenderness is related to the hormone changes that occur in the 1st trimester. Your breasts become larger. You may also notice darkening of the nipples. As your pregnancy continues, it's not uncommon to product milk, even before you deliver. To relieve tenderness and heaviness, wear a good support bra.     Colds and Congestion   This can be normal. To relieve congestion, you may use ocean nasal spray, a saline nasal spray, or Sudafed (without PE) sparingly. Do not use antihistamines because they may make your congestion worse. You can also try using a humidifier.   Medications ok in pregnancy: Plain Robitussin, plain Sudafed, Actifed, Benadryl, Claritin, Mucinex, Zyrtec.   DO NOT use any DM medications.     Constipation   This is very common throughout all stages of pregnancy. It can be caused by hormones that relax the muscles in your digestive system. Iron often taken during pregnancy may make this worse. In addition, the growing uterus presses on the lower intestines which may add to the problem. Here are some ways to relieve constipation:   Drink plenty of water (8-10 glasses a day)   Eat foods high in fiber such as raw fruits, vegetables, wholegrain breasts, and cereals.   Eat fruit, especially at night, such as prunes, figs, dates, raisins, peaches, and cherries because of their laxative properties.   Avoid cheese, bananas, and rice as they may slow down your bowel movements.   Use Metamucil or Citrucel as  needed. You can also try Senekot, GoodSense Fiber, Miralax.  Try a stool softener such as Colace.  Minimal use of Milk of Magnesia, Lactulose, or Dulcolax   If you go 5 days without any form of a bowel movement or have significant pain, contact your doctor.     Contractions   Cramping or Lajas Manjarrez contractions are common in the 2nd and 3rd trimesters. Make sure you stay well hydrated. You may also find comfort from a warm bath.   If you experience contractions every 5 minuets apart or closer for 2 hours, come to the hospital for evaluation.      Cough   For relief from cough, you can try regular/plain Robitussin, Chloraseptic spray, or any throat lozenges.     Cramping   Women commonly have abdominal cramping throughout their pregnancies. Cramping associated with pregnancy is often described as feeling similar to menstrual cramps. Early in the pregnancy, mild abdominal cramping is due to the growing uterus. Cramping can also be due to round ligament pain. In the late 2nd trimester and 3rd trimester, you may experience cramping due to Jalen Manjarrez. These are contractions of the uterus but are not associated with labor. They can be worse with activity or when you are dehydrated. Make sure you drink 8-10 glasses of water every day. If the cramping becomes more intense or you experience the cramping every 5 minutes apart or closer for 2 hours, come to the hospital for evaluation.     Cues  Babies don't feed at regular intervals. Instead, babies use cutes to tell you when they are hungry and full. You can tell when your baby is starting to get hungry when you see him or her stretch or begin to wake up. Ten, your baby may begin to bring hands to mouth, smack lips or stick tongue out hopefully you have started to feed by now. If not, baby may start to cry, which makes feeding very difficult. When mothers respond to feeding cues, baby eats more frequently. More frequent feedings increase moms milk supply. More milk  means that baby will be happier and healthier, and mom will be more confident.     Dental Procedures  Bleeding gums are common during pregnancy. However, if you have painful gums or teeth, speak with your dentist.   Most dental procedures can be done safely in pregnancy with a local anesthetic.   Keep your teeth healthy during pregnancy by brushing twice a day, using dental floss, and having regular dental exams and cleanings.     Diarrhea  Your gastrointestinal tract may be more sensitive during pregnancy. That sensitivity can cause diarrhea after you eat certain foods.   Relieve diarrhea with Imodium or Kaopectate. Stay well hydrated. If you have blood or mucus in your stool, call your doctor.     Diet   Maintain a healthy diet throughout pregnancy by eating grains, fruits, vegetables, dairy products, meats, and beans. Avoid fatty greasy, and fried foods. Eat small, frequent meals throughout the day and NEVER skip breakfast. There are some foods you want to avoid during pregnancy:   Cheese such as brie, Gouda, and feta. These soft cheeses are not completely pasteurized and contain bacteria that can be harmful to your baby.   Shark, swordfish, tana mackerel, and tilefish can be high in mercury. Limit canned tuna and salmon to once per week.   Packaged meat such as ham, bologna, and hot dogs. They contain bacteria that can be harmful. Eat them only if they are fully cooked. Raw meat and raw fish.   More information on foods below    Dizziness/Faintness  This is common during pregnancy when standing for long periods of time. You may also experience this when changing positions, such as moving from sitting to standing. This usually occurs in the 2nd trimester. Adams sure you drink plenty of water and avoid standing for longer periods of time. If it does not improve, contact your doctor.     Exclusive Breastfeeding  Mothers milk has everything a baby needs for the first six months of life. It make take time to learn to  breastfeed, but you have the support you need to be successful. All major health organizations recommend excusive breastfeeding for 6 months. This means no water, juice, tea, rice cereal, or solids for baby until after six months.     Exercise and Physical cavity   You can and should exercise during pregnancy but do not start a new rigorous activity. When you are exercising, your heart rate should not exceed 140 eats per minute. Do not exercise for more than 15 minutes in areas that are hot, humid, or not well ventilated. After the 4th month of pregnancy, avoid exercises that require you to lie on your back. Avoid exercises that will cause trauma to your abdomen, such as horseback riding, downhill skiing, wrestling, etc. swimming is permissible, but diving is not. If you experience excessive contractions, bleeding, loss of fluid, or decreased fetal movement, come to the hospital immediately.     Frequent Urination   Hormone changes at the beginning of pregnancy increase the frequency of urination. This is normal. Pressure from the uterus and the baby at the end of pregnancy decreases the capacity of the bladder - also leading to frequent urination. Because of the increased pressure, it's not uncommon to lose urine unexpectedly.     Gas and Bloating  It is common to have gas and bloating during pregnancy. Here are things to do to help:   Recognize the foods that give you gas and avoid eating them  Eat small, frequent meals instead of big, heavy meals   Don't eat fired, fatty, and greasy foods  Avoid constipation     Hair  Often, the hormonal changes during pregnancy cause your hair to break. You may also notice increased shedding during the post-partum period. These changes are normal.   Relaxer, perms, and hair dyes can be applied after the first trimester.     Headaches   There are different causes for headaches during pregnancy:   Tension Headaches: pain usually in the back and sides of the head that becomes worse  with stress. These are best treated by taking regular strength Tylenol, drinking plenty of water, and resting.   Sinus Headaches: Pain under eyes or around the face. Best relieved with regular strength Tylenol, alternating cold and warm compresses, or a humidifier.   Migraine Headaches: Often accompanied with nausea or vomiting. Light and sound make migraine pain worse. Tylenol may help with this pain. If you experience this, consult your doctor.   Tips to help with headaches:   Magnesium oxide 400 mg nightly  Check your Prenatal Vitamin and ensure you it has at least 400 mg of Vitamin B2. If it does not get OTC Vitamin B2 as well.    Riboflavin 400 mg daily  Caffeine (during the day, <200 mg daily)  Benadryl or OTC Melatonin 1-2 mg nightly (before bedtime).   2 extra strength Tylenol or 1-2 tablets (do not exceed 3000 mg tylenol daily)  Gatorade  Migraine frequency and severity should start to level off and improve during the second trimester as estrogen levels normalize.  Please contact me in 1-2 weeks to update me on any changes for better or worse.  Any time you experience a headache associated with blurry vision or a headache that's not relieved with Tylenol, contact your doctor. This may be cause by elevated blood pressure.     Heartburn   Pregnancy hormonal changes slow down your digestive system and the stomach takes longer to empty. This increases the production of gastric juices that can lead to heartburn. Hear are some ways to ease the pain:   Eat small, more frequent meals  Avoid spicy foods  Avoid fried, fatty, spicy food   Avoid irritants such as citrus juices, tomatoes, and sodas  Avoid alcohol, mini, coffee, and strong tea   Remain upright for at least one hour after eating   Medications: Tums, Rolaids, carafate, Mylanta with Simethicone, Gas-X. Pepcid AC, Prilosec OTC 20mg, Prevacid, Protonix.   Take over the counter Pepcid twice a day      Heart Palpitations  During pregnancy, you may feel as  though your heart is racing or skipping a beat. These can be normal but if associated with chest pain, shortness of breath, or fatigue, contact your doctor immediately.     Insomnia  Benadryl 25mg, Tylenol PM, Unisom     Hemorrhoids  As your pregnancy continues and you have more pelvic pressure, you may develop hemorrhoids. These can be painful. Here are tips to help with the pain:   Avoid constipation (see above).   Use hemorrhoid creams such as Annusol or Preparation H.   Use astringents such as Tucks Medicated Pads  If your pain persists or your experience excessive bleeding, contact your doctor.     Latch   When it comes to latching on for breastfeeding, only 2 things matter: comfort and effectiveness. Breastfeeding is part instinct, and part learning. Our staff will help the learning to make sure there is no pain and baby is getting milk     Leg Cramps   Muscle spasms in the calf, especially at night, are common during pregnancy. Try massaging the calves, stretching or applying a warm heating pad. If your leg cramps do not improve or only occur in one leg, go to the hospital.     Miscarriage   Unfortunately, this is the unhappy side of pregnancy and is very common. This is not your fault or your partner's fault. Most of the time, miscarriage is the results of genetic information not coming together in the right way. It will not affect your next pregnancy. However, if you have had 3 or more miscarriages, discuss this with your doctors. Also, you should be aware of the signs of a miscarriage:   Bleeding during pregnancy is a result of increased dilation of blood vessels. However, if you have bleeding that soaks through a sanitary pad, contact your doctor.   Cramping can be a sign of growth. However, if it is associated with bleeding, contact your doctor.   Pregnancy loss is a difficult life event. If you are having difficult coping, speak to your doctor or nurse about support groups or counselors.     Mood  Swings  You may be happy one minute and sad the next. Mood swings are a normal part of pregnancy and caused by hormones. However, if you are extremely sad, cry a lot, cannot sleep, are not eating or feel like hurting yourself or someone else, call your doctor immediately.     Nausea and Vomiting  During the early stages of pregnancy, these symptoms are common. These usually stop in the 2nd trimester. Here are some things you can do to reduce feeling nauseous.   Diet Modifications: Eat before or as soon as you feel hungry to avoid an empty stomach, which can aggravate nausea. Eat crackers or dry toast before sitting up in the morning. A snack before getting out of bed in the morning and snacks during the night may be helpful. Eat meals slowly and in small amounts every 1-2 hours to avoid an overly full stomach. Eliminate coffee and spicy, odorous, high-fat, acidic, or very sweet foods, and instead eat protein-dominant, salty, low-fat, bland, and/or dry meals. These include things like nuts, pretzels, crackers, cereal, toast. Drink fluids 30 minutes before or after food. Try drinking cold, clear, carbonated, or sour fluids in small amounts.    Avoid sudden movements. Get out of bed slowly.   Stay hydrated. If you cannot tolerate water, try Sprite.   Try ginger in any form: ginger ale, ginger snaps, or ginger tablets.   The only category A medication for nausea in pregnancy is doxylamine, but insurance coverage for the brand medication is still poor. It is available over-the-counter in a form of Unisom 25 mg.  The other component of Diclegis is Vitamin B6 (also known as pyridoxine), also available over-the-counter.  You could take 1/2 tablet of the Unisom in the morning and a full tablet in the evening (to minimize sleepiness during the day).  Take 25 mg of the pyridoxine every 6-8 hours. You can also try Emetrol.   If your vomiting persists for more than 24 hours, check with your doctor for further advice.      Nosebleeds  Because of increased dilation of the blood vessels during pregnancy, nosebleeds can occur. This condition does not usually require medical treatment. However, if the bleeding persists, contact your doctor.     Pain  Labor is hard work. When you're making decisions about how to hand birth pain, talk with your nurses and doctors about techniques that will not interfere with breastfeeding. For example, select a support person to be with you during labor, and practice ways of moving and massaging that help you feel comfortable.     Preeclampsia   This is a disorder during pregnancy in which your blood pressure goes up above limits that are normal for you. This can be dangerous if not monitored. Possible complications are seizure, stroke, placental abruption, pulmonary edema, kidney failure, and baby's death. Notify your doctor if you have:   Sudden weight gain of 5 or more pounds in one week   Generalized swelling that appears quickly   Decreased urine   Headaches that don't go away with Tylenol  Chest pain  Shortness of breath   Vision changes   If you have these symptoms with a blood pressure of >140/90 or you do not have these symptoms, but your blood pressure is >160/100 go to labor and delivery if you are >20 weeks.     Prenatal Visits  Prenatal care is essential to having a happy, healthy pregnancy. During your visits, your provider will listen to the baby's heart (after 12 weeks gestation) and make sure your baby is growing appropriately. You will have different laboratory tests performed, including your blood type, checking for anemia, and testing for infections. You will need to see a provider every 4 weeks until you are 28 weeks pregnant. Then you will see your provider every 2 weeks until you are 35 weeks pregnant. After 35 weeks, you will be seen every week until you deliver.      Labor   labor occurs when regular contractions begin to open our cervix before 37 weeks of  pregnancy. A full-term pregnancy should last about 40 weeks. If  labor can't be stopped, your baby will be born early.  The good news is that doctors can do a lot to delay an early delivery. The longer your baby gets to grow inside you, the less likely he or she is to have problems after birth. Here are some risks for  birth:   Pervious  labor and delivery  Abnormally shaped uterus or uterine surgery   Two or more second trimester miscarriages or abortions   Incompetent cervix, cone biopsy, large fibroid   Current pregnancy with twins, triplets, etc.   Severe urinary tract infection or kidney infection   Vaginal bleeding, placenta previa   Too much or too little amniotic fluid     Rooming In   This is when mom and baby are together in the same room throughout their whole stay. The doctors and nurses provide all clinical care in the room, except for some procedures that need to e done in another unit. Babies feel safe and secure when they are near the people who love them. Mother and babies sleep better quality when rooming in. nurses are more available to be with you and your baby in your room because they are not busy taking care of a nursery full of babies. They will help you with feeding, diapering, bathing, etc. This way, when you go home, you will feel confident.     Round Ligament Pain   There are 2 ligaments (a band of tough, flexible, fibrous connective tissue) from the front of the uterus an end in the vagina. These are the round ligaments.as the uterus grows and stretches, these ligaments stretch. Pain is often associated with this stretching. It can be sharp, stabbing pain usually in the lower pelvis or vagina. This pain is worse when you move from sitting to standing or walk for long periods. You can help this pain with the following:   Using a warm (not hot) heating pad  Warm bath   Regular strength Tylenol   Maternity belt   Staying well hydrated.     Salivation and spitting   Some  women experience increased salivation during pregnancy. This is known as ptyalism. Decrease your saliva by using non-mediated throat lozenges, sucking candy such as peppermint or eating crackers. This may stop in 2nd trimester but may continue throughout pregnancy.     Sexual intercourse  In most cases, it is safe to continue sexual intercourse throughout pregnancy. You may find a decreased or increase desire during pregnancy, and this is normal. Avoid sex if you are having bleeding, your water bag is ruptured, or you have been diagnosed with placenta previa or an incompetent cervix.     Shortness of Breath  Toward the end of pregnancy, many women experience shortness of breath. The uterus is getting bigger, and the diaphragm is unable to lower, making it feel like you are unable to catch your breath. However, if you experience wheezing, inability to catch your breath, dizziness or your blood pressure is elevated, call your doctor.     Skin Changes  Hormone changes in pregnancy affect the melanocytes and cause darkening of several areas of the body. Some women's faces darken causing a pregnancy mask. Some have darkening around the areola around the nipple. Often a dark line appears on the abdomen from the belly button to the pubic area. Stretch marks may also form. Most of the skin changes will fade during pregnancy. You may minimize the appearance of stretch marks by using cocoa butter lotion, vitamin E oil and other over the counter products.     Skin to Skin   The first few hours after birth are sacred. As soon as your baby is born, she or he will be dried ad placed on you for the first hug! As long as everyone is healthy, you will get to be skin to skin through your first feeding and for at least an hour. Baby has been growing inside of you and really needs to stay close after birth in order to adjust to life in the outside world. Feeling your warmth, hearing your heartbeat and voice, and receiving your milk  will all ensure a smooth transition. Other loved ones will enjoy holding the baby after this important adjustment period.     Support  We are here for you. With the right support, al mother can breastfeed. Our team of doctors, nurses, and lactation consultants can help you met your goals. Our job is to help you make informed decisions and to make sure you have the support you need to meet your goals.     Swelling  As the uterus gets larger, it lies on the inferior vena cava, diminishing the return of blood flow. This often leads to swelling in your ankles and feet, and sometimes in your hands. Swelling in your hands may lead to a condition known as carpal tunnel syndrome, resulting in pain in your wrists. You can improve swelling with these techniques:   Avoid salty foods  Elevate your feet high than the level of your heart  Avoid standing for long periods  Wear loose clothes  Wear wrist braces, especially at night, for carpal tunnel  If you experience swelling with blood pressure >140/90 or with other signs of preeclampsia, go to the hospital     Tiredness  Fatigue during pregnancy can be normal. It's most pronounced at the start and end of pregnancy. Make sure to get adequate sleep and rest. While most of the time this is normal, it can be a sign of anemia. You are screened for this routinely during your pregnancy.     Travel  Travel during pregnancy is safe. However, you should always check with your doctor first before traveling. During pregnancy, you are at increased risk for blood clots, so you should walk around every 1-2 hours during travel. Always wear a seatbelt when riding in the car. Place the shoulder strap across your chest and place the waist belt underneath your belly. You should avoid flying after 35 weeks.     True Labor vs False Labor  It's important to e able to tell the difference between true and false labor. When labor begins, you have regular contractions every 5 minutes for 2 hours. The  contractions get stronger (average is about 1 minute) in intensity and last longer. The cervix starts to dilate or open. A bloody show or pink discharge may occur. A sudden gush or leaking of watery fluid from the vagina may be because your water bag has broken.   When you are having false labor, the cervix does not dilate. Contractions are not in a regular pattern. They do not get strong in intensity and changing your activity, such as walking or lying down, may make the pain of contractions lessen. Staying hydrated with plenty of water and or taking a dose of Tylenol makes contractions decrease.     Urinary Tract Infection   Urinary tract infections can cause  labor. Here are some methods to help prevent getting a urinary tract infection:   Drink 8-10 glasses of water a day   Drink cranberry juice every day as it lowers the pH or the urinary tract and discourages bacterial growth   Empty your bladder immediately before and after sexual intercourse  Wipe from front to back after using the toilet  Avoid irritating bubble baths and soaps.   Wear cotton underwear.   If you experience burning when you urinate, blood in your urine, fever, chills, or pain associated with urination, tell your doctor.     Vaginal Bleeding and Discharge  Hormonal changes during pregnancy can cause vaginal discharge or varying consistencies. You may have a thick white discharge from your vagina that helps prepare your body for birth. You may have vaginal bleeding for different reasons such as broken blood vessels in your cervix after a vaginal exam. A vaginal infection may cause bleeding. Also, having sex may cause some of the blood vessels to break and result in some spotting. If you have bleeding like a menstrual period, itching, irritation, or foul odor, call your doctor.     Varicose Veins  Varicose veins occur because of dilation of the blood vessels during pregnancy. Varicose veins may occur on the legs or even the vulva. Avoid  standing for long periods of time, elevate your feet at night, and wear support hose during the day.     Weight Gain  While you should not diet during your pregnancy, there is an expected amount of weight you should gain during your pregnancy based on your BMI:   BMI  < 18.5 -  28-40 lbs.  18.5-24.9 -  25-35 lbs.  25-29.9 -  15-25 lbs.  >30 -   11-20 lbs.    Yeast Infections   In pregnancy, we would recommend any of the following (all are over the counter) - so you will not need a prescription for these medications. These products can be used at any point during pregnancy and don't pose a risk of birth defects or other pregnancy complications. For best results, choose a seven-day formula.   Clotrimazole (Mycelex, Lotrimin AF)  Miconazole (Monistat)  Terconazole    WHAT TO EXPECT AT CERTAINS WEEKS FOR VISITS/ULTRASOUNDS:     6-10 WEEKS  Schedule a transvaginal ultrasound to be done at a later date   This will be scheduled as a separate visit by our M department. This is usually scheduled between 8-12 weeks to confirm your dating.   Discuss prenatal vitamins  Order labs such as CBC, Rh type, Syphilis, Hepatitis, HIV, Rubella titers   Gonorrhea and Chlamydia test   Pap smear if due   Get medical history and previous OB history   Monthly visits until 28 week visit     12 weeks   Discuss prenatal lab testing  Check weight, blood pressure, and urine  Listen for fetal heart tones  Sign up for patient portal and connected mom   MaternTI 21 (NIPT) testing - optional     15-18 weeks  Listen for fetal heart tones and check uterine size  Order quad screen/maternal integrated screening if wanted   Check weight, blood pressure, urine  Maternal Quad Screen for down syndrome screen   Anatomy scan at 18-20 weeks     22-24 weeks  Glucose testing     28-30 weeks    Check weight, blood pressure, and urine  Listen for fetal heart tones  Tdap vaccine   Rhogam vaccine if - Rh.   Prenatal visits every 2 weeks until 36 weeks   Nonstress  tests if necessary     32-36 weeks   Check weight, blood pressure, and urine  Listen for fetal heart tones  Group B strep culture of vagina     37-40 weeks   Check weight, blood pressure, and urine  Listen for fetal heart tones  Discuss labor and delivery   Cervical examination     NUTRITION IN PREGNANCY   What is folic acid and how much do I need daily?   Folic acid, also known as folate, is a B vitamin that is important for pregnant women. Before pregnancy and during pregnancy, you need 400 micrograms of folic acid daily to help prevent major birth defects of the fetal brain and spine called neural tube defects. Current dietary guidelines recommend that pregnant women get at least 600 micrograms of folic acid daily from all sources. It may be hard to get the recommended amount of folic acid from food alone. For this reason, all pregnant women and all women who may become pregnant should take a daily vitamin supplement that contains folic acid.    Why is iron important during pregnancy and how much do I need daily?  Iron is used by your body to make a substance in red blood cells that carries oxygen to your organs and tissues. During pregnancy, you need extra iron--about double the amount that a nonpregnant woman needs. This extra iron helps your body make more blood to supply oxygen to your fetus. The daily recommended dose of iron during pregnancy is 27 mg, which is found in most prenatal vitamin supplements. You also can eat iron-rich foods, including lean red meat, poultry, fish, dried beans and peas, iron-fortified cereals, and prune juice. Iron also can be absorbed more easily if iron-rich foods are eaten with vitamin C-rich foods, such as citrus fruits and tomatoes.    Why is calcium important during pregnancy and how much do I need daily?  Calcium is used to build your fetus's bones and teeth. All women, including pregnant women, aged 19 years and older should get 1,000 mg of calcium daily; those aged 14-18  years should get 1,300 mg daily. Milk and other dairy products, such as cheese and yogurt, are the best sources of calcium. If you have trouble digesting milk products, you can get calcium from other sources, such as broccoli; dark, leafy greens; sardines; or a calcium supplement.    Why is vitamin D important during pregnancy and how much do I need daily?  Vitamin D works with calcium to help the fetus's bones and teeth develop. It also is essential for healthy skin and eyesight. All women, including those who are pregnant, need 600 international units of vitamin D a day. Good sources are milk fortified with vitamin D and fatty fish such as salmon. Exposure to sunlight also converts a chemical in the skin to vitamin D.    Can being overweight or obese affect my pregnancy?  Overweight and obese women are at an increased risk of several pregnancy problems. These problems include gestational diabetes, high blood pressure, preeclampsia,  birth, and  delivery. Babies of overweight and obese women also are at greater risk of certain problems, such as birth defects, macrosomia with possible birth injury, and childhood obesity.    Can caffeine in my diet affect my pregnancy?  Although there have been many studies on whether caffeine increases the risk of miscarriage, the results are unclear. Most experts state that consuming fewer than 200 mg of caffeine (one 12-ounce cup of coffee) a day during pregnancy is safe.  Food and Beverages Milligrams of Caffeine (Average)   Coffee (8 oz)    Brewed, drip  137   Instant 76   Tea (8oz)     Brewed 48   Instant 26-36   Caffeinated soft drinks (12 oz)  37   Hot cocoa (12 oz) 8-12   Chocolate milk (8 oz)  5-8   Candy    Dark chocolate (1.45 oz) 30   Milk chocolate (1.55 oz)  11   Semi-sweet chocolate (1/4 cup)  26-28   Chocolate syrup (1 tbsp.) 3   Coffee ice cream or frozen yogurt (1/2 cup) 2     How much water should I drink during pregnancy?   During pregnancy,  adequate fluid intake from consumption of beverages (water and other liquids) is estimated to be approximately 2.3 L/day (76 fluid ounces or approximately 10 cups). Additional water is consumed in foods other than beverages to meet the total adequate intake of 3 L/day.    What are the benefits of including fish and shellfish in my diet during pregnancy?  Omega-3 fatty acids are a type of fat found naturally in many kinds of fish. They may be important factors in your fetus's brain development both before and after birth. To get the most benefits from omega-3 fatty acids, women should eat at least two servings of fish or shellfish (about 8-12 ounces) per week before getting pregnant, while pregnant, and while breastfeeding.    What should I know about eating fish during pregnancy?  Some types of fish have higher levels of a metal called mercury than others. Mercury has been linked to birth defects. To limit your exposure to mercury, follow a few simple guidelines. Choose fish and shellfish such as shrimp, salmon, catfish, and pollock. Do not eat shark, swordfish, tana mackerel, ruiz, orange roughy, or tilefish. Limit white (albacore) tuna to 6 ounces a week. You also should check advisories about fish caught in local [place].    How can food poisoning affect my pregnancy?  Food poisoning in a pregnant woman can cause serious problems for both her and her fetus. Vomiting and diarrhea can cause your body to lose too much water and can disrupt your body's chemical balance. To prevent food poisoning, follow these general guidelines:    Wash food. Rinse all raw produce thoroughly under running tap water before eating, cutting, or cooking.  Keep your kitchen clean. Wash your hands, knives, countertops, and cutting boards after handling and preparing uncooked foods.  Avoid all raw and undercooked seafood, eggs, and meat. Do not eat sushi made with raw fish (cooked sushi is safe). Food such as beef, pork, or poultry should  be cooked to a safe internal temperature.    What is listeriosis and how can it affect my pregnancy?  Listeriosis is a type of food-borne illness caused by bacteria. Pregnant women are 13 times more likely to get listeriosis than the general population. Listeriosis can cause mild, flu-like symptoms such as fever, muscle aches, and diarrhea, but it also may not cause any symptoms. Listeriosis can lead to miscarriage, stillbirth, and premature delivery. Antibiotics can be given to treat the infection and to protect your fetus. To help prevent listeriosis, avoid eating the following foods during pregnancy:  Unpasteurized milk and foods made with unpasteurized milk  Hot dogs, luncheon meats, and cold cuts unless they are heated until steaming hot just before serving  Refrigerated muse and meat spreads  Refrigerated smoked seafood  Raw and undercooked seafood, eggs, and meat

## 2023-06-12 NOTE — TELEPHONE ENCOUNTER
----- Message from Christopher Shahzad sent at 6/12/2023  3:18 PM CDT -----  Regarding: Self [phone]  Type: Patient Call Back    Who called: Self    What is the request in detail: Pt stated she received a message stating she needed to follow up with her OBGYN within three days. Would like a call back in regards to this.     Can the clinic reply by [facility]? No     Would the patient rather a call back or a response via [facility]? Call back     Best call back number: .[phone]     Additional Information:    Thank you.

## 2023-06-13 LAB
C TRACH DNA SPEC QL NAA+PROBE: NOT DETECTED
N GONORRHOEA DNA SPEC QL NAA+PROBE: NOT DETECTED

## 2023-06-14 DIAGNOSIS — B37.31 CANDIDIASIS OF VAGINA DURING PREGNANCY: Primary | ICD-10-CM

## 2023-06-14 DIAGNOSIS — O98.819 CANDIDIASIS OF VAGINA DURING PREGNANCY: Primary | ICD-10-CM

## 2023-06-14 RX ORDER — TERCONAZOLE 4 MG/G
1 CREAM VAGINAL DAILY
Qty: 45 G | Refills: 0 | Status: SHIPPED | OUTPATIENT
Start: 2023-06-14 | End: 2023-06-21

## 2023-06-15 ENCOUNTER — HOSPITAL ENCOUNTER (OUTPATIENT)
Dept: RADIOLOGY | Facility: HOSPITAL | Age: 33
Discharge: HOME | End: 2023-06-15
Attending: PHYSICIAN ASSISTANT
Payer: COMMERCIAL

## 2023-06-15 DIAGNOSIS — O36.80X0 PREGNANCY OF UNKNOWN ANATOMIC LOCATION: ICD-10-CM

## 2023-06-15 PROCEDURE — 76817 TRANSVAGINAL US OBSTETRIC: CPT | Mod: TC

## 2023-06-15 PROCEDURE — 76801 OB US < 14 WKS SINGLE FETUS: CPT | Mod: 26,,, | Performed by: RADIOLOGY

## 2023-06-15 PROCEDURE — 76817 US OB <14 WEEKS, TRANSABDOM & TRANSVAG, SINGLE GESTATION (XPD): ICD-10-PCS | Mod: 26,,, | Performed by: RADIOLOGY

## 2023-06-15 PROCEDURE — 76817 TRANSVAGINAL US OBSTETRIC: CPT | Mod: 26,,, | Performed by: RADIOLOGY

## 2023-06-15 PROCEDURE — 76801 US OB <14 WEEKS, TRANSABDOM & TRANSVAG, SINGLE GESTATION (XPD): ICD-10-PCS | Mod: 26,,, | Performed by: RADIOLOGY

## 2023-06-16 ENCOUNTER — PATIENT MESSAGE (OUTPATIENT)
Dept: OBSTETRICS AND GYNECOLOGY | Facility: CLINIC | Age: 33
End: 2023-06-16
Payer: COMMERCIAL

## 2023-06-19 ENCOUNTER — LAB VISIT (OUTPATIENT)
Dept: LAB | Facility: HOSPITAL | Age: 33
End: 2023-06-19
Attending: PHYSICIAN ASSISTANT
Payer: COMMERCIAL

## 2023-06-19 ENCOUNTER — CLINICAL SUPPORT (OUTPATIENT)
Dept: OBSTETRICS AND GYNECOLOGY | Facility: CLINIC | Age: 33
End: 2023-06-19
Payer: COMMERCIAL

## 2023-06-19 ENCOUNTER — TELEPHONE (OUTPATIENT)
Dept: UROLOGY | Facility: CLINIC | Age: 33
End: 2023-06-19
Payer: COMMERCIAL

## 2023-06-19 ENCOUNTER — TELEPHONE (OUTPATIENT)
Dept: OBSTETRICS AND GYNECOLOGY | Facility: CLINIC | Age: 33
End: 2023-06-19
Payer: COMMERCIAL

## 2023-06-19 DIAGNOSIS — O36.80X0 PREGNANCY OF UNKNOWN ANATOMIC LOCATION: ICD-10-CM

## 2023-06-19 DIAGNOSIS — N91.2 AMENORRHEA: Primary | ICD-10-CM

## 2023-06-19 LAB — HCG INTACT+B SERPL-ACNC: NORMAL MIU/ML

## 2023-06-19 PROCEDURE — 36415 COLL VENOUS BLD VENIPUNCTURE: CPT | Performed by: PHYSICIAN ASSISTANT

## 2023-06-19 PROCEDURE — 99999 PR PBB SHADOW E&M-EST. PATIENT-LVL I: CPT | Mod: PBBFAC,,,

## 2023-06-19 PROCEDURE — 99999 PR PBB SHADOW E&M-EST. PATIENT-LVL I: ICD-10-PCS | Mod: PBBFAC,,,

## 2023-06-19 PROCEDURE — 84702 CHORIONIC GONADOTROPIN TEST: CPT | Performed by: PHYSICIAN ASSISTANT

## 2023-06-19 NOTE — TELEPHONE ENCOUNTER
On call back, pt advised she spoke with someone already. No further assistance needed.     ----- Message from Pasquale Cm sent at 6/19/2023  1:35 PM CDT -----  Regarding: Self [phone]  Type: Patient Call Back     What is the request in detail: Pt is requesting a call back and wanted to know why she has to take ACG test every 48 hours.     Can the clinic reply by [facility]? No     Would the patient rather a call back or a response via [facility]? Call back    Best call back number: .[phone]      Additional Information:    Thank you.

## 2023-06-19 NOTE — TELEPHONE ENCOUNTER
Pt call was returned. Pt will see provider on July 12,2023. That is the earliest the  provider has open.          ----- Message from Bri Tate sent at 6/19/2023  1:44 PM CDT -----  Regarding: pt called  Type: Patient Call Back         Who called: LAVELLE CALLEJAS [[id number]]         What is the request in detail: Pt wants to see Dr sooner involving unplanned pregnancy options and concerns. Please Advise          Can the clinic reply by [facility]? No         Would the patient rather a call back or a response via [facility]? Call back         Best call back number:      [phone]                  Thank you.

## 2023-06-25 ENCOUNTER — NURSE TRIAGE (OUTPATIENT)
Dept: ADMINISTRATIVE | Facility: CLINIC | Age: 33
End: 2023-06-25
Payer: COMMERCIAL

## 2023-06-26 ENCOUNTER — PATIENT MESSAGE (OUTPATIENT)
Dept: FAMILY MEDICINE | Facility: CLINIC | Age: 33
End: 2023-06-26
Payer: COMMERCIAL

## 2023-06-26 NOTE — TELEPHONE ENCOUNTER
"Patient states she is approximately 6 weeks  pregnant with her first child. Patient states c/o severe headaches and dizziness x 2 weeks. Patient rates headache pain 9/10 at this time.     Care Advice given per Pregnancy - Headache (Adult) Guideline. Patient advised to Go to ED Now and to have another adult drive her to ED. Patient states understanding of care advice and cites no additional concerns at this time.     Reason for Disposition   [1] SEVERE headache (e.g., excruciating) AND [2] "worst headache" of life    Additional Information   Negative: Difficult to awaken or acting confused (e.g., disoriented, slurred speech)   Negative: [1] Weakness of the face, arm or leg on one side of the body AND [2] new-onset   Negative: [1] Numbness of the face, arm or leg on one side of the body AND [2] new-onset   Negative: [1] Loss of speech or garbled speech AND [2] new-onset   Negative: Passed out (i.e., lost consciousness, collapsed and was not responding)   Negative: Sounds like a life-threatening emergency to the triager   Negative: Followed a head injury   Pregnant   Negative: Difficult to awaken or acting confused (e.g., disoriented, slurred speech)   Negative: [1] Weakness of the face, arm or leg on one side of the body AND [2] new-onset   Negative: [1] Numbness of the face, arm or leg on one side of the body AND [2] new-onset   Negative: [1] Loss of speech or garbled speech AND [2] new-onset   Negative: Sounds like a life-threatening emergency to the triager   Negative: Followed a head injury within last 3 days   Negative: Traumatic Brain Injury (TBI) is suspected   Negative: Sinus pain of forehead and yellow or green nasal discharge   Negative: Severe pain in one eye   Negative: Unable to walk, or can only walk with assistance (e.g., requires support)   Negative: Stiff neck (can't touch chin to chest)   Negative: [1] Other family members (or people in same household) with headaches AND [2] possibility of carbon " monoxide exposure   Negative: [1] SEVERE headache (e.g., excruciating) AND [2] having contractions or other symptoms of labor   Negative: [1] Pregnant 20 or more weeks AND [2] Systolic BP >= 140 OR Diastolic BP >= 90    Protocols used: Headache-A-AH, Pregnancy - Headache-A-AH

## 2023-06-28 ENCOUNTER — PATIENT MESSAGE (OUTPATIENT)
Dept: OBSTETRICS AND GYNECOLOGY | Facility: CLINIC | Age: 33
End: 2023-06-28
Payer: COMMERCIAL

## 2023-06-28 ENCOUNTER — CLINICAL SUPPORT (OUTPATIENT)
Dept: OBSTETRICS AND GYNECOLOGY | Facility: CLINIC | Age: 33
End: 2023-06-28
Payer: MEDICAID

## 2023-07-05 ENCOUNTER — PATIENT MESSAGE (OUTPATIENT)
Dept: OBSTETRICS AND GYNECOLOGY | Facility: CLINIC | Age: 33
End: 2023-07-05

## 2023-07-05 ENCOUNTER — OFFICE VISIT (OUTPATIENT)
Dept: OBSTETRICS AND GYNECOLOGY | Facility: CLINIC | Age: 33
End: 2023-07-05
Payer: COMMERCIAL

## 2023-07-05 ENCOUNTER — HOSPITAL ENCOUNTER (OUTPATIENT)
Dept: RADIOLOGY | Facility: OTHER | Age: 33
Discharge: HOME | End: 2023-07-05
Payer: COMMERCIAL

## 2023-07-05 VITALS
SYSTOLIC BLOOD PRESSURE: 118 MMHG | BODY MASS INDEX: 26.46 KG/M2 | WEIGHT: 155 LBS | DIASTOLIC BLOOD PRESSURE: 72 MMHG | HEIGHT: 64 IN

## 2023-07-05 DIAGNOSIS — O36.80X0 PREGNANCY WITH UNCERTAIN FETAL VIABILITY, SINGLE OR UNSPECIFIED FETUS: ICD-10-CM

## 2023-07-05 DIAGNOSIS — O36.80X0 PREGNANCY WITH UNCERTAIN FETAL VIABILITY, SINGLE OR UNSPECIFIED FETUS: Primary | ICD-10-CM

## 2023-07-05 PROBLEM — O99.019 ANEMIA AFFECTING PREGNANCY: Status: ACTIVE | Noted: 2023-07-05

## 2023-07-05 PROBLEM — R15.1 FECAL SMEARING: Status: RESOLVED | Noted: 2020-07-10 | Resolved: 2023-07-05

## 2023-07-05 PROBLEM — M79.645 CHRONIC THUMB PAIN, BILATERAL: Status: RESOLVED | Noted: 2019-05-14 | Resolved: 2023-07-05

## 2023-07-05 PROBLEM — R15.0 INCOMPLETE DEFECATION: Status: RESOLVED | Noted: 2020-07-10 | Resolved: 2023-07-05

## 2023-07-05 PROBLEM — M79.644 CHRONIC THUMB PAIN, BILATERAL: Status: RESOLVED | Noted: 2019-05-14 | Resolved: 2023-07-05

## 2023-07-05 PROBLEM — G89.29 CHRONIC THUMB PAIN, BILATERAL: Status: RESOLVED | Noted: 2019-05-14 | Resolved: 2023-07-05

## 2023-07-05 PROCEDURE — 87086 URINE CULTURE/COLONY COUNT: CPT

## 2023-07-05 PROCEDURE — 3078F DIAST BP <80 MM HG: CPT | Mod: CPTII,S$GLB,,

## 2023-07-05 PROCEDURE — 3074F SYST BP LT 130 MM HG: CPT | Mod: CPTII,S$GLB,,

## 2023-07-05 PROCEDURE — 76801 OB US < 14 WKS SINGLE FETUS: CPT | Mod: 26,,, | Performed by: RADIOLOGY

## 2023-07-05 PROCEDURE — 99203 OFFICE O/P NEW LOW 30 MIN: CPT | Mod: S$GLB,,,

## 2023-07-05 PROCEDURE — 1159F PR MEDICATION LIST DOCUMENTED IN MEDICAL RECORD: ICD-10-PCS | Mod: CPTII,S$GLB,,

## 2023-07-05 PROCEDURE — 99999 PR PBB SHADOW E&M-EST. PATIENT-LVL III: ICD-10-PCS | Mod: PBBFAC,,,

## 2023-07-05 PROCEDURE — 76801 US OB <14 WEEKS TRANSABDOM, SINGLE GESTATION: ICD-10-PCS | Mod: 26,,, | Performed by: RADIOLOGY

## 2023-07-05 PROCEDURE — 3074F PR MOST RECENT SYSTOLIC BLOOD PRESSURE < 130 MM HG: ICD-10-PCS | Mod: CPTII,S$GLB,,

## 2023-07-05 PROCEDURE — 3008F BODY MASS INDEX DOCD: CPT | Mod: CPTII,S$GLB,,

## 2023-07-05 PROCEDURE — 99999 PR PBB SHADOW E&M-EST. PATIENT-LVL III: CPT | Mod: PBBFAC,,,

## 2023-07-05 PROCEDURE — 99213 OFFICE O/P EST LOW 20 MIN: CPT | Mod: PBBFAC,TH,25

## 2023-07-05 PROCEDURE — 3008F PR BODY MASS INDEX (BMI) DOCUMENTED: ICD-10-PCS | Mod: CPTII,S$GLB,,

## 2023-07-05 PROCEDURE — 99203 PR OFFICE/OUTPT VISIT, NEW, LEVL III, 30-44 MIN: ICD-10-PCS | Mod: S$GLB,,,

## 2023-07-05 PROCEDURE — 3078F PR MOST RECENT DIASTOLIC BLOOD PRESSURE < 80 MM HG: ICD-10-PCS | Mod: CPTII,S$GLB,,

## 2023-07-05 PROCEDURE — 76801 OB US < 14 WKS SINGLE FETUS: CPT | Mod: TC

## 2023-07-05 PROCEDURE — 1159F MED LIST DOCD IN RCRD: CPT | Mod: CPTII,S$GLB,,

## 2023-07-05 RX ORDER — FERROUS SULFATE 325(65) MG
325 TABLET, DELAYED RELEASE (ENTERIC COATED) ORAL DAILY
Qty: 90 TABLET | Refills: 2 | Status: ON HOLD | OUTPATIENT
Start: 2023-07-05 | End: 2024-02-03 | Stop reason: HOSPADM

## 2023-07-05 NOTE — PROGRESS NOTES
Mone Chandler is a 32 y.o. , presents today for amenorrhea.      C/C: amenorrhea  She has seen another provider for this pregnancy. Labs not done yet.    HPI: Reports amenorrhea since Patient's last menstrual period was 05/15/2023.. Prior to LMP, menses were irregular occuring every 30-90 days prior to this.  She is not currently on any contraception. + UPT on . Also reports neither nausea nor vomiting. Has noticed breast tenderness. Denies vaginal bleeding since LMP.    SOCIAL HISTORY: Denies emotional/mental/physical/sexual violence or abuse. Feels safe at home. Accompanied today by Catrachito. Involved with father of baby, Catrachito. First pregnancy. First  baby for both.     PAP HISTORY: last pap 21, result NILM, denies any history of abnormal pap smear or STDs.     Reports no long-term chronic medical conditions     Review of patient's allergies indicates:  No Known Allergies  Past Medical History:   Diagnosis Date    Allergic rhinitis, cause unspecified 2015    ADRIANO (generalized anxiety disorder) 2015    ADRIANO-7 score of 17     PCOS (polycystic ovarian syndrome)      Past Surgical History:   Procedure Laterality Date    NASAL ENDOSCOPY W/ BALLON SINUPLASTY Bilateral 2017    thumb Bilateral     TRIGGER FINGER RELEASE Bilateral     thumbs     Past Surgical History:   Procedure Laterality Date    NASAL ENDOSCOPY W/ BALLON SINUPLASTY Bilateral 2017    thumb Bilateral     TRIGGER FINGER RELEASE Bilateral     thumbs     OB History    Para Term  AB Living   1             SAB IAB Ectopic Multiple Live Births                  # Outcome Date GA Lbr Arya/2nd Weight Sex Delivery Anes PTL Lv   1 Current              OB History          1    Para        Term                AB        Living             SAB        IAB        Ectopic        Multiple        Live Births                   Social History     Socioeconomic History    Marital status: Single   Tobacco Use    Smoking  status: Never    Smokeless tobacco: Never   Substance and Sexual Activity    Alcohol use: No    Drug use: No     Family History   Problem Relation Age of Onset    Hypertension Mother     Cancer Mother     Diverticulitis Father      Social History     Substance and Sexual Activity   Sexual Activity Not on file       GENETIC SCREENING   Patient's age 35 years or older as of estimated date of delivery? no  Neural tube defect (meningomyelocele, spina bifida, or anencephaly)? no  Down syndrome? no  Sammy-Sachs (Ashkenazi Mandaeism, Cajun, Korean [place])? no  Canavan disease (Ashkenazi Mandaeism)? no  Familial dysautonomia (Ashkenazi Mandaeism)? no  Sickle cell disease or trait ()? no  Hemophilia or other blood disorders? no  Cystic fibrosis? no  Muscular dystrophy? no  Anson's chorea? no  Thalassemia (Italian, Greek, Mediterranean, or  background) MCV less than 80? no  Congenital heart defect? no  Mental retardation/autism? Yes, autism on her side   If Yes, was person tested for Fragile X? no  Other inherited genetic or chromosomal disorder? no  Maternal metabolic disorder (e.g. type 1 diabetes, PKU)? no  Patient or baby's father had a child with birth defects not listed above? no  Recurrent pregnancy loss or a stillbirth: no  Medications (including supplements, vitamins, herbs or OTC drugs)/illicit/recreational drugs/alcohol since last menstrual period? no   If yes, agent(s) and strength/dose:   List any other genetic risks: no  Comments/counseling: no    INFECTION HISTORY  Live with someone with TB or exposed to TB: no  Patient or partner has history of genital herpes: no  Rash or viral illness since last menstrual period: no  Patient or partner has hepatitis B or C: no  History of STD, gonorrhea, chlamydia, HPV, HIV, syphilis (list all that apply): no  List other infections:   Additional comments:     ROS:    Constitutional/Gen: Denies fevers, chills, malaise, or weight loss. Endorses fatigue   Psych: Denies  depression, anxiety  Eyes: Denies changes in vision or scotomata  Ears, nose, mouth, throat: Denies sinus tenderness, swelling, or dentition problems  CV/vasc: Denies heart palpitations or edema  Resp: Denies SOB or dyspnea  Breasts: Denies mass, nipple discharge, or trauma. Endorses slight breast tenderness.  GI: Denies constipation, diarrhea, or vomiting. Slight nausea.  : Denies vaginal discharge, dysuria or pelvic pain. Slight urinary frequency  MS: Denies weakness, soreness, or changes in ROM    OBJECTIVE:  LMP 05/15/2023   Constitutional/Gen: NAD, appears stated age, well groomed  Neck: supple, no masses or enlargement  Head: normocephalic  Skin: warm and dry w/o rash  Mouth: Denies caries  Back: negative CVAT  Breasts: bilaterally--no masses, tenderness, skin changes, or nipple discharge noted  Abdomen: soft, nontender, no masses, and bowel sounds normal, no enlargement  Extremities: FROM, with no edema or tenderness.  Neurologic: A&O x 4, non-focal, cranial nerves 2-12 grossly intact  Psych: affect appropriate and without signs of mood, thought or memory difficulty appreciated    UPT positive in office    ASSESSMENT:  32 y.o. female  with amenorrhea  Likely at 7w2d via LMP  There is no height or weight on file to calculate BMI.  Patient Active Problem List   Diagnosis    ADRIANO (generalized anxiety disorder)    Allergic rhinitis, cause unspecified    PCOS (polycystic ovarian syndrome)    Slow transit constipation    Overweight (BMI 25.0-29.9)    Voice complaint    Skin sensitivity    Post-nasal drip    Migraine without aura and without status migrainosus, not intractable    Pelvic floor dysfunction    Candidiasis of vagina during pregnancy       PLAN:  1. Amenorrhea  -- + UPT in office, Patient's last menstrual period was 05/15/2023. --> Estimated Date of Delivery: None noted.  -- Dating US today, yet to be read.  -- Routine serum and urine prenatal labs today    2. Physical exam today. Discussed ASCCP  guidelines. Last pap 6/22/2021. Pap not indicated today/ next pap due not later than 6/22/2024.     3. BMI 25.9  -- Discussed IOM recommended weight gain of:   Weight category Pre pre BMI  Recommended TWG   Underweight Less than 18.5 28-40    Normal Weight 18.5-24.9  25-35    Overweight 25-29.9  15-25    Obese   30 and greater  11-20   -- Discussed criteria for delivery at [facility] r/t excessive pre-preg weight or excessive weight gain:   Pre-pregnancy BMI over 40 or excess pregnancy weight gain defined as:   Pre-preg BMI < 18.5; Excess weight gain = > 60 pound   Pre-preg BMI 18.5-24.9;  Excess weight gain = > 53 pounds   Pre-preg BMI 25-29.9;  Excess weight gain = > 38 pounds   Pre-preg BMI > 30;  Excess weight gain = > 30 pounds    4. Discussed nausea and vomiting in pregnancy  -- Education regarding lifestyle and dietary modifications  -- Reviewed use of B6/Unisom prn. Pt will notify us if no relief/worsening symptoms, will consider alternative therapies prn    5. Pregnancy education and couseling; handouts and booklet provided  -- She has not already attended [facility] and has not toured facility  -- Oriented to practice and anticipated prenatal course of care and how to contact us  -- Reviewed [facility] guidelines and admission, exclusion, and transfer criteria  -- Precautions/warning signs reviewed  -- Common complaints of pregnancy  -- Routine prenatal labs including HIV  -- Ultrasounds  -- Childbirth education/hospital/[facility] facilities  -- Nutrition, prepregnant BMI, and recommended weight gain  -- Toxoplasmosis precautions (Cats/Raw Meat)  -- Sexual activity and exercise  -- Environmental/Work hazards  -- Travel  -- Tobacco (Ask, Advise, Assess, Assist, and Arrange), as well as alcohol and drug use  -- Use of any medications (Including supplements, Vitamins, Herbs, or OTC Drugs)  -- Domestic violence screen benign    6. Reviewed genetic testing options. Reviewed available first trimester and/or second  trimester  screening options. Reviewed risk of false positive/negative results and recommendation of referral to [facility] in event of a positive result, for NIPT, US, and/or amniocentesis. Reviewed the possible estimated 1 in 300/500 risk of miscarriage with amniocentesis, even with a healthy fetus. Patient desires genetic screening.     7. Reviewed warning signs, precautions, and how/when to contact us.     8. RTC x 4 weeks, call or present sooner prn. UTD on COVID vaccines    Updated Medication List:  Current Outpatient Medications   Medication Sig Dispense Refill    PNV,calcium 72-iron-folic acid (PRENATAL VITAMIN PLUS LOW IRON) 27 mg iron- 1 mg Tab Take one tablet daily.  Prescribe prenatal covered by insurance 90 tablet 11     No current facility-administered medications for this visit.       SAUMYA Benítez CNM    7/5/2023  11:07 AM

## 2023-07-06 LAB — BACTERIA UR CULT: NO GROWTH

## 2023-07-08 ENCOUNTER — PATIENT MESSAGE (OUTPATIENT)
Dept: REHABILITATION | Facility: HOSPITAL | Age: 33
End: 2023-07-08
Payer: MEDICAID

## 2023-07-12 ENCOUNTER — TELEPHONE (OUTPATIENT)
Dept: OBSTETRICS AND GYNECOLOGY | Facility: CLINIC | Age: 33
End: 2023-07-12
Payer: COMMERCIAL

## 2023-07-12 ENCOUNTER — ROUTINE PRENATAL (OUTPATIENT)
Dept: OBSTETRICS AND GYNECOLOGY | Facility: CLINIC | Age: 33
End: 2023-07-12
Payer: COMMERCIAL

## 2023-07-12 ENCOUNTER — NURSE TRIAGE (OUTPATIENT)
Dept: ADMINISTRATIVE | Facility: CLINIC | Age: 33
End: 2023-07-12
Payer: COMMERCIAL

## 2023-07-12 VITALS
WEIGHT: 152.44 LBS | DIASTOLIC BLOOD PRESSURE: 72 MMHG | BODY MASS INDEX: 26.17 KG/M2 | SYSTOLIC BLOOD PRESSURE: 112 MMHG

## 2023-07-12 DIAGNOSIS — Z3A.09 9 WEEKS GESTATION OF PREGNANCY: ICD-10-CM

## 2023-07-12 DIAGNOSIS — N89.8 VAGINAL DISCHARGE DURING PREGNANCY IN FIRST TRIMESTER: Primary | ICD-10-CM

## 2023-07-12 DIAGNOSIS — O26.891 VAGINAL DISCHARGE DURING PREGNANCY IN FIRST TRIMESTER: Primary | ICD-10-CM

## 2023-07-12 PROCEDURE — 0502F PR SUBSEQUENT PRENATAL CARE: ICD-10-PCS | Mod: S$GLB,,,

## 2023-07-12 PROCEDURE — 99212 OFFICE O/P EST SF 10 MIN: CPT | Mod: PBBFAC,TH

## 2023-07-12 PROCEDURE — 87210 SMEAR WET MOUNT SALINE/INK: CPT | Mod: QW,S$GLB,,

## 2023-07-12 PROCEDURE — 87210 SMEAR WET MOUNT SALINE/INK: CPT | Mod: PBBFAC

## 2023-07-12 PROCEDURE — 0502F SUBSEQUENT PRENATAL CARE: CPT | Mod: S$GLB,,,

## 2023-07-12 PROCEDURE — 87210 PR  SMEAR,STAIN,WET MNT,INTERP: ICD-10-PCS | Mod: QW,S$GLB,,

## 2023-07-12 PROCEDURE — 99999 PR PBB SHADOW E&M-EST. PATIENT-LVL II: ICD-10-PCS | Mod: PBBFAC,,,

## 2023-07-12 PROCEDURE — 99999 PR PBB SHADOW E&M-EST. PATIENT-LVL II: CPT | Mod: PBBFAC,,,

## 2023-07-12 NOTE — PROGRESS NOTES
SUBJECTIVE:   32 y.o. female complains of brown and yellow vaginal discharge for about 3 days with some cramping.  Denies abnormal vaginal bleeding or significant pelvic pain or  fever. No UTI symptoms. Denies history of known exposure to STD. Denies odor. Denies bowel changes    Pt continues to express ambivalence regarding pregnancy today.    Patient's last menstrual period was 05/15/2023.    OBJECTIVE:   She appears well, afebrile.  Abdomen: benign, soft, nontender, no masses.  Pelvic Exam: VULVA: normal appearing vulva with no masses, tenderness or lesions, VAGINA: normal appearing vagina with normal color and discharge, no lesions, CERVIX: friable, erythematous, WET MOUNT done - results: negative for pathogens, normal epithelial cells, KOH done, hyphae.    ASSESSMENT:   C. albicans vulvovaginitis    PLAN:   GC and chlamydia DNA  probe sent to lab.  Treatment: OTC yeast cream such as Monistat or Gyne-Lotrimin and abstain from coitus during course of treatment  ROV prn if symptoms persist or worsen.     Gracy Lagunas CNM

## 2023-07-12 NOTE — TELEPHONE ENCOUNTER
Spoke with pt. Pt would like to be seen for brown/yellow discharge and cramping. Will see midwives

## 2023-07-12 NOTE — TELEPHONE ENCOUNTER
Called pt and she states that she made an appt at [facility] since Dr. Calvert didn't have any openings today. jtn    ----- Message from Maria D Wakefield sent at 7/12/2023  8:54 AM CDT -----  Regarding: patient call back  Type: Patient Call Back    Who called: Self     What is the request in detail: Asked for a call to RS apt. She said that she had a personal emergency She is asking for an apt around 2:30 pm today    Can the clinic reply by [facility]? No     Would the patient rather a call back or a response via [facility]? Call     Best call back number: .[phone]

## 2023-07-12 NOTE — TELEPHONE ENCOUNTER
For the past 2 days she has been having light brown yellowish discharge. Light minor aching pain in vaginal area. It feels like a small contraction. Pt is 9 weeks pregnant. Care advice recommend pt see MD today. Please call pt in reference to an appointment.   Reason for Disposition   Intermittent lower abdominal pain (e.g., cramping) lasting > 24 hours    Additional Information   Negative: Shock suspected (e.g., cold/pale/clammy skin, too weak to stand, low BP, rapid pulse)   Negative: Difficult to awaken or acting confused (e.g., disoriented, slurred speech)   Negative: Passed out (i.e., fainted, collapsed and was not responding)   Negative: Sounds like a life-threatening emergency to the triager   Negative: SEVERE abdominal pain (e.g., excruciating)   Negative: SEVERE vaginal bleeding (e.g., soaking 2 pads or tampons per hour and present 2 or more hours; 1 menstrual cup every 2 hours)   Negative: SEVERE dizziness (e.g., unable to stand, requires support to walk, feels like passing out)   Negative: MODERATE vaginal bleeding (i.e., soaking 1 pad) and present > 6 hours   Negative: MODERATE vaginal bleeding (i.e., soaking 1 pad / hour, clots) and pregnant > 12 weeks   Negative: Passed tissue (e.g., gray-white)   Negative: Shoulder pain   Negative: Constant abdominal pain lasting > 1 hour   Negative: Fever > 100.4 F (38.0 C)   Negative: Pale skin (pallor) of new-onset or worsening   Negative: Patient sounds very sick or weak to the triager   Negative: MODERATE vaginal bleeding (i.e., soaking 1 pad / hour; clots)    Protocols used: Pregnancy - Vaginal Bleeding Less Than 20 Weeks EGA-A-OH

## 2023-07-20 ENCOUNTER — PATIENT MESSAGE (OUTPATIENT)
Dept: OBSTETRICS AND GYNECOLOGY | Facility: CLINIC | Age: 33
End: 2023-07-20
Payer: COMMERCIAL

## 2023-07-24 ENCOUNTER — TELEPHONE (OUTPATIENT)
Dept: OBSTETRICS AND GYNECOLOGY | Facility: CLINIC | Age: 33
End: 2023-07-24
Payer: COMMERCIAL

## 2023-07-24 ENCOUNTER — PATIENT MESSAGE (OUTPATIENT)
Dept: OBSTETRICS AND GYNECOLOGY | Facility: CLINIC | Age: 33
End: 2023-07-24
Payer: COMMERCIAL

## 2023-07-24 ENCOUNTER — TELEPHONE (OUTPATIENT)
Dept: FAMILY MEDICINE | Facility: CLINIC | Age: 33
End: 2023-07-24
Payer: COMMERCIAL

## 2023-07-24 NOTE — TELEPHONE ENCOUNTER
Called to talk to midwife on call about being seen tomorrow but would not say why. States she called from work where she was unable to talk, but mentioned wanted a letter about working less and avoiding stress. Discussed that we would likely not be able to give her a medical letter stating she couldn't work, but that we would be happy to see her in clinic and try to help.

## 2023-07-24 NOTE — TELEPHONE ENCOUNTER
Pt called asking to be seen by virtual appt  r/t job stress , no appts are available with any provider

## 2023-07-24 NOTE — TELEPHONE ENCOUNTER
----- Message from Pasquale Cm sent at 7/24/2023 12:22 PM CDT -----  Regarding: Self [phone]  Type: Patient Call Back     What is the request in detail: Pt is requesting a call back     Can the clinic reply by [facility]? No     Would the patient rather a call back or a response via [facility]? Call back    Best call back number: .[phone]      Additional Information:    Thank you.

## 2023-07-25 ENCOUNTER — ROUTINE PRENATAL (OUTPATIENT)
Dept: OBSTETRICS AND GYNECOLOGY | Facility: CLINIC | Age: 33
End: 2023-07-25
Payer: COMMERCIAL

## 2023-07-25 VITALS — WEIGHT: 150.81 LBS | BODY MASS INDEX: 25.88 KG/M2

## 2023-07-25 DIAGNOSIS — F32.A DEPRESSION AFFECTING PREGNANCY: Primary | ICD-10-CM

## 2023-07-25 DIAGNOSIS — O99.340 DEPRESSION AFFECTING PREGNANCY: Primary | ICD-10-CM

## 2023-07-25 PROCEDURE — 99999 PR PBB SHADOW E&M-EST. PATIENT-LVL II: CPT | Mod: PBBFAC,,,

## 2023-07-25 PROCEDURE — 99999 PR PBB SHADOW E&M-EST. PATIENT-LVL II: ICD-10-PCS | Mod: PBBFAC,,,

## 2023-07-25 PROCEDURE — 0502F SUBSEQUENT PRENATAL CARE: CPT | Mod: CPTII,S$GLB,,

## 2023-07-25 PROCEDURE — 0502F PR SUBSEQUENT PRENATAL CARE: ICD-10-PCS | Mod: CPTII,S$GLB,,

## 2023-07-25 RX ORDER — SERTRALINE HYDROCHLORIDE 25 MG/1
25 TABLET, FILM COATED ORAL DAILY
Qty: 30 TABLET | Refills: 0 | Status: SHIPPED | OUTPATIENT
Start: 2023-07-25 | End: 2023-10-12

## 2023-07-25 NOTE — PROGRESS NOTES
32 y.o.,  at 11w1d by 1T US  Patient presents today for add-on visit.    Complaints today: She is unsure if she would like to continue care.    ROS  OBSTETRICS:   Contractions No   Bleeding No   Loss of fluid No    GASTRO:   Nausea No   Vomiting No      OB History    Para Term  AB Living   1             SAB IAB Ectopic Multiple Live Births                  # Outcome Date GA Lbr Arya/2nd Weight Sex Delivery Anes PTL Lv   1 Current                Allergies and problem list reviewed and updated  Medical and surgical history reviewed    PHYSICAL EXAM  Wt 68.4 kg (150 lb 12.7 oz)   LMP 05/15/2023   BMI 25.88 kg/m²     GENERAL: No acute distress  HEENT: Normocephalic, atruamatic  NEURO: Alert and oriented x3  PSYCH: Calm, normal mood and affect  PULMONARY: Non-labored respiration; no tachypnea  ABD: Soft, gravid, nontender    ASSESSMENT AND PLAN     Problems (from 23 to present)     Problem Noted Resolved    Anemia affecting pregnancy 2023 by Aure Posada CNM No          EPDS  1) I have been able to laugh and see the funny side of things.   As much as I always could 0   Not quite so much now 1   Definitely not so much now 2   Not at all 3    2) I have looked forward with enjoyment to things.   As much as I ever did 0   Rather less than I used to 1   Definitely less than I used to 2   Hardly at all 3    3) I have blamed myself unnecessarily when things went wrong.   Yes, most of the time 3   Yes, some of the time 2   Not very often 1   No, never 0    4) I have been anxious or worried for no good reason.   No not at all 0   Hardly ever 1   Yes, sometimes 2   Yes, very often 3    5) I have felt scared or panicky for no very good reason.   Yes, quite a lot 3   Yes, sometimes 2   No, not much 1   No, not at all 0    6) Things have been getting on top of me.   Yes, most of the time I havent been able to cope at all 3   Yes, sometimes I havent been coping as well as usual 2   No, most of the  time I have coped quite well 1   No, I have been coping as well as ever 0    7) I have been so unhappy that I have had difficulty sleeping.   Yes, most of the time 3   Yes, sometimes 2   Not very often 1   No, not at all 0    8) I have felt sad or miserable.   Yes, most of the time 3   Yes, sometimes 2   Not very often 1   No, not at all 0    9) I have been so unhappy that I have been crying.   Yes, most of the time 3   Yes, quite often 2   Only occasionally 1   No, never 0    10) The thought of harming myself has occurred to me.   Yes, quite often 3   Sometimes 2   Hardly ever 1   Never 0    Score: 20    Discussed starting zoloft. Patient would like to start medication. 25 mg zoloft sent to pharmacy -- discussed follow up mood visit in two weeks and possible dose adjustment.    Patient currently has therapist and finds it helpful. Declines psychiatry referral today.    Discussed appropriate follow up, patient to follow up with [facility] if she decides to continue care, or if anything changes/she feels the need to seek care.    Follow up: 2 wks for mood check, call or present sooner prn.     Sharlene Pruitt CNM

## 2023-07-26 ENCOUNTER — TELEPHONE (OUTPATIENT)
Dept: OBSTETRICS AND GYNECOLOGY | Facility: CLINIC | Age: 33
End: 2023-07-26
Payer: COMMERCIAL

## 2023-07-26 DIAGNOSIS — F32.A DEPRESSION AFFECTING PREGNANCY IN FIRST TRIMESTER, ANTEPARTUM: Primary | ICD-10-CM

## 2023-07-26 DIAGNOSIS — O99.341 DEPRESSION AFFECTING PREGNANCY IN FIRST TRIMESTER, ANTEPARTUM: Primary | ICD-10-CM

## 2023-07-26 NOTE — TELEPHONE ENCOUNTER
Returned phone call to pt who desires to forgo zoloft initiation and instead see psychiatry. Referral made.

## 2023-07-28 ENCOUNTER — PATIENT MESSAGE (OUTPATIENT)
Dept: OBSTETRICS AND GYNECOLOGY | Facility: CLINIC | Age: 33
End: 2023-07-28
Payer: COMMERCIAL

## 2023-07-28 ENCOUNTER — OFFICE VISIT (OUTPATIENT)
Dept: FAMILY MEDICINE | Facility: CLINIC | Age: 33
End: 2023-07-28
Payer: COMMERCIAL

## 2023-07-28 DIAGNOSIS — F41.9 ANXIETY DISORDER AFFECTING PREGNANCY, ANTEPARTUM: ICD-10-CM

## 2023-07-28 DIAGNOSIS — F41.1 GAD (GENERALIZED ANXIETY DISORDER): Primary | Chronic | ICD-10-CM

## 2023-07-28 DIAGNOSIS — Z13.79 ENCOUNTER FOR GENETIC SCREENING FOR BIRTH DEFECT: Primary | ICD-10-CM

## 2023-07-28 DIAGNOSIS — O99.340 ANXIETY DISORDER AFFECTING PREGNANCY, ANTEPARTUM: ICD-10-CM

## 2023-07-28 PROCEDURE — 99213 OFFICE O/P EST LOW 20 MIN: CPT | Mod: 95,,, | Performed by: NURSE PRACTITIONER

## 2023-07-28 PROCEDURE — 99213 PR OFFICE/OUTPT VISIT, EST, LEVL III, 20-29 MIN: ICD-10-PCS | Mod: 95,,, | Performed by: NURSE PRACTITIONER

## 2023-07-28 NOTE — LETTER
July 28, 2023      [facility]  [address] [facility]  [place] [address]  Phone: [phone]  Fax: [phone]       Patient: Mone Chandler   YOB: 1990  Date of Visit: 07/28/2023    To Whom It May Concern:    Lucía Chandler  was at [facility] on 07/28/2023. Given her current diagnosis and restrains on her physical and mental health/wellbeing, we feel it best for her to resign from this position. If you have any questions or concerns, or if I can be of further assistance, please do not hesitate to contact me.    Sincerely,      Shellie Odonnell NP
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
## 2023-08-04 ENCOUNTER — PATIENT MESSAGE (OUTPATIENT)
Dept: OBSTETRICS AND GYNECOLOGY | Facility: CLINIC | Age: 33
End: 2023-08-04
Payer: COMMERCIAL

## 2023-08-04 DIAGNOSIS — F41.9 ANXIETY DURING PREGNANCY IN SECOND TRIMESTER, ANTEPARTUM: Primary | ICD-10-CM

## 2023-08-04 DIAGNOSIS — O99.342 ANXIETY DURING PREGNANCY IN SECOND TRIMESTER, ANTEPARTUM: Primary | ICD-10-CM

## 2023-08-08 ENCOUNTER — OFFICE VISIT (OUTPATIENT)
Dept: OBSTETRICS AND GYNECOLOGY | Facility: CLINIC | Age: 33
End: 2023-08-08
Payer: COMMERCIAL

## 2023-08-08 DIAGNOSIS — Z3A.13 13 WEEKS GESTATION OF PREGNANCY: Primary | ICD-10-CM

## 2023-08-08 PROCEDURE — 0502F PR SUBSEQUENT PRENATAL CARE: ICD-10-PCS | Mod: 95,,,

## 2023-08-08 PROCEDURE — 1159F MED LIST DOCD IN RCRD: CPT | Mod: CPTII,95,,

## 2023-08-08 PROCEDURE — 0502F SUBSEQUENT PRENATAL CARE: CPT | Mod: 95,,,

## 2023-08-08 PROCEDURE — 1159F PR MEDICATION LIST DOCUMENTED IN MEDICAL RECORD: ICD-10-PCS | Mod: CPTII,95,,

## 2023-08-08 NOTE — PROGRESS NOTES
The patient location is: work  The chief complaint leading to consultation is: prenatal    Visit type: audiovisual        Each patient to whom he or she provides medical services by telemedicine is:  (1) informed of the relationship between the physician and patient and the respective role of any other health care provider with respect to management of the patient; and (2) notified that he or she may decline to receive medical services by telemedicine and may withdraw from such care at any time.    Notes:      No chief complaint on file.    32 y.o.,  at 13w1d by 1T US    Complaints today: none.  Doing well without concerns.    Denies cramping/contractions, denies leaking vaginal fluid, denies vaginal bleeding.   Reports has not yet felt fetal movement.    Allergies: Patient has no known allergies.    PHYSICAL EXAM  GENERAL: No acute distress  HEENT: Normocephalic, atruamatic  NEURO: Alert and oriented x3  PSYCH: Calm, normal mood and affect      ASSESSMENT AND PLAN  13 weeks gestation of pregnancy  -     US MFM Procedure (Viewpoint)-Future; Future      Anatomy ultrasound with MFM ordered/discussed.  Reviewed aneuploidy screening:   Pt has completed [id number] - neg. Education regarding AFP screening today - can order at next visit.      Reviewed warning signs and pregnancy precautions, along with how/when to call.      Follow up: approx 4 wks, call or present sooner prn.     Sharlene Pruitt, SAUMYA, APRN

## 2023-08-10 ENCOUNTER — PATIENT MESSAGE (OUTPATIENT)
Dept: MATERNAL FETAL MEDICINE | Facility: CLINIC | Age: 33
End: 2023-08-10
Payer: COMMERCIAL

## 2023-08-16 ENCOUNTER — ROUTINE PRENATAL (OUTPATIENT)
Dept: OBSTETRICS AND GYNECOLOGY | Facility: CLINIC | Age: 33
End: 2023-08-16
Payer: COMMERCIAL

## 2023-08-16 VITALS
SYSTOLIC BLOOD PRESSURE: 116 MMHG | DIASTOLIC BLOOD PRESSURE: 72 MMHG | WEIGHT: 154.44 LBS | BODY MASS INDEX: 26.51 KG/M2

## 2023-08-16 DIAGNOSIS — Z3A.14 14 WEEKS GESTATION OF PREGNANCY: ICD-10-CM

## 2023-08-16 DIAGNOSIS — F41.9 ANXIETY DISORDER AFFECTING PREGNANCY, ANTEPARTUM: Primary | ICD-10-CM

## 2023-08-16 DIAGNOSIS — O99.340 ANXIETY DISORDER AFFECTING PREGNANCY, ANTEPARTUM: Primary | ICD-10-CM

## 2023-08-16 PROCEDURE — 99999 PR PBB SHADOW E&M-EST. PATIENT-LVL II: CPT | Mod: PBBFAC,,, | Performed by: ADVANCED PRACTICE MIDWIFE

## 2023-08-16 PROCEDURE — 99212 OFFICE O/P EST SF 10 MIN: CPT | Mod: PBBFAC,TH | Performed by: ADVANCED PRACTICE MIDWIFE

## 2023-08-16 PROCEDURE — 0502F SUBSEQUENT PRENATAL CARE: CPT | Mod: S$GLB,,, | Performed by: ADVANCED PRACTICE MIDWIFE

## 2023-08-16 PROCEDURE — 99999 PR PBB SHADOW E&M-EST. PATIENT-LVL II: ICD-10-PCS | Mod: PBBFAC,,, | Performed by: ADVANCED PRACTICE MIDWIFE

## 2023-08-16 PROCEDURE — 0502F PR SUBSEQUENT PRENATAL CARE: ICD-10-PCS | Mod: S$GLB,,, | Performed by: ADVANCED PRACTICE MIDWIFE

## 2023-08-16 NOTE — PROGRESS NOTES
32 y.o.,  at 14w2d by first trimester US    Patient presents today for initial prenatal visit. See previous encounters for details. Reports feeling very distant from FOB, low libido. Relays several social concerns and complaints.  Expressing situational ambivalence about pregnancy. Denies nausea, vomiting. Denies HA. Awaiting appointment with psychiatry; referral already placed.    Complaints today: see above.  TWG: -1 lbs     Denies LOF, VB. Reports cramping/abdominal pain.     PHYSICAL EXAM  /72   Wt 70.1 kg (154 lb 6.9 oz)   LMP 05/15/2023   BMI 26.51 kg/m²   GENERAL: No acute distress  HEENT: Normocephalic, atruamatic  NEURO: Alert and oriented x3  PULMONARY: Non-labored respiration; no tachypnea  ABD: Soft, gravid, nontender    ASSESSMENT AND PLAN  Anxiety disorder affecting pregnancy, antepartum  -     Connected MOM Enrollment  -     Assign Connected MOM Program Consent Questionnaire       Patient was oriented to practice and progression of routine prenatal care. Reviewed New OB Pregnancy packet & questions answered.   Initial labs and dating u/s reviewed.   Counseled today on proper weight gain based on the [employer] [employer]'s recommendations based on her pre-pregnancy weight.   o Prepregnancy BMI 26 (prepregnancy)    -- Discussed IOM recommended weight gain of:  Weight category Pre pre BMI  Recommended TWG   Underweight Less than 18.5  28-40    Normal Weight 18.5-24.9  25-35    Overweight 25-29.9  15-25    Obese   30 and greater  11-20      Review exercise precautions in pregnancy, along with recommendations. Patient does not exercise regularly.    Discussed diet, along with substances & foods to avoid in pregnancy (i.e. sushi, fish that are high in mercury, deli meat, and unpasteurized cheeses).    Discussed prenatal vitamin options (i.e. stool softener, DHA).     Reviewed aneuploidy screening options and indications, questions answered - patient has completed  [id number].   Education regarding warning signs.   Encouraged mental health follow up.    30-minute visit. 25 minutes spent counseling patient on social and mental health resources, stress management during pregnancy.      Follow up: 4 wks, call or present sooner prn.       Alida Andrews, SAUMYA, APRN

## 2023-08-28 ENCOUNTER — PATIENT MESSAGE (OUTPATIENT)
Dept: OTHER | Facility: OTHER | Age: 33
End: 2023-08-28
Payer: COMMERCIAL

## 2023-08-29 ENCOUNTER — PATIENT MESSAGE (OUTPATIENT)
Dept: OBSTETRICS AND GYNECOLOGY | Facility: CLINIC | Age: 33
End: 2023-08-29
Payer: COMMERCIAL

## 2023-09-04 ENCOUNTER — PATIENT MESSAGE (OUTPATIENT)
Dept: OTHER | Facility: OTHER | Age: 33
End: 2023-09-04
Payer: COMMERCIAL

## 2023-09-20 ENCOUNTER — PROCEDURE VISIT (OUTPATIENT)
Dept: MATERNAL FETAL MEDICINE | Facility: CLINIC | Age: 33
End: 2023-09-20
Payer: COMMERCIAL

## 2023-09-20 ENCOUNTER — ROUTINE PRENATAL (OUTPATIENT)
Dept: OBSTETRICS AND GYNECOLOGY | Facility: CLINIC | Age: 33
End: 2023-09-20
Payer: MEDICAID

## 2023-09-20 ENCOUNTER — LAB VISIT (OUTPATIENT)
Dept: LAB | Facility: OTHER | Age: 33
End: 2023-09-20
Attending: ADVANCED PRACTICE MIDWIFE
Payer: COMMERCIAL

## 2023-09-20 VITALS
BODY MASS INDEX: 27.51 KG/M2 | SYSTOLIC BLOOD PRESSURE: 116 MMHG | WEIGHT: 160.25 LBS | DIASTOLIC BLOOD PRESSURE: 68 MMHG

## 2023-09-20 DIAGNOSIS — O99.012 ANEMIA AFFECTING PREGNANCY IN SECOND TRIMESTER: ICD-10-CM

## 2023-09-20 DIAGNOSIS — Z3A.19 19 WEEKS GESTATION OF PREGNANCY: ICD-10-CM

## 2023-09-20 DIAGNOSIS — O34.02 SEPTATE UTERUS AFFECTING PREGNANCY IN SECOND TRIMESTER: ICD-10-CM

## 2023-09-20 DIAGNOSIS — Z3A.13 13 WEEKS GESTATION OF PREGNANCY: ICD-10-CM

## 2023-09-20 DIAGNOSIS — F41.9 ANXIETY DISORDER AFFECTING PREGNANCY, ANTEPARTUM: Primary | ICD-10-CM

## 2023-09-20 DIAGNOSIS — Q51.28 SEPTATE UTERUS AFFECTING PREGNANCY IN SECOND TRIMESTER: ICD-10-CM

## 2023-09-20 DIAGNOSIS — O99.340 ANXIETY DISORDER AFFECTING PREGNANCY, ANTEPARTUM: Primary | ICD-10-CM

## 2023-09-20 DIAGNOSIS — E66.3 OVERWEIGHT (BMI 25.0-29.9): Chronic | ICD-10-CM

## 2023-09-20 DIAGNOSIS — Z34.90 PREGNANCY, UNSPECIFIED GESTATIONAL AGE: ICD-10-CM

## 2023-09-20 PROBLEM — M62.89 PELVIC FLOOR DYSFUNCTION: Status: RESOLVED | Noted: 2023-03-03 | Resolved: 2023-09-20

## 2023-09-20 PROBLEM — R09.82 POST-NASAL DRIP: Status: RESOLVED | Noted: 2022-04-11 | Resolved: 2023-09-20

## 2023-09-20 PROBLEM — R49.9 VOICE COMPLAINT: Status: RESOLVED | Noted: 2022-04-11 | Resolved: 2023-09-20

## 2023-09-20 PROBLEM — K59.01 SLOW TRANSIT CONSTIPATION: Chronic | Status: RESOLVED | Noted: 2019-07-30 | Resolved: 2023-09-20

## 2023-09-20 PROBLEM — R23.8 SKIN SENSITIVITY: Status: RESOLVED | Noted: 2022-04-11 | Resolved: 2023-09-20

## 2023-09-20 PROBLEM — F32.A DEPRESSION AFFECTING PREGNANCY: Status: ACTIVE | Noted: 2023-09-20

## 2023-09-20 PROBLEM — B37.31 CANDIDIASIS OF VAGINA DURING PREGNANCY: Status: RESOLVED | Noted: 2023-06-14 | Resolved: 2023-09-20

## 2023-09-20 PROBLEM — O98.819 CANDIDIASIS OF VAGINA DURING PREGNANCY: Status: RESOLVED | Noted: 2023-06-14 | Resolved: 2023-09-20

## 2023-09-20 PROCEDURE — 99999 PR PBB SHADOW E&M-EST. PATIENT-LVL II: CPT | Mod: PBBFAC,,, | Performed by: ADVANCED PRACTICE MIDWIFE

## 2023-09-20 PROCEDURE — 0502F SUBSEQUENT PRENATAL CARE: CPT | Mod: S$GLB,,, | Performed by: ADVANCED PRACTICE MIDWIFE

## 2023-09-20 PROCEDURE — 76805 OB US >/= 14 WKS SNGL FETUS: CPT | Mod: S$GLB,,, | Performed by: OBSTETRICS & GYNECOLOGY

## 2023-09-20 PROCEDURE — 0502F PR SUBSEQUENT PRENATAL CARE: ICD-10-PCS | Mod: S$GLB,,, | Performed by: ADVANCED PRACTICE MIDWIFE

## 2023-09-20 PROCEDURE — 99999 PR PBB SHADOW E&M-EST. PATIENT-LVL II: ICD-10-PCS | Mod: PBBFAC,,, | Performed by: ADVANCED PRACTICE MIDWIFE

## 2023-09-20 PROCEDURE — 76805 US MFM PROCEDURE (VIEWPOINT): ICD-10-PCS | Mod: S$GLB,,, | Performed by: OBSTETRICS & GYNECOLOGY

## 2023-09-20 PROCEDURE — 82105 ALPHA-FETOPROTEIN SERUM: CPT | Performed by: ADVANCED PRACTICE MIDWIFE

## 2023-09-20 PROCEDURE — 36415 COLL VENOUS BLD VENIPUNCTURE: CPT | Performed by: ADVANCED PRACTICE MIDWIFE

## 2023-09-20 NOTE — PROGRESS NOTES
"Reason for Visit   Routine Prenatal Visit    33 y.o.,  at 19w2d, here for scheduled EDDIE visit. Doing well. Catrachito COLLINS here with her.    Denies lof/brvb, dysuria, fever/chills, nausea or emesis. No abdominal pain. No quickening as of yet.    TW lbs   Allergies: Patient has no known allergies.    PHYSICAL EXAM  GENERAL: No acute distress. Pleasant, well groomed, appropriate affect.   HEENT: Normocephalic, atruamatic  NEURO: Alert and oriented x3  PSYCH: Calm, normal mood and affect  PULMONARY: Non-labored respiration; no tachypnea  ABD: Soft, gravid, nontender  FH = 1 below U. + fhts 150s  EXT: no pedal edema    ASSESSMENT AND PLAN  Anxiety disorder affecting pregnancy, antepartum- feels she is now coping well with pregnancy and work stressors. Has not started Zoloft. Unsure if desires mental health counseling.    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 Anatomy US done today, report pending. Patient scheduled for repeat US in 4 weeks by [facility].    Anemia affecting pregnancy in second trimester-continue po Iron.     19 weeks gestation of pregnancy  -     Maternal Screen AFP (Single Marker); Future; Expected date: 2023-discussed/desires today.      1. S&S of SAB and PTL/PPROM reinforced.   2. Prenatal labs reviewed/discussed.  3. Continue po Iron. Weight gain in pregnancy, healthy diet/exercise reviewed briefly.   4. AFP today, discussed and desires.  5. Repeat anatomy US scheduled by [facility] for 10/19/23. Patient told by M "to look at the ? Bicornuate uterus again. M consult placed as well. Patient aware that she may risk out of CNM care but unclear until we have more information. Bicornuate uterus briefly discussed.   6. Continue to monitor anxiety. Feels she is doing well at present. Address and access frequently in pregnancy and offer counseling.  7.  After-hours number given to patient.  8. EDDIE 4 weeks, sooner, prn.     Follow-up: 4 weeks, call or present sooner " PRN    Eva Chavira, CNM, APRN

## 2023-09-22 ENCOUNTER — TELEPHONE (OUTPATIENT)
Dept: OBSTETRICS AND GYNECOLOGY | Facility: CLINIC | Age: 33
End: 2023-09-22
Payer: COMMERCIAL

## 2023-09-22 PROBLEM — O09.92 HIGH-RISK PREGNANCY IN SECOND TRIMESTER: Status: ACTIVE | Noted: 2023-09-20

## 2023-09-22 NOTE — TELEPHONE ENCOUNTER
I spoke with Ms. Chandler today regarding results of recent anatomy US. US revealed suspected septate uterus, septum noted to extend the majority of the length of the uterine cavity, fetus on maternal right with no fetal parts noted on left side of uterine septum. Left portion of the uterus is fluid filled. One cervix noted on transvaginal ultrasound.   As I discussed with Ms. Chandler at her recent EDDIE visit in our midwifery clinic, this uterine malformation risks her out of our scope of practice. Today, we discussed again and I sent a message to [facility] resident team requesting transfer of care to them. Ms. Chandler appears to understand all we discussed. She is again, considering termination of pregnancy but would like to discuss pregnancy risks/complications with MD/Resident team prior to making decision. She will notify us if she has not heard from their  by end of next week so we can facilitate appointment asap.

## 2023-09-22 NOTE — TELEPHONE ENCOUNTER
----- Message from Eva Chavira CNM sent at 9/22/2023  3:51 PM CDT -----  Regarding: Transfer of Care  Hello,   Patient is 19+ weeks pregnant with a bicornuate uterus. MFM consult has been placed. Patient has risked out of midwifery scope of care. Please call patient and schedule her an OB appointment with our [facility] resident team. Patient is aware of transfer.

## 2023-09-22 NOTE — TELEPHONE ENCOUNTER
----- Message from Courtney Padilla MD sent at 9/22/2023  4:05 PM CDT -----  Regarding: RE: Transfer of Care  Thanks!  We will get her scheduled.  Courtney  ----- Message -----  From: Eva Chavira CNM  Sent: 9/22/2023   4:03 PM CDT  To: Courtney Padilla MD  Subject: Transfer of Care                                 Hello,   Patient is 19+ weeks pregnant with a bicornuate uterus. MFM consult has been placed. Patient has risked out of midwifery scope of care. Please call patient and schedule her an OB appointment with our [facility] resident team. Patient is aware of transfer.

## 2023-09-25 ENCOUNTER — PATIENT MESSAGE (OUTPATIENT)
Dept: OTHER | Facility: OTHER | Age: 33
End: 2023-09-25
Payer: COMMERCIAL

## 2023-09-25 LAB
# FETUSES US: NORMAL
AFP INTERPRETATION: NORMAL
AFP MOM SERPL: 1.42
AFP SERPL-MCNC: 83.5 NG/ML
AFP SERPL-MCNC: NEGATIVE NG/ML
AGE AT DELIVERY: 33
GA (DAYS): 2 D
GA (WEEKS): 19 WK
GESTATIONAL AGE METHOD: NORMAL
IDDM PATIENT QL: NORMAL
SMOKING STATUS FTND: NO

## 2023-09-26 ENCOUNTER — OFFICE VISIT (OUTPATIENT)
Dept: URGENT CARE | Facility: CLINIC | Age: 33
End: 2023-09-26
Payer: COMMERCIAL

## 2023-09-26 VITALS
RESPIRATION RATE: 20 BRPM | HEART RATE: 79 BPM | DIASTOLIC BLOOD PRESSURE: 80 MMHG | HEIGHT: 64 IN | SYSTOLIC BLOOD PRESSURE: 116 MMHG | BODY MASS INDEX: 27.36 KG/M2 | OXYGEN SATURATION: 100 % | WEIGHT: 160.25 LBS | TEMPERATURE: 98 F

## 2023-09-26 DIAGNOSIS — S20.469A INSECT BITE OF BACK WALL OF THORAX, UNSPECIFIED LOCATION, INITIAL ENCOUNTER: Primary | ICD-10-CM
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
## 2023-09-26 PROCEDURE — 99213 OFFICE O/P EST LOW 20 MIN: CPT | Mod: S$GLB,,, | Performed by: NURSE PRACTITIONER

## 2023-09-26 PROCEDURE — 99213 PR OFFICE/OUTPT VISIT, EST, LEVL III, 20-29 MIN: ICD-10-PCS | Mod: S$GLB,,, | Performed by: NURSE PRACTITIONER

## 2023-09-26 RX ORDER — TRIAMCINOLONE ACETONIDE 1 MG/G
CREAM TOPICAL 2 TIMES DAILY
Qty: 28.4 G | Refills: 0 | Status: SHIPPED | OUTPATIENT
Start: 2023-09-26 | End: 2023-09-26 | Stop reason: HOSPADM

## 2023-09-26 RX ORDER — CAMPHOR 0.45 %
GEL (GRAM) TOPICAL 3 TIMES DAILY PRN
Qty: 35 G | Status: SHIPPED | OUTPATIENT
Start: 2023-09-26 | End: 2023-09-26

## 2023-09-26 RX ORDER — TRIAMCINOLONE ACETONIDE 1 MG/G
CREAM TOPICAL 2 TIMES DAILY
Qty: 28.4 G | Refills: 0 | Status: SHIPPED | OUTPATIENT
Start: 2023-09-26 | End: 2023-09-26

## 2023-09-26 RX ORDER — CAMPHOR 0.45 %
GEL (GRAM) TOPICAL 3 TIMES DAILY PRN
Qty: 35 G | Status: ON HOLD | OUTPATIENT
Start: 2023-09-26 | End: 2024-02-03 | Stop reason: HOSPADM

## 2023-09-26 NOTE — PROGRESS NOTES
"Subjective:      Patient ID: Mone Chandler is a 33 y.o. female.    Vitals:  height is 5' 4" (1.626 m) and weight is 72.7 kg (160 lb 4.4 oz). Her oral temperature is 98.2 °F (36.8 °C). Her blood pressure is 116/80 and her pulse is 79. Her respiration is 20 and oxygen saturation is 100%.     Chief Complaint: Insect Bite    This is a 33 y.o. female who presents today with a chief complaint of bug bites on back x 3 days ago.     Insect Bite  Associated symptoms include a rash. Pertinent negatives include no fatigue or fever.     Constitution: Negative for fatigue and fever.   Respiratory:  Negative for shortness of breath.    Gastrointestinal: Negative.    Musculoskeletal: Negative.    Skin:  Positive for rash.      Objective:     Physical Exam   HENT:   Head: Atraumatic.   Pulmonary/Chest: Effort normal.   Abdominal: Normal appearance.   Neurological: She is alert.   Skin: Skin is warm, dry, rash and urticarial.         Comments: +hives to back       Assessment:     1. Insect bite of back wall of thorax, unspecified location, initial encounter        Plan:       Insect bite of back wall of thorax, unspecified location, initial encounter  -     Discontinue: triamcinolone acetonide 0.1% (KENALOG) 0.1 % cream; Apply topically 2 (two) times daily.  Dispense: 28.4 g; Refill: 0  -     Discontinue: diphenhydrAMINE-zinc acetate 2-0.1% (BENADRYL EXTRA STRENGTH) cream; Apply topically 3 (three) times daily as needed for Itching.  Dispense: 35 g; Refill: G  -     Discontinue: triamcinolone acetonide 0.1% (KENALOG) 0.1 % cream; Apply topically 2 (two) times daily.  Dispense: 28.4 g; Refill: 0  -     diphenhydrAMINE-zinc acetate 2-0.1% (BENADRYL EXTRA STRENGTH) cream; Apply topically 3 (three) times daily as needed for Itching.  Dispense: 35 g; Refill: G                    "

## 2023-09-28 ENCOUNTER — ROUTINE PRENATAL (OUTPATIENT)
Dept: OBSTETRICS AND GYNECOLOGY | Facility: CLINIC | Age: 33
End: 2023-09-28
Attending: OBSTETRICS & GYNECOLOGY
Payer: COMMERCIAL

## 2023-09-28 VITALS
DIASTOLIC BLOOD PRESSURE: 64 MMHG | SYSTOLIC BLOOD PRESSURE: 116 MMHG | BODY MASS INDEX: 27.74 KG/M2 | WEIGHT: 161.63 LBS

## 2023-09-28 DIAGNOSIS — Q51.28 SEPTATE UTERUS AFFECTING PREGNANCY IN SECOND TRIMESTER: ICD-10-CM

## 2023-09-28 DIAGNOSIS — F41.9 ANXIETY DISORDER AFFECTING PREGNANCY, ANTEPARTUM: ICD-10-CM

## 2023-09-28 DIAGNOSIS — O99.340 ANXIETY DISORDER AFFECTING PREGNANCY, ANTEPARTUM: ICD-10-CM

## 2023-09-28 DIAGNOSIS — O34.02 SEPTATE UTERUS AFFECTING PREGNANCY IN SECOND TRIMESTER: ICD-10-CM

## 2023-09-28 DIAGNOSIS — O09.92 HIGH-RISK PREGNANCY IN SECOND TRIMESTER: Primary | ICD-10-CM

## 2023-09-28 DIAGNOSIS — O99.012 ANEMIA AFFECTING PREGNANCY IN SECOND TRIMESTER: ICD-10-CM

## 2023-09-28 PROBLEM — Z3A.19 19 WEEKS GESTATION OF PREGNANCY: Status: RESOLVED | Noted: 2023-09-20 | Resolved: 2023-09-28

## 2023-09-28 PROCEDURE — 0502F PR SUBSEQUENT PRENATAL CARE: ICD-10-PCS | Mod: CPTII,S$GLB,, | Performed by: OBSTETRICS & GYNECOLOGY

## 2023-09-28 PROCEDURE — 99999 PR PBB SHADOW E&M-EST. PATIENT-LVL III: ICD-10-PCS | Mod: PBBFAC,,, | Performed by: OBSTETRICS & GYNECOLOGY

## 2023-09-28 PROCEDURE — 0502F SUBSEQUENT PRENATAL CARE: CPT | Mod: CPTII,S$GLB,, | Performed by: OBSTETRICS & GYNECOLOGY

## 2023-09-28 PROCEDURE — 99999 PR PBB SHADOW E&M-EST. PATIENT-LVL III: CPT | Mod: PBBFAC,,, | Performed by: OBSTETRICS & GYNECOLOGY

## 2023-09-28 RX ORDER — TRIAMCINOLONE ACETONIDE 1 MG/G
CREAM TOPICAL 2 TIMES DAILY
COMMUNITY
Start: 2023-09-26 | End: 2024-03-14

## 2023-09-28 RX ORDER — CYCLOBENZAPRINE HCL 10 MG
10 TABLET ORAL NIGHTLY
Status: ON HOLD | COMMUNITY
Start: 2023-05-30 | End: 2024-02-03 | Stop reason: HOSPADM

## 2023-09-28 NOTE — PROGRESS NOTES
Good fm.  Denies ctx, vb, lof.  She complains of gas, constipation, and occasional pelvic pain.  Counseled on increased risk of malposition and  with uterine septum.  Counseled that she will have follow up ultrasound for growth as well.  She is frustrated because she was planning to deliver in the [facility] and is now concerned about having a complicated pregnancy.    Mone was seen today for routine prenatal visit.    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

## 2023-09-29 ENCOUNTER — DOCUMENTATION ONLY (OUTPATIENT)
Dept: REHABILITATION | Facility: HOSPITAL | Age: 33
End: 2023-09-29
Payer: COMMERCIAL

## 2023-09-29 NOTE — PROGRESS NOTES
9/29/2023    Pt has not attended PT sessions since 3/3/2023 and will be discharged from PT services with goal status unknown.
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
## 2023-10-04 NOTE — ASSESSMENT & PLAN NOTE
Patient currently stable and doing well without medication. She denies suicidal or homicidal ideation. She declines medication at this time.    We briefly discussed the pros/cons of taking an SSRI during pregnancy: The  period can severely effect mood disorders, with critical outcomes ranging from decreased bonding to social isolation to suicide. Sertraline is an excellent choice for management of symptoms during pregnancy.  While some early epidemiologic studies showed some association between selective serotonin reuptake inhibitors and congenital cardiac defects, these results have not been replicated reliably.  Use in later pregnancy has been associated with a mild transient  syndrome that can have CNS, motor, respiratory and GI signs.  As suggested before, these are almost always mild and transient.  There has also been a reported association with  pulmonary hypertension, though again this is not been reliably replicated.  Critically, maternal mood disorders have been associated with long-term neurodevelopmental outcomes; exposure to Sertraline has not. As of any medication pregnancy, I do recommend the lowest effective dose.  I advised against non titrated cessation, as this can be associated with series mood swings. Sertraline's active metabolites are excreted in breast milk, though the absolute level is about 6-7% of the weight adjusted maternal dose. No adverse outcomes have been reliably reported for  infants of mothers taking sertraline.    Recommendations:  After this conversation of risks and benefits, patient has decided that her symptoms are controlled without medication at this time. She feels comfortable re-addressing medication usage should anything change. Defer medication at this time per patient decision.

## 2023-10-04 NOTE — ASSESSMENT & PLAN NOTE
Patient was counseled today that ultrasound findings are most suggestive of septate uterus, although it can be difficult to distinguish between the uterine anomalies during pregnancy, as the growing fetus can make complete evaluation of the uterine contour and cavity challenging.  The complications are similar with bicornuate versus septate uterus, although risk of complications are often higher with septate.      Patients with septate uterus are at increased risk for poor pregnancy outcomes, including spontaneous  (21 to 44 percent) and  birth (12 to 33 percent); the live birth rate ranges from 50 to 72 percent. When pregnancy loss occurs it is often in the second trimester secondary to  labor. The septate uterus is also associated with an increased risk of breech presentation and placental abruption. Some data suggest that adverse outcomes increase with length of septum.     Recommendations:  1.  labor/PPROM precautions reviewed, discussed gestational age for viability of 23-24 weeks.   2. Recommend growth ultrasounds q 4-6 weeks, will be scheduled with our clinic.  3. At this time no contraindication to vaginal delivery; recommend reassessment of fetal position at 36-37 weeks.

## 2023-10-06 ENCOUNTER — TELEPHONE (OUTPATIENT)
Dept: FAMILY MEDICINE | Facility: CLINIC | Age: 33
End: 2023-10-06
Payer: COMMERCIAL

## 2023-10-06 DIAGNOSIS — Z36.2 ENCOUNTER FOR FOLLOW-UP ULTRASOUND OF FETAL ANATOMY: Primary | ICD-10-CM

## 2023-10-06 NOTE — TELEPHONE ENCOUNTER
----- Message from Shellie Casey sent at 10/6/2023 12:22 PM CDT -----  Regarding: Patient call back  .Type: Patient Call Back    Who called:self     What is the request in detail:states an MRI order was sent in from her . Patient states her  told her the order is in and someone should reach out to her for scheduling. States the order was  faxed over sometime in late August. Patient doesn't know what fax number was used. Nothing in Epic showing MRI Order     Can the clinic reply by [facility]?no     Would the patient rather a call back or a response via [facility]? Call     Best call back number:.[phone]      Additional Information:

## 2023-10-06 NOTE — TELEPHONE ENCOUNTER
Spoke with patient in reference to her receiving an MRI that her  requested to be done that was faxed over in late August. Patient was last seen in the clinic on 7/28/23 by Shellie Odonnell NP. No active orders seen for a MRI, but according to chart patient seems to be pregnant. Offered patient PCP fax number, patient refused. Informed patient that providers will be notified in reference to an order for a MRI.

## 2023-10-09 NOTE — TELEPHONE ENCOUNTER
Spoke to patient in reference to her earlier request for an order for an MRI. Gave patient the number for [facility] legal team at [phone] for her request.

## 2023-10-09 NOTE — ASSESSMENT & PLAN NOTE
Patient currently progressing well in pregnancy, no acute concerns at this time.    Recommendations:  --Genetic testing: MT21 low risk, patient does not desire to know gender. No additional testing indicated at this time.  --Anatomy scan: completed 23. Unremarkable anatomy. Sub-optimal views of cardiac structures and genitals. Will reassess on next ultrasound.  --Patient at this time does desire to have a natural delivery if possible and was hesitant to transfer out of the nurse midwife service.  She is currently looking into options for where she wants to deliver/receive her care.  I did  her that if she ends up delivering at [facility] and the baby is vertex, we would make every effort to work with her on her desired birth plan.  She understands that if the baby is breech the recommendation would be for a  delivery.

## 2023-10-12 ENCOUNTER — OFFICE VISIT (OUTPATIENT)
Dept: MATERNAL FETAL MEDICINE | Facility: CLINIC | Age: 33
End: 2023-10-12
Payer: COMMERCIAL

## 2023-10-12 DIAGNOSIS — Q51.28 SEPTATE UTERUS AFFECTING PREGNANCY IN SECOND TRIMESTER: ICD-10-CM

## 2023-10-12 DIAGNOSIS — O34.02 SEPTATE UTERUS AFFECTING PREGNANCY IN SECOND TRIMESTER: ICD-10-CM

## 2023-10-12 DIAGNOSIS — F41.9 ANXIETY DISORDER AFFECTING PREGNANCY, ANTEPARTUM: ICD-10-CM

## 2023-10-12 DIAGNOSIS — O09.92 HIGH-RISK PREGNANCY IN SECOND TRIMESTER: ICD-10-CM

## 2023-10-12 DIAGNOSIS — O99.340 ANXIETY DISORDER AFFECTING PREGNANCY, ANTEPARTUM: ICD-10-CM

## 2023-10-12 PROCEDURE — 1160F RVW MEDS BY RX/DR IN RCRD: CPT | Mod: CPTII,95,, | Performed by: STUDENT IN AN ORGANIZED HEALTH CARE EDUCATION/TRAINING PROGRAM

## 2023-10-12 PROCEDURE — 1159F MED LIST DOCD IN RCRD: CPT | Mod: CPTII,95,, | Performed by: STUDENT IN AN ORGANIZED HEALTH CARE EDUCATION/TRAINING PROGRAM

## 2023-10-12 PROCEDURE — 99214 OFFICE O/P EST MOD 30 MIN: CPT | Mod: 95,,, | Performed by: STUDENT IN AN ORGANIZED HEALTH CARE EDUCATION/TRAINING PROGRAM

## 2023-10-12 PROCEDURE — 1159F PR MEDICATION LIST DOCUMENTED IN MEDICAL RECORD: ICD-10-PCS | Mod: CPTII,95,, | Performed by: STUDENT IN AN ORGANIZED HEALTH CARE EDUCATION/TRAINING PROGRAM

## 2023-10-12 PROCEDURE — 1160F PR REVIEW ALL MEDS BY PRESCRIBER/CLIN PHARMACIST DOCUMENTED: ICD-10-PCS | Mod: CPTII,95,, | Performed by: STUDENT IN AN ORGANIZED HEALTH CARE EDUCATION/TRAINING PROGRAM

## 2023-10-12 PROCEDURE — 99214 PR OFFICE/OUTPT VISIT, EST, LEVL IV, 30-39 MIN: ICD-10-PCS | Mod: 95,,, | Performed by: STUDENT IN AN ORGANIZED HEALTH CARE EDUCATION/TRAINING PROGRAM

## 2023-10-15 ENCOUNTER — NURSE TRIAGE (OUTPATIENT)
Dept: ADMINISTRATIVE | Facility: CLINIC | Age: 33
End: 2023-10-15
Payer: COMMERCIAL

## 2023-10-15 NOTE — TELEPHONE ENCOUNTER
Pt reports she is 5 months pregnant. Reports scratchy throat w/ congestion that began on Tuesday. Boyfriend tested positive for Covid today but she was negative. Advised, per protocol. Verbalizes understanding. May utilize [facility] but otherwise, would like follow up in this regard.    Reason for Disposition   [1] HIGH RISK patient (e.g., weak immune system, age > 64 years, obesity with BMI 30 or higher, pregnant, chronic lung disease or other chronic medical condition) AND [2] COVID symptoms (e.g., cough, fever)  (Exceptions: Already seen by PCP and no new or worsening symptoms.)    Additional Information   Negative: SEVERE difficulty breathing (e.g., struggling for each breath, speaks in single words)   Negative: Difficult to awaken or acting confused (e.g., disoriented, slurred speech)   Negative: Bluish (or gray) lips or face now   Negative: Shock suspected (e.g., cold/pale/clammy skin, too weak to stand, low BP, rapid pulse)   Negative: Sounds like a life-threatening emergency to the triager   Negative: SEVERE or constant chest pain or pressure  (Exception: Mild central chest pain, present only when coughing.)   Negative: MODERATE difficulty breathing (e.g., speaks in phrases, SOB even at rest, pulse 100-120)   Negative: [1] Headache AND [2] stiff neck (can't touch chin to chest)   Negative: Oxygen level (e.g., pulse oximetry) 90 percent or lower   Negative: Chest pain or pressure  (Exception: MILD central chest pain, present only when coughing.)   Negative: [1] Drinking very little AND [2] dehydration suspected (e.g., no urine > 12 hours, very dry mouth, very lightheaded)   Negative: Patient sounds very sick or weak to the triager   Negative: Oxygen level (e.g., pulse oximetry) 91 to 94 percent   Negative: MILD difficulty breathing (e.g., minimal/no SOB at rest, SOB with walking, pulse <100)   Negative: Fever > 103 F (39.4 C)   Negative: [1] Fever > 101 F (38.3 C) AND [2] age > 60 years   Negative:  [1] Fever > 100.0 F (37.8 C) AND [2] bedridden (e.g., CVA, chronic illness, recovering from surgery)    Protocols used: Coronavirus (COVID-19) Diagnosed or [id number]

## 2023-10-16 ENCOUNTER — TELEPHONE (OUTPATIENT)
Dept: OBSTETRICS AND GYNECOLOGY | Facility: CLINIC | Age: 33
End: 2023-10-16
Payer: COMMERCIAL

## 2023-10-16 ENCOUNTER — PATIENT MESSAGE (OUTPATIENT)
Dept: OBSTETRICS AND GYNECOLOGY | Facility: CLINIC | Age: 33
End: 2023-10-16
Payer: COMMERCIAL

## 2023-10-16 ENCOUNTER — ON-DEMAND VIRTUAL (OUTPATIENT)
Dept: URGENT CARE | Facility: CLINIC | Age: 33
End: 2023-10-16
Payer: COMMERCIAL

## 2023-10-16 DIAGNOSIS — J00 ACUTE NASOPHARYNGITIS: Primary | ICD-10-CM

## 2023-10-16 PROCEDURE — 99212 PR OFFICE/OUTPT VISIT, EST, LEVL II, 10-19 MIN: ICD-10-PCS | Mod: 95,,, | Performed by: NURSE PRACTITIONER

## 2023-10-16 PROCEDURE — 99212 OFFICE O/P EST SF 10 MIN: CPT | Mod: 95,,, | Performed by: NURSE PRACTITIONER

## 2023-10-16 NOTE — PROGRESS NOTES
Subjective:      Patient ID: Mone Chandler is a 33 y.o. female.    Vitals:  vitals were not taken for this visit.     Chief Complaint: URI      Visit Type: TELE AUDIOVISUAL    Present with the patient at the time of consultation: TELEMED PRESENT WITH PATIENT: None    Past Medical History:   Diagnosis Date    Allergic rhinitis, cause unspecified 6/17/2015    ADRIANO (generalized anxiety disorder) 6/17/2015    ADRIANO-7 score of 17     PCOS (polycystic ovarian syndrome)      Past Surgical History:   Procedure Laterality Date    NASAL ENDOSCOPY W/ BALLON SINUPLASTY Bilateral 2017    thumb Bilateral     TRIGGER FINGER RELEASE Bilateral     thumbs     Review of patient's allergies indicates:  No Known Allergies  Current Outpatient Medications on File Prior to Visit   Medication Sig Dispense Refill    cyclobenzaprine (FLEXERIL) 10 MG tablet Take 10 mg by mouth every evening.      diphenhydrAMINE-zinc acetate 2-0.1% (BENADRYL EXTRA STRENGTH) cream Apply topically 3 (three) times daily as needed for Itching. (Patient not taking: Reported on 10/12/2023) 35 g G    ferrous sulfate 325 (65 FE) MG EC tablet Take 1 tablet (325 mg total) by mouth once daily. (Patient not taking: Reported on 9/26/2023) 90 tablet 2    PNV,calcium 72-iron-folic acid (PRENATAL VITAMIN PLUS LOW IRON) 27 mg iron- 1 mg Tab Take one tablet daily.  Prescribe prenatal covered by insurance 90 tablet 11    triamcinolone acetonide 0.1% (KENALOG) 0.1 % cream Apply topically 2 (two) times daily.       No current facility-administered medications on file prior to visit.     Family History   Problem Relation Age of Onset    Hypertension Mother     Cancer Mother     Diverticulitis Father            Ohs Peq Odvv Intake    10/16/2023  8:43 AM CDT - Filed by Patient   Describe your reason for todays visit Sick with sore throat   What is your current physical address in the event of a medical emergency?    Are you able to take your vital signs? No   Please attach any  relevant images or files          34 yo pregnant female with c/o uri symptoms for one week.   She states symptoms include congestion, throat burning, slight cough,   She states getting better daily but frustrating.   No fever, sob, wheezing.       URI   Associated symptoms include congestion. Pertinent negatives include no coughing or headaches.       Constitution: Negative for fever.   HENT:  Positive for congestion, postnasal drip and sinus pressure.    Respiratory:  Positive for sputum production. Negative for cough.    Neurological:  Negative for headaches.        Objective:   The physical exam was conducted virtually.  Physical Exam   Constitutional: She is oriented to person, place, and time. She appears well-developed.   HENT:   Head: Normocephalic and atraumatic.   Ears:   Right Ear: Hearing, tympanic membrane and external ear normal.   Left Ear: Hearing, tympanic membrane and external ear normal.   Nose: Nose normal.   Mouth/Throat: Uvula is midline, oropharynx is clear and moist and mucous membranes are normal.   Eyes: Conjunctivae and EOM are normal. Pupils are equal, round, and reactive to light.   Neck: Neck supple.   Cardiovascular: Normal rate.   Pulmonary/Chest: Effort normal and breath sounds normal.   Musculoskeletal: Normal range of motion.         General: Normal range of motion.   Neurological: She is alert and oriented to person, place, and time.   Skin: Skin is warm.   Psychiatric: Her behavior is normal. Thought content normal.   Nursing note and vitals reviewed.      Assessment:     1. Acute nasopharyngitis        Plan:     Patient Instructions   Mucinex DM without pseudoephedrine  Zyrtec 5 mg daily  Flonase nasal spray one spray twice daily       Acute nasopharyngitis

## 2023-10-16 NOTE — TELEPHONE ENCOUNTER
----- Message from Saadia Jefe sent at 10/16/2023 10:18 AM CDT -----  Regarding: [id number]  Type: Patient Call Back    Who called:self     What is the request in detail: pt states she needs clarification on her upcoming appt s, and is in need to speak with a nurse     Can the clinic reply by [facility]?no     Would the patient rather a call back or a response via [facility]? Call back     Best call back number:[phone]      Additional Information:

## 2023-10-19 ENCOUNTER — PATIENT MESSAGE (OUTPATIENT)
Dept: REHABILITATION | Facility: HOSPITAL | Age: 33
End: 2023-10-19
Payer: COMMERCIAL

## 2023-10-19 ENCOUNTER — PROCEDURE VISIT (OUTPATIENT)
Dept: MATERNAL FETAL MEDICINE | Facility: CLINIC | Age: 33
End: 2023-10-19
Payer: COMMERCIAL

## 2023-10-19 DIAGNOSIS — Z36.2 ENCOUNTER FOR FOLLOW-UP ULTRASOUND OF FETAL ANATOMY: ICD-10-CM

## 2023-10-19 PROCEDURE — 76816 OB US FOLLOW-UP PER FETUS: CPT | Mod: S$GLB,,, | Performed by: OBSTETRICS & GYNECOLOGY

## 2023-10-19 PROCEDURE — 76816 US MFM PROCEDURE (VIEWPOINT): ICD-10-PCS | Mod: S$GLB,,, | Performed by: OBSTETRICS & GYNECOLOGY

## 2023-10-23 ENCOUNTER — ROUTINE PRENATAL (OUTPATIENT)
Dept: OBSTETRICS AND GYNECOLOGY | Facility: CLINIC | Age: 33
End: 2023-10-23
Payer: COMMERCIAL

## 2023-10-23 ENCOUNTER — PATIENT MESSAGE (OUTPATIENT)
Dept: OTHER | Facility: OTHER | Age: 33
End: 2023-10-23
Payer: COMMERCIAL

## 2023-10-23 VITALS
BODY MASS INDEX: 28.27 KG/M2 | SYSTOLIC BLOOD PRESSURE: 110 MMHG | WEIGHT: 164.69 LBS | DIASTOLIC BLOOD PRESSURE: 66 MMHG

## 2023-10-23 DIAGNOSIS — Z34.00 SUPERVISION OF NORMAL FIRST PREGNANCY, ANTEPARTUM: Primary | ICD-10-CM

## 2023-10-23 PROCEDURE — 99999 PR PBB SHADOW E&M-EST. PATIENT-LVL III: ICD-10-PCS | Mod: PBBFAC,,, | Performed by: ADVANCED PRACTICE MIDWIFE

## 2023-10-23 PROCEDURE — 99999 PR PBB SHADOW E&M-EST. PATIENT-LVL III: CPT | Mod: PBBFAC,,, | Performed by: ADVANCED PRACTICE MIDWIFE

## 2023-10-23 PROCEDURE — 0502F SUBSEQUENT PRENATAL CARE: CPT | Mod: CPTII,S$GLB,, | Performed by: ADVANCED PRACTICE MIDWIFE

## 2023-10-23 PROCEDURE — 0502F PR SUBSEQUENT PRENATAL CARE: ICD-10-PCS | Mod: CPTII,S$GLB,, | Performed by: ADVANCED PRACTICE MIDWIFE

## 2023-10-23 NOTE — PROGRESS NOTES
Pregnancy dating, labs, ultrasound reports, prenatal testing, and problem list; prior records and results; and available outside records were reviewed and updated in EMR.  Pertinent findings were noted below.    Reason for Visit   Routine Prenatal Visit    HPI   33 y.o., at 24w0d by Estimated Date of Delivery: 24    No concerns today. Asked to review most recent U/S and supplement ingredients.    Contractions: No   Bleeding: No   Loss of fluid: No   Fetal movement: Yes   Nausea: No   Vomiting: No   Headache: No     Exam   /66   Wt 74.7 kg (164 lb 10.9 oz)   LMP 05/15/2023   BMI 28.27 kg/m²     TWlbs  GENERAL: No acute distress  ABD: Gravid    Assessment and Plan   Supervision of normal first pregnancy, antepartum  -     OB Glucose Screen; Future; Expected date: 10/23/2023  -     CBC Auto Differential; Future; Expected date: 10/23/2023       Discussed the use of supplements in pregnancy.  Encouraged abstaining from unregulated dietary supplements and further encouraged a complete, healthy diet.   Encouraged continuation of low impact exercise   Discussed variations in uterine anomalies and reassured pt   Blood glucose and growth U/S at next appt     labor and fetal movement count precautions given  Follow-up: 2 weeks      Grover Lopez MD MS  OB/Gyn  PGY-1    I have seen the patient, reviewed the Resident's assessment, plan and progress note. I have personally interviewed and examined the patient at bedside and: agree with the findings.. Date or Service: 10/23/2023        Shanika Chavez CNM  Obstetrics

## 2023-10-27 DIAGNOSIS — Z36.89 ENCOUNTER FOR ULTRASOUND TO ASSESS FETAL GROWTH: Primary | ICD-10-CM

## 2023-11-02 ENCOUNTER — TELEPHONE (OUTPATIENT)
Dept: OBSTETRICS AND GYNECOLOGY | Facility: CLINIC | Age: 33
End: 2023-11-02
Payer: COMMERCIAL

## 2023-11-02 NOTE — TELEPHONE ENCOUNTER
On call back, pt advised she is considering switching back to Dr. Calvert. Pt requesting a call from provider prior to moving appts.     ----- Message from Maria D Wakefield sent at 11/2/2023  9:14 AM CDT -----  Regarding: patient call back  Type: Patient Call Back    Who called: Self     What is the request in detail: Asked for a call back from the nurse because she is thinking about switching back to Dr. Calvert.     Can the clinic reply by [facility]? No     Would the patient rather a call back or a response via [facility]? Call     Best call back number: .[phone]

## 2023-11-02 NOTE — TELEPHONE ENCOUNTER
----- Message from Leslie Timur sent at 11/2/2023  9:03 AM CDT -----  Name of Who is Calling:LAVELLE CALLEJAS [[id number]]                What is the request in detail: Pt calling because she was sent a location that doesn't do the glucose screening test and pt states that she took off work to do it today and she is asking for a call back to see if any order can be put in so she can do it today since she took off.Please call back to further assist.                 Can the clinic reply by [facility]: No                What Number to Call Back if not in [facility]:[phone]

## 2023-11-06 ENCOUNTER — PATIENT MESSAGE (OUTPATIENT)
Dept: OTHER | Facility: OTHER | Age: 33
End: 2023-11-06
Payer: COMMERCIAL

## 2023-11-06 ENCOUNTER — CLINICAL SUPPORT (OUTPATIENT)
Dept: REHABILITATION | Facility: OTHER | Age: 33
End: 2023-11-06
Attending: INTERNAL MEDICINE
Payer: COMMERCIAL

## 2023-11-06 DIAGNOSIS — M62.89 PELVIC FLOOR TENSION: Primary | ICD-10-CM

## 2023-11-06 DIAGNOSIS — R27.8 COORDINATION IMPAIRMENT: ICD-10-CM

## 2023-11-06 PROCEDURE — 97162 PT EVAL MOD COMPLEX 30 MIN: CPT

## 2023-11-06 PROCEDURE — 97112 NEUROMUSCULAR REEDUCATION: CPT

## 2023-11-06 PROCEDURE — 97530 THERAPEUTIC ACTIVITIES: CPT

## 2023-11-13 ENCOUNTER — TELEPHONE (OUTPATIENT)
Dept: OBSTETRICS AND GYNECOLOGY | Facility: CLINIC | Age: 33
End: 2023-11-13
Payer: COMMERCIAL

## 2023-11-13 NOTE — TELEPHONE ENCOUNTER
----- Message from Moni De La Fuente sent at 11/13/2023 12:20 PM CST -----      Name of Who is Calling: LAVELLE CALLEJAS [[id number]]      What is the request in detail: Pt called to speak with the office.Please contact to further discuss and advise.          Can the clinic reply by [facility]: Y      [facility] [phone]

## 2023-11-16 DIAGNOSIS — M54.16 LUMBAR RADICULOPATHY: Primary | ICD-10-CM

## 2023-11-21 ENCOUNTER — CLINICAL SUPPORT (OUTPATIENT)
Dept: REHABILITATION | Facility: OTHER | Age: 33
End: 2023-11-21
Attending: OBSTETRICS & GYNECOLOGY
Payer: COMMERCIAL

## 2023-11-21 ENCOUNTER — ROUTINE PRENATAL (OUTPATIENT)
Dept: OBSTETRICS AND GYNECOLOGY | Facility: CLINIC | Age: 33
End: 2023-11-21
Attending: OBSTETRICS & GYNECOLOGY
Payer: COMMERCIAL

## 2023-11-21 ENCOUNTER — PROCEDURE VISIT (OUTPATIENT)
Dept: OBSTETRICS AND GYNECOLOGY | Facility: CLINIC | Age: 33
End: 2023-11-21
Payer: COMMERCIAL

## 2023-11-21 VITALS
BODY MASS INDEX: 28.72 KG/M2 | DIASTOLIC BLOOD PRESSURE: 62 MMHG | WEIGHT: 167.31 LBS | SYSTOLIC BLOOD PRESSURE: 112 MMHG

## 2023-11-21 DIAGNOSIS — O09.92 HIGH-RISK PREGNANCY IN SECOND TRIMESTER: Primary | ICD-10-CM

## 2023-11-21 DIAGNOSIS — O09.92 HIGH-RISK PREGNANCY IN SECOND TRIMESTER: ICD-10-CM

## 2023-11-21 DIAGNOSIS — M62.89 PELVIC FLOOR TENSION: ICD-10-CM

## 2023-11-21 DIAGNOSIS — O34.02 SEPTATE UTERUS AFFECTING PREGNANCY IN SECOND TRIMESTER: ICD-10-CM

## 2023-11-21 DIAGNOSIS — O99.340 ANXIETY DISORDER AFFECTING PREGNANCY, ANTEPARTUM: ICD-10-CM

## 2023-11-21 DIAGNOSIS — O99.012 ANEMIA AFFECTING PREGNANCY IN SECOND TRIMESTER: ICD-10-CM

## 2023-11-21 DIAGNOSIS — Q51.28 SEPTATE UTERUS AFFECTING PREGNANCY IN SECOND TRIMESTER: ICD-10-CM

## 2023-11-21 DIAGNOSIS — R27.8 COORDINATION IMPAIRMENT: Primary | ICD-10-CM

## 2023-11-21 DIAGNOSIS — F41.9 ANXIETY DISORDER AFFECTING PREGNANCY, ANTEPARTUM: ICD-10-CM

## 2023-11-21 LAB
BASOPHILS # BLD AUTO: 0.03 K/UL (ref 0–0.2)
BASOPHILS NFR BLD: 0.4 % (ref 0–1.9)
DIFFERENTIAL METHOD BLD: ABNORMAL
EOSINOPHIL # BLD AUTO: 0 K/UL (ref 0–0.5)
EOSINOPHIL NFR BLD: 0.4 % (ref 0–8)
ERYTHROCYTE [DISTWIDTH] IN BLOOD BY AUTOMATED COUNT: 14.1 % (ref 11.5–14.5)
GLUCOSE SERPL-MCNC: 88 MG/DL (ref 70–140)
HCT VFR BLD AUTO: 31.9 % (ref 37–48.5)
HGB BLD-MCNC: 10.1 G/DL (ref 12–16)
IMM GRANULOCYTES # BLD AUTO: 0.02 K/UL (ref 0–0.04)
IMM GRANULOCYTES NFR BLD AUTO: 0.3 % (ref 0–0.5)
LYMPHOCYTES # BLD AUTO: 1.3 K/UL (ref 1–4.8)
LYMPHOCYTES NFR BLD: 18.6 % (ref 18–48)
MCH RBC QN AUTO: 28.8 PG (ref 27–31)
MCHC RBC AUTO-ENTMCNC: 31.7 G/DL (ref 32–36)
MCV RBC AUTO: 91 FL (ref 82–98)
MONOCYTES # BLD AUTO: 0.5 K/UL (ref 0.3–1)
MONOCYTES NFR BLD: 7.1 % (ref 4–15)
NEUTROPHILS # BLD AUTO: 5.2 K/UL (ref 1.8–7.7)
NEUTROPHILS NFR BLD: 73.2 % (ref 38–73)
NRBC BLD-RTO: 0 /100 WBC
PLATELET # BLD AUTO: 223 K/UL (ref 150–450)
PMV BLD AUTO: 11.8 FL (ref 9.2–12.9)
RBC # BLD AUTO: 3.51 M/UL (ref 4–5.4)
WBC # BLD AUTO: 7.14 K/UL (ref 3.9–12.7)

## 2023-11-21 PROCEDURE — 90471 IMMUNIZATION ADMIN: CPT | Mod: PBBFAC,PN

## 2023-11-21 PROCEDURE — 97530 THERAPEUTIC ACTIVITIES: CPT

## 2023-11-21 PROCEDURE — 99999PBSHW TDAP VACCINE GREATER THAN OR EQUAL TO 7YO IM: ICD-10-PCS | Mod: PBBFAC,,,

## 2023-11-21 PROCEDURE — 99999 PR PBB SHADOW E&M-EST. PATIENT-LVL II: CPT | Mod: PBBFAC,,, | Performed by: OBSTETRICS & GYNECOLOGY

## 2023-11-21 PROCEDURE — 90686 IIV4 VACC NO PRSV 0.5 ML IM: CPT | Mod: PBBFAC,PN

## 2023-11-21 PROCEDURE — 90472 IMMUNIZATION ADMIN EACH ADD: CPT | Mod: PBBFAC,PN

## 2023-11-21 PROCEDURE — 99999PBSHW TDAP VACCINE GREATER THAN OR EQUAL TO 7YO IM: Mod: PBBFAC,,,

## 2023-11-21 PROCEDURE — 0502F SUBSEQUENT PRENATAL CARE: CPT | Mod: CPTII,,, | Performed by: OBSTETRICS & GYNECOLOGY

## 2023-11-21 PROCEDURE — 97140 MANUAL THERAPY 1/> REGIONS: CPT

## 2023-11-21 PROCEDURE — 90715 TDAP VACCINE 7 YRS/> IM: CPT | Mod: PBBFAC,PN

## 2023-11-21 PROCEDURE — 99999PBSHW FLU VACCINE (QUAD) GREATER THAN OR EQUAL TO 3YO PRESERVATIVE FREE IM: Mod: PBBFAC,,,

## 2023-11-21 PROCEDURE — 85025 COMPLETE CBC W/AUTO DIFF WBC: CPT | Performed by: OBSTETRICS & GYNECOLOGY

## 2023-11-21 PROCEDURE — 99999 PR PBB SHADOW E&M-EST. PATIENT-LVL II: ICD-10-PCS | Mod: PBBFAC,,, | Performed by: OBSTETRICS & GYNECOLOGY

## 2023-11-21 PROCEDURE — 82950 GLUCOSE TEST: CPT | Performed by: OBSTETRICS & GYNECOLOGY

## 2023-11-21 PROCEDURE — 97112 NEUROMUSCULAR REEDUCATION: CPT

## 2023-11-21 PROCEDURE — 0502F PR SUBSEQUENT PRENATAL CARE: ICD-10-PCS | Mod: CPTII,,, | Performed by: OBSTETRICS & GYNECOLOGY

## 2023-11-21 NOTE — PATIENT INSTRUCTIONS
([employer])     Relaxation      To isolate front of pelvic floor: think about opening around your vagina or filling up your lower belly with air as you inhale    - Try performing in supported butterfly stretch (knees out, feet together). Can also performing one side at a time.     To isolate back of pelvic floor: imagine sit bones widening or your butt filling up your butt with air as you inhale   - Try performing with knees resting together, feet spread wider      So when practicing this at home: Complete 1 minute at each layer 2 times per day  Inhale and let pelvic floor muscles expand. Do not push muscles down!  Exhale and let pelvic floor muscles relax. Do not contract!
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
## 2023-11-21 NOTE — PROGRESS NOTES
Good fm.  Denies ctx, vb, lof.  Counseled  on tdap and flu.  Will get today.  Will follow up labs.  She wants uterine septum removal and paragard placement postpartum.  Mone was seen today for routine prenatal visit.    Diagnoses and all orders for this visit:    High-risk pregnancy in second trimester  -     CBC Auto Differential; Future  -     OB Glucose Screen; Future    Anxiety disorder affecting pregnancy, antepartum    Septate uterus affecting pregnancy in second trimester    Anemia affecting pregnancy in second trimester    Care and examination of lactating mother  -     BREAST PUMP FOR HOME USE

## 2023-11-22 ENCOUNTER — TELEPHONE (OUTPATIENT)
Dept: FAMILY MEDICINE | Facility: CLINIC | Age: 33
End: 2023-11-22
Payer: COMMERCIAL

## 2023-11-22 ENCOUNTER — HOSPITAL ENCOUNTER (OUTPATIENT)
Dept: RADIOLOGY | Facility: HOSPITAL | Age: 33
Discharge: HOME | End: 2023-11-22
Payer: COMMERCIAL

## 2023-11-22 ENCOUNTER — ROUTINE PRENATAL (OUTPATIENT)
Dept: OBSTETRICS AND GYNECOLOGY | Facility: CLINIC | Age: 33
End: 2023-11-22
Payer: COMMERCIAL

## 2023-11-22 VITALS
BODY MASS INDEX: 28.99 KG/M2 | DIASTOLIC BLOOD PRESSURE: 78 MMHG | WEIGHT: 168.88 LBS | SYSTOLIC BLOOD PRESSURE: 122 MMHG

## 2023-11-22 DIAGNOSIS — M54.16 LUMBAR RADICULOPATHY: ICD-10-CM

## 2023-11-22 DIAGNOSIS — Q51.28 UTERINE SEPTUM: ICD-10-CM

## 2023-11-22 DIAGNOSIS — Z34.00 SUPERVISION OF NORMAL FIRST PREGNANCY, ANTEPARTUM: Primary | ICD-10-CM

## 2023-11-22 PROBLEM — R27.8 COORDINATION IMPAIRMENT: Status: ACTIVE | Noted: 2023-11-22

## 2023-11-22 PROCEDURE — 0502F PR SUBSEQUENT PRENATAL CARE: ICD-10-PCS | Mod: CPTII,S$GLB,, | Performed by: OBSTETRICS & GYNECOLOGY

## 2023-11-22 PROCEDURE — 99999 PR PBB SHADOW E&M-EST. PATIENT-LVL III: ICD-10-PCS | Mod: PBBFAC,,, | Performed by: OBSTETRICS & GYNECOLOGY

## 2023-11-22 PROCEDURE — 72148 MRI LUMBAR SPINE W/O DYE: CPT | Mod: TC

## 2023-11-22 PROCEDURE — 0502F SUBSEQUENT PRENATAL CARE: CPT | Mod: CPTII,S$GLB,, | Performed by: OBSTETRICS & GYNECOLOGY

## 2023-11-22 PROCEDURE — 99999 PR PBB SHADOW E&M-EST. PATIENT-LVL III: CPT | Mod: PBBFAC,,, | Performed by: OBSTETRICS & GYNECOLOGY

## 2023-11-22 PROCEDURE — 72148 MRI LUMBAR SPINE WITHOUT CONTRAST: ICD-10-PCS | Mod: 26,,, | Performed by: RADIOLOGY

## 2023-11-22 PROCEDURE — 72148 MRI LUMBAR SPINE W/O DYE: CPT | Mod: 26,,, | Performed by: RADIOLOGY

## 2023-11-22 NOTE — TELEPHONE ENCOUNTER
Return call placed to patient and voicemail message left to call into clinic for scheduling. Awaiting call back.

## 2023-11-22 NOTE — TELEPHONE ENCOUNTER
Left message on voice mail to inform patient to call the [facility] for assistance with scheduling an appointment with Dr. Gardner.

## 2023-11-22 NOTE — TELEPHONE ENCOUNTER
----- Message from Amarilys Angulo sent at 11/22/2023  2:45 PM CST -----  Type:  Patient  Call    Who Called: Pt   Would the patient rather a call back or a response via [facility]? call  Best Call Back Number [phone]  Additional Information:  Pt  called  requesting to schedule  an  appointment with Dr. Gardner at the [facility],  no  dates is  given in the  system. Please  call pt

## 2023-11-22 NOTE — PATIENT INSTRUCTIONS
Home Exercise Program: 11/22/2023    DIAPHRAGMATIC BREATHING     The diaphragm is a dome shaped muscle that forms the floor of the rib cage. It is the most efficient muscle for breathing and relaxation, although most people are not used to using the diaphragm. Diaphragmatic or belly breathing is an important technique to learn because it helps settle down or relax the autonomic nervous system. The correct use of diaphragmatic breathing can help to quiet brain activity resulting in the relaxation of all the muscles and organs of the body. This is accomplished by slow rhythmic breathing concentrated in the diaphragm muscle rather than the chest.    360 Breath - Inhale long, slow and deep. You should feel as if your lower ribs are expanding in all directions like the way an umbrella opens. You should feel the belly, back and sides gently expand and you may notice a relaxation in the pelvic floor. If you notice that your belly is pulling up and flattening during your inhale - try to reverse this and allow your belly to gently expand during the inhale while it recoils and flattens during the exhale      Continue to breath like this for 5 minutes. Repeat 1 times/day.       1) Voluntary relaxation - spend time consciously relaxing muscles. Get in comfortable position and focus on relaxing all muscles around hips, low back, and abdominals, then pelvic floor. As you are relaxing pelvic floor muscles think about softening, opening, dropping, letting go. Some people describe like a flower whose petals are opening or like gently laying an egg. Spend 3-5 minutes doing this.  This can be a great position:      2) Diaphragmatic breathing - focus on circumferential expansion. Can do ssitting or laying on back. Should feel like your lower ribs expand in all directions as the abdominals and low back also gently expand and fill with the breath. The anus should gently drop away from you on inhalation. If you are sitting, you may feel  like the anus is floating down towards the seat. 15-20 breaths, or more.    Do above, 1x/day    Also begin to become aware of if/when holding pelvic muscle tension throughout the day and try to relax/let go

## 2023-11-22 NOTE — TELEPHONE ENCOUNTER
"----- Message from Jazmin Nazario sent at 11/22/2023  2:15 PM CST -----  Regarding: self [phone]  .Type: Patient Call Back    Who called:self    What is the request in detail: patient is requesting a call back in regards to scheduling a follow up appointment. "[employer]" isn't allowing me to do so, I'm unsure why.    Can the clinic reply by [facility]? no    Would the patient rather a call back or a response via [facility]? Call back     Best call back number [phone]        "
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
## 2023-11-26 PROBLEM — Z34.00 SUPERVISION OF NORMAL FIRST PREGNANCY, ANTEPARTUM: Status: ACTIVE | Noted: 2023-11-26

## 2023-11-26 NOTE — PROGRESS NOTES
Complaints today: Ms. Chandler reports that she is doing well. She is nervous about her uterine septum and concerned and has questions about her pregnancy.     Normal fetal movements with no vaginal bleeding, LOF or contractions at this time.     /78   Wt 76.6 kg (168 lb 14 oz)   LMP 05/15/2023   BMI 28.99 kg/m²     33 y.o., at 28w2d by Estimated Date of Delivery: 24  Patient Active Problem List   Diagnosis    ADRIANO (generalized anxiety disorder)    PCOS (polycystic ovarian syndrome)    Overweight (BMI 25.0-29.9)    Migraine without aura and without status migrainosus, not intractable    Pelvic floor tension    Anemia affecting pregnancy in second trimester    Septate uterus affecting pregnancy in second trimester    Anxiety disorder affecting pregnancy, antepartum    High-risk pregnancy in second trimester    Coordination impairment    Supervision of normal first pregnancy, antepartum     OB History    Para Term  AB Living   1             SAB IAB Ectopic Multiple Live Births                  # Outcome Date GA Lbr Arya/2nd Weight Sex Delivery Anes PTL Lv   1 Current                Dating reviewed    Allergies and problem list reviewed and updated    Medical and surgical history reviewed    Prenatal labs reviewed and updated    Physical Exam:  ABD: soft, gravid, nontender, 28w2    Assessment:  Mone was seen today for routine prenatal visit and advice only.    Diagnoses and all orders for this visit:    Supervision of normal first pregnancy, antepartum    Uterine septum  - Discussed uterine anomaly and pregnancy with patient in detail   - Reviewed MFM US and consult note with patient  - Patient is concerned about her health and wondering if she would abort pregnancy, although not her desire, in lieu of her health  - Discussed with patient that she is entering the 3 trimester at which point  labor of her baby will come with complications, there are medical advances to help assist the  baby outside of the womb.She has no signs of placenta abruption or  labor at this time  - Questions answered       No orders of the defined types were placed in this encounter.      Follow up in about 2 weeks (around 2023) for Routine OB with OB of her choosing.

## 2023-12-04 ENCOUNTER — CLINICAL SUPPORT (OUTPATIENT)
Dept: REHABILITATION | Facility: OTHER | Age: 33
End: 2023-12-04
Attending: ADVANCED PRACTICE MIDWIFE
Payer: MEDICAID

## 2023-12-04 ENCOUNTER — PATIENT MESSAGE (OUTPATIENT)
Dept: OTHER | Facility: OTHER | Age: 33
End: 2023-12-04
Payer: MEDICAID

## 2023-12-04 DIAGNOSIS — M62.89 PELVIC FLOOR TENSION: Primary | ICD-10-CM

## 2023-12-04 DIAGNOSIS — R27.8 COORDINATION IMPAIRMENT: ICD-10-CM

## 2023-12-04 PROCEDURE — 97112 NEUROMUSCULAR REEDUCATION: CPT

## 2023-12-04 PROCEDURE — 97140 MANUAL THERAPY 1/> REGIONS: CPT

## 2023-12-04 NOTE — PROGRESS NOTES
Pelvic Health Physical Therapy   Treatment Note     Name: Mone Chandler  Clinic Number: [id number]    Therapy Diagnosis:   Encounter Diagnoses   Name Primary?    Pelvic floor tension Yes    Coordination impairment        Physician: Salo Bob,*    Visit Date: 12/4/2023    Physician Orders: PT Eval and Treat - Pelvic Floor PT   Medical Diagnosis from Referral: M62.89 (ICD-10-CM) - Pelvic floor dysfunction   Evaluation Date: 11/6/2023  Authorization Period Expiration: 12/31/2023  Plan of Care Expiration: 2/6/2024  Visit # / Visits authorized: 3/40    Cancelled Visits: 0  No Show Visits: 0    Time In: 1600  Time Out: 1635  Total Billable Time: 35 minutes    Precautions: Standard    Subjective     Pt reports: continues with difficulty achieving complete relaxation, especially in sitting on toilet for BMs    She was not compliant with home exercise program.  Response to previous treatment: Good   Functional change: Ongoing     Pain: 0/10  Location:     Objective     FOTO 1/3 - Most recently administered 11/21/2023    Mone received therapeutic exercises to develop  strength, endurance, ROM, flexibility, posture, and core stabilization for 00 minutes including:     Mone received the following manual therapy techniques: to develop flexibility, extensibility, and desensitization for 10 minutes including: trigger point/myofascial release of pelvic floor muscle     Externa, internal lMFR to pelvic floor muscles  In hook lying + hip internal rotation, external rotation     Mone participated in neuromuscular re-education activities to develop Coordination, Control, and Down training for 25 minutes including: pelvic floor relaxation/bulging training and diaphragmatic breathing    Diaphragmatic breathing review    + pelvic floor muscle mobility    Anterior vs posterior pelvic floor muscle focus     Mone participated in dynamic functional therapeutic activities to improve functional performance for  00 minutes, including:        Home Exercises Provided and Patient Education Provided     Education provided:   - anatomy/physiology of pelvic floor, posture/body mechanices, diaphragmatic breathing, and behavior modifications  Discussed progression of plan of care with patient; educated pt in activity modification; reviewed HEP with pt. Pt demonstrated and verbalized understanding of all instruction and was provided with a handout of HEP (see Patient Instructions).    Written Home Exercises Provided: yes.  Exercises were reviewed and Mone was able to demonstrate them prior to the end of the session.  Mone demonstrated good  understanding of the education provided.     See EMR under Patient Instructions for exercises provided 11/21/2023.    Assessment     Pelvic floor muscle relaxation training continued this session, with Mone demonstrating continued difficulty with complete pelvic floor muscle relaxation. Slight improvement noted in superficial pelvic floor, though continued tension noted in deep, posterior pelvic floor muscles. Plan to continue and progress as tolerated in preparation for delivery.    Mone Is progressing well towards her goals.   Pt prognosis is Excellent.     Pt will continue to benefit from skilled outpatient physical therapy to address the deficits listed in the problem list box on initial evaluation, provide pt/family education and to maximize pt's level of independence in the home and community environment.     Pt's spiritual, cultural and educational needs considered and pt agreeable to plan of care and goals.     Anticipated barriers to physical therapy:  limitations in scheduling, chronicity of condition     Goals:  Short Term Goals: 5 weeks -progressing  Pt to demonstrate proper diaphragmatic breathing to help with calming the nervous system in order to decrease pain and to improve abdominal wall musculature extensibility.   Pt to demonstrate good body mechanics when  performing ADLs such as lifting and bending to decrease strain to lumbopelvic structures and reduce risk of further injury.  Pt will report minimal to no pain with single digit pelvic assessment and display relaxation demonstrating improving pelvic floor muscle relaxation and coordination.   Patient to demonstrate good body mechanics with sit <--> stand and side lying <--> sitting transfers to demonstrate improved functional mobility.    Pt to be able to bulge pelvic floor which is needed for comfortable BM and complete evacuation.   Pt to report being able to have a BM without straining 75% of the time to demonstrate improving PF coordination.      Long Term Goals: 10 weeks -progressing  Pt to be discharged with home plan for carry over after discharge.    Pt will be trained and compliant with postural strategies in sitting and standing to improve alignment and decrease pain and muscle fatigue  Pt to report being able to have a comfortable vaginal exam without significant increase in pelvic pain.  Pt to report a decrease in pain to no more than 3/10 at it's worst with vaginal intercourse.    Pt to demonstrate being able to correctly and consistently relax and lengthen her pelvic floor muscle(s) in preparation for labor and delivery.     Pt to report being able to have a BM without straining 90% of the time to demonstrate improving PF coordination.        Plan     Plan for next visit: progress pelvic floor muscle assessment, review/progress relaxation training     DM BALLARD, PT   12/11/2023

## 2023-12-07 ENCOUNTER — ROUTINE PRENATAL (OUTPATIENT)
Dept: OBSTETRICS AND GYNECOLOGY | Facility: CLINIC | Age: 33
End: 2023-12-07
Payer: COMMERCIAL

## 2023-12-07 VITALS — SYSTOLIC BLOOD PRESSURE: 103 MMHG | DIASTOLIC BLOOD PRESSURE: 75 MMHG

## 2023-12-07 DIAGNOSIS — M51.36 BULGING LUMBAR DISC: ICD-10-CM

## 2023-12-07 DIAGNOSIS — O99.343 MENTAL DISORDER AFFECTING PREGNANCY IN THIRD TRIMESTER: ICD-10-CM

## 2023-12-07 DIAGNOSIS — Z34.03 ENCOUNTER FOR SUPERVISION OF NORMAL FIRST PREGNANCY IN THIRD TRIMESTER: Primary | ICD-10-CM

## 2023-12-07 DIAGNOSIS — Q51.28 UTERINE SEPTUM: ICD-10-CM

## 2023-12-07 PROCEDURE — 99999 PR PBB SHADOW E&M-EST. PATIENT-LVL III: CPT | Mod: PBBFAC,,, | Performed by: OBSTETRICS & GYNECOLOGY

## 2023-12-07 PROCEDURE — 0502F SUBSEQUENT PRENATAL CARE: CPT | Mod: CPTII,S$GLB,, | Performed by: OBSTETRICS & GYNECOLOGY

## 2023-12-07 PROCEDURE — 0502F PR SUBSEQUENT PRENATAL CARE: ICD-10-PCS | Mod: CPTII,S$GLB,, | Performed by: OBSTETRICS & GYNECOLOGY

## 2023-12-07 PROCEDURE — 99999 PR PBB SHADOW E&M-EST. PATIENT-LVL III: ICD-10-PCS | Mod: PBBFAC,,, | Performed by: OBSTETRICS & GYNECOLOGY

## 2023-12-07 NOTE — PROGRESS NOTES
Pregnancy dating, labs, ultrasound reports, prenatal testing, and problem list; prior records and results; and available outside records were reviewed and updated in EMR.  Pertinent findings were noted below.    Reason for Visit   Routine Prenatal Visit    HPI   33 y.o., at 30w3d by Estimated Date of Delivery: 24    Patient reports severe distress with current pregnancy. She feels anchored down to situations that she would not have been in if she were not pregnant. Desired termination earlier on but did not follow through. Patient is concerned about her own overall health, especially given septate uterus. Patient reports her family is minimally supportive. Denies SI/HI. No guns at home.    Contractions: No   Bleeding: No   Loss of fluid: No   Fetal movement: Yes   Nausea: Yes   Vomiting: No   Headache: No     Exam   /75   LMP 05/15/2023     TW.1 kg (13 lb 7.2 oz)   GENERAL: No acute distress  ABD: Gravid    Assessment and Plan   Encounter for supervision of normal first pregnancy in third trimester    Uterine septum    Mental disorder affecting pregnancy in third trimester    Bulging lumbar disc        Discussed possible adverse outcomes with patient regarding uterine septum as per M note 10/12  Advised patient to seek additional resources and support for mental health  Patient concerned about bulging disc seen on MRI. Will refer to OB anesthesia to see if there are any issues with receiving epidural during labor course  Growth ultrasound schedules      labor precautions given  Follow-up: 2 weeks      Marita Rosa MD PGY1  Obstetrics and Gynecology    I have reviewed the Resident's progress note.  I have personally interviewed and examined the patient at bedside and agree with the findings as documented.  All patient questions answered.  -- STEVEN Bardales M.D.

## 2023-12-07 NOTE — LETTER
December 7, 2023      [facility] [place]  [address]  [facility] [address]  [facility] [address]  Phone: [phone]  Fax: [phone]       Patient: Mone Chandler   YOB: 1990  Date of Visit: 12/07/2023    To Whom It May Concern:    Lucía Chandler  was at [facility] on 12/07/2023.  She is under our care for her pregnancy.  At this point in pregnancy, I recommend brief sitting breaks every 1 to 2 hours and no heavy lifting greater than 25 pounds.  If you have any questions or concerns, or if I can be of further assistance, please do not hesitate to contact me.    Sincerely,        STEVEN Bardales MD

## 2023-12-15 ENCOUNTER — TELEPHONE (OUTPATIENT)
Dept: OBSTETRICS AND GYNECOLOGY | Facility: CLINIC | Age: 33
End: 2023-12-15
Payer: MEDICAID

## 2023-12-15 NOTE — TELEPHONE ENCOUNTER
----- Message from Leslie Ding sent at 12/15/2023  9:27 AM CST -----  Name of Who is Calling:LAVELLE CALLEJAS [[id number]]                What is the request in detail: Pt calling because she would like to change her apt time, states she get off at 3:45pm on 12/22.Please call back to further assist.                 Can the clinic reply by [facility]: No                What Number to Call Back if not in [facility]: [phone]

## 2023-12-18 ENCOUNTER — PATIENT MESSAGE (OUTPATIENT)
Dept: OTHER | Facility: OTHER | Age: 33
End: 2023-12-18
Payer: MEDICAID

## 2023-12-18 ENCOUNTER — CLINICAL SUPPORT (OUTPATIENT)
Dept: REHABILITATION | Facility: OTHER | Age: 33
End: 2023-12-18
Attending: OBSTETRICS & GYNECOLOGY
Payer: MEDICAID

## 2023-12-18 DIAGNOSIS — R27.8 COORDINATION IMPAIRMENT: ICD-10-CM

## 2023-12-18 DIAGNOSIS — M62.89 PELVIC FLOOR TENSION: Primary | ICD-10-CM

## 2023-12-18 PROCEDURE — 97530 THERAPEUTIC ACTIVITIES: CPT

## 2023-12-18 NOTE — PROGRESS NOTES
Pelvic Health Physical Therapy   Treatment Note     Name: Mone Chandler  Clinic Number: [id number]    Therapy Diagnosis:   Encounter Diagnoses   Name Primary?    Pelvic floor tension Yes    Coordination impairment        Physician: Salo Bob,*    Visit Date: 12/18/2023    Physician Orders: PT Eval and Treat - Pelvic Floor PT   Medical Diagnosis from Referral: M62.89 (ICD-10-CM) - Pelvic floor dysfunction   Evaluation Date: 11/6/2023  Authorization Period Expiration: 12/31/2023  Plan of Care Expiration: 2/6/2024  Visit # / Visits authorized: 4/40    Cancelled Visits: 0  No Show Visits: 0    Time In: 1610  Time Out: 1640  Total Billable Time: 30 minutes    Precautions: Standard, Pregnant JONAH 2/12/2024    Subjective     Pt reports: Has not been consistent with exercises. Feels like work-related stress has been most limiting for her. She typically performs relaxation exercises by herself prior to exercising in the morning.   Has not been consistent with water/fluid intake, and has been very constipated lately.   Has been getting prenatal massages every 2 weeks.     She was not compliant with home exercise program.  Response to previous treatment: Good   Functional change: Ongoing     Pain: 0/10  Location:     Objective     FOTO 1/3 - Most recently administered 11/21/2023    Mone received therapeutic exercises to develop  strength, endurance, ROM, flexibility, posture, and core stabilization for 00 minutes including:     Mone received the following manual therapy techniques: to develop flexibility, extensibility, and desensitization for 00 minutes including: trigger point/myofascial release of pelvic floor muscle     Externa, internal lMFR to pelvic floor muscles  In hook lying + hip internal rotation, external rotation     Mone participated in neuromuscular re-education activities to develop Coordination, Control, and Down training for 00 minutes including: pelvic floor relaxation/bulging  training and diaphragmatic breathing    Diaphragmatic breathing review    + pelvic floor muscle mobility    Anterior vs posterior pelvic floor muscle focus     Mone participated in dynamic functional therapeutic activities to improve functional performance for 30 minutes, including:    Bowel habits    Fluid intake goals    Magnesium use    Pelvic floor muscle relaxation, regular activity     Labor and delivery considerations     Home Exercises Provided and Patient Education Provided     Education provided:   - anatomy/physiology of pelvic floor, posture/body mechanices, diaphragmatic breathing, and behavior modifications  Discussed progression of plan of care with patient; educated pt in activity modification; reviewed HEP with pt. Pt demonstrated and verbalized understanding of all instruction and was provided with a handout of HEP (see Patient Instructions).    Written Home Exercises Provided: yes.  Exercises were reviewed and Mone was able to demonstrate them prior to the end of the session.  Mone demonstrated good  understanding of the education provided.     See EMR under Patient Instructions for exercises provided 11/21/2023.    Assessment     Bowel habits and exercise routine reviewed this session, as home exercise program use has been greatly limited. Mone plans to improve use over the holiday break from work. Plan to reassess utility of magnesium supplementation, improved fluid intake, and relaxation training.     Mone Is progressing well towards her goals.   Pt prognosis is Excellent.     Pt will continue to benefit from skilled outpatient physical therapy to address the deficits listed in the problem list box on initial evaluation, provide pt/family education and to maximize pt's level of independence in the home and community environment.     Pt's spiritual, cultural and educational needs considered and pt agreeable to plan of care and goals.     Anticipated barriers to physical  therapy:  limitations in scheduling, chronicity of condition     Goals:  Short Term Goals: 5 weeks -progressing  Pt to demonstrate proper diaphragmatic breathing to help with calming the nervous system in order to decrease pain and to improve abdominal wall musculature extensibility.   Pt to demonstrate good body mechanics when performing ADLs such as lifting and bending to decrease strain to lumbopelvic structures and reduce risk of further injury.  Pt will report minimal to no pain with single digit pelvic assessment and display relaxation demonstrating improving pelvic floor muscle relaxation and coordination.   Patient to demonstrate good body mechanics with sit <--> stand and side lying <--> sitting transfers to demonstrate improved functional mobility.    Pt to be able to bulge pelvic floor which is needed for comfortable BM and complete evacuation.   Pt to report being able to have a BM without straining 75% of the time to demonstrate improving PF coordination.      Long Term Goals: 10 weeks -progressing  Pt to be discharged with home plan for carry over after discharge.    Pt will be trained and compliant with postural strategies in sitting and standing to improve alignment and decrease pain and muscle fatigue  Pt to report being able to have a comfortable vaginal exam without significant increase in pelvic pain.  Pt to report a decrease in pain to no more than 3/10 at it's worst with vaginal intercourse.    Pt to demonstrate being able to correctly and consistently relax and lengthen her pelvic floor muscle(s) in preparation for labor and delivery.     Pt to report being able to have a BM without straining 90% of the time to demonstrate improving PF coordination.        Plan     Plan for next visit: progress pelvic floor muscle assessment, review/progress relaxation training     DM BALLARD, PT   12/18/2023

## 2023-12-19 ENCOUNTER — TELEPHONE (OUTPATIENT)
Dept: OBSTETRICS AND GYNECOLOGY | Facility: CLINIC | Age: 33
End: 2023-12-19
Payer: MEDICAID

## 2023-12-19 NOTE — TELEPHONE ENCOUNTER
Spoke with pt in regards to upcoming appt. Informed pt that scheduled appt time is providers latest time in clinic. Offered pt appt with different provider pt stated she will give a call back updating if she would be able to keep appt or try for latest time with different provider.

## 2023-12-19 NOTE — TELEPHONE ENCOUNTER
----- Message from Danyell Jones sent at 12/19/2023  3:45 PM CST -----  Pt called said she missed a call from the office she need to speak with someone she can be reached [phone]

## 2023-12-20 ENCOUNTER — OFFICE VISIT (OUTPATIENT)
Dept: MATERNAL FETAL MEDICINE | Facility: CLINIC | Age: 33
End: 2023-12-20
Payer: MEDICAID

## 2023-12-20 ENCOUNTER — PROCEDURE VISIT (OUTPATIENT)
Dept: MATERNAL FETAL MEDICINE | Facility: CLINIC | Age: 33
End: 2023-12-20
Payer: COMMERCIAL

## 2023-12-20 VITALS — SYSTOLIC BLOOD PRESSURE: 116 MMHG | DIASTOLIC BLOOD PRESSURE: 62 MMHG

## 2023-12-20 DIAGNOSIS — Z36.89 ENCOUNTER FOR ULTRASOUND TO ASSESS FETAL GROWTH: ICD-10-CM

## 2023-12-20 DIAGNOSIS — O36.5990 PREGNANCY AFFECTED BY FETAL GROWTH RESTRICTION: Primary | ICD-10-CM

## 2023-12-20 DIAGNOSIS — O36.5990 FETAL GROWTH RESTRICTION ANTEPARTUM: Primary | ICD-10-CM

## 2023-12-20 PROCEDURE — 76819 FETAL BIOPHYS PROFIL W/O NST: CPT | Mod: 26,S$PBB,, | Performed by: OBSTETRICS & GYNECOLOGY

## 2023-12-20 PROCEDURE — 99999 PR PBB SHADOW E&M-EST. PATIENT-LVL I: ICD-10-PCS | Mod: PBBFAC,,, | Performed by: OBSTETRICS & GYNECOLOGY

## 2023-12-20 PROCEDURE — 3074F PR MOST RECENT SYSTOLIC BLOOD PRESSURE < 130 MM HG: ICD-10-PCS | Mod: CPTII,,, | Performed by: OBSTETRICS & GYNECOLOGY

## 2023-12-20 PROCEDURE — 76820 PR US, OB DOPPLER, FETAL UMBILICAL ARTERY ECHO: ICD-10-PCS | Mod: 26,S$PBB,, | Performed by: OBSTETRICS & GYNECOLOGY

## 2023-12-20 PROCEDURE — 99999 PR PBB SHADOW E&M-EST. PATIENT-LVL I: CPT | Mod: PBBFAC,,, | Performed by: OBSTETRICS & GYNECOLOGY

## 2023-12-20 PROCEDURE — 99211 OFF/OP EST MAY X REQ PHY/QHP: CPT | Mod: PBBFAC,TH,25 | Performed by: OBSTETRICS & GYNECOLOGY

## 2023-12-20 PROCEDURE — 76820 UMBILICAL ARTERY ECHO: CPT | Mod: 26,S$PBB,, | Performed by: OBSTETRICS & GYNECOLOGY

## 2023-12-20 PROCEDURE — 76816 OB US FOLLOW-UP PER FETUS: CPT | Mod: PBBFAC | Performed by: OBSTETRICS & GYNECOLOGY

## 2023-12-20 PROCEDURE — 76819 PR US, OB, FETAL BIOPHYSICAL, W/O NST: ICD-10-PCS | Mod: 26,S$PBB,, | Performed by: OBSTETRICS & GYNECOLOGY

## 2023-12-20 PROCEDURE — 99215 PR OFFICE/OUTPT VISIT, EST, LEVL V, 40-54 MIN: ICD-10-PCS | Mod: 25,S$PBB,TH, | Performed by: OBSTETRICS & GYNECOLOGY

## 2023-12-20 PROCEDURE — 76819 FETAL BIOPHYS PROFIL W/O NST: CPT | Mod: PBBFAC | Performed by: OBSTETRICS & GYNECOLOGY

## 2023-12-20 PROCEDURE — 99215 OFFICE O/P EST HI 40 MIN: CPT | Mod: 25,S$PBB,TH, | Performed by: OBSTETRICS & GYNECOLOGY

## 2023-12-20 PROCEDURE — 76820 UMBILICAL ARTERY ECHO: CPT | Mod: PBBFAC | Performed by: OBSTETRICS & GYNECOLOGY

## 2023-12-20 PROCEDURE — 76816 OB US FOLLOW-UP PER FETUS: CPT | Mod: 26,S$PBB,, | Performed by: OBSTETRICS & GYNECOLOGY

## 2023-12-20 PROCEDURE — 76816 PR  US,PREGNANT UTERUS,F/U,TRANSABD APP: ICD-10-PCS | Mod: 26,S$PBB,, | Performed by: OBSTETRICS & GYNECOLOGY

## 2023-12-20 PROCEDURE — 3078F DIAST BP <80 MM HG: CPT | Mod: CPTII,,, | Performed by: OBSTETRICS & GYNECOLOGY

## 2023-12-20 PROCEDURE — 3078F PR MOST RECENT DIASTOLIC BLOOD PRESSURE < 80 MM HG: ICD-10-PCS | Mod: CPTII,,, | Performed by: OBSTETRICS & GYNECOLOGY

## 2023-12-20 PROCEDURE — 3074F SYST BP LT 130 MM HG: CPT | Mod: CPTII,,, | Performed by: OBSTETRICS & GYNECOLOGY

## 2023-12-20 NOTE — PROGRESS NOTES
Consultation  ==========  40 minutes of total time was spent on the encounter which included face-to-face time and non-face-to-face time preparing to see the patient,  obtaining and or reviewing separately obtained history, documenting clinical information in the electronic or other health record, independently  interpreting results (not separately reported) and communicating results to the patient and her partner, or care coordination (not separately  reported).    Referring MD or midlevel practitioner: PACO Chavez    The patients fetus has been diagnosed with FGR based on AC < 10th percentile. Potential etiologies of FGR include but are not limited to  normal variation of stature, placental insufficiency, chromosomal abnormalities, genetic disorders, infections, medical conditions, teratogen  exposure and other etiologies. Pregnancies complicated by FGR are at increased risk of stillbirth. We discussed recommendations for  antepartum testing. FGR, by itself, is not an indication for  delivery, although there is an increased risk of needing a  due to  heart rate abnormalities. Mode of delivery will be dictated by overall clinical status and doppler abnormalities (if any).    Recommendations:  * Start twice weekly  fetal surveillance with NST and BPP until delivery.  * Start weekly UA dopplers.  * Repeat fetal growth ultrasound in 3 weeks  * Patient counseled re: fetal movement monitoring and decreased fetal movement  * Monitor closely for any signs of evolving preeclampsia.  * If  testing is non-reassuring, delivery may be warranted regardless of GA.  * For all pregnancies with FGR, the placenta should be sent to pathology for examination.  * Mode of delivery will be dictated by overall clinical status and doppler abnormalities (if any).    General delivery timing (EFW as opposed to AC percentile should be used to determine delivery timing):  Exceptions to these recommendations may  occur (e.g. gestational age <26 weeks). However, in general, delivery should be considered  when:    FGR (EFW 3rd-9th or EFW > 10th with AC < 10th percentile)   Normal testing, No co-morbid conditions: 38 0/7- 39 0/7   UA Doppler S/D ratio > 95th percentile: 37 0/7- 37 6/7   Co-morbid conditions: 37 0/7- 37 6/7 (see below)    FGR (< 3rd percentile)   Normal testing, no co-morbid conditions: 37 0/7- 37 6/7   Co-morbid conditions: 36 0/7- 37 6/7 weeks (see below)    Maternal co-morbid conditions - CHTN, pregestational DM, renal disease, autoimmune disease, AMA = 40    Earlier delivery can be considered in conjunction with MFM in the setting of FGR with preeclampsia/gestational hypertension (36 0/7-37 0/7  weeks), multifetal pregnancy, or UA Doppler abnormalities (ie EFW < 3rd percentile with elevated UA Dopplers)    FGR + Oligohydramnios: At diagnosis, if GA = 34 weeks (if less than 34 weeks, management needs to be individualized based on other testing  and entire clinical scenario)  FGR + AEDF: By 34 weeks (33 0/7-34 6/7 weeks) if there is absent diastolic flow in the umbilical artery and there has not been a previous  indication for delivery  FGR + REDF: By 32 weeks (30 0/7-32 6/7 weeks), if there is reversed diastolic flow in the umbilical artery and there has not been a previous  indication for delivery    Ultrasound Impression  =========  FGR is identified on today's study. The EFW plots at the 13%, and the AC plots at the 4%.  The EFW is 1626 g.  Polyhydramnios is identified.  The BPP score is reassuring at 8/8.  Umbilical artery Doppler S/D ratios are normal.  The uterine septum is again identified.

## 2023-12-24 ENCOUNTER — PATIENT MESSAGE (OUTPATIENT)
Dept: OBSTETRICS AND GYNECOLOGY | Facility: CLINIC | Age: 33
End: 2023-12-24
Payer: MEDICAID

## 2023-12-26 ENCOUNTER — PROCEDURE VISIT (OUTPATIENT)
Dept: MATERNAL FETAL MEDICINE | Facility: CLINIC | Age: 33
End: 2023-12-26
Payer: MEDICAID

## 2023-12-26 ENCOUNTER — TELEPHONE (OUTPATIENT)
Dept: OBSTETRICS AND GYNECOLOGY | Facility: CLINIC | Age: 33
End: 2023-12-26
Payer: MEDICAID

## 2023-12-26 DIAGNOSIS — N91.2 AMENORRHEA: ICD-10-CM

## 2023-12-26 PROCEDURE — 76820 UMBILICAL ARTERY ECHO: CPT | Mod: 26,S$PBB,, | Performed by: OBSTETRICS & GYNECOLOGY

## 2023-12-26 PROCEDURE — 76819 US OB/GYN PROCEDURE (VIEWPOINT): ICD-10-PCS | Mod: 26,S$PBB,, | Performed by: OBSTETRICS & GYNECOLOGY

## 2023-12-26 PROCEDURE — 76819 FETAL BIOPHYS PROFIL W/O NST: CPT | Mod: PBBFAC | Performed by: OBSTETRICS & GYNECOLOGY

## 2023-12-26 PROCEDURE — 76820 US OB/GYN PROCEDURE (VIEWPOINT): ICD-10-PCS | Mod: 26,S$PBB,, | Performed by: OBSTETRICS & GYNECOLOGY

## 2023-12-26 NOTE — TELEPHONE ENCOUNTER
Spoke with patient - added patient to DR Padilla schedule 12/28/23 patient is aware of date and time.

## 2023-12-28 ENCOUNTER — HOSPITAL ENCOUNTER (OUTPATIENT)
Dept: PERINATAL CARE | Facility: OTHER | Age: 33
Discharge: HOME | End: 2023-12-28
Attending: OBSTETRICS & GYNECOLOGY
Payer: MEDICAID

## 2023-12-28 ENCOUNTER — ROUTINE PRENATAL (OUTPATIENT)
Dept: OBSTETRICS AND GYNECOLOGY | Facility: CLINIC | Age: 33
End: 2023-12-28
Attending: OBSTETRICS & GYNECOLOGY
Payer: COMMERCIAL

## 2023-12-28 VITALS
BODY MASS INDEX: 29.71 KG/M2 | DIASTOLIC BLOOD PRESSURE: 70 MMHG | WEIGHT: 173.06 LBS | SYSTOLIC BLOOD PRESSURE: 102 MMHG

## 2023-12-28 DIAGNOSIS — Q51.28 SEPTATE UTERUS AFFECTING PREGNANCY IN SECOND TRIMESTER: ICD-10-CM

## 2023-12-28 DIAGNOSIS — O34.02 SEPTATE UTERUS AFFECTING PREGNANCY IN SECOND TRIMESTER: ICD-10-CM

## 2023-12-28 DIAGNOSIS — O36.5990 PREGNANCY AFFECTED BY FETAL GROWTH RESTRICTION: ICD-10-CM

## 2023-12-28 DIAGNOSIS — O99.012 ANEMIA AFFECTING PREGNANCY IN SECOND TRIMESTER: ICD-10-CM

## 2023-12-28 DIAGNOSIS — O36.5990 FETAL GROWTH RESTRICTION ANTEPARTUM: ICD-10-CM

## 2023-12-28 DIAGNOSIS — O09.92 HIGH-RISK PREGNANCY IN SECOND TRIMESTER: Primary | ICD-10-CM

## 2023-12-28 DIAGNOSIS — O36.5990 FETAL GROWTH RESTRICTION ANTEPARTUM: Primary | ICD-10-CM

## 2023-12-28 DIAGNOSIS — O99.340 ANXIETY DISORDER AFFECTING PREGNANCY, ANTEPARTUM: ICD-10-CM

## 2023-12-28 DIAGNOSIS — F41.9 ANXIETY DISORDER AFFECTING PREGNANCY, ANTEPARTUM: ICD-10-CM

## 2023-12-28 PROBLEM — Z34.00 SUPERVISION OF NORMAL FIRST PREGNANCY, ANTEPARTUM: Status: RESOLVED | Noted: 2023-11-26 | Resolved: 2023-12-28

## 2023-12-28 PROCEDURE — 59025 FETAL NON-STRESS TEST: CPT | Mod: 26,,, | Performed by: OBSTETRICS & GYNECOLOGY

## 2023-12-28 PROCEDURE — 59025 FETAL NON-STRESS TEST: CPT

## 2023-12-28 PROCEDURE — 99213 PR OFFICE/OUTPT VISIT, EST, LEVL III, 20-29 MIN: ICD-10-PCS | Mod: TH,S$PBB,, | Performed by: OBSTETRICS & GYNECOLOGY

## 2023-12-28 PROCEDURE — 99999 PR PBB SHADOW E&M-EST. PATIENT-LVL III: ICD-10-PCS | Mod: PBBFAC,,, | Performed by: OBSTETRICS & GYNECOLOGY

## 2023-12-28 PROCEDURE — 99213 OFFICE O/P EST LOW 20 MIN: CPT | Mod: TH,S$PBB,, | Performed by: OBSTETRICS & GYNECOLOGY

## 2023-12-28 PROCEDURE — 59025 PRENATAL TESTING - NST/AFI: ICD-10-PCS | Mod: 26,,, | Performed by: OBSTETRICS & GYNECOLOGY

## 2023-12-28 PROCEDURE — 99213 OFFICE O/P EST LOW 20 MIN: CPT | Mod: PBBFAC,25,TH,PN | Performed by: OBSTETRICS & GYNECOLOGY

## 2023-12-28 PROCEDURE — 99999 PR PBB SHADOW E&M-EST. PATIENT-LVL III: CPT | Mod: PBBFAC,,, | Performed by: OBSTETRICS & GYNECOLOGY

## 2023-12-28 NOTE — PROGRESS NOTES
Complaints today:none. , Good fetal movements reported. No Bleeding or pains    /70   Wt 78.5 kg (173 lb 1 oz)   LMP 05/15/2023   BMI 29.71 kg/m²     33 y.o., at 33w3d by Estimated Date of Delivery: 24  Patient Active Problem List   Diagnosis    ADRIANO (generalized anxiety disorder)    PCOS (polycystic ovarian syndrome)    Overweight (BMI 25.0-29.9)    Migraine without aura and without status migrainosus, not intractable    Pelvic floor tension    Anemia affecting pregnancy in second trimester    Septate uterus affecting pregnancy in second trimester    Anxiety disorder affecting pregnancy, antepartum    High-risk pregnancy in second trimester    Coordination impairment    Fetal growth restriction antepartum     OB History    Para Term  AB Living   1             SAB IAB Ectopic Multiple Live Births                  # Outcome Date GA Lbr Arya/2nd Weight Sex Delivery Anes PTL Lv   1 Current                Dating reviewed    Allergies and problem list reviewed and updated    Medical and surgical history reviewed    Prenatal labs reviewed and updated    Physical Exam:  ABD: soft, gravid, nontender,     Assessment:  Mone was seen today for routine prenatal visit.    Diagnoses and all orders for this visit:    High-risk pregnancy in second trimester    Anxiety disorder affecting pregnancy, antepartum    Septate uterus affecting pregnancy in second trimester    Anemia affecting pregnancy in second trimester    Fetal growth restriction antepartum         Plan:   Continue pnt.  She is not taking her pnv and iron consistently but will try to.  Counseled that we don't do lutus birth at [facility].  Counseled on circumcision.  Re[facility] and [facility] to her next visit   follow up 1 Weeks, bleeding/pain precautions  kick counts, labor precautions

## 2024-01-02 ENCOUNTER — PROCEDURE VISIT (OUTPATIENT)
Dept: MATERNAL FETAL MEDICINE | Facility: CLINIC | Age: 34
End: 2024-01-02
Payer: MEDICAID

## 2024-01-02 DIAGNOSIS — O36.5990 FETAL GROWTH RESTRICTION ANTEPARTUM: ICD-10-CM

## 2024-01-02 PROCEDURE — 76819 FETAL BIOPHYS PROFIL W/O NST: CPT | Mod: PBBFAC | Performed by: OBSTETRICS & GYNECOLOGY

## 2024-01-02 PROCEDURE — 76820 UMBILICAL ARTERY ECHO: CPT | Mod: 26,S$PBB,, | Performed by: OBSTETRICS & GYNECOLOGY

## 2024-01-04 ENCOUNTER — ROUTINE PRENATAL (OUTPATIENT)
Dept: OBSTETRICS AND GYNECOLOGY | Facility: CLINIC | Age: 34
End: 2024-01-04
Attending: OBSTETRICS & GYNECOLOGY
Payer: COMMERCIAL

## 2024-01-04 ENCOUNTER — HOSPITAL ENCOUNTER (OUTPATIENT)
Dept: PERINATAL CARE | Facility: OTHER | Age: 34
Discharge: HOME | End: 2024-01-04
Attending: OBSTETRICS & GYNECOLOGY
Payer: MEDICAID

## 2024-01-04 VITALS — DIASTOLIC BLOOD PRESSURE: 62 MMHG | BODY MASS INDEX: 29.78 KG/M2 | WEIGHT: 173.5 LBS | SYSTOLIC BLOOD PRESSURE: 112 MMHG

## 2024-01-04 DIAGNOSIS — F41.9 ANXIETY DISORDER AFFECTING PREGNANCY, ANTEPARTUM: ICD-10-CM

## 2024-01-04 DIAGNOSIS — O22.43 HEMORRHOIDS DURING PREGNANCY IN THIRD TRIMESTER: ICD-10-CM

## 2024-01-04 DIAGNOSIS — O36.5990 FETAL GROWTH RESTRICTION ANTEPARTUM: ICD-10-CM

## 2024-01-04 DIAGNOSIS — Q51.28 SEPTATE UTERUS AFFECTING PREGNANCY IN SECOND TRIMESTER: ICD-10-CM

## 2024-01-04 DIAGNOSIS — O99.340 ANXIETY DISORDER AFFECTING PREGNANCY, ANTEPARTUM: ICD-10-CM

## 2024-01-04 DIAGNOSIS — O99.012 ANEMIA AFFECTING PREGNANCY IN SECOND TRIMESTER: ICD-10-CM

## 2024-01-04 DIAGNOSIS — O34.02 SEPTATE UTERUS AFFECTING PREGNANCY IN SECOND TRIMESTER: ICD-10-CM

## 2024-01-04 DIAGNOSIS — O09.92 HIGH-RISK PREGNANCY IN SECOND TRIMESTER: Primary | ICD-10-CM

## 2024-01-04 DIAGNOSIS — O36.5990 PREGNANCY AFFECTED BY FETAL GROWTH RESTRICTION: ICD-10-CM

## 2024-01-04 DIAGNOSIS — O40.3XX1 POLYHYDRAMNIOS IN THIRD TRIMESTER COMPLICATION, FETUS 1 OF MULTIPLE GESTATION: ICD-10-CM

## 2024-01-04 PROBLEM — O40.3XX0 POLYHYDRAMNIOS IN THIRD TRIMESTER: Status: ACTIVE | Noted: 2024-01-04

## 2024-01-04 PROCEDURE — 59025 FETAL NON-STRESS TEST: CPT

## 2024-01-04 PROCEDURE — 0502F SUBSEQUENT PRENATAL CARE: CPT | Mod: S$GLB,,, | Performed by: OBSTETRICS & GYNECOLOGY

## 2024-01-04 PROCEDURE — 59025 FETAL NON-STRESS TEST: CPT | Mod: 26,,, | Performed by: OBSTETRICS & GYNECOLOGY

## 2024-01-04 PROCEDURE — 99213 OFFICE O/P EST LOW 20 MIN: CPT | Mod: PBBFAC,25,TH,PN | Performed by: OBSTETRICS & GYNECOLOGY

## 2024-01-04 PROCEDURE — 99999 PR PBB SHADOW E&M-EST. PATIENT-LVL III: CPT | Mod: PBBFAC,,, | Performed by: OBSTETRICS & GYNECOLOGY

## 2024-01-04 RX ORDER — HYDROCORTISONE 25 MG/G
CREAM TOPICAL 2 TIMES DAILY
Qty: 3.5 G | Refills: 1 | Status: SHIPPED | OUTPATIENT
Start: 2024-01-04

## 2024-01-04 NOTE — PROGRESS NOTES
Complaints today:rectal itchin.  PNT done, Good fetal movements reported.No Bleeding or pains    /62   Wt 78.7 kg (173 lb 8 oz)   LMP 05/15/2023   BMI 29.78 kg/m²     33 y.o., at 34w3d by Estimated Date of Delivery: 24  Patient Active Problem List   Diagnosis    ADRIANO (generalized anxiety disorder)    PCOS (polycystic ovarian syndrome)    Overweight (BMI 25.0-29.9)    Migraine without aura and without status migrainosus, not intractable    Pelvic floor tension    Anemia affecting pregnancy in second trimester    Septate uterus affecting pregnancy in second trimester    Anxiety disorder affecting pregnancy, antepartum    High-risk pregnancy in second trimester    Coordination impairment    Fetal growth restriction antepartum    Polyhydramnios in third trimester     OB History    Para Term  AB Living   1             SAB IAB Ectopic Multiple Live Births                  # Outcome Date GA Lbr Arya/2nd Weight Sex Delivery Anes PTL Lv   1 Current                Dating reviewed    Allergies and problem list reviewed and updated    Medical and surgical history reviewed    Prenatal labs reviewed and updated    Physical Exam:  ABD: soft, gravid, nontender,   SSE: hemorrhoid    Assessment:  Mone was seen today for routine prenatal visit.    Diagnoses and all orders for this visit:    High-risk pregnancy in second trimester    Fetal growth restriction antepartum    Anxiety disorder affecting pregnancy, antepartum    Septate uterus affecting pregnancy in second trimester    Anemia affecting pregnancy in second trimester    Polyhydramnios in third trimester complication, fetus 1 of multiple gestation    Hemorrhoids during pregnancy in third trimester  -     hydrocortisone 2.5 % cream; Apply topically 2 (two) times daily.         Plan:      follow up 1 Weeks, bleeding/pain precautions  kick counts, labor precautions

## 2024-01-08 ENCOUNTER — PATIENT MESSAGE (OUTPATIENT)
Dept: OTHER | Facility: OTHER | Age: 34
End: 2024-01-08
Payer: MEDICAID

## 2024-01-08 ENCOUNTER — TELEPHONE (OUTPATIENT)
Dept: OBSTETRICS AND GYNECOLOGY | Facility: CLINIC | Age: 34
End: 2024-01-08
Payer: MEDICAID

## 2024-01-08 ENCOUNTER — PATIENT MESSAGE (OUTPATIENT)
Dept: OBSTETRICS AND GYNECOLOGY | Facility: CLINIC | Age: 34
End: 2024-01-08
Payer: MEDICAID

## 2024-01-08 NOTE — TELEPHONE ENCOUNTER
----- Message from Martha Fontenot sent at 1/8/2024 11:12 AM CST -----  Regarding: call back  Name of caller: Brit       What is the requesting detail: pt is requesting a call back regarding upcoming appts. Please advise       Can the clinic reply by [facility]:       What number to call back: [phone]

## 2024-01-09 ENCOUNTER — TELEPHONE (OUTPATIENT)
Dept: OBSTETRICS AND GYNECOLOGY | Facility: CLINIC | Age: 34
End: 2024-01-09
Payer: MEDICAID

## 2024-01-09 ENCOUNTER — PROCEDURE VISIT (OUTPATIENT)
Dept: MATERNAL FETAL MEDICINE | Facility: CLINIC | Age: 34
End: 2024-01-09
Attending: OBSTETRICS & GYNECOLOGY
Payer: MEDICAID

## 2024-01-09 DIAGNOSIS — Z36.89 ENCOUNTER FOR ULTRASOUND TO ASSESS FETAL GROWTH: ICD-10-CM

## 2024-01-09 DIAGNOSIS — O36.5990 PREGNANCY AFFECTED BY FETAL GROWTH RESTRICTION: ICD-10-CM

## 2024-01-09 PROCEDURE — 76816 OB US FOLLOW-UP PER FETUS: CPT | Mod: PBBFAC | Performed by: STUDENT IN AN ORGANIZED HEALTH CARE EDUCATION/TRAINING PROGRAM

## 2024-01-09 PROCEDURE — 76819 FETAL BIOPHYS PROFIL W/O NST: CPT | Mod: 26,S$PBB,, | Performed by: STUDENT IN AN ORGANIZED HEALTH CARE EDUCATION/TRAINING PROGRAM

## 2024-01-09 PROCEDURE — 76820 UMBILICAL ARTERY ECHO: CPT | Mod: 26,S$PBB,, | Performed by: STUDENT IN AN ORGANIZED HEALTH CARE EDUCATION/TRAINING PROGRAM

## 2024-01-09 NOTE — TELEPHONE ENCOUNTER
----- Message from Leslie Timur sent at 1/8/2024  3:50 PM CST -----  Name of Who is Calling:LAVELLE CALLEJAS [[id number]]                What is the request in detail: Pt calling because she want to talk to staff about her letter for work, states she want to talk with staff first before you write it.Please call back to further assist.               Can the clinic reply by [facility]: No                [facility]: [phone]

## 2024-01-10 ENCOUNTER — ROUTINE PRENATAL (OUTPATIENT)
Dept: OBSTETRICS AND GYNECOLOGY | Facility: CLINIC | Age: 34
End: 2024-01-10
Attending: OBSTETRICS & GYNECOLOGY
Payer: COMMERCIAL

## 2024-01-10 VITALS
SYSTOLIC BLOOD PRESSURE: 114 MMHG | WEIGHT: 174.63 LBS | DIASTOLIC BLOOD PRESSURE: 62 MMHG | BODY MASS INDEX: 29.97 KG/M2

## 2024-01-10 DIAGNOSIS — Q51.28 SEPTATE UTERUS AFFECTING PREGNANCY IN SECOND TRIMESTER: Primary | ICD-10-CM

## 2024-01-10 DIAGNOSIS — F41.9 ANXIETY DISORDER AFFECTING PREGNANCY, ANTEPARTUM: ICD-10-CM

## 2024-01-10 DIAGNOSIS — O34.02 SEPTATE UTERUS AFFECTING PREGNANCY IN SECOND TRIMESTER: Primary | ICD-10-CM

## 2024-01-10 DIAGNOSIS — O99.340 ANXIETY DISORDER AFFECTING PREGNANCY, ANTEPARTUM: ICD-10-CM

## 2024-01-10 DIAGNOSIS — O99.012 ANEMIA AFFECTING PREGNANCY IN SECOND TRIMESTER: ICD-10-CM

## 2024-01-10 DIAGNOSIS — O36.5990 FETAL GROWTH RESTRICTION ANTEPARTUM: ICD-10-CM

## 2024-01-10 DIAGNOSIS — O09.92 HIGH-RISK PREGNANCY IN SECOND TRIMESTER: ICD-10-CM

## 2024-01-10 PROCEDURE — 99212 OFFICE O/P EST SF 10 MIN: CPT | Mod: PBBFAC,TH,PN | Performed by: OBSTETRICS & GYNECOLOGY

## 2024-01-10 PROCEDURE — 0502F SUBSEQUENT PRENATAL CARE: CPT | Mod: S$PBB,,, | Performed by: OBSTETRICS & GYNECOLOGY

## 2024-01-10 PROCEDURE — 99999 PR PBB SHADOW E&M-EST. PATIENT-LVL II: CPT | Mod: PBBFAC,,, | Performed by: OBSTETRICS & GYNECOLOGY

## 2024-01-10 NOTE — PROGRESS NOTES
Pregnancy dating, labs, ultrasound reports, prenatal testing, and problem list; prior records and results; and available outside records were reviewed and updated in EMR.  Pertinent findings were noted below.    Reason for Visit   Routine Prenatal Visit    HPI   33 y.o., at 35w2d by Estimated Date of Delivery: 24      Contractions: No   Bleeding: No   Loss of fluid: No   Fetal movement: Yes   Nausea: No   Vomiting: No   Headache: No     Exam   /62   Wt 79.2 kg (174 lb 9.7 oz)   LMP 05/15/2023   BMI 29.97 kg/m²     GENERAL: No acute distress  ABD: Gravid    Assessment and Plan   Septate uterus affecting pregnancy in second trimester    Anemia affecting pregnancy in second trimester  - continue iron supplements  - asymptomatic    Anxiety disorder affecting pregnancy, antepartum  - taking zoloft  - mood stable    High-risk pregnancy in second trimester  - Plan to schedule IOL between 38-39 weeks    Fetal growth restriction antepartum  - discussed FGR diagnosis and monitoring  - normal dopplers on   - growth US every 3 weeks.   - current recommendation for IOL between 38 and 39 weeks.      labor precautions given  Follow-up: 1 week

## 2024-01-10 NOTE — LETTER
January 10, 2024    Mone Chandler  [address]  [address]  [facility] [address]         [facility]  [address]  [address]  [facility] [address]  Phone: [phone]  Fax: [phone] January 10, 2024     Patient: Mone Chandler   YOB: 1990   Date of Visit: 1/10/2024       To Whom It May Concern:    It is my medical opinion that Mone Chandler  plans to start her maternity leave on 1/22/24 .    If you have any questions or concerns, please don't hesitate to call.    Sincerely,        Courtney Padilla MD

## 2024-01-12 ENCOUNTER — HOSPITAL ENCOUNTER (OUTPATIENT)
Dept: PERINATAL CARE | Facility: OTHER | Age: 34
Discharge: HOME | End: 2024-01-12
Attending: OBSTETRICS & GYNECOLOGY
Payer: MEDICAID

## 2024-01-12 DIAGNOSIS — O36.5990 PREGNANCY AFFECTED BY FETAL GROWTH RESTRICTION: ICD-10-CM

## 2024-01-12 PROCEDURE — 59025 FETAL NON-STRESS TEST: CPT

## 2024-01-12 PROCEDURE — 59025 FETAL NON-STRESS TEST: CPT | Mod: 26,,, | Performed by: OBSTETRICS & GYNECOLOGY

## 2024-01-16 ENCOUNTER — TELEPHONE (OUTPATIENT)
Dept: OBSTETRICS AND GYNECOLOGY | Facility: CLINIC | Age: 34
End: 2024-01-16
Payer: MEDICAID

## 2024-01-16 ENCOUNTER — TELEPHONE (OUTPATIENT)
Dept: MATERNAL FETAL MEDICINE | Facility: CLINIC | Age: 34
End: 2024-01-16
Payer: MEDICAID

## 2024-01-16 NOTE — TELEPHONE ENCOUNTER
Called patient and rescheduled her.      ----- Message from Callum Queen MA sent at 1/16/2024  3:19 PM CST -----  Patient called regards to rescheduling her appointment for her bpp .    Patient contact: [phone]

## 2024-01-16 NOTE — TELEPHONE ENCOUNTER
----- Message from Leslie Ding sent at 1/16/2024  3:22 PM CST -----  Name of Who is Calling:LAVELLE CALLEJAS [[id number]]                What is the request in detail: Pt calling because she states that she can't leave the house due to roads close and want to reschedule her apt for today.Please call back to further assist.                 Can the clinic reply by [facility]: No                What Number to Call Back [facility]: [phone]

## 2024-01-18 ENCOUNTER — HOSPITAL ENCOUNTER (OUTPATIENT)
Dept: PERINATAL CARE | Facility: OTHER | Age: 34
Discharge: HOME | End: 2024-01-18
Attending: OBSTETRICS & GYNECOLOGY
Payer: MEDICAID

## 2024-01-18 DIAGNOSIS — O36.5990 PREGNANCY AFFECTED BY FETAL GROWTH RESTRICTION: ICD-10-CM

## 2024-01-18 PROCEDURE — 76820 UMBILICAL ARTERY ECHO: CPT

## 2024-01-18 PROCEDURE — 76820 UMBILICAL ARTERY ECHO: CPT | Mod: 26,,, | Performed by: OBSTETRICS & GYNECOLOGY

## 2024-01-18 PROCEDURE — 76819 FETAL BIOPHYS PROFIL W/O NST: CPT | Mod: 26,,, | Performed by: OBSTETRICS & GYNECOLOGY

## 2024-01-18 PROCEDURE — 76819 FETAL BIOPHYS PROFIL W/O NST: CPT

## 2024-01-22 ENCOUNTER — CLINICAL SUPPORT (OUTPATIENT)
Dept: REHABILITATION | Facility: OTHER | Age: 34
End: 2024-01-22
Attending: OBSTETRICS & GYNECOLOGY
Payer: COMMERCIAL

## 2024-01-22 ENCOUNTER — HOSPITAL ENCOUNTER (OUTPATIENT)
Dept: PERINATAL CARE | Facility: OTHER | Age: 34
Discharge: HOME | End: 2024-01-22
Attending: OBSTETRICS & GYNECOLOGY
Payer: MEDICAID

## 2024-01-22 DIAGNOSIS — R27.8 COORDINATION IMPAIRMENT: ICD-10-CM

## 2024-01-22 DIAGNOSIS — O36.5990 PREGNANCY AFFECTED BY FETAL GROWTH RESTRICTION: ICD-10-CM

## 2024-01-22 DIAGNOSIS — M62.89 PELVIC FLOOR TENSION: Primary | ICD-10-CM

## 2024-01-22 PROCEDURE — 97110 THERAPEUTIC EXERCISES: CPT

## 2024-01-22 PROCEDURE — 59025 FETAL NON-STRESS TEST: CPT

## 2024-01-22 PROCEDURE — 59025 FETAL NON-STRESS TEST: CPT | Mod: 26,,, | Performed by: STUDENT IN AN ORGANIZED HEALTH CARE EDUCATION/TRAINING PROGRAM

## 2024-01-22 NOTE — PROGRESS NOTES
Pelvic Health Physical Therapy   Treatment Note     Name: Mone Chandler  Clinic Number: [id number]    Therapy Diagnosis:   Encounter Diagnoses   Name Primary?    Pelvic floor tension Yes    Coordination impairment        Physician: Salo Bob,*    Visit Date: 1/22/2024    Physician Orders: PT Eval and Treat - Pelvic Floor PT   Medical Diagnosis from Referral: M62.89 (ICD-10-CM) - Pelvic floor dysfunction   Evaluation Date: 11/6/2023  Authorization Period Expiration: 12/31/2023  Plan of Care Expiration: 2/6/2024  Visit # / Visits authorized: 1/80 (Episode visit #5)     Cancelled Visits: 0  No Show Visits: 0    Time In: 1610  Time Out: 1640  Total Billable Time: 30 minutes    Precautions: Standard, Pregnant JONAH 2/12/2024    Subjective     Pt reports: Has been using prenatal yoga and exercises more regularly.     not been consistent with exercises. Feels like work-related stress has been most limiting for her. She typically performs relaxation exercises by herself prior to exercising in the morning.   Has not been consistent with water/fluid intake, and has been very constipated lately.   Has been getting prenatal massages every 2 weeks.     She was not compliant with home exercise program.  Response to previous treatment: Good   Functional change: Ongoing     Pain: 0/10  Location:     Objective     FOTO 1/3 - Most recently administered 11/21/2023    Mone received therapeutic exercises to develop  strength, endurance, ROM, flexibility, posture, and core stabilization for 00 minutes including:     Mone received the following manual therapy techniques: to develop flexibility, extensibility, and desensitization for 00 minutes including: trigger point/myofascial release of pelvic floor muscle     Externa, internal lMFR to pelvic floor muscles  In hook lying + hip internal rotation, external rotation     Mone participated in neuromuscular re-education activities to develop Coordination, Control,  and Down training for 00 minutes including: pelvic floor relaxation/bulging training and diaphragmatic breathing    Diaphragmatic breathing review    + pelvic floor muscle mobility    Anterior vs posterior pelvic floor muscle focus     Mone participated in dynamic functional therapeutic activities to improve functional performance for 30 minutes, including:    Bowel habits    Fluid intake goals    Magnesium use    Pelvic floor muscle relaxation, regular activity     Labor and delivery considerations     Home Exercises Provided and Patient Education Provided     Education provided:   - anatomy/physiology of pelvic floor, posture/body mechanices, diaphragmatic breathing, and behavior modifications  Discussed progression of plan of care with patient; educated pt in activity modification; reviewed HEP with pt. Pt demonstrated and verbalized understanding of all instruction and was provided with a handout of HEP (see Patient Instructions).    Written Home Exercises Provided: yes.  Exercises were reviewed and Mone was able to demonstrate them prior to the end of the session.  Mone demonstrated good  understanding of the education provided.     See EMR under Patient Instructions for exercises provided 11/21/2023.    Assessment     Bowel habits and exercise routine reviewed this session, as home exercise program use has been greatly limited. Mone plans to improve use over the holiday break from work. Plan to reassess utility of magnesium supplementation, improved fluid intake, and relaxation training.     Mone Is progressing well towards her goals.   Pt prognosis is Excellent.     Pt will continue to benefit from skilled outpatient physical therapy to address the deficits listed in the problem list box on initial evaluation, provide pt/family education and to maximize pt's level of independence in the home and community environment.     Pt's spiritual, cultural and educational needs considered and pt  agreeable to plan of care and goals.     Anticipated barriers to physical therapy:  limitations in scheduling, chronicity of condition     Goals:  Short Term Goals: 5 weeks -progressing  Pt to demonstrate proper diaphragmatic breathing to help with calming the nervous system in order to decrease pain and to improve abdominal wall musculature extensibility.   Pt to demonstrate good body mechanics when performing ADLs such as lifting and bending to decrease strain to lumbopelvic structures and reduce risk of further injury.  Pt will report minimal to no pain with single digit pelvic assessment and display relaxation demonstrating improving pelvic floor muscle relaxation and coordination.   Patient to demonstrate good body mechanics with sit <--> stand and side lying <--> sitting transfers to demonstrate improved functional mobility.    Pt to be able to bulge pelvic floor which is needed for comfortable BM and complete evacuation.   Pt to report being able to have a BM without straining 75% of the time to demonstrate improving PF coordination.      Long Term Goals: 10 weeks -progressing  Pt to be discharged with home plan for carry over after discharge.    Pt will be trained and compliant with postural strategies in sitting and standing to improve alignment and decrease pain and muscle fatigue  Pt to report being able to have a comfortable vaginal exam without significant increase in pelvic pain.  Pt to report a decrease in pain to no more than 3/10 at it's worst with vaginal intercourse.    Pt to demonstrate being able to correctly and consistently relax and lengthen her pelvic floor muscle(s) in preparation for labor and delivery.     Pt to report being able to have a BM without straining 90% of the time to demonstrate improving PF coordination.        Plan     Plan for next visit: progress pelvic floor muscle assessment, review/progress relaxation training     DM BALLARD, PT   01/22/2024

## 2024-01-22 NOTE — PATIENT INSTRUCTIONS
STAGES OF LABOR AND YOUR PELVIC FLOOR    Stage 1: Early and Active Labor    Early labor is unpredictable and can last anywhere from hours to days (it may tend to be longer for first-time moms). This is when your cervix begins to dilate and efface, and you will begin to feel mild, irregular contractions. If your pregnancy is not medically complex, you may be able to spend the majority of early labor at home. Now is the time to utilize diaphragmatic breathing and relaxation techniques that you learned in PT and your birthing classes. Try to use gravity-dependent positions like standing or walking to keep your body comfortable during contractions and assist with the baby dropping into the birth canal.     During active labor, your cervix will dilate from 6 to 10 cm and contractions will become stronger, more regular, and closer together. On average, your cervix will dilate about 1 cm per hour, and this stage can last anywhere from 4-8 hours. To help labor progress, change positions every 20 minutes and have your partner begin using comfort measures such as massage or pelvic presses to make you more comfortable.     Stage 2: Pushing and Birth    The American College of Obstetricians and Gynecologists recommends a strategy called laboring down to help reduce strain on the pelvic floor during the pushing phase. Once your cervix reaches 10 cm, you should begin to experience the fetal ejection reflex, which is our body's way of guiding the baby lower for birth. Uterine contractions and the top part of the uterus (called the fundus) work together to push the baby downwards. At this point you may elect to delay pushing for 1-2 hours, and as long as there are no restrictions in the baby's path the baby should move lower into the pelvis and overall active pushing time will be decreased. This can help decrease maternal fatigue, incidence of perineal tearing, and the risk of  or instrument-assisted birth.     If the  baby's position has not changed within an hour of laboring down, your medical provider may assist you with positioning or starting active pushing. Your body may intuitively know what position you need most during different stages of labor, or you may need to actively work on movements to change the baby's position. Here are a few strategies to keep in mind based on fetal position:        Pelvic inlet diameter can be increased by assuming postures with external rotation of the hips and pelvic posterior tilt (i.e. deep squatting, supported hands and knees position with knees apart and feet together, flying cowgirl).        The midpelvis can be opened with asymmetrical positions such as an elevated side lunge or with a peanut ball underneath the shins.            Finally, the pelvic outlet can be widened with an anterior pelvic tilt to help the sacrum and coccyx move posteriorly, or internal rotation of the hips.            *Note that if you have an epidural you will need to modifying by using supported positions in bed.     We also encourage you to avoid pushing on your back if possible and comfortable for you. This position restricts movement of the sacrum and tailbone, which is necessary to widen the pelvic outlet and make room for baby. Upright postures, such as supported squatting, sidelying, or hands and knees position, can allow the pelvis to expand, and also let gravity assist the baby in moving downwards. Explore some of the alternative postures given to you during your labor and delivery training to find what feels best for your body.         POSITIONAL CHANGES AND MOBILITY THROUGH LABOR AND DELIVERY    First stage or second stage prior to epidural:    Walking  Benefits of gravity-assisted standing position plus changes in pelvic joints encourage baby to rotate and descend    Standing  Gravity assist  Shortens contractions and makes them more productive  Helps baby enter pelvis    Standing leaning forward on  "support    Benefits of gravity-assisted standing position plus  Restful  Contact with champion  Reduces back pain  Can add "slow dancing" which can be additionally helpful to reduce back pain    Lunge    [place] assist  Widens pelvic outlet toward side of lunge  Reduces back pain    Squatting (lap, supported)    Gravity assist  Widens pelvic outlet      Positions after epidural:     Quadruped (hands and knees)      Relieves back pain  Allows pelvic rocking and movement  Sacrum and tailbone can move freely  Takes pressure off pelvic floor, hemorrhoids  Lower incidence of perineal tearing    Quadruped with hip internal (IR) /external rotation (ER)    Quadruped with IR good for pubic symphysis pain  IR allows for widening of the middle and inferior openings of the pelvis, ER narrows these openings > can alternate between IR and  ER to assist fetus in moving through the birth canal     Kneeling      Same as quadruped plus less strain on hands/wrists    Sitting upright    Gravity assist  Restful    Sitting leaning forward on support    Same as sitting plus:  Reduces back pain  Contact with champion    Semi recumbent    Same as sitting plus:   Vaginal exam possible  Commonly used for imminent delivery (65.9%)  Can restrict sacral movement*  Shortens contractions and makes them more productive  Helps baby enter pelvis    Side lying    Restful position  Vaginal exam possible  Can slow a rapid 2nd stage  Takes pressure off pelvic floor, hemorrhoids  Least incidence of perineal trauma         "

## 2024-01-25 ENCOUNTER — ROUTINE PRENATAL (OUTPATIENT)
Dept: OBSTETRICS AND GYNECOLOGY | Facility: CLINIC | Age: 34
End: 2024-01-25
Attending: OBSTETRICS & GYNECOLOGY
Payer: COMMERCIAL

## 2024-01-25 ENCOUNTER — HOSPITAL ENCOUNTER (OUTPATIENT)
Dept: PERINATAL CARE | Facility: OTHER | Age: 34
Discharge: HOME | End: 2024-01-25
Attending: OBSTETRICS & GYNECOLOGY
Payer: MEDICAID

## 2024-01-25 VITALS — DIASTOLIC BLOOD PRESSURE: 75 MMHG | WEIGHT: 177.69 LBS | BODY MASS INDEX: 30.5 KG/M2 | SYSTOLIC BLOOD PRESSURE: 111 MMHG

## 2024-01-25 DIAGNOSIS — O36.5990 PREGNANCY AFFECTED BY FETAL GROWTH RESTRICTION: ICD-10-CM

## 2024-01-25 DIAGNOSIS — O36.5990 FETAL GROWTH RESTRICTION ANTEPARTUM: ICD-10-CM

## 2024-01-25 DIAGNOSIS — O40.3XX1 POLYHYDRAMNIOS IN THIRD TRIMESTER COMPLICATION, FETUS 1 OF MULTIPLE GESTATION: ICD-10-CM

## 2024-01-25 DIAGNOSIS — Q51.28 SEPTATE UTERUS AFFECTING PREGNANCY IN SECOND TRIMESTER: ICD-10-CM

## 2024-01-25 DIAGNOSIS — O09.93 HIGH-RISK PREGNANCY IN THIRD TRIMESTER: Primary | ICD-10-CM

## 2024-01-25 DIAGNOSIS — O99.340 ANXIETY DISORDER AFFECTING PREGNANCY, ANTEPARTUM: ICD-10-CM

## 2024-01-25 DIAGNOSIS — F41.9 ANXIETY DISORDER AFFECTING PREGNANCY, ANTEPARTUM: ICD-10-CM

## 2024-01-25 DIAGNOSIS — O09.92 HIGH-RISK PREGNANCY IN SECOND TRIMESTER: ICD-10-CM

## 2024-01-25 DIAGNOSIS — O34.02 SEPTATE UTERUS AFFECTING PREGNANCY IN SECOND TRIMESTER: ICD-10-CM

## 2024-01-25 DIAGNOSIS — O99.012 ANEMIA AFFECTING PREGNANCY IN SECOND TRIMESTER: ICD-10-CM

## 2024-01-25 PROCEDURE — 76820 UMBILICAL ARTERY ECHO: CPT

## 2024-01-25 PROCEDURE — 76819 FETAL BIOPHYS PROFIL W/O NST: CPT | Mod: 26,,, | Performed by: STUDENT IN AN ORGANIZED HEALTH CARE EDUCATION/TRAINING PROGRAM

## 2024-01-25 PROCEDURE — 76819 FETAL BIOPHYS PROFIL W/O NST: CPT

## 2024-01-25 PROCEDURE — 99999 PR PBB SHADOW E&M-EST. PATIENT-LVL III: CPT | Mod: PBBFAC,,, | Performed by: OBSTETRICS & GYNECOLOGY

## 2024-01-25 PROCEDURE — 87081 CULTURE SCREEN ONLY: CPT | Performed by: STUDENT IN AN ORGANIZED HEALTH CARE EDUCATION/TRAINING PROGRAM

## 2024-01-25 PROCEDURE — 99213 OFFICE O/P EST LOW 20 MIN: CPT | Mod: PBBFAC,25,TH,PN | Performed by: OBSTETRICS & GYNECOLOGY

## 2024-01-25 PROCEDURE — 0502F SUBSEQUENT PRENATAL CARE: CPT | Mod: S$GLB,,, | Performed by: OBSTETRICS & GYNECOLOGY

## 2024-01-25 PROCEDURE — 76820 UMBILICAL ARTERY ECHO: CPT | Mod: 26,,, | Performed by: STUDENT IN AN ORGANIZED HEALTH CARE EDUCATION/TRAINING PROGRAM

## 2024-01-25 RX ORDER — HYDROXYZINE HYDROCHLORIDE 10 MG/1
10 TABLET, FILM COATED ORAL 2 TIMES DAILY
Qty: 60 TABLET | Refills: 3 | Status: SHIPPED | OUTPATIENT
Start: 2024-01-25 | End: 2024-03-14

## 2024-01-26 ENCOUNTER — TELEPHONE (OUTPATIENT)
Dept: OBSTETRICS AND GYNECOLOGY | Facility: CLINIC | Age: 34
End: 2024-01-26
Payer: MEDICAID

## 2024-01-26 ENCOUNTER — PATIENT MESSAGE (OUTPATIENT)
Dept: OBSTETRICS AND GYNECOLOGY | Facility: CLINIC | Age: 34
End: 2024-01-26
Payer: MEDICAID

## 2024-01-26 NOTE — H&P (VIEW-ONLY)
HISTORY AND PHYSICAL                                                OBSTETRICS          Subjective:       Mone Chandler is a 33 y.o.  female with IUP at 37w3d weeks gestation who presents for scheduled induction of labor.    Patient denies contractions, denies vaginal bleeding, denies LOF.   Fetal Movement: normal.    This IUP is complicated by fetal growth restriction, polyhydramnios (resolved), septate uterus, anxiety, and iron deficiency anemia.    Review of Systems   Constitutional:  Negative for chills and fever.   Respiratory:  Negative for shortness of breath.    Cardiovascular:  Negative for chest pain.   Gastrointestinal:  Negative for abdominal pain, nausea and vomiting.   Endocrine: Negative for hot flashes.   Genitourinary:  Negative for dysuria and vaginal bleeding.   Integumentary:  Negative for breast mass, nipple discharge and breast skin changes.   Neurological:  Negative for headaches.   Hematological:  Does not bruise/bleed easily.   Psychiatric/Behavioral:  Negative for depression.    Breast: Negative for mass, mastodynia, nipple discharge and skin changes      PMHx:   Past Medical History:   Diagnosis Date    Allergic rhinitis, cause unspecified 2015    ADRIANO (generalized anxiety disorder) 2015    ADRIANO-7 score of 17     PCOS (polycystic ovarian syndrome)        PSHx:   Past Surgical History:   Procedure Laterality Date    NASAL ENDOSCOPY W/ BALLON SINUPLASTY Bilateral 2017    thumb Bilateral     TRIGGER FINGER RELEASE Bilateral     thumbs       All: Review of patient's allergies indicates:  No Known Allergies    Meds: (Not in a hospital admission)      SH:   Social History     Socioeconomic History    Marital status: Single   Tobacco Use    Smoking status: Never    Smokeless tobacco: Never   Substance and Sexual Activity    Alcohol use: No    Drug use: No    Sexual activity: Yes     Partners: Male     Birth control/protection: None     Social Determinants of Health      Financial Resource Strain: Low Risk  (2023)    Overall Financial Resource Strain (CARDIA)     Difficulty of Paying Living Expenses: Not very hard   Food Insecurity: No Food Insecurity (2023)    Hunger Vital Sign     Worried About Running Out of Food in the Last Year: Never true     Ran Out of Food in the Last Year: Never true   Transportation Needs: No Transportation Needs (2023)    PRAPARE - Transportation     Lack of Transportation (Medical): No     Lack of Transportation (Non-Medical): No   Physical Activity: Sufficiently Active (2023)    Exercise Vital Sign     Days of Exercise per Week: 3 days     Minutes of Exercise per Session: 50 min   Stress: Stress Concern Present (2023)    [employer] - Occupational Stress Questionnaire     Feeling of Stress : Rather much   Social Connections: Unknown (2023)    Social Connection and Isolation Panel [NHANES]     Frequency of Communication with Friends and Family: More than three times a week     Frequency of Social Gatherings with Friends and Family: Twice a week     Active Member of Clubs or Organizations: Yes     Attends Club or Organization Meetings: 1 to 4 times per year     Marital Status: Living with partner   Housing Stability: Unknown (2023)    Housing Stability Vital Sign     Unable to Pay for Housing in the Last Year: No     Unstable Housing in the Last Year: No       FH:   Family History   Problem Relation Age of Onset    Hypertension Mother     Cancer Mother     Diverticulitis Father        OBHx:   OB History    Para Term  AB Living   1 0 0 0 0 0   SAB IAB Ectopic Multiple Live Births   0 0 0 0 0      # Outcome Date GA Lbr Arya/2nd Weight Sex Delivery Anes PTL Lv   1 Current                Objective:       /75   Wt 80.6 kg (177 lb 11.1 oz)   LMP 05/15/2023   BMI 30.50 kg/m²     Vitals:    24 1427   BP: 111/75   Weight: 80.6 kg (177 lb 11.1 oz)       General:    alert, appears stated age and cooperative, no apparent distress   HENT:  normocephalic, atraumatic   Eyes:  extraocular movements and conjunctivae normal   Neck:  supple, range of motion normal, no thyromegaly   Lungs:   no respiratory distress   Heart:   regular rate   Abdomen:  soft, non-tender, non-distended but gravid, no rebound or guarding    Extremities negative edema, negative erythema   FHT: To be assessed on admit                 TOCO: To be assessed on admit   Presentations: cephalic by ultrasound   Cervix: To be assessed on admit     Recent Growth Scan: w0d 2234g 13% and AC 9%    Lab Review  Blood Type O POS  GBBS: pending  Rubella: Immune  RPR: non-reactive (1T)  HIV: negative (1T)  HepB: negative       Assessment:       37w3d weeks gestation here for induction of labor    There are no hospital problems to display for this patient.         Plan:   IOL   Risks, benefits, alternatives and possible complications have been discussed in detail with the patient.   - Consents signed and to chart  - Admit to Labor and Delivery unit   - Epidural per Anesthesia  - Draw CBC, T&S, HIV, RPR  - Notify Staff  - Recheck in 4 hrs or PRN    2. Fetal growth restriction  - EFW 2234g 13% and AC 9% at 35wga  - Normal dopplers on 01/18, but previously elevated S/D ratio    3. Septate uterus  - Septum noted to extend the majority of the length of the uterine cavity, fetus on maternal right with no fetal parts noted on left side of uterine   septum. Left portion of the uterus is fluid filled. One cervix noted on transvaginal ultrasound   - Discuss postpartum contraception with patient, as patient at greater risk of IUD expulsion    4. Anxiety  - Patient not taking prescribed SSRI  - Desires anxiolytic for cervical check/balloon placement if needed as she wishes to not have an epidural   - Will 2 week postpartum mood check    5. Iron deficiency anemia  - Continue PO iron postpartum      Salbador Dimas M.D.  OB/GYN PGY-  4

## 2024-01-26 NOTE — H&P
HISTORY AND PHYSICAL                                                OBSTETRICS          Subjective:       Mone Chandler is a 33 y.o.  female with IUP at 37w3d weeks gestation who presents for scheduled induction of labor.    Patient denies contractions, denies vaginal bleeding, denies LOF.   Fetal Movement: normal.    This IUP is complicated by fetal growth restriction, polyhydramnios (resolved), septate uterus, anxiety, and iron deficiency anemia.    Review of Systems   Constitutional:  Negative for chills and fever.   Respiratory:  Negative for shortness of breath.    Cardiovascular:  Negative for chest pain.   Gastrointestinal:  Negative for abdominal pain, nausea and vomiting.   Endocrine: Negative for hot flashes.   Genitourinary:  Negative for dysuria and vaginal bleeding.   Integumentary:  Negative for breast mass, nipple discharge and breast skin changes.   Neurological:  Negative for headaches.   Hematological:  Does not bruise/bleed easily.   Psychiatric/Behavioral:  Negative for depression.    Breast: Negative for mass, mastodynia, nipple discharge and skin changes      PMHx:   Past Medical History:   Diagnosis Date    Allergic rhinitis, cause unspecified 2015    ADRIANO (generalized anxiety disorder) 2015    ADRIANO-7 score of 17     PCOS (polycystic ovarian syndrome)        PSHx:   Past Surgical History:   Procedure Laterality Date    NASAL ENDOSCOPY W/ BALLON SINUPLASTY Bilateral 2017    thumb Bilateral     TRIGGER FINGER RELEASE Bilateral     thumbs       All: Review of patient's allergies indicates:  No Known Allergies    Meds: (Not in a hospital admission)      SH:   Social History     Socioeconomic History    Marital status: Single   Tobacco Use    Smoking status: Never    Smokeless tobacco: Never   Substance and Sexual Activity    Alcohol use: No    Drug use: No    Sexual activity: Yes     Partners: Male     Birth control/protection: None     Social Determinants of Health      Financial Resource Strain: Low Risk  (2023)    Overall Financial Resource Strain (CARDIA)     Difficulty of Paying Living Expenses: Not very hard   Food Insecurity: No Food Insecurity (2023)    Hunger Vital Sign     Worried About Running Out of Food in the Last Year: Never true     Ran Out of Food in the Last Year: Never true   Transportation Needs: No Transportation Needs (2023)    PRAPARE - Transportation     Lack of Transportation (Medical): No     Lack of Transportation (Non-Medical): No   Physical Activity: Sufficiently Active (2023)    Exercise Vital Sign     Days of Exercise per Week: 3 days     Minutes of Exercise per Session: 50 min   Stress: Stress Concern Present (2023)    [employer] - Occupational Stress Questionnaire     Feeling of Stress : Rather much   Social Connections: Unknown (2023)    Social Connection and Isolation Panel [NHANES]     Frequency of Communication with Friends and Family: More than three times a week     Frequency of Social Gatherings with Friends and Family: Twice a week     Active Member of Clubs or Organizations: Yes     Attends Club or Organization Meetings: 1 to 4 times per year     Marital Status: Living with partner   Housing Stability: Unknown (2023)    Housing Stability Vital Sign     Unable to Pay for Housing in the Last Year: No     Unstable Housing in the Last Year: No       FH:   Family History   Problem Relation Age of Onset    Hypertension Mother     Cancer Mother     Diverticulitis Father        OBHx:   OB History    Para Term  AB Living   1 0 0 0 0 0   SAB IAB Ectopic Multiple Live Births   0 0 0 0 0      # Outcome Date GA Lbr Arya/2nd Weight Sex Delivery Anes PTL Lv   1 Current                Objective:       /75   Wt 80.6 kg (177 lb 11.1 oz)   LMP 05/15/2023   BMI 30.50 kg/m²     Vitals:    24 1427   BP: 111/75   Weight: 80.6 kg (177 lb 11.1 oz)       General:    alert, appears stated age and cooperative, no apparent distress   HENT:  normocephalic, atraumatic   Eyes:  extraocular movements and conjunctivae normal   Neck:  supple, range of motion normal, no thyromegaly   Lungs:   no respiratory distress   Heart:   regular rate   Abdomen:  soft, non-tender, non-distended but gravid, no rebound or guarding    Extremities negative edema, negative erythema   FHT: To be assessed on admit                 TOCO: To be assessed on admit   Presentations: cephalic by ultrasound   Cervix: To be assessed on admit     Recent Growth Scan: w0d 2234g 13% and AC 9%    Lab Review  Blood Type O POS  GBBS: pending  Rubella: Immune  RPR: non-reactive (1T)  HIV: negative (1T)  HepB: negative       Assessment:       37w3d weeks gestation here for induction of labor    There are no hospital problems to display for this patient.         Plan:   IOL   Risks, benefits, alternatives and possible complications have been discussed in detail with the patient.   - Consents signed and to chart  - Admit to Labor and Delivery unit   - Epidural per Anesthesia  - Draw CBC, T&S, HIV, RPR  - Notify Staff  - Recheck in 4 hrs or PRN    2. Fetal growth restriction  - EFW 2234g 13% and AC 9% at 35wga  - Normal dopplers on 01/18, but previously elevated S/D ratio    3. Septate uterus  - Septum noted to extend the majority of the length of the uterine cavity, fetus on maternal right with no fetal parts noted on left side of uterine   septum. Left portion of the uterus is fluid filled. One cervix noted on transvaginal ultrasound   - Discuss postpartum contraception with patient, as patient at greater risk of IUD expulsion    4. Anxiety  - Patient not taking prescribed SSRI  - Desires anxiolytic for cervical check/balloon placement if needed as she wishes to not have an epidural   - Will 2 week postpartum mood check    5. Iron deficiency anemia  - Continue PO iron postpartum      Salbador Dimas M.D.  OB/GYN PGY-  4

## 2024-01-26 NOTE — PROGRESS NOTES
Pregnancy dating, labs, ultrasound reports, prenatal testing, and problem list; prior records and results; and available outside records were reviewed and updated in EMR.  Pertinent findings were noted below.    Reason for Visit   Routine Prenatal Visit    HPI   33 y.o., at 37w3d by Estimated Date of Delivery: 2/12/24    Patient presents for routine OB appointment today and to discuss recommended induction of labor due to fetal growth restriction with elevated S/D ratio.    Contractions: Irregular Jalen-Manjarrez   Bleeding: No   Loss of fluid: No   Fetal movement: Yes   Nausea: No   Vomiting: No   Headache: No     Exam   /75   Wt 80.6 kg (177 lb 11.1 oz)   LMP 05/15/2023   BMI 30.50 kg/m²     GENERAL: No acute distress  ABD: Gravid    Assessment and Plan   High-risk pregnancy in third trimester  -     Strep B Screen, Vaginal / Rectal    High-risk pregnancy in second trimester    Fetal growth restriction antepartum    Polyhydramnios in third trimester complication, fetus 1 of multiple gestation    Anxiety disorder affecting pregnancy, antepartum  -     hydrOXYzine HCL (ATARAX) 10 MG Tab; Take 1 tablet (10 mg total) by mouth 2 (two) times a day.  Dispense: 60 tablet; Refill: 3    Septate uterus affecting pregnancy in second trimester    Anemia affecting pregnancy in second trimester      GBS collected today, will need HIV and RPR on admit  Cervical exam very difficult to perform due to discomfort and tensing of pelvis due to anxiety  L&D and blood consents reviewed and signed with patient. Undecided on circumcision as patient's partners does not want performed  Discuss post-placental ParaGard IUD and sign consents on admit  Long discussion with patient on recommendation for induction of labor 38-39 weeks due to fetal growth restriction and elevated S/D ratio on dopplers. Offered patient available induction dates and times, however she feels need to discuss with partner, mother, and  prior to deciding.  Will await to hear from patient to schedule  Discussed personnel on L&D as well as allowed support persons during labor and delivery. Standard practices as DCC and skin to skin reviewed.  Birthing wishes: tub room, wireless external monitors, hopes to be as mobile as possible, does not want an epidural, desires anxiolytic prior to balloon placement, wishes alternative birthing positions    Labor and fetal movement count precautions given  Follow-up: 1 week if not delivered    Salbador Dimas M.D.  OB/GYN PGY- 4

## 2024-01-26 NOTE — TELEPHONE ENCOUNTER
----- Message from Divya Lovell sent at 1/26/2024  1:27 PM CST -----  Name of Who is Calling: LAVELLE CALLEJAS [[id number]]              What is the request in detail: Patient requesting a call back to discuss induction and need an update              Can the clinic reply by [facility]: No              What Number to Call Back if not in [facility]: [phone]

## 2024-01-26 NOTE — TELEPHONE ENCOUNTER
Returned call.  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  Counseled that midwives follow patients that they have been seeing in clinic.  There are staff physicians who oversee the residents and are always there for delivery.  She is allowed to use essential oils in a diffuser.  Also counseled regarding the induction process:  price/misoprostol (if not odilia too much), pitocin after price, arom when safe.  Counseled that the process can take 36 hours or longer.  Counseled that active labor is 6cm and that after that is when we start to become concerned if no cervical change. All questions answered.  She will let me know today which induction date she wants.

## 2024-01-27 ENCOUNTER — PATIENT MESSAGE (OUTPATIENT)
Dept: OBSTETRICS AND GYNECOLOGY | Facility: CLINIC | Age: 34
End: 2024-01-27
Payer: MEDICAID

## 2024-01-29 ENCOUNTER — PATIENT MESSAGE (OUTPATIENT)
Dept: REHABILITATION | Facility: OTHER | Age: 34
End: 2024-01-29

## 2024-01-29 DIAGNOSIS — O09.92 HIGH-RISK PREGNANCY IN SECOND TRIMESTER: Primary | ICD-10-CM

## 2024-01-29 DIAGNOSIS — O34.02 SEPTATE UTERUS AFFECTING PREGNANCY IN SECOND TRIMESTER: ICD-10-CM

## 2024-01-29 DIAGNOSIS — O99.012 ANEMIA AFFECTING PREGNANCY IN SECOND TRIMESTER: ICD-10-CM

## 2024-01-29 DIAGNOSIS — O36.5990 FETAL GROWTH RESTRICTION ANTEPARTUM: ICD-10-CM

## 2024-01-29 DIAGNOSIS — O40.3XX1 POLYHYDRAMNIOS IN THIRD TRIMESTER COMPLICATION, FETUS 1 OF MULTIPLE GESTATION: ICD-10-CM

## 2024-01-29 DIAGNOSIS — F41.9 ANXIETY DISORDER AFFECTING PREGNANCY, ANTEPARTUM: ICD-10-CM

## 2024-01-29 DIAGNOSIS — Q51.28 SEPTATE UTERUS AFFECTING PREGNANCY IN SECOND TRIMESTER: ICD-10-CM

## 2024-01-29 DIAGNOSIS — O99.340 ANXIETY DISORDER AFFECTING PREGNANCY, ANTEPARTUM: ICD-10-CM

## 2024-01-29 LAB — BACTERIA SPEC AEROBE CULT: NORMAL

## 2024-01-30 ENCOUNTER — PATIENT MESSAGE (OUTPATIENT)
Dept: OBSTETRICS AND GYNECOLOGY | Facility: OTHER | Age: 34
End: 2024-01-30
Payer: MEDICAID

## 2024-01-30 ENCOUNTER — ROUTINE PRENATAL (OUTPATIENT)
Dept: OBSTETRICS AND GYNECOLOGY | Facility: CLINIC | Age: 34
End: 2024-01-30
Attending: OBSTETRICS & GYNECOLOGY
Payer: MEDICAID

## 2024-01-30 VITALS
WEIGHT: 178.81 LBS | BODY MASS INDEX: 30.69 KG/M2 | DIASTOLIC BLOOD PRESSURE: 74 MMHG | SYSTOLIC BLOOD PRESSURE: 118 MMHG

## 2024-01-30 DIAGNOSIS — F41.9 ANXIETY DISORDER AFFECTING PREGNANCY, ANTEPARTUM: ICD-10-CM

## 2024-01-30 DIAGNOSIS — O36.5990 FETAL GROWTH RESTRICTION ANTEPARTUM: ICD-10-CM

## 2024-01-30 DIAGNOSIS — O99.012 ANEMIA AFFECTING PREGNANCY IN SECOND TRIMESTER: ICD-10-CM

## 2024-01-30 DIAGNOSIS — O99.340 ANXIETY DISORDER AFFECTING PREGNANCY, ANTEPARTUM: ICD-10-CM

## 2024-01-30 DIAGNOSIS — Q51.28 SEPTATE UTERUS AFFECTING PREGNANCY IN SECOND TRIMESTER: ICD-10-CM

## 2024-01-30 DIAGNOSIS — O09.92 HIGH-RISK PREGNANCY IN SECOND TRIMESTER: Primary | ICD-10-CM

## 2024-01-30 DIAGNOSIS — O34.02 SEPTATE UTERUS AFFECTING PREGNANCY IN SECOND TRIMESTER: ICD-10-CM

## 2024-01-30 PROCEDURE — 99213 OFFICE O/P EST LOW 20 MIN: CPT | Mod: PBBFAC,TH,PN | Performed by: OBSTETRICS & GYNECOLOGY

## 2024-01-30 PROCEDURE — 99999 PR PBB SHADOW E&M-EST. PATIENT-LVL III: CPT | Mod: PBBFAC,,, | Performed by: OBSTETRICS & GYNECOLOGY

## 2024-01-30 PROCEDURE — 99213 OFFICE O/P EST LOW 20 MIN: CPT | Mod: TH,S$PBB,, | Performed by: OBSTETRICS & GYNECOLOGY

## 2024-01-30 NOTE — PROGRESS NOTES
Pregnancy dating, labs, ultrasound reports, prenatal testing, and problem list; prior records and results; and available outside records were reviewed and updated in EMR.  Pertinent findings were noted below.    Reason for Visit   Routine Prenatal Visit    HPI   33 y.o., at 38w1d by Estimated Date of Delivery: 2/12/24    Contractions: No   Bleeding: No   Loss of fluid: No   Fetal movement: Yes   Nausea: No   Vomiting: No   Headache: No     Exam   /74   Wt 81.1 kg (178 lb 12.7 oz)   LMP 05/15/2023   BMI 30.69 kg/m²     GENERAL: No acute distress  ABD: Gravid    Assessment and Plan   High-risk pregnancy in second trimester    Fetal growth restriction antepartum  - 1/25 doppler with normal S/D ratio, BPP 8/8  - IOL scheduled for tomorrow 1/31  - Discussed IOL in detail including ripening agents, pitocin and AROM. Patient expressed understanding.     Anxiety disorder affecting pregnancy, antepartum  - stable mood today    Septate uterus affecting pregnancy in second trimester    Anemia affecting pregnancy in second trimester    Konrad Little MD  Obstetrics and Gynecology- PGY1

## 2024-01-31 ENCOUNTER — ANESTHESIA EVENT (OUTPATIENT)
Dept: OBSTETRICS AND GYNECOLOGY | Facility: OTHER | Age: 34
End: 2024-01-31
Payer: MEDICAID

## 2024-01-31 ENCOUNTER — HOSPITAL ENCOUNTER (INPATIENT)
Facility: OTHER | Age: 34
LOS: 3 days | Discharge: HOME | End: 2024-02-03
Attending: OBSTETRICS & GYNECOLOGY | Admitting: OBSTETRICS & GYNECOLOGY
Payer: MEDICAID

## 2024-01-31 ENCOUNTER — ANESTHESIA (OUTPATIENT)
Dept: OBSTETRICS AND GYNECOLOGY | Facility: OTHER | Age: 34
End: 2024-01-31
Payer: MEDICAID

## 2024-01-31 DIAGNOSIS — O36.5990 FETAL GROWTH RESTRICTION ANTEPARTUM: ICD-10-CM

## 2024-01-31 DIAGNOSIS — Q51.28 SEPTATE UTERUS AFFECTING PREGNANCY IN SECOND TRIMESTER: ICD-10-CM

## 2024-01-31 DIAGNOSIS — Z34.90 ENCOUNTER FOR INDUCTION OF LABOR: ICD-10-CM

## 2024-01-31 DIAGNOSIS — O99.340 ANXIETY DISORDER AFFECTING PREGNANCY, ANTEPARTUM: ICD-10-CM

## 2024-01-31 DIAGNOSIS — O99.012 ANEMIA AFFECTING PREGNANCY IN SECOND TRIMESTER: ICD-10-CM

## 2024-01-31 DIAGNOSIS — F41.9 ANXIETY DISORDER AFFECTING PREGNANCY, ANTEPARTUM: ICD-10-CM

## 2024-01-31 DIAGNOSIS — O09.92 HIGH-RISK PREGNANCY IN SECOND TRIMESTER: ICD-10-CM

## 2024-01-31 DIAGNOSIS — O40.3XX1 POLYHYDRAMNIOS IN THIRD TRIMESTER COMPLICATION, FETUS 1 OF MULTIPLE GESTATION: ICD-10-CM

## 2024-01-31 DIAGNOSIS — O34.02 SEPTATE UTERUS AFFECTING PREGNANCY IN SECOND TRIMESTER: ICD-10-CM

## 2024-01-31 LAB
ABO + RH BLD: NORMAL
BASOPHILS # BLD AUTO: 0.03 K/UL (ref 0–0.2)
BASOPHILS NFR BLD: 0.4 % (ref 0–1.9)
BLD GP AB SCN CELLS X3 SERPL QL: NORMAL
DIFFERENTIAL METHOD BLD: ABNORMAL
EOSINOPHIL # BLD AUTO: 0 K/UL (ref 0–0.5)
EOSINOPHIL NFR BLD: 0.5 % (ref 0–8)
ERYTHROCYTE [DISTWIDTH] IN BLOOD BY AUTOMATED COUNT: 18.9 % (ref 11.5–14.5)
HCT VFR BLD AUTO: 38.5 % (ref 37–48.5)
HGB BLD-MCNC: 12.4 G/DL (ref 12–16)
HIV 1+2 AB+HIV1 P24 AG SERPL QL IA: NEGATIVE
IMM GRANULOCYTES # BLD AUTO: 0.04 K/UL (ref 0–0.04)
IMM GRANULOCYTES NFR BLD AUTO: 0.5 % (ref 0–0.5)
LYMPHOCYTES # BLD AUTO: 1.4 K/UL (ref 1–4.8)
LYMPHOCYTES NFR BLD: 18.4 % (ref 18–48)
MCH RBC QN AUTO: 28.7 PG (ref 27–31)
MCHC RBC AUTO-ENTMCNC: 32.2 G/DL (ref 32–36)
MCV RBC AUTO: 89 FL (ref 82–98)
MONOCYTES # BLD AUTO: 0.7 K/UL (ref 0.3–1)
MONOCYTES NFR BLD: 8.8 % (ref 4–15)
NEUTROPHILS # BLD AUTO: 5.4 K/UL (ref 1.8–7.7)
NEUTROPHILS NFR BLD: 71.4 % (ref 38–73)
NRBC BLD-RTO: 0 /100 WBC
PLATELET # BLD AUTO: 193 K/UL (ref 150–450)
PMV BLD AUTO: 12.8 FL (ref 9.2–12.9)
RBC # BLD AUTO: 4.32 M/UL (ref 4–5.4)
RPR SER QL: NORMAL
SPECIMEN OUTDATE: NORMAL
WBC # BLD AUTO: 7.59 K/UL (ref 3.9–12.7)

## 2024-01-31 PROCEDURE — 63600175 PHARM REV CODE 636 W HCPCS

## 2024-01-31 PROCEDURE — 63600175 PHARM REV CODE 636 W HCPCS: Performed by: STUDENT IN AN ORGANIZED HEALTH CARE EDUCATION/TRAINING PROGRAM

## 2024-01-31 PROCEDURE — 86901 BLOOD TYPING SEROLOGIC RH(D): CPT

## 2024-01-31 PROCEDURE — 72100002 HC LABOR CARE, 1ST 8 HOURS

## 2024-01-31 PROCEDURE — 86592 SYPHILIS TEST NON-TREP QUAL: CPT

## 2024-01-31 PROCEDURE — 85025 COMPLETE CBC W/AUTO DIFF WBC: CPT

## 2024-01-31 PROCEDURE — 59200 INSERT CERVICAL DILATOR: CPT

## 2024-01-31 PROCEDURE — 87389 HIV-1 AG W/HIV-1&-2 AB AG IA: CPT

## 2024-01-31 PROCEDURE — 11000001 HC ACUTE MED/SURG PRIVATE ROOM

## 2024-01-31 RX ORDER — METHYLERGONOVINE MALEATE 0.2 MG/ML
200 INJECTION INTRAVENOUS ONCE AS NEEDED
Status: DISCONTINUED | OUTPATIENT
Start: 2024-01-31 | End: 2024-02-03 | Stop reason: HOSPADM

## 2024-01-31 RX ORDER — TRANEXAMIC ACID 10 MG/ML
1000 INJECTION, SOLUTION INTRAVENOUS EVERY 30 MIN PRN
Status: DISCONTINUED | OUTPATIENT
Start: 2024-01-31 | End: 2024-02-03 | Stop reason: HOSPADM

## 2024-01-31 RX ORDER — OXYTOCIN/RINGER'S LACTATE 30/500 ML
95 PLASTIC BAG, INJECTION (ML) INTRAVENOUS ONCE AS NEEDED
Status: DISCONTINUED | OUTPATIENT
Start: 2024-01-31 | End: 2024-02-03 | Stop reason: HOSPADM

## 2024-01-31 RX ORDER — SIMETHICONE 80 MG
1 TABLET,CHEWABLE ORAL 4 TIMES DAILY PRN
Status: DISCONTINUED | OUTPATIENT
Start: 2024-01-31 | End: 2024-02-01

## 2024-01-31 RX ORDER — PROCHLORPERAZINE EDISYLATE 5 MG/ML
5 INJECTION INTRAMUSCULAR; INTRAVENOUS EVERY 6 HOURS PRN
Status: DISCONTINUED | OUTPATIENT
Start: 2024-01-31 | End: 2024-02-03 | Stop reason: HOSPADM

## 2024-01-31 RX ORDER — MISOPROSTOL 200 UG/1
800 TABLET ORAL ONCE AS NEEDED
Status: DISCONTINUED | OUTPATIENT
Start: 2024-01-31 | End: 2024-02-03 | Stop reason: HOSPADM

## 2024-01-31 RX ORDER — OXYTOCIN/RINGER'S LACTATE 30/500 ML
334 PLASTIC BAG, INJECTION (ML) INTRAVENOUS ONCE AS NEEDED
Status: COMPLETED | OUTPATIENT
Start: 2024-01-31 | End: 2024-02-01

## 2024-01-31 RX ORDER — OXYTOCIN 10 [USP'U]/ML
10 INJECTION, SOLUTION INTRAMUSCULAR; INTRAVENOUS ONCE AS NEEDED
Status: DISCONTINUED | OUTPATIENT
Start: 2024-01-31 | End: 2024-02-03 | Stop reason: HOSPADM

## 2024-01-31 RX ORDER — CALCIUM CARBONATE 200(500)MG
500 TABLET,CHEWABLE ORAL 3 TIMES DAILY PRN
Status: DISCONTINUED | OUTPATIENT
Start: 2024-01-31 | End: 2024-02-03 | Stop reason: HOSPADM

## 2024-01-31 RX ORDER — OXYTOCIN/RINGER'S LACTATE 30/500 ML
0-32 PLASTIC BAG, INJECTION (ML) INTRAVENOUS CONTINUOUS
Status: DISCONTINUED | OUTPATIENT
Start: 2024-01-31 | End: 2024-02-03 | Stop reason: HOSPADM

## 2024-01-31 RX ORDER — DIPHENOXYLATE HYDROCHLORIDE AND ATROPINE SULFATE 2.5; .025 MG/1; MG/1
2 TABLET ORAL EVERY 6 HOURS PRN
Status: DISCONTINUED | OUTPATIENT
Start: 2024-01-31 | End: 2024-02-03 | Stop reason: HOSPADM

## 2024-01-31 RX ORDER — SODIUM CHLORIDE 9 MG/ML
INJECTION, SOLUTION INTRAVENOUS
Status: DISCONTINUED | OUTPATIENT
Start: 2024-01-31 | End: 2024-02-03 | Stop reason: HOSPADM

## 2024-01-31 RX ORDER — SODIUM CHLORIDE, SODIUM LACTATE, POTASSIUM CHLORIDE, CALCIUM CHLORIDE 600; 310; 30; 20 MG/100ML; MG/100ML; MG/100ML; MG/100ML
INJECTION, SOLUTION INTRAVENOUS CONTINUOUS
Status: DISCONTINUED | OUTPATIENT
Start: 2024-01-31 | End: 2024-02-03 | Stop reason: HOSPADM

## 2024-01-31 RX ORDER — OXYTOCIN/RINGER'S LACTATE 30/500 ML
95 PLASTIC BAG, INJECTION (ML) INTRAVENOUS ONCE
Status: DISCONTINUED | OUTPATIENT
Start: 2024-01-31 | End: 2024-02-03 | Stop reason: HOSPADM

## 2024-01-31 RX ORDER — OXYTOCIN/RINGER'S LACTATE 30/500 ML
334 PLASTIC BAG, INJECTION (ML) INTRAVENOUS ONCE
Status: DISCONTINUED | OUTPATIENT
Start: 2024-01-31 | End: 2024-02-03 | Stop reason: HOSPADM

## 2024-01-31 RX ORDER — OXYTOCIN/RINGER'S LACTATE 30/500 ML
0-32 PLASTIC BAG, INJECTION (ML) INTRAVENOUS CONTINUOUS
Status: DISCONTINUED | OUTPATIENT
Start: 2024-01-31 | End: 2024-01-31

## 2024-01-31 RX ORDER — LIDOCAINE HYDROCHLORIDE 10 MG/ML
10 INJECTION INFILTRATION; PERINEURAL ONCE AS NEEDED
Status: DISCONTINUED | OUTPATIENT
Start: 2024-01-31 | End: 2024-02-03 | Stop reason: HOSPADM

## 2024-01-31 RX ORDER — ONDANSETRON 8 MG/1
8 TABLET, ORALLY DISINTEGRATING ORAL EVERY 8 HOURS PRN
Status: DISCONTINUED | OUTPATIENT
Start: 2024-01-31 | End: 2024-02-03 | Stop reason: HOSPADM

## 2024-01-31 RX ORDER — CARBOPROST TROMETHAMINE 250 UG/ML
250 INJECTION, SOLUTION INTRAMUSCULAR
Status: DISCONTINUED | OUTPATIENT
Start: 2024-01-31 | End: 2024-02-03 | Stop reason: HOSPADM

## 2024-01-31 RX ADMIN — SODIUM CHLORIDE, POTASSIUM CHLORIDE, SODIUM LACTATE AND CALCIUM CHLORIDE: 600; 310; 30; 20 INJECTION, SOLUTION INTRAVENOUS at 10:01

## 2024-01-31 RX ADMIN — Medication 2 MILLI-UNITS/MIN: at 12:01

## 2024-01-31 NOTE — ANESTHESIA PREPROCEDURE EVALUATION
[facility]  Anesthesia Pre-Operative Evaluation         Patient Name: Mone Chandler  YOB: 1990  MRN: [id number]      2024    Mone Chandler is a 33 y.o. female  @ 38w2d who presents for IOL 2/2 dates in this IUP c/b FGR, Anxiety, anemia, and septate uterus.     She denies any cardiopulmonary disease, scoliosis, previous back surgeries, blood thinner use, or personal/family hx of bleeding disorders.         OB History    Para Term  AB Living   1             SAB IAB Ectopic Multiple Live Births                  # Outcome Date GA Lbr Arya/2nd Weight Sex Delivery Anes PTL Lv   1 Current                Review of patient's allergies indicates:  No Known Allergies    Wt Readings from Last 1 Encounters:   24 0858 81.1 kg (178 lb 12.7 oz)       BP Readings from Last 3 Encounters:   24 118/74   24 111/75   01/10/24 114/62       Patient Active Problem List   Diagnosis    ADRIANO (generalized anxiety disorder)    PCOS (polycystic ovarian syndrome)    Overweight (BMI 25.0-29.9)    Migraine without aura and without status migrainosus, not intractable    Pelvic floor tension    Anemia affecting pregnancy in second trimester    Septate uterus affecting pregnancy in second trimester    Anxiety disorder affecting pregnancy, antepartum    High-risk pregnancy in second trimester    Coordination impairment    Fetal growth restriction antepartum    Polyhydramnios in third trimester       Past Surgical History:   Procedure Laterality Date    NASAL ENDOSCOPY W/ BALLON SINUPLASTY Bilateral 2017    thumb Bilateral     TRIGGER FINGER RELEASE Bilateral     thumbs       Social History     Socioeconomic History    Marital status: Single   Tobacco Use    Smoking status: Never    Smokeless tobacco: Never   Substance and Sexual Activity    Alcohol use: No    Drug use: No    Sexual activity: Yes     Partners: Male     Birth control/protection: None     Social Determinants  of Health     Financial Resource Strain: Low Risk  (11/22/2023)    Overall Financial Resource Strain (CARDIA)     Difficulty of Paying Living Expenses: Not very hard   Food Insecurity: No Food Insecurity (11/22/2023)    Hunger Vital Sign     Worried About Running Out of Food in the Last Year: Never true     Ran Out of Food in the Last Year: Never true   Transportation Needs: No Transportation Needs (11/22/2023)    PRAPARE - Transportation     Lack of Transportation (Medical): No     Lack of Transportation (Non-Medical): No   Physical Activity: Sufficiently Active (11/22/2023)    Exercise Vital Sign     Days of Exercise per Week: 3 days     Minutes of Exercise per Session: 50 min   Stress: Stress Concern Present (11/22/2023)    [employer] [place] [employer] - Occupational Stress Questionnaire     Feeling of Stress : Rather much   Social Connections: Unknown (11/22/2023)    Social Connection and Isolation Panel [NHANES]     Frequency of Communication with Friends and Family: More than three times a week     Frequency of Social Gatherings with Friends and Family: Twice a week     Active Member of Clubs or Organizations: Yes     Attends Club or Organization Meetings: 1 to 4 times per year     Marital Status: Living with partner   Housing Stability: Unknown (11/22/2023)    Housing Stability Vital Sign     Unable to Pay for Housing in the Last Year: No     Unstable Housing in the Last Year: No         Chemistry        Component Value Date/Time     12/28/2021 1133    K 4.0 12/28/2021 1133     12/28/2021 1133    CO2 23 12/28/2021 1133    BUN 12 12/28/2021 1133    CREATININE 0.7 12/28/2021 1133    GLU 72 12/28/2021 1133        Component Value Date/Time    CALCIUM 9.3 12/28/2021 1133    ALKPHOS 68 12/28/2021 1133    AST 24 12/28/2021 1133    ALT 10 12/28/2021 1133    BILITOT 0.5 12/28/2021 1133    ESTGFRAFRICA >60.0 12/28/2021 1133    EGFRNONAA >60.0 12/28/2021 1133            Lab Results   Component  "Value Date    WBC 7.14 11/21/2023    HGB 10.1 (L) 11/21/2023    HCT 31.9 (L) 11/21/2023    MCV 91 11/21/2023     11/21/2023       No results for input(s): "PT", "INR", "PROTIME", "APTT" in the last 72 hours.            Pre-op Assessment    I have reviewed the Patient Summary Reports.     I have reviewed the Nursing Notes. I have reviewed the NPO Status.   I have reviewed the Medications.     Review of Systems  Anesthesia Hx:  No previous Anesthesia   Neg history of prior surgery.          Denies Family Hx of Anesthesia complications.     Social:  Non-Smoker       Hematology/Oncology:  Hematology Normal   Oncology Normal    -- Denies Anemia:                                  EENT/Dental:  EENT/Dental Normal           Cardiovascular:  Cardiovascular Normal     Denies Hypertension.    Denies CAD.    Denies CABG/stent.  Denies Dysrhythmias.    Denies CHF.  Denies Orthopnea.                                Pulmonary:  Pulmonary Normal    Denies Asthma.     Denies Sleep Apnea.                Renal/:  Renal/ Normal                 Hepatic/GI:  Hepatic/GI Normal                 Neurological:  Neurology Normal                                      Endocrine:  Endocrine Normal Denies Diabetes.           Psych:   anxiety                 Physical Exam  General: Well nourished, Cooperative, Alert and Oriented    Airway:  Mallampati: III / II  Mouth Opening: Normal  TM Distance: Normal  Tongue: Normal  Neck ROM: Normal ROM    Dental:  Intact, Braces        Anesthesia Plan  Type of Anesthesia, risks & benefits discussed:    Anesthesia Type: Gen ETT, Epidural, Spinal, CSE  Intra-op Monitoring Plan: Standard ASA Monitors  Post Op Pain Control Plan: multimodal analgesia, IV/PO Opioids PRN, epidural analgesia and intrathecal opioid  Airway Plan: Video, Post-Induction  Informed Consent: Informed consent signed with the Patient and all parties understand the risks and agree with anesthesia plan.  All questions answered. Patient " consented to blood products? Yes  ASA Score: 2  Day of Surgery Review of History & Physical: H&P Update referred to the surgeon/provider.    Ready For Surgery From Anesthesia Perspective.     .

## 2024-01-31 NOTE — PROGRESS NOTES
LABOR NOTE    S:  MD to bedside for two consecutive decelerations. Epidural working:  not applicable  No pt concerns at this time.    O: /69   Pulse 89   Temp 98.1 °F (36.7 °C) (Oral)   Resp 16   LMP 05/15/2023   SpO2 99%   Breastfeeding No     FHT: 140 bpm, moderate variability, +accels, +decels, Cat 2 (generally reassuring after pit pause and fluid bolus)  CTX: q 2-3 minutes, pit @ 8  SVE: 3/60/-3    TIMELINE:  1515: 1/50/-3, pit at 6   1715: 3/60/-3, pit at 8    ASSESSMENT:  Final Active Diagnoses:    Diagnosis Date Noted POA    PRINCIPAL PROBLEM:  Encounter for induction of labor [Z34.90] 01/31/2024 Not Applicable      Problems Resolved During this Admission:     PLAN:    Continue Close Maternal/Fetal Monitoring; 20 min continuous NST before using wireless monitoring  Restart pitocin at 8 and augment per protocol  Recheck 4 hours or PRN      Grover Lopez MD MS  OB/Gyn  PGY-1

## 2024-01-31 NOTE — PROGRESS NOTES
LABOR NOTE    S:  MD to bedside for routine cervical exam. Epidural working:  not applicable  No pt concerns at this time.    O: /69   Pulse 89   Temp 98.1 °F (36.7 °C) (Oral)   Resp 16   LMP 05/15/2023   SpO2 99%   Breastfeeding No     FHT: 140 bpm, moderate variability, +accels, -decels, Cat 1 (reassuring)  CTX: q 2-4 minutes, pit @ 6  SVE: 1/50/-3    TIMELINE:  1515: 1/50/-3, pit at 6    ASSESSMENT:  Final Active Diagnoses:    Diagnosis Date Noted POA    PRINCIPAL PROBLEM:  Encounter for induction of labor [Z34.90] 01/31/2024 Not Applicable      Problems Resolved During this Admission:     PLAN:    Continue Close Maternal/Fetal Monitoring  Pitocin Augmentation per protocol  Recheck 4 hours or PRN      Grover Lopez MD MS  OB/Gyn  PGY-1

## 2024-01-31 NOTE — PROGRESS NOTES
"Patient requesting to wait to start pitocin. "Wants to think about it again at 1215." MD notified. Will reassess at that time.     See MAR for pit administration.     1350 - pt requesting to reassess pitocin increase at 1430.   1433 - pt wants to wait for  to return to bedside before increasing pitocin.   "

## 2024-01-31 NOTE — INTERVAL H&P NOTE
Mone Chandler is 33 y.o.  at 38w2d wga presenting for scheduled induction of labor.     Temp:  [98.3 °F (36.8 °C)] 98.3 °F (36.8 °C)  Pulse:  [90] 90  Resp:  [17] 17  SpO2:  [100 %] 100 %  BP: (115)/(70) 115/70    FHT: 140 bpm, moderate BTBV, +accels, -decels; Cat 1 (reassuring)  [place]: q 5mins  Presentation: cephalic by ultrasound    SVE: /-3    1) Induction of Labor  - Plan for price balloon and low dose pitocin per protocol  - Avoid misoprostol based on FGR    Contraception: Chuy Lopez MD MS  OB/Gyn  PGY-1

## 2024-01-31 NOTE — PLAN OF CARE
Problem:  Fall Injury Risk  Goal: Absence of Fall, Infant Drop and Related Injury  Outcome: Ongoing, Progressing     Problem: Adult Inpatient Plan of Care  Goal: Plan of Care Review  Outcome: Ongoing, Progressing  Goal: Patient-Specific Goal (Individualized)  Outcome: Ongoing, Progressing  Goal: Absence of Hospital-Acquired Illness or Injury  Outcome: Ongoing, Progressing  Goal: Optimal Comfort and Wellbeing  Outcome: Ongoing, Progressing  Goal: Readiness for Transition of Care  Outcome: Ongoing, Progressing     Problem: Bleeding (Labor)  Goal: Hemostasis  Outcome: Ongoing, Progressing     Problem: Change in Fetal Wellbeing (Labor)  Goal: Stable Fetal Wellbeing  Outcome: Ongoing, Progressing     Problem: Delayed Labor Progression (Labor)  Goal: Effective Progression to Delivery  Outcome: Ongoing, Progressing     Problem: Infection (Labor)  Goal: Absence of Infection Signs and Symptoms  Outcome: Ongoing, Progressing     Problem: Labor Pain (Labor)  Goal: Acceptable Pain Control  Outcome: Ongoing, Progressing     Problem: Uterine Tachysystole (Labor)  Goal: Normal Uterine Contraction Pattern  Outcome: Ongoing, Progressing

## 2024-01-31 NOTE — PLAN OF CARE
24 1200   OB SCREEN   Assessment Type Discharge Planning Brief Assessment   Source of Information health record   Received Prenatal Care Yes   Any indications/suspicions for None   Is this a teen pregnancy No   Is the baby in NICU No   Indication for adoption/Safe Haven No   Indication for DME/post-acute needs No   HIV (+) No   Any congenital  disorders No   Fetal demise/ death No     Patient has been screened for Social Work discharge planning needs. Based on documentation in medical record, no discharge planning needs are anticipated at this time. Should any discharge planning needs arise, please consult .

## 2024-02-01 ENCOUNTER — TELEPHONE (OUTPATIENT)
Dept: OBSTETRICS AND GYNECOLOGY | Facility: CLINIC | Age: 34
End: 2024-02-01
Payer: MEDICAID

## 2024-02-01 PROCEDURE — 59409 OBSTETRICAL CARE: CPT | Mod: AA,,, | Performed by: ANESTHESIOLOGY

## 2024-02-01 PROCEDURE — 62326 NJX INTERLAMINAR LMBR/SAC: CPT | Performed by: ANESTHESIOLOGY

## 2024-02-01 PROCEDURE — 25000003 PHARM REV CODE 250: Performed by: STUDENT IN AN ORGANIZED HEALTH CARE EDUCATION/TRAINING PROGRAM

## 2024-02-01 PROCEDURE — 10907ZC DRAINAGE OF AMNIOTIC FLUID, THERAPEUTIC FROM PRODUCTS OF CONCEPTION, VIA NATURAL OR ARTIFICIAL OPENING: ICD-10-PCS | Performed by: STUDENT IN AN ORGANIZED HEALTH CARE EDUCATION/TRAINING PROGRAM

## 2024-02-01 PROCEDURE — 72200005 HC VAGINAL DELIVERY LEVEL II

## 2024-02-01 PROCEDURE — C1751 CATH, INF, PER/CENT/MIDLINE: HCPCS | Performed by: ANESTHESIOLOGY

## 2024-02-01 PROCEDURE — 0HQ9XZZ REPAIR PERINEUM SKIN, EXTERNAL APPROACH: ICD-10-PCS | Performed by: STUDENT IN AN ORGANIZED HEALTH CARE EDUCATION/TRAINING PROGRAM

## 2024-02-01 PROCEDURE — 63600175 PHARM REV CODE 636 W HCPCS: Performed by: STUDENT IN AN ORGANIZED HEALTH CARE EDUCATION/TRAINING PROGRAM

## 2024-02-01 PROCEDURE — 25000003 PHARM REV CODE 250

## 2024-02-01 PROCEDURE — 11000001 HC ACUTE MED/SURG PRIVATE ROOM

## 2024-02-01 PROCEDURE — 25000003 PHARM REV CODE 250: Performed by: ANESTHESIOLOGY

## 2024-02-01 PROCEDURE — 51702 INSERT TEMP BLADDER CATH: CPT

## 2024-02-01 PROCEDURE — 4A1HXCZ MONITORING OF PRODUCTS OF CONCEPTION, CARDIAC RATE, EXTERNAL APPROACH: ICD-10-PCS | Performed by: STUDENT IN AN ORGANIZED HEALTH CARE EDUCATION/TRAINING PROGRAM

## 2024-02-01 PROCEDURE — 27200710 HC EPIDURAL INFUSION PUMP SET: Performed by: ANESTHESIOLOGY

## 2024-02-01 PROCEDURE — 59409 OBSTETRICAL CARE: CPT | Mod: AT,,, | Performed by: STUDENT IN AN ORGANIZED HEALTH CARE EDUCATION/TRAINING PROGRAM

## 2024-02-01 RX ORDER — LIDOCAINE HYDROCHLORIDE AND EPINEPHRINE 15; 5 MG/ML; UG/ML
INJECTION, SOLUTION EPIDURAL
Status: DISCONTINUED | OUTPATIENT
Start: 2024-02-01 | End: 2024-02-01

## 2024-02-01 RX ORDER — HYDROCODONE BITARTRATE AND ACETAMINOPHEN 5; 325 MG/1; MG/1
1 TABLET ORAL EVERY 4 HOURS PRN
Status: DISCONTINUED | OUTPATIENT
Start: 2024-02-01 | End: 2024-02-01

## 2024-02-01 RX ORDER — METHYLERGONOVINE MALEATE 0.2 MG/ML
200 INJECTION INTRAVENOUS ONCE AS NEEDED
Status: CANCELLED | OUTPATIENT
Start: 2024-02-01 | End: 2035-06-30

## 2024-02-01 RX ORDER — OXYTOCIN/RINGER'S LACTATE 30/500 ML
95 PLASTIC BAG, INJECTION (ML) INTRAVENOUS ONCE
Status: DISCONTINUED | OUTPATIENT
Start: 2024-02-01 | End: 2024-02-03 | Stop reason: HOSPADM

## 2024-02-01 RX ORDER — FAMOTIDINE 10 MG/ML
20 INJECTION INTRAVENOUS ONCE
Status: CANCELLED | OUTPATIENT
Start: 2024-02-01 | End: 2024-02-01

## 2024-02-01 RX ORDER — SIMETHICONE 80 MG
1 TABLET,CHEWABLE ORAL EVERY 6 HOURS PRN
Status: DISCONTINUED | OUTPATIENT
Start: 2024-02-01 | End: 2024-02-03 | Stop reason: HOSPADM

## 2024-02-01 RX ORDER — OXYCODONE HYDROCHLORIDE 10 MG/1
10 TABLET ORAL EVERY 4 HOURS PRN
Status: DISCONTINUED | OUTPATIENT
Start: 2024-02-02 | End: 2024-02-03 | Stop reason: HOSPADM

## 2024-02-01 RX ORDER — MISOPROSTOL 200 UG/1
800 TABLET ORAL ONCE AS NEEDED
Status: CANCELLED | OUTPATIENT
Start: 2024-02-01

## 2024-02-01 RX ORDER — ACETAMINOPHEN 325 MG/1
650 TABLET ORAL EVERY 6 HOURS PRN
Status: DISCONTINUED | OUTPATIENT
Start: 2024-02-01 | End: 2024-02-03 | Stop reason: HOSPADM

## 2024-02-01 RX ORDER — PROCHLORPERAZINE EDISYLATE 5 MG/ML
5 INJECTION INTRAMUSCULAR; INTRAVENOUS EVERY 6 HOURS PRN
Status: CANCELLED | OUTPATIENT
Start: 2024-02-01

## 2024-02-01 RX ORDER — DIPHENHYDRAMINE HCL 25 MG
25 CAPSULE ORAL EVERY 4 HOURS PRN
Status: DISCONTINUED | OUTPATIENT
Start: 2024-02-01 | End: 2024-02-03 | Stop reason: HOSPADM

## 2024-02-01 RX ORDER — CARBOPROST TROMETHAMINE 250 UG/ML
250 INJECTION, SOLUTION INTRAMUSCULAR
Status: CANCELLED | OUTPATIENT
Start: 2024-02-01

## 2024-02-01 RX ORDER — HYDROCODONE BITARTRATE AND ACETAMINOPHEN 10; 325 MG/1; MG/1
1 TABLET ORAL EVERY 4 HOURS PRN
Status: DISCONTINUED | OUTPATIENT
Start: 2024-02-01 | End: 2024-02-01

## 2024-02-01 RX ORDER — SODIUM CHLORIDE 0.9 % (FLUSH) 0.9 %
10 SYRINGE (ML) INJECTION EVERY 6 HOURS PRN
Status: CANCELLED | OUTPATIENT
Start: 2024-02-01

## 2024-02-01 RX ORDER — FENTANYL/BUPIVACAINE/NS/PF 2MCG/ML-.1
PLASTIC BAG, INJECTION (ML) INJECTION
Status: DISPENSED
Start: 2024-02-01 | End: 2024-02-02

## 2024-02-01 RX ORDER — FENTANYL/BUPIVACAINE/NS/PF 2MCG/ML-.1
PLASTIC BAG, INJECTION (ML) INJECTION
Status: COMPLETED
Start: 2024-02-01 | End: 2024-02-01

## 2024-02-01 RX ORDER — SODIUM CITRATE AND CITRIC ACID MONOHYDRATE 334; 500 MG/5ML; MG/5ML
30 SOLUTION ORAL ONCE
Status: CANCELLED | OUTPATIENT
Start: 2024-02-01 | End: 2024-02-01

## 2024-02-01 RX ORDER — OXYTOCIN/RINGER'S LACTATE 30/500 ML
95 PLASTIC BAG, INJECTION (ML) INTRAVENOUS ONCE AS NEEDED
Status: CANCELLED | OUTPATIENT
Start: 2024-02-01 | End: 2035-06-30

## 2024-02-01 RX ORDER — OXYTOCIN 10 [USP'U]/ML
10 INJECTION, SOLUTION INTRAMUSCULAR; INTRAVENOUS ONCE AS NEEDED
Status: CANCELLED | OUTPATIENT
Start: 2024-02-01 | End: 2035-06-30

## 2024-02-01 RX ORDER — OXYCODONE HYDROCHLORIDE 5 MG/1
5 TABLET ORAL EVERY 4 HOURS PRN
Status: DISCONTINUED | OUTPATIENT
Start: 2024-02-02 | End: 2024-02-03 | Stop reason: HOSPADM

## 2024-02-01 RX ORDER — FENTANYL/BUPIVACAINE/NS/PF 2MCG/ML-.1
PLASTIC BAG, INJECTION (ML) INJECTION CONTINUOUS PRN
Status: DISCONTINUED | OUTPATIENT
Start: 2024-02-01 | End: 2024-02-01

## 2024-02-01 RX ORDER — DOCUSATE SODIUM 100 MG/1
200 CAPSULE, LIQUID FILLED ORAL 2 TIMES DAILY PRN
Status: DISCONTINUED | OUTPATIENT
Start: 2024-02-01 | End: 2024-02-03 | Stop reason: HOSPADM

## 2024-02-01 RX ORDER — DIPHENOXYLATE HYDROCHLORIDE AND ATROPINE SULFATE 2.5; .025 MG/1; MG/1
2 TABLET ORAL EVERY 6 HOURS PRN
Status: CANCELLED | OUTPATIENT
Start: 2024-02-01

## 2024-02-01 RX ORDER — TRANEXAMIC ACID 10 MG/ML
1000 INJECTION, SOLUTION INTRAVENOUS EVERY 30 MIN PRN
Status: CANCELLED | OUTPATIENT
Start: 2024-02-01

## 2024-02-01 RX ORDER — PRENATAL WITH FERROUS FUM AND FOLIC ACID 3080; 920; 120; 400; 22; 1.84; 3; 20; 10; 1; 12; 200; 27; 25; 2 [IU]/1; [IU]/1; MG/1; [IU]/1; MG/1; MG/1; MG/1; MG/1; MG/1; MG/1; UG/1; MG/1; MG/1; MG/1; MG/1
1 TABLET ORAL DAILY
Status: DISCONTINUED | OUTPATIENT
Start: 2024-02-02 | End: 2024-02-03 | Stop reason: HOSPADM

## 2024-02-01 RX ORDER — HYDROCORTISONE 25 MG/G
CREAM TOPICAL 3 TIMES DAILY PRN
Status: DISCONTINUED | OUTPATIENT
Start: 2024-02-01 | End: 2024-02-03 | Stop reason: HOSPADM

## 2024-02-01 RX ORDER — ONDANSETRON 8 MG/1
8 TABLET, ORALLY DISINTEGRATING ORAL EVERY 8 HOURS PRN
Status: CANCELLED | OUTPATIENT
Start: 2024-02-01

## 2024-02-01 RX ORDER — IBUPROFEN 600 MG/1
600 TABLET ORAL EVERY 6 HOURS
Status: DISCONTINUED | OUTPATIENT
Start: 2024-02-02 | End: 2024-02-01

## 2024-02-01 RX ORDER — MISOPROSTOL 200 UG/1
800 TABLET ORAL ONCE AS NEEDED
Status: CANCELLED | OUTPATIENT
Start: 2024-02-01 | End: 2035-06-30

## 2024-02-01 RX ORDER — DIPHENHYDRAMINE HYDROCHLORIDE 50 MG/ML
25 INJECTION INTRAMUSCULAR; INTRAVENOUS EVERY 4 HOURS PRN
Status: DISCONTINUED | OUTPATIENT
Start: 2024-02-01 | End: 2024-02-03 | Stop reason: HOSPADM

## 2024-02-01 RX ORDER — FENTANYL/BUPIVACAINE/NS/PF 2MCG/ML-.1
PLASTIC BAG, INJECTION (ML) INJECTION CONTINUOUS
Status: CANCELLED | OUTPATIENT
Start: 2024-02-01

## 2024-02-01 RX ORDER — IBUPROFEN 600 MG/1
600 TABLET ORAL EVERY 6 HOURS
Status: DISCONTINUED | OUTPATIENT
Start: 2024-02-01 | End: 2024-02-03 | Stop reason: HOSPADM

## 2024-02-01 RX ORDER — OXYTOCIN/RINGER'S LACTATE 30/500 ML
334 PLASTIC BAG, INJECTION (ML) INTRAVENOUS ONCE AS NEEDED
Status: CANCELLED | OUTPATIENT
Start: 2024-02-01 | End: 2035-06-30

## 2024-02-01 RX ADMIN — Medication 334 MILLI-UNITS/MIN: at 08:02

## 2024-02-01 RX ADMIN — Medication 8 ML/HR: at 06:02

## 2024-02-01 RX ADMIN — ONDANSETRON 8 MG: 8 TABLET, ORALLY DISINTEGRATING ORAL at 05:02

## 2024-02-01 RX ADMIN — SODIUM CHLORIDE, POTASSIUM CHLORIDE, SODIUM LACTATE AND CALCIUM CHLORIDE: 600; 310; 30; 20 INJECTION, SOLUTION INTRAVENOUS at 01:02

## 2024-02-01 RX ADMIN — LIDOCAINE HYDROCHLORIDE,EPINEPHRINE BITARTRATE 3 ML: 15; .005 INJECTION, SOLUTION EPIDURAL; INFILTRATION; INTRACAUDAL; PERINEURAL at 06:02

## 2024-02-01 RX ADMIN — Medication 2 ML: at 06:02

## 2024-02-01 RX ADMIN — SODIUM CHLORIDE, POTASSIUM CHLORIDE, SODIUM LACTATE AND CALCIUM CHLORIDE: 600; 310; 30; 20 INJECTION, SOLUTION INTRAVENOUS at 08:02

## 2024-02-01 RX ADMIN — ACETAMINOPHEN 650 MG: 325 TABLET, FILM COATED ORAL at 10:02

## 2024-02-01 RX ADMIN — ONDANSETRON 8 MG: 8 TABLET, ORALLY DISINTEGRATING ORAL at 01:02

## 2024-02-01 RX ADMIN — SODIUM CHLORIDE, POTASSIUM CHLORIDE, SODIUM LACTATE AND CALCIUM CHLORIDE: 600; 310; 30; 20 INJECTION, SOLUTION INTRAVENOUS at 04:02

## 2024-02-01 RX ADMIN — IBUPROFEN 600 MG: 600 TABLET, FILM COATED ORAL at 10:02

## 2024-02-01 NOTE — PROGRESS NOTES
LABOR NOTE    S:  MD to bedside for routine cervical checks. Epidural working:  not applicable  No pt concerns at this time.    O: BP (!) 134/57   Pulse 90   Temp 98.5 °F (36.9 °C) (Oral)   Resp 16   LMP 05/15/2023   SpO2 99%   Breastfeeding No     FHT: 150 bpm, moderate variability, +accels, +decels, Cat 1    TIMELINE:  1515: 1/50/-3, pit at 6   1715: 3/60/-3, pit at 8  2120: 4/60/-2, pit @ 8    ASSESSMENT:  Final Active Diagnoses:    Diagnosis Date Noted POA    PRINCIPAL PROBLEM:  Encounter for induction of labor [Z34.90] 01/31/2024 Not Applicable      Problems Resolved During this Admission:     PLAN:  Continue Close Maternal/Fetal Monitoring; 20 min continuous NST before using wireless monitoring  Continue Pitocin augmentation per protocol  Recheck 4 hours or PRN    Deepti Chavira MD PGY-2  Obstetrics and Gynecology

## 2024-02-01 NOTE — PLAN OF CARE
Problem:  Fall Injury Risk  Goal: Absence of Fall, Infant Drop and Related Injury  Outcome: Ongoing, Progressing     Problem: Adult Inpatient Plan of Care  Goal: Plan of Care Review  Outcome: Ongoing, Progressing  Goal: Patient-Specific Goal (Individualized)  Outcome: Ongoing, Progressing  Goal: Absence of Hospital-Acquired Illness or Injury  Outcome: Ongoing, Progressing  Goal: Optimal Comfort and Wellbeing  Outcome: Ongoing, Progressing  Goal: Readiness for Transition of Care  Outcome: Ongoing, Progressing     Problem: Bleeding (Labor)  Goal: Hemostasis  Outcome: Ongoing, Progressing     Problem: Change in Fetal Wellbeing (Labor)  Goal: Stable Fetal Wellbeing  Outcome: Ongoing, Progressing     Problem: Delayed Labor Progression (Labor)  Goal: Effective Progression to Delivery  Outcome: Ongoing, Progressing     Problem: Infection (Labor)  Goal: Absence of Infection Signs and Symptoms  Outcome: Ongoing, Progressing     Problem: Labor Pain (Labor)  Goal: Acceptable Pain Control  Outcome: Ongoing, Progressing     Problem: Uterine Tachysystole (Labor)  Goal: Normal Uterine Contraction Pattern  Outcome: Ongoing, Progressing     Problem: Infection  Goal: Absence of Infection Signs and Symptoms  Outcome: Ongoing, Progressing

## 2024-02-01 NOTE — PROGRESS NOTES
LABOR NOTE    S:  MD to bedside for routine cervical exam. Epidural working:  yes  No pt concerns at this time    O: /67   Pulse 89   Temp 98.2 °F (36.8 °C)   Resp 16   LMP 05/15/2023   SpO2 97%   Breastfeeding No     FHT: 140 bpm, moderate variability, +accels, -decels, Cat 1 (reassuring)  CTX: q 3 minutes, pit @ 14  SVE: 5/60/-3    TIMELINE:  1515: 1/50/-3, pit at 6   1715: 3/60/-3, pit at 8  2120: 4/60/-2, pit @ 8  0215: 4/60/-2, pit @ 10  0500: Pt declined cervical exam  0830: 5/60/-3, pit @ 14    ASSESSMENT:  Final Active Diagnoses:    Diagnosis Date Noted POA    PRINCIPAL PROBLEM:  Encounter for induction of labor [Z34.90] 01/31/2024 Not Applicable      Problems Resolved During this Admission:     PLAN:    Continue Close Maternal/Fetal Monitoring  Pitocin Augmentation per protocol  Recheck 4 hours or PRN      Grover Lopez MD MS  OB/Gyn  PGY-1

## 2024-02-01 NOTE — PROGRESS NOTES
LABOR NOTE    S:  MD to bedside for routine cervical exam. Epidural working:  yes  No pt concerns at this time    O: /67   Pulse 87   Temp 98.2 °F (36.8 °C)   Resp 16   LMP 05/15/2023   SpO2 97%   Breastfeeding No     FHT: 130 bpm, moderate variability, +accels, -decels, Cat 1 (reassuring)  CTX: q 3 minutes, pit @ 22  SVE: 5/60/-3    TIMELINE:  1515: 1/50/-3, pit at 6   1715: 3/60/-3, pit at 8  2120: 4/60/-2, pit @ 8  0215: 4/60/-2, pit @ 10  0500: Pt declined cervical exam  0830: 5/60/-3, pit @ 14  1330: 5/60/-3, pit @ 22    ASSESSMENT:  Final Active Diagnoses:    Diagnosis Date Noted POA    PRINCIPAL PROBLEM:  Encounter for induction of labor [Z34.90] 01/31/2024 Not Applicable      Problems Resolved During this Admission:     PLAN:    Continue Close Maternal/Fetal Monitoring  Pitocin Augmentation per protocol  Recheck 4 hours or PRN      Grover Lopez MD MS  OB/Gyn  PGY-1

## 2024-02-01 NOTE — PROGRESS NOTES
LABOR NOTE    S:  MD to bedside for routine cervical exam. Epidural working:  yes  No pt concerns at this time    O: BP (!) 95/57   Pulse 81   Temp 98 °F (36.7 °C) (Oral)   Resp 16   LMP 05/15/2023   SpO2 99%   Breastfeeding No     FHT: 130 bpm, moderate variability, +accels, -decels, Cat 1 (reassuring)  CTX: q 2 minutes, pit @ 22  SVE: 5/60/-3    TIMELINE:  1515: 1/50/-3, pit at 6   1715: 3/60/-3, pit at 8  2120: 4/60/-2, pit @ 8  0215: 4/60/-2, pit @ 10  0500: Pt declined cervical exam  0830: 5/60/-3, pit @ 14  1330: 5/60/-3, pit @ 22  1645: 6/90/-2, pit @ 22    ASSESSMENT:  Final Active Diagnoses:    Diagnosis Date Noted POA    PRINCIPAL PROBLEM:  Encounter for induction of labor [Z34.90] 01/31/2024 Not Applicable      Problems Resolved During this Admission:     PLAN:    Continue Close Maternal/Fetal Monitoring  Pitocin Augmentation per protocol  Recheck 2 hours or PRN      Grover Lopez MD MS  OB/Gyn  PGY-1

## 2024-02-01 NOTE — PLAN OF CARE
Problem: Adult Inpatient Plan of Care  Goal: Plan of Care Review  Outcome: Ongoing, Progressing     Problem: Adult Inpatient Plan of Care  Goal: Patient-Specific Goal (Individualized)  Outcome: Ongoing, Progressing     Problem: Adult Inpatient Plan of Care  Goal: Absence of Hospital-Acquired Illness or Injury  Outcome: Ongoing, Progressing     Problem: Adult Inpatient Plan of Care  Goal: Optimal Comfort and Wellbeing  Outcome: Ongoing, Progressing       Problem: Change in Fetal Wellbeing (Labor)  Goal: Stable Fetal Wellbeing  Outcome: Ongoing, Progressing     Problem: Delayed Labor Progression (Labor)  Goal: Effective Progression to Delivery  Outcome: Ongoing, Progressing     Problem: Infection (Labor)  Goal: Absence of Infection Signs and Symptoms  Outcome: Ongoing, Progressing     Problem: Labor Pain (Labor)  Goal: Acceptable Pain Control  Outcome: Ongoing, Progressing     Problem: Uterine Tachysystole (Labor)  Goal: Normal Uterine Contraction Pattern  Outcome: Ongoing, Progressing

## 2024-02-01 NOTE — TELEPHONE ENCOUNTER
----- Message from Rafaela Chris MD sent at 2/1/2024  3:43 PM CST -----  Regarding: change to virtual  Please change 2/16 EDDIE appt to virtual mood check.   Thanks  Rafaela

## 2024-02-01 NOTE — ANESTHESIA PROCEDURE NOTES
CSE    Patient location during procedure: OB  Start time: 2/1/2024 6:31 AM  Timeout: 2/1/2024 6:30 AM  End time: 2/1/2024 6:35 AM      Staffing  Authorizing Provider: Vianca Hamm MD  Performing Provider: Vianca Hamm MD    Staffing  Performed by: Vianca Hamm MD  Authorized by: Vianca Hamm MD    Preanesthetic Checklist  Completed: patient identified, IV checked, site marked, risks and benefits discussed, surgical consent, monitors and equipment checked, pre-op evaluation and timeout performed  CSE  Patient position: sitting  Prep: ChloraPrep  Patient monitoring: heart rate, continuous pulse ox and frequent blood pressure checks  Approach: midline  Spinal Needle  Needle type: pencil-tip   Needle gauge: 25 G  Needle length: 5 in  Epidural Needle  Injection technique: MARTINE saline  Needle type: Tuohy   Needle gauge: 17 G  Needle length: 3.5 in  Needle insertion depth: 6.5 cm  Location: L3-4  Needle localization: anatomical landmarks   Catheter  Catheter type: [employer]  Catheter size: 19 G  Catheter at skin depth: 11 cm  Test dose: lidocaine 1.5% with Epi 1-to-200,000  Test dose: 3 mL  Additional Documentation: incremental injection, no paresthesia on injection, negative test dose and negative aspiration for heme  Assessment  Intrathecal Medications:   administered: primary anesthetic mcg of

## 2024-02-01 NOTE — PROGRESS NOTES
LABOR NOTE    S:  MD to bedside for routine cervical checks. Epidural working:  not applicable  Pt visibly upset because she dislikes cervical exams.    O: /77   Pulse 85   Temp 97.3 °F (36.3 °C) (Oral)   Resp 16   LMP 05/15/2023   SpO2 98%   Breastfeeding No     FHT: 140 bpm, moderate variability, +accels, +decels, Cat 1    TIMELINE:  1515: 1/50/-3, pit at 6   1715: 3/60/-3, pit at 8  2120: 4/60/-2, pit @ 8  0215: 4/60/-2, pit @ 10    ASSESSMENT:  Final Active Diagnoses:    Diagnosis Date Noted POA    PRINCIPAL PROBLEM:  Encounter for induction of labor [Z34.90] 01/31/2024 Not Applicable      Problems Resolved During this Admission:     PLAN:  Declined AROM. Discussed the process of induction and why its necessary to increase pitocin per the protocol to promote cervical change. Also explained why its helpful to have AROM. Patient expressed understanding. Of note, patient is intolerable of cervical exams. Encouraged patient to consider epidural anesthesia for a more comfortable experience. Patient and family discussing at this time.  Continue Close Maternal/Fetal Monitoring; 20 min continuous NST   Continue Pitocin augmentation per protocol  Recheck 4 hours or PRN    Deepti Chavira MD PGY-2  Obstetrics and Gynecology

## 2024-02-01 NOTE — PROGRESS NOTES
LABOR NOTE    S:  MD to bedside for routine cervical checks. Epidural working:  not applicable    O: /61   Pulse 88   Temp 98.4 °F (36.9 °C) (Oral)   Resp 16   LMP 05/15/2023   SpO2 97%   Breastfeeding No     FHT: 140 bpm, moderate variability, +accels, +decels, Cat 1    TIMELINE:  1515: 1/50/-3, pit at 6   1715: 3/60/-3, pit at 8  2120: 4/60/-2, pit @ 8  0215: 4/60/-2, pit @ 10  0500: Pt declined cervical exam    ASSESSMENT:  Final Active Diagnoses:    Diagnosis Date Noted POA    PRINCIPAL PROBLEM:  Encounter for induction of labor [Z34.90] 01/31/2024 Not Applicable      Problems Resolved During this Admission:     PLAN:  Continue Close Maternal/Fetal Monitoring  Continue Pitocin augmentation per protocol    Deepti Chavira MD PGY-2  Obstetrics and Gynecology

## 2024-02-02 PROBLEM — O36.5990 FETAL GROWTH RESTRICTION ANTEPARTUM: Status: RESOLVED | Noted: 2023-12-20 | Resolved: 2024-02-02

## 2024-02-02 PROBLEM — O40.3XX0 POLYHYDRAMNIOS IN THIRD TRIMESTER: Status: RESOLVED | Noted: 2024-01-04 | Resolved: 2024-02-02

## 2024-02-02 LAB
BASOPHILS # BLD AUTO: 0.04 K/UL (ref 0–0.2)
BASOPHILS NFR BLD: 0.3 % (ref 0–1.9)
DIFFERENTIAL METHOD BLD: ABNORMAL
EOSINOPHIL # BLD AUTO: 0 K/UL (ref 0–0.5)
EOSINOPHIL NFR BLD: 0.3 % (ref 0–8)
ERYTHROCYTE [DISTWIDTH] IN BLOOD BY AUTOMATED COUNT: 18.7 % (ref 11.5–14.5)
HCT VFR BLD AUTO: 33 % (ref 37–48.5)
HGB BLD-MCNC: 10.6 G/DL (ref 12–16)
IMM GRANULOCYTES # BLD AUTO: 0.07 K/UL (ref 0–0.04)
IMM GRANULOCYTES NFR BLD AUTO: 0.5 % (ref 0–0.5)
LYMPHOCYTES # BLD AUTO: 1.4 K/UL (ref 1–4.8)
LYMPHOCYTES NFR BLD: 10.4 % (ref 18–48)
MCH RBC QN AUTO: 28.9 PG (ref 27–31)
MCHC RBC AUTO-ENTMCNC: 32.1 G/DL (ref 32–36)
MCV RBC AUTO: 90 FL (ref 82–98)
MONOCYTES # BLD AUTO: 1.3 K/UL (ref 0.3–1)
MONOCYTES NFR BLD: 9.5 % (ref 4–15)
NEUTROPHILS # BLD AUTO: 10.7 K/UL (ref 1.8–7.7)
NEUTROPHILS NFR BLD: 79 % (ref 38–73)
NRBC BLD-RTO: 0 /100 WBC
PLATELET # BLD AUTO: 173 K/UL (ref 150–450)
PMV BLD AUTO: 12.9 FL (ref 9.2–12.9)
RBC # BLD AUTO: 3.67 M/UL (ref 4–5.4)
WBC # BLD AUTO: 13.54 K/UL (ref 3.9–12.7)

## 2024-02-02 PROCEDURE — 72100003 HC LABOR CARE, EA. ADDL. 8 HRS

## 2024-02-02 PROCEDURE — 99232 SBSQ HOSP IP/OBS MODERATE 35: CPT | Mod: ,,,

## 2024-02-02 PROCEDURE — 25000003 PHARM REV CODE 250

## 2024-02-02 PROCEDURE — 36415 COLL VENOUS BLD VENIPUNCTURE: CPT | Performed by: OBSTETRICS & GYNECOLOGY

## 2024-02-02 PROCEDURE — 85025 COMPLETE CBC W/AUTO DIFF WBC: CPT | Performed by: OBSTETRICS & GYNECOLOGY

## 2024-02-02 PROCEDURE — 11000001 HC ACUTE MED/SURG PRIVATE ROOM

## 2024-02-02 RX ADMIN — ACETAMINOPHEN 650 MG: 325 TABLET, FILM COATED ORAL at 05:02

## 2024-02-02 RX ADMIN — ACETAMINOPHEN 650 MG: 325 TABLET, FILM COATED ORAL at 11:02

## 2024-02-02 RX ADMIN — OXYCODONE HYDROCHLORIDE 5 MG: 5 TABLET ORAL at 08:02

## 2024-02-02 RX ADMIN — ACETAMINOPHEN 650 MG: 325 TABLET, FILM COATED ORAL at 08:02

## 2024-02-02 RX ADMIN — OXYCODONE HYDROCHLORIDE 5 MG: 5 TABLET ORAL at 01:02

## 2024-02-02 RX ADMIN — OXYCODONE HYDROCHLORIDE 10 MG: 10 TABLET ORAL at 12:02

## 2024-02-02 RX ADMIN — IBUPROFEN 600 MG: 600 TABLET, FILM COATED ORAL at 05:02

## 2024-02-02 RX ADMIN — OXYCODONE HYDROCHLORIDE 5 MG: 5 TABLET ORAL at 04:02

## 2024-02-02 RX ADMIN — PRENATAL VIT W/ FE FUMARATE-FA TAB 27-0.8 MG 1 TABLET: 27-0.8 TAB at 08:02

## 2024-02-02 RX ADMIN — IBUPROFEN 600 MG: 600 TABLET, FILM COATED ORAL at 08:02

## 2024-02-02 RX ADMIN — IBUPROFEN 600 MG: 600 TABLET, FILM COATED ORAL at 01:02

## 2024-02-02 RX ADMIN — OXYCODONE HYDROCHLORIDE 5 MG: 5 TABLET ORAL at 10:02

## 2024-02-02 RX ADMIN — SIMETHICONE 80 MG: 80 TABLET, CHEWABLE ORAL at 08:02

## 2024-02-02 RX ADMIN — DOCUSATE SODIUM 200 MG: 100 CAPSULE, LIQUID FILLED ORAL at 11:02

## 2024-02-02 NOTE — PLAN OF CARE
Overnight, AVSS. Ambulating and voiding independently without difficulty. Fundus is firm, midline and with light amount of lochia.  Breastfeeding successfully. Appropriate bonding with baby. Pain well controlled with scheduled PO meds. Safety maintained.

## 2024-02-02 NOTE — ANESTHESIA POSTPROCEDURE EVALUATION
Anesthesia Post Evaluation    Patient: Mone Chandler    Procedure(s) Performed: * No procedures listed *    Final Anesthesia Type: CSE      Patient location during evaluation: floor  Patient participation: Yes- Able to Participate  Level of consciousness: awake and alert  Post-procedure vital signs: reviewed and stable  Pain management: adequate  Airway patency: patent  YVETTE mitigation strategies: Use of major conduction anesthesia (spinal/epidural) or peripheral nerve block and Multimodal analgesia  PONV status at discharge: No PONV  Anesthetic complications: no      Cardiovascular status: blood pressure returned to baseline  Respiratory status: unassisted, spontaneous ventilation and room air  Hydration status: euvolemic  Follow-up not needed.              Vitals Value Taken Time   /58 02/02/24 0152   Temp 36.7 °C (98 °F) 02/02/24 0152   Pulse 91 02/02/24 0152   Resp 18 02/02/24 1358   SpO2 93 % 02/02/24 0152         No case tracking events are documented in the log.      Pain/Nena Score: Pain Rating Prior to Med Admin: 4 (2/2/2024  1:58 PM)  Pain Rating Post Med Admin: 2 (2/2/2024  9:36 AM)

## 2024-02-02 NOTE — LACTATION NOTE
Breastfeeding basics reviewed via breastfeeding guide. Pt was very sleepy during education. Latch assistance given. Baby latched on right breast in football with minimal help. Baby taking a few good sucks with occasional swallows noted. Breast compression needed to keep the baby actively feeding. Pt encouraged to feed the baby 8 or more times in 24hrs on cue until content, going no longer than 3 hrs between the feedings due to SGA. Pt verbalized understanding.    02/02/24 1019   Maternal Assessment   Breast Shape Bilateral:;round   Breast Density Bilateral:;soft   Areola Bilateral:;elastic   Nipples everted   Left Nipple Symptoms tender   Maternal Infant Feeding   Maternal Emotional State assist needed   Infant Positioning clutch/football   Signs of Milk Transfer audible swallow;infant jaw motion present   Pain with Feeding yes   Pain Location nipple, left   Pain Description soreness   Comfort Measures Before/During Feeding infant position adjusted;latch adjusted;suction broken using finger   Comfort Measures Following Feeding expressed milk applied   Nipple Shape After Feeding, Left round   Nipple Shape After Feeding, Right round   Latch Assistance yes   Community Referrals   Community Referrals outpatient lactation program;pediatric care provider;support group

## 2024-02-02 NOTE — PLAN OF CARE
VSS. Pain controlled with scheduled and PRN oral pain medication. Breastfeeding/pumping and supplementing with EBM/donor milk. Fundus firm and midline with light lochia rubra. Voiding spontaneously with adequate output. Passing gas. No concerns at this time.

## 2024-02-02 NOTE — PROGRESS NOTES
[facility])  Obstetrics  Postpartum Progress Note    Patient Name: Mone Chandler  MRN: [id number]  Admission Date: 2024  Hospital Length of Stay: 2 days  Attending Physician: Courtney Padilla MD  Primary Care Provider: Courtney Padilla MD    Subjective:     Principal Problem: (spontaneous vaginal delivery)    Hospital Course:  24 PPD#1, doing well, normal involution, breastfeeding    Interval History:     She is doing well this morning. She is tolerating a regular diet without nausea or vomiting. She is voiding spontaneously. She is ambulating. She has passed flatus, and has not a BM. Vaginal bleeding is mild. She denies fever or chills. Abdominal pain is mild and controlled with oral medications. She Is breastfeeding. She desires circumcision for her male baby: yes.    Objective:     Vital Signs (Most Recent):  Temp: 98 °F (36.7 °C) (24 015)  Pulse: 91 (24)  Resp: 20 (24 041)  BP: (!) 123/58 (24)  SpO2: (!) 93 % (24) Vital Signs (24h Range):  Temp:  [97.7 °F (36.5 °C)-99.4 °F (37.4 °C)] 98 °F (36.7 °C)  Pulse:  [] 91  Resp:  [16-20] 20  SpO2:  [93 %-100 %] 93 %  BP: ()/(54-90) 123/58        There is no height or weight on file to calculate BMI.      Intake/Output Summary (Last 24 hours) at 2024 0746  Last data filed at 2024 0018  Gross per 24 hour   Intake 2436.08 ml   Output 470 ml   Net 1966.08 ml         Significant Labs:  Lab Results   Component Value Date    GROUPTRH O POS 2024    HEPBSAG Non-reactive 2023    STREPBCULT No Group B Streptococcus isolated 2024     Recent Labs   Lab 24  0549   HGB 10.6*   HCT 33.0*       I have personallly reviewed all pertinent lab results from the last 24 hours.  Recent Lab Results         24  0549        Baso # 0.04       Basophil % 0.3       Differential Method Automated       Eos # 0.0       Eos % 0.3       Gran # (ANC) 10.7       Gran % 79.0        Hematocrit 33.0       Hemoglobin 10.6       Immature Grans (Abs) 0.07  Comment: Mild elevation in immature granulocytes is non specific and   can be seen in a variety of conditions including stress response,   acute inflammation, trauma and pregnancy. Correlation with other   laboratory and clinical findings is essential.         Immature Granulocytes 0.5       Lymph # 1.4       Lymph % 10.4       MCH 28.9       MCHC 32.1       MCV 90       Mono # 1.3       Mono % 9.5       MPV 12.9       nRBC 0       Platelet Count 173       RBC 3.67       RDW 18.7       WBC 13.54               Physical Exam:   Constitutional: She is oriented to person, place, and time. She appears well-developed and well-nourished.    HENT:   Head: Normocephalic and atraumatic.    Eyes: EOM are normal.      Pulmonary/Chest: Effort normal.        Abdominal: Soft. There is no abdominal tenderness.             Musculoskeletal: Normal range of motion.       Neurological: She is alert and oriented to person, place, and time.    Skin: Skin is warm and dry.    Psychiatric: She has a normal mood and affect. Her behavior is normal. Judgment and thought content normal.       Review of Systems   Constitutional:  Negative for fever.   Eyes:  Negative for visual disturbance.   Cardiovascular:  Negative for chest pain.   Gastrointestinal:  Positive for abdominal pain.        Cramping     Genitourinary:  Positive for vaginal bleeding. Negative for vaginal pain.        Mild lochia rubra     Neurological:  Negative for headaches.   All other systems reviewed and are negative.    Assessment/Plan:     33 y.o. female  for:    *  (spontaneous vaginal delivery)  Routine postpartum care and advances    Obstetrical laceration, first degree  Routine pericare    ADRIANO (generalized anxiety disorder)  Needs 2 wk postpartum mood check  Rx zoloft, not taking        Disposition: As patient meets milestones, will plan to discharge tomorrow on PPD#2.    Gracy Lagunas,  SAUMYA  Obstetrics  [facility]

## 2024-02-02 NOTE — L&D DELIVERY NOTE
[facility] - Labor & Delivery  Vaginal Delivery   Operative Note    SUMMARY     Vertex presentation.   Patient was under epidural anesthesia.   after approximately 15 minutes of maternal pushing.  Infant delivered OA over intact perineum.  Male infant also tolerated the delivery well and was placed on mother's abdomen for skin-to-skin contact.   Cord clamping was delayed until pulse was diminished to palpation in the cord.  Cord was then clamped and cut, and umbilical cord blood was obtained.  Gentle traction on the umbilical cord yielded delivery of the placenta, and IV pitocin was started.   Bimanual uterine massage confirmed good uterine tone.   1st degree laceration was repaired with 2-0 Vicryl in the usual fashion and was then found to be hemostatic.  Uterine tone noted again.   Patient and infant tolerated delivery well.  EBL 310mL.  Dr. Oropeza was present for the entire procedure.   S/L/N counts correct x 2.    Renita Miller MD  Obstetrics and Gynecology - PGY1    Indications: Encounter for induction of labor  Pregnancy complicated by:   Patient Active Problem List   Diagnosis    ADRIANO (generalized anxiety disorder)    PCOS (polycystic ovarian syndrome)    Overweight (BMI 25.0-29.9)    Migraine without aura and without status migrainosus, not intractable    Pelvic floor tension    Anemia affecting pregnancy in second trimester    Septate uterus affecting pregnancy in second trimester    Anxiety disorder affecting pregnancy, antepartum    High-risk pregnancy in second trimester    Coordination impairment    Fetal growth restriction antepartum    Polyhydramnios in third trimester    Encounter for induction of labor     (spontaneous vaginal delivery)     Admitting GA: 38w3d    Delivery Information for Maged Chandler    Birth information:  YOB: 2024   Time of birth: 8:16 PM   Sex: male   Head Delivery Date/Time: 2024  8:16 PM   Delivery type: Vaginal, Spontaneous   Gestational Age: 38w3d   "      Delivery Providers    Delivering clinician: Cecilia Oropeza MD   Provider Role    Renita Miller MD Aultman, Kelli, RN Phiyo, Theresa, RN Bruner, Erika, ST Hilkirk, Jennifer, RN Vickanir, Kristine, RN               Measurements    Weight: 2590 g  Weight (lbs): 5 lb 11.4 oz  Length: 49.5 cm  Length (in): 19.5"  Head circumference: 34.4 cm  Chest circumference: 28.6 cm         Apgars    Living status: Living  Apgar Component Scores:  1 min.:  5 min.:  10 min.:  15 min.:  20 min.:    Skin color:  0  1       Heart rate:  2  2       Reflex irritability:  2  2       Muscle tone:  1  2       Respiratory effort:  1  2       Total:  6  9       Apgars assigned by: STEVEN TOMLINSON RN         Operative Delivery    Forceps attempted?: No  Vacuum extractor attempted?: No         Shoulder Dystocia    Shoulder dystocia present?: No           Presentation    Presentation: Vertex  Position: Occiput Anterior           Interventions/Resuscitation    Method: Bulb Suctioning, Blow By Oxygen, Tactile Stimulation, Deep Suctioning, CPAP, PPV       Cord    Vessels: 3 vessels  Complications: None  Delayed Cord Clamping?: Yes  Cord Clamped Date/Time: 2024  8:17 PM  Cord Blood Disposition: Sent with Baby  Gases Sent?: No  Stem Cell Collection (by MD): No       Placenta    Placenta delivery date/time: 2024  Placenta removal: Spontaneous  Placenta appearance: Intact  Placenta disposition: Discarded           Labor Events:       labor: No     Labor Onset Date/Time:         Dilation Complete Date/Time:         Start Pushing Date/Time: 2024 19:55       Start Pushing Date/Time: 2024 19:55     Rupture Date/Time: 24  1640         Rupture type: ARM (Artificial Rupture)         Fluid Amount:       Fluid Color: Clear               steroids: None     Antibiotics given for GBS: No     Induction: balloon dilation (Parker)     Indications for induction:  Elective     Augmentation: " oxytocin     Indications for augmentation: Ineffective Contraction Pattern     Labor complications: None     Additional complications:          Cervical ripenin2024 11:03 AM                 Delivery:      Episiotomy: None     Indication for Episiotomy:       Perineal Lacerations: 1st Repaired:  Yes   Periurethral Laceration:   Repaired:     Labial Laceration:   Repaired:     Sulcus Laceration:   Repaired:     Vaginal Laceration:   Repaired:     Cervical Laceration:   Repaired:     Repair suture:       Repair # of packets: 2     Last Value - EBL - Nursing (mL):       Sum - EBL - Nursing (mL): 0     Last Value - EBL - Anesthesia (mL):      Calculated QBL (mL): 310      Running total QBL (mL): 310      Vaginal Sweep Performed: Yes     Surgicount Correct: Yes     Vaginal Packing: No Quantity:       Other providers:       Anesthesia    Method: Epidural          Details (if applicable):  Trial of Labor      Categorization:      Priority:     Indications for :     Incision Type:       Additional  information:  Forceps:    Vacuum:    Breech:    Observed anomalies    Other (Comments):

## 2024-02-02 NOTE — PROGRESS NOTES
LABOR NOTE    S:  MD to bedside for routine cervical exam. Epidural working:  yes  No pt concerns at this time    O: /61 (BP Location: Left arm, Patient Position: Sitting)   Pulse (!) 116   Temp 98.4 °F (36.9 °C) (Oral)   Resp 16   LMP 05/15/2023   SpO2 100%   Breastfeeding No     FHT: 140 bpm, moderate variability, +accels, + variable decels and + intermittent late decels, Cat 2 (overall reassuring)  CTX: q 2 minutes, pit @ 22  SVE: 10/100/+2    TIMELINE:  1515: 1/50/-3, pit at 6   1715: 3/60/-3, pit at 8  2120: 4/60/-2, pit @ 8  0215: 4/60/-2, pit @ 10  0500: Pt declined cervical exam  0830: 5/60/-3, pit @ 14  1330: 5/60/-3, pit @ 22  1645: 6/90/-2, pit @ 22  1945: 10/100/+2, pit @ 22    ASSESSMENT:  Final Active Diagnoses:    Diagnosis Date Noted POA    PRINCIPAL PROBLEM:  Encounter for induction of labor [Z34.90] 01/31/2024 Not Applicable      Problems Resolved During this Admission:     PLAN:    Continue Close Maternal/Fetal Monitoring  Will set up and begin pushing at this time  Staff, Charge, RN, and chief aware    Renita Miller MD  Obstetrics and Gynecology - PGY1

## 2024-02-02 NOTE — NURSING
Patient hygiene maintained throughout  shift. Education provided regarding individual risk factors and ways to prevent infection. All  questions answered. Perineal care provided per protocol. Pads changed when soiled. Position changed as indicated. Oncoming RN notified of patients risk assessment. Transition RN updated on  patient status as needed.

## 2024-02-02 NOTE — HPI
Mone Chandler is a 33 y.o.  female with IUP at 37w3d weeks gestation who presents for scheduled induction of labor.     Patient denies contractions, denies vaginal bleeding, denies LOF.   Fetal Movement: normal.     This IUP is complicated by fetal growth restriction, polyhydramnios (resolved), septate uterus, anxiety, and iron deficiency anemia.

## 2024-02-02 NOTE — HOSPITAL COURSE
2/2/24 PPD#1, doing well, normal involution, breastfeeding  2/3/23 PPD #2: overwhelmed, baby discharge delayed secondary to low blood sugar, nurse to help reduce visitors by putting sign on door. Normal involution, breastfeeding going well, bleeding light, no other complaints. Discussed pp warning s/s, risk for pp depression/ anxiety and related resources.  F/u in 2 weeks for virtual mood check.   Contraception--unsure due to septum (wants removed), will f/u with Dr. Padilla.

## 2024-02-02 NOTE — SUBJECTIVE & OBJECTIVE
Interval History:     She is doing well this morning. She is tolerating a regular diet without nausea or vomiting. She is voiding spontaneously. She is ambulating. She has passed flatus, and has not a BM. Vaginal bleeding is mild. She denies fever or chills. Abdominal pain is mild and controlled with oral medications. She Is breastfeeding. She desires circumcision for her male baby: yes.    Objective:     Vital Signs (Most Recent):  Temp: 98 °F (36.7 °C) (02/02/24 0152)  Pulse: 91 (02/02/24 0152)  Resp: 20 (02/02/24 0411)  BP: (!) 123/58 (02/02/24 0152)  SpO2: (!) 93 % (02/02/24 0152) Vital Signs (24h Range):  Temp:  [97.7 °F (36.5 °C)-99.4 °F (37.4 °C)] 98 °F (36.7 °C)  Pulse:  [] 91  Resp:  [16-20] 20  SpO2:  [93 %-100 %] 93 %  BP: ()/(54-90) 123/58        There is no height or weight on file to calculate BMI.      Intake/Output Summary (Last 24 hours) at 2/2/2024 0746  Last data filed at 2/2/2024 0018  Gross per 24 hour   Intake 2436.08 ml   Output 470 ml   Net 1966.08 ml         Significant Labs:  Lab Results   Component Value Date    GROUPTRH O POS 01/31/2024    HEPBSAG Non-reactive 07/05/2023    STREPBCULT No Group B Streptococcus isolated 01/25/2024     Recent Labs   Lab 02/02/24  0549   HGB 10.6*   HCT 33.0*       I have personallly reviewed all pertinent lab results from the last 24 hours.  Recent Lab Results         02/02/24  0549        Baso # 0.04       Basophil % 0.3       Differential Method Automated       Eos # 0.0       Eos % 0.3       Gran # (ANC) 10.7       Gran % 79.0       Hematocrit 33.0       Hemoglobin 10.6       Immature Grans (Abs) 0.07  Comment: Mild elevation in immature granulocytes is non specific and   can be seen in a variety of conditions including stress response,   acute inflammation, trauma and pregnancy. Correlation with other   laboratory and clinical findings is essential.         Immature Granulocytes 0.5       Lymph # 1.4       Lymph % 10.4       MCH 28.9        MCHC 32.1       MCV 90       Mono # 1.3       Mono % 9.5       MPV 12.9       nRBC 0       Platelet Count 173       RBC 3.67       RDW 18.7       WBC 13.54               Physical Exam:   Constitutional: She is oriented to person, place, and time. She appears well-developed and well-nourished.    HENT:   Head: Normocephalic and atraumatic.    Eyes: EOM are normal.      Pulmonary/Chest: Effort normal.        Abdominal: Soft. There is no abdominal tenderness.             Musculoskeletal: Normal range of motion.       Neurological: She is alert and oriented to person, place, and time.    Skin: Skin is warm and dry.    Psychiatric: She has a normal mood and affect. Her behavior is normal. Judgment and thought content normal.       Review of Systems   Constitutional:  Negative for fever.   Eyes:  Negative for visual disturbance.   Cardiovascular:  Negative for chest pain.   Gastrointestinal:  Positive for abdominal pain.        Cramping     Genitourinary:  Positive for vaginal bleeding. Negative for vaginal pain.        Mild lochia rubra     Neurological:  Negative for headaches.   All other systems reviewed and are negative.

## 2024-02-03 VITALS
HEART RATE: 96 BPM | TEMPERATURE: 99 F | DIASTOLIC BLOOD PRESSURE: 68 MMHG | RESPIRATION RATE: 20 BRPM | SYSTOLIC BLOOD PRESSURE: 111 MMHG | OXYGEN SATURATION: 100 %

## 2024-02-03 PROCEDURE — 25000003 PHARM REV CODE 250

## 2024-02-03 PROCEDURE — 99024 POSTOP FOLLOW-UP VISIT: CPT | Mod: ,,, | Performed by: ADVANCED PRACTICE MIDWIFE

## 2024-02-03 RX ORDER — IBUPROFEN 600 MG/1
600 TABLET ORAL EVERY 6 HOURS
Qty: 30 TABLET | Refills: 0 | Status: SHIPPED | OUTPATIENT
Start: 2024-02-03 | End: 2024-02-16 | Stop reason: SDUPTHER

## 2024-02-03 RX ADMIN — IBUPROFEN 600 MG: 600 TABLET, FILM COATED ORAL at 08:02

## 2024-02-03 RX ADMIN — IBUPROFEN 600 MG: 600 TABLET, FILM COATED ORAL at 02:02

## 2024-02-03 RX ADMIN — DOCUSATE SODIUM 200 MG: 100 CAPSULE, LIQUID FILLED ORAL at 11:02

## 2024-02-03 RX ADMIN — OXYCODONE HYDROCHLORIDE 5 MG: 5 TABLET ORAL at 11:02

## 2024-02-03 RX ADMIN — OXYCODONE HYDROCHLORIDE 5 MG: 5 TABLET ORAL at 04:02

## 2024-02-03 RX ADMIN — PRENATAL VIT W/ FE FUMARATE-FA TAB 27-0.8 MG 1 TABLET: 27-0.8 TAB at 09:02

## 2024-02-03 RX ADMIN — OXYCODONE HYDROCHLORIDE 5 MG: 5 TABLET ORAL at 02:02

## 2024-02-03 NOTE — PLAN OF CARE
VSS. Pain controlled with scheduled and PRN oral pain medication. Breastfeeding/ pumping and supplementing with donor milk/EBM. Fundus firm and midline with light lochia rubra. Ambulating and voiding without difficulty. Passing gas. Discharge education provided by RN using mother baby guide. Discharge orders in per CNM, discharge instructions reviewed with pt. Pt to be discharged to room. No concerns at this time.

## 2024-02-03 NOTE — LACTATION NOTE
02/03/24 0900   Maternal Infant Feeding   Latch Assistance no   Equipment Type   Breast Pump Type double electric, hospital grade   Breast Pumping   Breast Pumping Interventions post-feed pumping encouraged;frequent pumping encouraged;early pumping promoted   Breast Pumping other (see comments)  (encouraged to pump 8x/day)   Community Referrals   Community Referrals support group;pediatric care provider;outpatient lactation program     Discharge lactation education reviewed with breastfeeding guide. Baby has been attempting to latch, then syringe feeding donor milk; mom pace bottle feeding with yellow nipple for first time upon entering room. Baby in crib, has taken 7mL so far, but not tolerating well. LC demonstrated how to loosely swaddle and side lie bottle feed baby with cheek and chin support. Baby very sleepy and not interested, several attempts to bottle feed more donor milk. Baby took 23mL at last feeding at 0600. May have to let rest and try again.     Encouraged to use Symphony pump 8x/day to stimulate and maintain milk supply. Patient to follow up with [employer] on ordering Spectra pump. To consider rental pump for discharge home tomorrow. Has mom Linnea pump for home use.

## 2024-02-03 NOTE — PLAN OF CARE
Mother to breastfeed infant 8 or more times in 24hrs on infant's cue until content, frequent skin to skin, and will avoid pacifiers.  Mother is to keep infant actively feeding by keeping infant stimulated and using breast compression. Mother ensure effective nursing by hearing infant swallows and feeling nice tugs and pulls. Latch should not be painful while nursing.  Mother to record all breastfeedings, voids and stools in breastfeeding guide. Mother to call LC for breastfeeding assistance or questions .  Refer breastfeeding guide for lactation education.       Baby not nursing effectively, having difficulty latching; plan to attempt latch, pump and bottle feed EBM (when available); donor milk in hospital.

## 2024-02-03 NOTE — PLAN OF CARE
Overnight, AVSS. Ambulating and voiding independently. Encouraged to use the breast pump to stimulate milk production.  Appropriate bonding with baby. Pain well controlled with PO meds. Safety maintained.

## 2024-02-04 ENCOUNTER — LACTATION ENCOUNTER (OUTPATIENT)
Dept: OBSTETRICS AND GYNECOLOGY | Facility: OTHER | Age: 34
End: 2024-02-04

## 2024-02-04 NOTE — LACTATION NOTE
This note was copied from a baby's chart.  Reviewed with mother the importance of feeding the baby at least 8 times in 24hrs no longer than 3 hours between feeding. Pt latching baby to breast the supplementing with ebm and /or abm but not using the breastpump after feeding. Discussed the importance of protecting her milk supply and to pump after the feeding when she supplements the baby. Pt rented a pump until she can obtain a pump thru her insurance company.  Pt given breastfeeding resources to contact after discharge for breastfeeding support and out patient resources Pt verbalized understanding.

## 2024-02-05 ENCOUNTER — PATIENT MESSAGE (OUTPATIENT)
Dept: OBSTETRICS AND GYNECOLOGY | Facility: OTHER | Age: 34
End: 2024-02-05
Payer: MEDICAID

## 2024-02-14 NOTE — PROGRESS NOTES
The patient location is: home  The chief complaint leading to consultation is: mood check  Visit type: Virtual visit with synchronous audio and video  Total time spent with patient: 15 min    Each patient to whom he or she provides medical services by telemedicine is:  (1) informed of the relationship between the physician and patient and the respective role of any other health care provider with respect to management of the patient; and (2) notified that he or she may decline to receive medical services by telemedicine and may withdraw from such care at any time.    Past medical, surgical, social, family, and obstetric histories; medications; prior records and results; and available outside records were reviewed and updated in the EMR.  Pertinent findings were noted below.    Reason for Visit   No chief complaint on file.    HPI   33 y.o. female     Patient's last menstrual period was 05/15/2023.    Post partum x2 weeks  Patient has known history of anxiety. Mood has been good. Has good support but may be moving to [place] to be closer to mother for additional help.  Working on getting into good schedule with breast feeding    Contraception: None  Pap: 2021, NILM  Mammogram: N/A    Assessment and Plan   Mood disorder      Mood good. Needs in-office postpartum visit scheduled - messaged primary OBGYN's nurse  Patient desired a list of all providers present in her delivery room for her baby book - sent list through [facility] patient portal

## 2024-02-16 ENCOUNTER — PATIENT MESSAGE (OUTPATIENT)
Dept: OBSTETRICS AND GYNECOLOGY | Facility: CLINIC | Age: 34
End: 2024-02-16

## 2024-02-16 ENCOUNTER — OFFICE VISIT (OUTPATIENT)
Dept: OBSTETRICS AND GYNECOLOGY | Facility: CLINIC | Age: 34
End: 2024-02-16
Payer: MEDICAID

## 2024-02-16 DIAGNOSIS — F39 MOOD DISORDER: Primary | ICD-10-CM

## 2024-02-16 DIAGNOSIS — R10.2 PELVIC PAIN: ICD-10-CM

## 2024-02-16 PROCEDURE — 99024 POSTOP FOLLOW-UP VISIT: CPT | Mod: 95,,, | Performed by: OBSTETRICS & GYNECOLOGY

## 2024-02-16 RX ORDER — IBUPROFEN 600 MG/1
600 TABLET ORAL EVERY 6 HOURS
Qty: 30 TABLET | Refills: 0 | Status: SHIPPED | OUTPATIENT
Start: 2024-02-16 | End: 2024-02-24

## 2024-02-19 ENCOUNTER — TELEPHONE (OUTPATIENT)
Dept: INTERNAL MEDICINE | Facility: CLINIC | Age: 34
End: 2024-02-19
Payer: MEDICAID

## 2024-02-19 ENCOUNTER — PATIENT MESSAGE (OUTPATIENT)
Dept: INTERNAL MEDICINE | Facility: CLINIC | Age: 34
End: 2024-02-19
Payer: MEDICAID

## 2024-02-19 ENCOUNTER — ON-DEMAND VIRTUAL (OUTPATIENT)
Dept: URGENT CARE | Facility: CLINIC | Age: 34
End: 2024-02-19
Payer: MEDICAID

## 2024-02-19 ENCOUNTER — HOSPITAL ENCOUNTER (EMERGENCY)
Facility: HOSPITAL | Age: 34
Discharge: HOME | End: 2024-02-19
Attending: EMERGENCY MEDICINE
Payer: MEDICAID

## 2024-02-19 VITALS
OXYGEN SATURATION: 99 % | SYSTOLIC BLOOD PRESSURE: 118 MMHG | DIASTOLIC BLOOD PRESSURE: 70 MMHG | HEART RATE: 96 BPM | TEMPERATURE: 99 F | RESPIRATION RATE: 16 BRPM

## 2024-02-19 DIAGNOSIS — N39.0 URINARY TRACT INFECTION WITH HEMATURIA, SITE UNSPECIFIED: Primary | ICD-10-CM

## 2024-02-19 DIAGNOSIS — R31.9 URINARY TRACT INFECTION WITH HEMATURIA, SITE UNSPECIFIED: Primary | ICD-10-CM

## 2024-02-19 DIAGNOSIS — R42 LIGHTHEADEDNESS: Primary | ICD-10-CM

## 2024-02-19 LAB
ALBUMIN SERPL BCP-MCNC: 3.3 G/DL (ref 3.5–5.2)
ALP SERPL-CCNC: 91 U/L (ref 55–135)
ALT SERPL W/O P-5'-P-CCNC: 11 U/L (ref 10–44)
ANION GAP SERPL CALC-SCNC: 11 MMOL/L (ref 8–16)
AST SERPL-CCNC: 24 U/L (ref 10–40)
B-HCG UR QL: NEGATIVE
BACTERIA #/AREA URNS HPF: ABNORMAL /HPF
BASOPHILS # BLD AUTO: 0.06 K/UL (ref 0–0.2)
BASOPHILS NFR BLD: 0.9 % (ref 0–1.9)
BILIRUB SERPL-MCNC: 0.4 MG/DL (ref 0.1–1)
BILIRUB UR QL STRIP: NEGATIVE
BUN SERPL-MCNC: 9 MG/DL (ref 6–20)
CALCIUM SERPL-MCNC: 9.2 MG/DL (ref 8.7–10.5)
CHLORIDE SERPL-SCNC: 112 MMOL/L (ref 95–110)
CLARITY UR: ABNORMAL
CO2 SERPL-SCNC: 19 MMOL/L (ref 23–29)
COLOR UR: YELLOW
CREAT SERPL-MCNC: 0.8 MG/DL (ref 0.5–1.4)
CTP QC/QA: YES
DIFFERENTIAL METHOD BLD: ABNORMAL
EOSINOPHIL # BLD AUTO: 0.1 K/UL (ref 0–0.5)
EOSINOPHIL NFR BLD: 1.2 % (ref 0–8)
ERYTHROCYTE [DISTWIDTH] IN BLOOD BY AUTOMATED COUNT: 17.1 % (ref 11.5–14.5)
EST. GFR  (NO RACE VARIABLE): >60 ML/MIN/1.73 M^2
GLUCOSE SERPL-MCNC: 89 MG/DL (ref 70–110)
GLUCOSE UR QL STRIP: NEGATIVE
HCT VFR BLD AUTO: 38.3 % (ref 37–48.5)
HGB BLD-MCNC: 12.8 G/DL (ref 12–16)
HGB UR QL STRIP: NEGATIVE
IMM GRANULOCYTES # BLD AUTO: 0.01 K/UL (ref 0–0.04)
IMM GRANULOCYTES NFR BLD AUTO: 0.1 % (ref 0–0.5)
KETONES UR QL STRIP: ABNORMAL
LEUKOCYTE ESTERASE UR QL STRIP: ABNORMAL
LYMPHOCYTES # BLD AUTO: 1.6 K/UL (ref 1–4.8)
LYMPHOCYTES NFR BLD: 24.6 % (ref 18–48)
MAGNESIUM SERPL-MCNC: 1.7 MG/DL (ref 1.6–2.6)
MCH RBC QN AUTO: 30 PG (ref 27–31)
MCHC RBC AUTO-ENTMCNC: 33.4 G/DL (ref 32–36)
MCV RBC AUTO: 90 FL (ref 82–98)
MICROSCOPIC COMMENT: ABNORMAL
MONOCYTES # BLD AUTO: 0.5 K/UL (ref 0.3–1)
MONOCYTES NFR BLD: 7.8 % (ref 4–15)
NEUTROPHILS # BLD AUTO: 4.4 K/UL (ref 1.8–7.7)
NEUTROPHILS NFR BLD: 65.4 % (ref 38–73)
NITRITE UR QL STRIP: NEGATIVE
NON-SQ EPI CELLS #/AREA URNS HPF: 1 /HPF
NRBC BLD-RTO: 0 /100 WBC
PH UR STRIP: 6 [PH] (ref 5–8)
PLATELET # BLD AUTO: 304 K/UL (ref 150–450)
PMV BLD AUTO: 10.3 FL (ref 9.2–12.9)
POTASSIUM SERPL-SCNC: 4 MMOL/L (ref 3.5–5.1)
PROT SERPL-MCNC: 6.5 G/DL (ref 6–8.4)
PROT UR QL STRIP: ABNORMAL
RBC # BLD AUTO: 4.27 M/UL (ref 4–5.4)
RBC #/AREA URNS HPF: 18 /HPF (ref 0–4)
SODIUM SERPL-SCNC: 142 MMOL/L (ref 136–145)
SP GR UR STRIP: >1.03 (ref 1–1.03)
SQUAMOUS #/AREA URNS HPF: 1 /HPF
URN SPEC COLLECT METH UR: ABNORMAL
UROBILINOGEN UR STRIP-ACNC: NEGATIVE EU/DL
WBC # BLD AUTO: 6.67 K/UL (ref 3.9–12.7)
WBC #/AREA URNS HPF: >100 /HPF (ref 0–5)

## 2024-02-19 PROCEDURE — 83735 ASSAY OF MAGNESIUM: CPT | Performed by: NURSE PRACTITIONER

## 2024-02-19 PROCEDURE — 87086 URINE CULTURE/COLONY COUNT: CPT | Performed by: NURSE PRACTITIONER

## 2024-02-19 PROCEDURE — 81025 URINE PREGNANCY TEST: CPT | Performed by: NURSE PRACTITIONER

## 2024-02-19 PROCEDURE — 99283 EMERGENCY DEPT VISIT LOW MDM: CPT

## 2024-02-19 PROCEDURE — 99214 OFFICE O/P EST MOD 30 MIN: CPT | Mod: 95,,, | Performed by: NURSE PRACTITIONER

## 2024-02-19 PROCEDURE — 81000 URINALYSIS NONAUTO W/SCOPE: CPT | Performed by: NURSE PRACTITIONER

## 2024-02-19 PROCEDURE — 85025 COMPLETE CBC W/AUTO DIFF WBC: CPT | Performed by: NURSE PRACTITIONER

## 2024-02-19 PROCEDURE — 80053 COMPREHEN METABOLIC PANEL: CPT | Performed by: NURSE PRACTITIONER

## 2024-02-19 RX ORDER — CEPHALEXIN 500 MG/1
500 CAPSULE ORAL 4 TIMES DAILY
Qty: 20 CAPSULE | Refills: 0 | Status: SHIPPED | OUTPATIENT
Start: 2024-02-19 | End: 2024-02-24

## 2024-02-19 RX ORDER — HYDROCORTISONE ACETATE 25 MG/1
25 SUPPOSITORY RECTAL DAILY
Qty: 3 SUPPOSITORY | Refills: 0 | Status: SHIPPED | OUTPATIENT
Start: 2024-02-19 | End: 2024-02-22

## 2024-02-19 NOTE — ED TRIAGE NOTES
"Pt arrived with multiple complaints.  Pt states she "just had a baby on 2/1 and wanted to check to make sure everything was okay."  Pt currently c/o photophobia, lightheadedness/"balance issues," and fatigue  Pt reports dysuria and would like her "level 1 vaginal tear checked."  Pt answering questions appropriately, speaking in complete sentences, respirations even and unlabored.  Aao x 4.    "

## 2024-02-19 NOTE — PROGRESS NOTES
Subjective:      Patient ID: Mone Chandler is a 33 y.o. female.    Vitals:  vitals were not taken for this visit.     Chief Complaint: Dizziness      Visit Type: TELE AUDIOVISUAL    Present with the patient at the time of consultation: TELEMED PRESENT WITH PATIENT: None    Past Medical History:   Diagnosis Date    Allergic rhinitis, cause unspecified 6/17/2015    ADRIANO (generalized anxiety disorder) 6/17/2015    ADRIANO-7 score of 17     PCOS (polycystic ovarian syndrome)      Past Surgical History:   Procedure Laterality Date    NASAL ENDOSCOPY W/ BALLON SINUPLASTY Bilateral 2017    thumb Bilateral     TRIGGER FINGER RELEASE Bilateral     thumbs     Review of patient's allergies indicates:  No Known Allergies  Current Outpatient Medications on File Prior to Visit   Medication Sig Dispense Refill    hydrocortisone 2.5 % cream Apply topically 2 (two) times daily. 3.5 g 1    hydrOXYzine HCL (ATARAX) 10 MG Tab Take 1 tablet (10 mg total) by mouth 2 (two) times a day. 60 tablet 3    ibuprofen (ADVIL,MOTRIN) 600 MG tablet Take 1 tablet (600 mg total) by mouth every 6 (six) hours. for 30 doses 30 tablet 0    PNV,calcium 72-iron-folic acid (PRENATAL VITAMIN PLUS LOW IRON) 27 mg iron- 1 mg Tab Take one tablet daily.  Prescribe prenatal covered by insurance 90 tablet 11    triamcinolone acetonide 0.1% (KENALOG) 0.1 % cream Apply topically 2 (two) times daily.       No current facility-administered medications on file prior to visit.     Family History   Problem Relation Age of Onset    Hypertension Mother     Cancer Mother     Diverticulitis Father            Ohs Peq Odvv Intake    2/19/2024  2:41 PM CST - Filed by Patient   What is your current physical address in the event of a medical emergency? [address]   Are you able to take your vital signs? No   Please attach any relevant images or files          Pt reports she gave birth to baby February 1st, and is feeling lightheaded. She reports feeling light  headed prior to delivery. She also reports light makes her feel shocks on top of her head and feels that equilibrium is off when she stands.         Constitution: Negative.   Cardiovascular: Negative.    Respiratory: Negative.     Neurological:  Positive for dizziness and light-headedness.        Objective:   The physical exam was conducted virtually.  Physical Exam   Constitutional: She does not appear ill. No distress.   Neurological: She is alert.       Assessment:     1. Lightheadedness        Plan:       Lightheadedness      Based on presenting symptoms, pt advised to go to the nearest ER or urgent care

## 2024-02-19 NOTE — TELEPHONE ENCOUNTER
Pt virtual visit and annual appointments both have been scheduled. Pt was informed Dr. Gardner is not showing as her PCP on her chart, Dr. Padilla is. Pt confirmed Dr. Padilla is just her GYN and Dr. Gardner is still her PCP.

## 2024-02-19 NOTE — ED PROVIDER NOTES
Encounter Date: 2/19/2024       History     Chief Complaint   Patient presents with    Dizziness     Described as irritation   of eyes and head x 4 days, triggered by light,  pt had baby 19 days ago, b/p- 130/72, + burning with urination      Patient is a 33-year-old female with a history of GERD status post spontaneous vaginal delivery approximally 3 weeks ago to [facility] who presents to the ED with the complaint of burning with urination. Pt reports several days of dysuria. She denies any vaginal discharge, vaginal bleeding, hematuria, pelvic or abdominal pain. She denies any N/V/D fever, chills, cough, chest pain, or SOB. Pt also reports several days of headache described as stabbing/shocking to the forehead that has improved with use of ibuprofen. She states that it is only triggered when she goes out into the bright son. She does report a history of migraines in the past but states that this headache is less intense.  Furthermore, she denies any hx of pre-eclampsia or eclampsia when questioned.     Note: She denies any overt dizziness or room spinning (contrary to what is documented in the triage note).       Review of patient's allergies indicates:  No Known Allergies  Past Medical History:   Diagnosis Date    Allergic rhinitis, cause unspecified 6/17/2015    ADRIANO (generalized anxiety disorder) 6/17/2015    ADRIANO-7 score of 17     PCOS (polycystic ovarian syndrome)      Past Surgical History:   Procedure Laterality Date    NASAL ENDOSCOPY W/ BALLON SINUPLASTY Bilateral 2017    thumb Bilateral     TRIGGER FINGER RELEASE Bilateral     thumbs     Family History   Problem Relation Age of Onset    Hypertension Mother     Cancer Mother     Diverticulitis Father      Social History     Tobacco Use    Smoking status: Never    Smokeless tobacco: Never   Substance Use Topics    Alcohol use: No    Drug use: No     Review of Systems   Constitutional:  Negative for chills, fatigue and fever.   Eyes:  Positive for  photophobia. Negative for discharge, redness, itching and visual disturbance.   Respiratory:  Negative for cough and shortness of breath.    Cardiovascular:  Negative for chest pain, palpitations and leg swelling.   Gastrointestinal:  Negative for abdominal pain, diarrhea, nausea and vomiting.   Genitourinary:  Positive for dysuria. Negative for decreased urine volume, difficulty urinating, flank pain, frequency, hematuria, pelvic pain, vaginal bleeding, vaginal discharge and vaginal pain.   Musculoskeletal:  Negative for back pain, myalgias and neck pain.   Skin:  Negative for rash.   Neurological:  Positive for headaches. Negative for dizziness and light-headedness.   Psychiatric/Behavioral:  Negative for agitation and confusion.        Physical Exam     Initial Vitals   BP Pulse Resp Temp SpO2   02/19/24 1655 02/19/24 1655 02/19/24 1655 02/19/24 1655 02/19/24 1859   130/72 98 18 98.3 °F (36.8 °C) 99 %      MAP       --                Physical Exam    Nursing note and vitals reviewed.  Constitutional: She appears well-developed and well-nourished. No distress.   HENT:   Head: Normocephalic and atraumatic.   Right Ear: External ear normal.   Left Ear: External ear normal.   Eyes: Conjunctivae and EOM are normal. Pupils are equal, round, and reactive to light.   Neck: Neck supple.   Normal range of motion.  Cardiovascular:  Normal rate, regular rhythm, normal heart sounds and intact distal pulses.           Pulmonary/Chest: Breath sounds normal.   Lungs clear to auscultation bilaterally.    Abdominal: Abdomen is soft. Bowel sounds are normal.   Abdomen is soft/NT/ND; normal bowel sounds throughout    Musculoskeletal:         General: Normal range of motion.      Cervical back: Normal range of motion and neck supple.     Neurological: She is alert and oriented to person, place, and time. She has normal strength. GCS score is 15. GCS eye subscore is 4. GCS verbal subscore is 5. GCS motor subscore is 6.   No focal  neurological deficit appreciated.  Strength 5/5   Sensation grossly in tact  MAEW  GCS 15   AAOX3    Skin: Skin is warm. Capillary refill takes less than 2 seconds.   Psychiatric: She has a normal mood and affect. Thought content normal.         ED Course   Procedures  Labs Reviewed   CBC W/ AUTO DIFFERENTIAL - Abnormal; Notable for the following components:       Result Value    RDW 17.1 (*)     All other components within normal limits   COMPREHENSIVE METABOLIC PANEL - Abnormal; Notable for the following components:    Chloride 112 (*)     CO2 19 (*)     Albumin 3.3 (*)     All other components within normal limits   URINALYSIS, REFLEX TO URINE CULTURE - Abnormal; Notable for the following components:    Appearance, UA Hazy (*)     Specific Gravity, UA >1.030 (*)     Protein, UA Trace (*)     Ketones, UA Trace (*)     Leukocytes, UA 3+ (*)     All other components within normal limits    Narrative:     Specimen Source->Urine   URINALYSIS MICROSCOPIC - Abnormal; Notable for the following components:    RBC, UA 18 (*)     WBC, UA >100 (*)     Non-Squam Epith 1 (*)     All other components within normal limits    Narrative:     Specimen Source->Urine   CULTURE, URINE   MAGNESIUM   POCT URINE PREGNANCY          Imaging Results    None          Medications - No data to display  Medical Decision Making  Amount and/or Complexity of Data Reviewed  Labs:  Decision-making details documented in ED Course.    Risk  Prescription drug management.               ED Course as of 02/19/24 1915 Mon Feb 19, 2024   1837 Leukocyte Esterase, UA(!): 3+ [LC]   1837 WBC, UA(!): >100 [LC]   1837 RBC, UA(!): 18 [LC]   1837 NON-SQUAM EPITH(!): 1 [LC]   1837 Sodium: 142 [LC]   1837 Magnesium : 1.7 [LC]   1837 WBC: 6.67 [LC]   1837 Hemoglobin: 12.8 [LC]   1837 Hematocrit: 38.3 [LC]      ED Course User Index  [LC] Ander Griffith MD               Medical Decision Making:   Initial Assessment:   See HPI   Clinical Tests:   Lab Tests:  Ordered and Reviewed  ED Management:  - VSS; pt afebrile; NAD  - BP not elevated to suggest post-partum pre-eclampsia  - physical exam largely unremarkable  - routine laboratory analysis without acute abnormaltiy  - UA consistent with infection, particularly in the context of pt complaining of dysuria  - will treat as an OP with rx for cephalexin for UTI; Ucx ordered, pending  - will provide rx for topical steroid for pt's reported hemorrhoid; I also recommended pt utilize stool softener, warm soaks/Sitz baths  - No further intervention is indicated at this time after having taken into account the patient's history, physical exam findings, and empirical and objective data obtained during the patient's emergency department workup.   - The patient is at low risk for an emergent medical condition at this time, and I am of the belief that that it is safe to discharge the patient from the emergency department.   - The patient is instructed to follow up as outpatient as indicated on the discharge paperwork.    - I have discussed the specifics of the workup with the patient and the patient has verbalized understanding of the details of the workup, the diagnosis, the treatment plan, and the need for outpatient follow-up.    - Although the patient has no emergent etiology today this does not preclude the development of an emergent condition so, in addition, I have advised the patient that they can return to the ED and/or activate EMS at any time with worsening of their symptoms, change of their symptoms, or with any other medical complaint.    - The patient remained comfortable and stable during their visit in the ED.    - Discharge and follow-up instructions discussed with the patient who expressed understanding and willingness to comply with my recommendations.  - Results of all emergency department tests  discussed thoroughly with patient; all patient questions answered; pt in agreement with plan  - Pt instructed to  follow up with PCP in 2-3 days for recheck of today's complaints  - Pt given strict emergency department return precautions for any new or worsening of symptoms  - Pt discharged from the emergency department in stable condition, in no acute distress                 Clinical Impression:  Final diagnoses:  [N39.0, R31.9] Urinary tract infection with hematuria, site unspecified (Primary)          ED Disposition Condition    Discharge Stable          ED Prescriptions       Medication Sig Dispense Start Date End Date Auth. Provider    cephALEXin (KEFLEX) 500 MG capsule Take 1 capsule (500 mg total) by mouth 4 (four) times daily. for 5 days 20 capsule 2/19/2024 2/24/2024 Ander Griffith MD    hydrocortisone (ANUSOL-HC) 25 mg suppository Place 1 suppository (25 mg total) rectally once daily. for 3 days 3 suppository 2/19/2024 2/22/2024 Andre Griffith MD          Follow-up Information       Follow up With Specialties Details Why Contact Info    Courtney Padilla MD Obstetrics, Obstetrics and Gynecology Schedule an appointment as soon as possible for a visit   [address] [place] [id number]  [phone]               Ander Griffith MD  02/19/24 [id number]

## 2024-02-19 NOTE — TELEPHONE ENCOUNTER
----- Message from Pasquale Cm sent at 2/19/2024  4:15 PM CST -----  Regarding: Self .[phone]  Patient is requesting a sooner appointment.  Patient declined first available appointment listed as well as another facility and provider .  Patient will not accept being placed on the waitlist and is requesting a message be sent to doctor.    Name of Caller: Self     When is the first available appointment?  None are populating     Symptoms:  annual / problems and concerns     Would the patient rather a call back or a response via [facility]?  Call back     Best Call Back Number: [phone]      Additional Information: Pt was last seen by Dr Gardner last year and still is established , she was told by someone in he office that she's a New pt and she's not. Please call pt back with clarification on if she can be seen for her appt.

## 2024-02-19 NOTE — TELEPHONE ENCOUNTER
----- Message from Maria D Wakefield sent at 2/19/2024  3:45 PM CST -----  Regarding: patient call back  Type: Patient Call Back    Who called: Self     What is the request in detail: Checking on the status of her msg about get scheduled for annual     Can the clinic reply by [facility]? Yes     Would the patient rather a call back or a response via [facility]? Call     Best call back number: [phone]

## 2024-02-21 ENCOUNTER — OFFICE VISIT (OUTPATIENT)
Dept: INTERNAL MEDICINE | Facility: CLINIC | Age: 34
End: 2024-02-21
Payer: MEDICAID

## 2024-02-21 ENCOUNTER — PATIENT MESSAGE (OUTPATIENT)
Dept: INTERNAL MEDICINE | Facility: CLINIC | Age: 34
End: 2024-02-21

## 2024-02-21 DIAGNOSIS — R42 DIZZINESS: Primary | ICD-10-CM

## 2024-02-21 DIAGNOSIS — R26.89 LOSS OF BALANCE: ICD-10-CM

## 2024-02-21 LAB — BACTERIA UR CULT: NO GROWTH

## 2024-02-21 PROCEDURE — 1159F MED LIST DOCD IN RCRD: CPT | Mod: CPTII,95,, | Performed by: INTERNAL MEDICINE

## 2024-02-21 PROCEDURE — 1160F RVW MEDS BY RX/DR IN RCRD: CPT | Mod: CPTII,95,, | Performed by: INTERNAL MEDICINE

## 2024-02-21 PROCEDURE — 99214 OFFICE O/P EST MOD 30 MIN: CPT | Mod: 95,,, | Performed by: INTERNAL MEDICINE

## 2024-02-21 PROCEDURE — 1111F DSCHRG MED/CURRENT MED MERGE: CPT | Mod: CPTII,95,, | Performed by: INTERNAL MEDICINE

## 2024-02-21 NOTE — PROGRESS NOTES
"Assessment & Plan  Problem List Items Addressed This Visit    None  Visit Diagnoses       Dizziness    -  Primary  -    Recheck labs.  Doesn't seem to be a clear neurologic cause.  Keep OV in March for physical neuo exam    Relevant Orders    Basic Metabolic Panel    Vitamin B12    VITAMIN B1    Loss of balance    -  As above    Relevant Orders    Basic Metabolic Panel    Vitamin B12    VITAMIN B1              Health Maintenance reviewed, deferred.    The patient location is:  Patient home   The chief complaint leading to consultation is: noted below  Visit type: Virtual visit with synchronous audio and video  Total time spent with patient: 20  Each patient to whom he or she provides medical services by telemedicine is:  (1) informed of the relationship between the physician and patient and the respective role of any other health care provider with respect to management of the patient; and (2) notified that he or she may decline to receive medical services by telemedicine and may withdraw from such care at any time.    Follow-up: Follow up for as sched for March.    ______________________________________________________________________    Chief Complaint  Chief Complaint   Patient presents with    Dizziness     For 6+ months       HPI  Mone Chandler is a 33 y.o. female with multiple medical diagnoses as listed in the medical history and problem list that presents for dizziness for 6+ months via virtual visit.  Their last appointment Visit date not found.      She is now 2-3 weeks postpartum.      Reports that symptoms started in the 1st trimester then spontaneously resolved.  They returned in the last few weeks.  Noticed that the symptoms are intermittent.  She notices that there is some occurrence of HA that is triggered by going outside into the sunlight.  This is not her migraines that occur.  She describes it as a "screeching irritating ache" of her entire head.      No weakness, no numbness.  No F/C/NS.  " No vision change.     Recently ahd reassuring CBC.  CMP reassuring other than mild metabolic acidosis.         ROS  Review of Systems   Constitutional:  Negative for activity change and unexpected weight change.   HENT:  Negative for hearing loss, rhinorrhea and trouble swallowing.    Eyes:  Negative for discharge and visual disturbance.   Respiratory:  Negative for chest tightness and wheezing.    Cardiovascular:  Negative for chest pain and palpitations.   Gastrointestinal:  Negative for blood in stool, constipation, diarrhea and vomiting.   Endocrine: Negative for polydipsia and polyuria.   Genitourinary:  Negative for difficulty urinating, dysuria, hematuria and menstrual problem.   Musculoskeletal:  Negative for arthralgias, joint swelling and neck pain.   Neurological:  Positive for dizziness, light-headedness and headaches. Negative for weakness.   Psychiatric/Behavioral:  Negative for confusion and dysphoric mood.            Physical Exam  Physical Exam  Constitutional:       General: She is not in acute distress.     Appearance: She is well-developed.   HENT:      Head: Normocephalic and atraumatic.   Eyes:      Conjunctiva/sclera: Conjunctivae normal.   Pulmonary:      Effort: Pulmonary effort is normal. No respiratory distress.   Neurological:      Mental Status: She is alert and oriented to person, place, and time.      Comments: Symmetric facial movements   Psychiatric:         Behavior: Behavior normal.         Thought Content: Thought content normal.

## 2024-02-21 NOTE — PATIENT INSTRUCTIONS
Below are some options of psychiatry evaluation.  Please see the below contact information:    [facility] - [phone]    [facility] - [phone]    Inclusive - [phone]    [facility] - [phone]

## 2024-02-26 ENCOUNTER — LAB VISIT (OUTPATIENT)
Dept: LAB | Facility: HOSPITAL | Age: 34
End: 2024-02-26
Attending: INTERNAL MEDICINE
Payer: MEDICAID

## 2024-02-26 ENCOUNTER — PATIENT MESSAGE (OUTPATIENT)
Dept: OBSTETRICS AND GYNECOLOGY | Facility: CLINIC | Age: 34
End: 2024-02-26
Payer: MEDICAID

## 2024-02-26 DIAGNOSIS — R26.89 LOSS OF BALANCE: ICD-10-CM

## 2024-02-26 DIAGNOSIS — R42 DIZZINESS: ICD-10-CM

## 2024-02-26 LAB
ANION GAP SERPL CALC-SCNC: 5 MMOL/L (ref 8–16)
BUN SERPL-MCNC: 10 MG/DL (ref 6–20)
CALCIUM SERPL-MCNC: 9.1 MG/DL (ref 8.7–10.5)
CHLORIDE SERPL-SCNC: 110 MMOL/L (ref 95–110)
CO2 SERPL-SCNC: 24 MMOL/L (ref 23–29)
CREAT SERPL-MCNC: 0.7 MG/DL (ref 0.5–1.4)
EST. GFR  (NO RACE VARIABLE): >60 ML/MIN/1.73 M^2
GLUCOSE SERPL-MCNC: 78 MG/DL (ref 70–110)
POTASSIUM SERPL-SCNC: 4.3 MMOL/L (ref 3.5–5.1)
SODIUM SERPL-SCNC: 139 MMOL/L (ref 136–145)
VIT B12 SERPL-MCNC: 518 PG/ML (ref 210–950)

## 2024-02-26 PROCEDURE — 82607 VITAMIN B-12: CPT | Performed by: INTERNAL MEDICINE

## 2024-02-26 PROCEDURE — 80048 BASIC METABOLIC PNL TOTAL CA: CPT | Performed by: INTERNAL MEDICINE

## 2024-02-26 PROCEDURE — 84425 ASSAY OF VITAMIN B-1: CPT | Performed by: INTERNAL MEDICINE

## 2024-02-26 PROCEDURE — 36415 COLL VENOUS BLD VENIPUNCTURE: CPT | Performed by: INTERNAL MEDICINE

## 2024-02-27 ENCOUNTER — TELEPHONE (OUTPATIENT)
Dept: OBSTETRICS AND GYNECOLOGY | Facility: HOSPITAL | Age: 34
End: 2024-02-27
Payer: MEDICAID

## 2024-02-27 ENCOUNTER — PATIENT MESSAGE (OUTPATIENT)
Dept: OBSTETRICS AND GYNECOLOGY | Facility: CLINIC | Age: 34
End: 2024-02-27
Payer: MEDICAID

## 2024-02-27 NOTE — TELEPHONE ENCOUNTER
Called to discuss recent ER visit with patient. Explained to patient that a urine cath sample was not sent and therefore post partum vaginal secretions had likely contaminated urinalysis test. Ultimately, the urine culture resulted with no growth therefore a bladder infection was not found. Patient still complains of vaginal irritation. She is breastfeeding. Discussed that breastfeeding causes temporary menopausal state with vaginal dryness likely leading to the symptoms that she is describing. Patient would still like an external vulvar exam and will consider a straight cath UA/Ucx to be re-sent for reassurance. Will get patient scheduled for in-office visit ASAP.

## 2024-03-01 LAB — VIT B1 BLD-MCNC: 66 UG/L (ref 38–122)

## 2024-03-07 ENCOUNTER — PATIENT MESSAGE (OUTPATIENT)
Dept: INTERNAL MEDICINE | Facility: CLINIC | Age: 34
End: 2024-03-07
Payer: MEDICAID

## 2024-03-07 DIAGNOSIS — O99.719: Primary | ICD-10-CM

## 2024-03-07 DIAGNOSIS — L98.9: Primary | ICD-10-CM

## 2024-03-08 ENCOUNTER — TELEPHONE (OUTPATIENT)
Dept: DERMATOLOGY | Facility: CLINIC | Age: 34
End: 2024-03-08
Payer: MEDICAID

## 2024-03-08 NOTE — TELEPHONE ENCOUNTER
Contacted patient no answer left an message   ----- Message from Kassandra Chandler sent at 3/7/2024  4:54 PM CST -----  Contact: Mone Vance is calling in regards to dry skin after having a baby and getting an appt from a referral.please call back at .[phone]             Thanks  DAVID

## 2024-03-14 ENCOUNTER — POSTPARTUM VISIT (OUTPATIENT)
Dept: OBSTETRICS AND GYNECOLOGY | Facility: CLINIC | Age: 34
End: 2024-03-14
Attending: OBSTETRICS & GYNECOLOGY
Payer: MEDICAID

## 2024-03-14 VITALS
WEIGHT: 163.13 LBS | DIASTOLIC BLOOD PRESSURE: 80 MMHG | BODY MASS INDEX: 27.85 KG/M2 | SYSTOLIC BLOOD PRESSURE: 124 MMHG | HEIGHT: 64 IN

## 2024-03-14 DIAGNOSIS — Z30.09 GENERAL COUNSELING FOR PRESCRIPTION OF ORAL CONTRACEPTIVES: ICD-10-CM

## 2024-03-14 DIAGNOSIS — N93.9 ABNORMAL UTERINE BLEEDING (AUB): ICD-10-CM

## 2024-03-14 PROBLEM — Q51.28 SEPTATE UTERUS AFFECTING PREGNANCY IN SECOND TRIMESTER: Status: RESOLVED | Noted: 2023-09-20 | Resolved: 2024-03-14

## 2024-03-14 PROBLEM — O99.340 ANXIETY DISORDER AFFECTING PREGNANCY, ANTEPARTUM: Status: RESOLVED | Noted: 2023-09-20 | Resolved: 2024-03-14

## 2024-03-14 PROBLEM — F41.9 ANXIETY DISORDER AFFECTING PREGNANCY, ANTEPARTUM: Status: RESOLVED | Noted: 2023-09-20 | Resolved: 2024-03-14

## 2024-03-14 PROBLEM — Z34.90 ENCOUNTER FOR INDUCTION OF LABOR: Status: RESOLVED | Noted: 2024-01-31 | Resolved: 2024-03-14

## 2024-03-14 PROBLEM — O34.02 SEPTATE UTERUS AFFECTING PREGNANCY IN SECOND TRIMESTER: Status: RESOLVED | Noted: 2023-09-20 | Resolved: 2024-03-14

## 2024-03-14 PROBLEM — O09.92 HIGH-RISK PREGNANCY IN SECOND TRIMESTER: Status: RESOLVED | Noted: 2023-09-20 | Resolved: 2024-03-14

## 2024-03-14 PROBLEM — O99.012 ANEMIA AFFECTING PREGNANCY IN SECOND TRIMESTER: Status: RESOLVED | Noted: 2023-07-05 | Resolved: 2024-03-14

## 2024-03-14 PROCEDURE — 99213 OFFICE O/P EST LOW 20 MIN: CPT | Mod: PBBFAC,TH,PN | Performed by: OBSTETRICS & GYNECOLOGY

## 2024-03-14 PROCEDURE — 99999 PR PBB SHADOW E&M-EST. PATIENT-LVL III: CPT | Mod: PBBFAC,,, | Performed by: OBSTETRICS & GYNECOLOGY

## 2024-03-14 PROCEDURE — 0503F POSTPARTUM CARE VISIT: CPT | Mod: S$GLB,,, | Performed by: OBSTETRICS & GYNECOLOGY

## 2024-03-14 RX ORDER — NORETHINDRONE 0.35 MG/1
1 TABLET ORAL DAILY
Qty: 30 TABLET | Refills: 11 | Status: SHIPPED | OUTPATIENT
Start: 2024-03-14 | End: 2024-03-14 | Stop reason: SDUPTHER

## 2024-03-14 RX ORDER — NORETHINDRONE 0.35 MG/1
1 TABLET ORAL DAILY
Qty: 30 TABLET | Refills: 11 | Status: SHIPPED | OUTPATIENT
Start: 2024-03-14 | End: 2025-03-14

## 2024-03-14 NOTE — PROGRESS NOTES
Mone Chandler is a 33 y.o. female  presents for a postpartum visit.  She is status post  6 weeks ago.  Her hospitalization was not complicated.  She is breastfeeding.  She desires oral progesterone-only contraceptive for contraception.  She admits to postpartum depression.  She has started having some heavy vaginal bleeding despite exclusively breastfeeding.  She is concerned about her milk supply and would like to see lactation.  She has support at home.  She is concerned about returning to work due to her anxiety and depression.  She would like to see a counselor.  She declines medication at this time.  She denies si/si or hi/hi.    Her last pap was normal    Past Medical History:   Diagnosis Date    Allergic rhinitis, cause unspecified 2015    ADRIANO (generalized anxiety disorder) 2015    ADRIANO-7 score of 17     PCOS (polycystic ovarian syndrome)      Past Surgical History:   Procedure Laterality Date    NASAL ENDOSCOPY W/ BALLON SINUPLASTY Bilateral 2017    thumb Bilateral     TRIGGER FINGER RELEASE Bilateral     thumbs     Review of patient's allergies indicates:  No Known Allergies    Current Outpatient Medications:     PNV,calcium 72-iron-folic acid (PRENATAL VITAMIN PLUS LOW IRON) 27 mg iron- 1 mg Tab, Take one tablet daily.  Prescribe prenatal covered by insurance, Disp: 90 tablet, Rfl: 11    norethindrone (MICRONOR) 0.35 mg tablet, Take 1 tablet (0.35 mg total) by mouth once daily., Disp: 30 tablet, Rfl: 11      Vitals:    24 0927   BP: 124/80       GENERAL: no distress  ABDOMEN: no masses, hepatosplenomegaly, no hernias  EXTERNAL GENITALIA POSTPARTUM: normal, well-healed, without lesions or masses   VAGINA POSTPARTUM: normal, well-healed, physiologic discharge, without lesions.  Light bleeding   CERVIX POSTPARTUM: normal, well-healed, without lesions     Mone was seen today for postpartum follow-up.    Diagnoses and all orders for this visit:     (spontaneous vaginal  delivery)  -     CBC Auto Differential; Future    General counseling for prescription of oral contraceptives  -     Discontinue: norethindrone (MICRONOR) 0.35 mg tablet; Take 1 tablet (0.35 mg total) by mouth once daily.  -     norethindrone (MICRONOR) 0.35 mg tablet; Take 1 tablet (0.35 mg total) by mouth once daily.    Abnormal uterine bleeding (AUB)  -     TSH; Future  -     US Pelvis Comp with Transvag NON-OB (xpd); Future    Postpartum depression  -     Ambulatory referral/consult to Psychiatry; Future

## 2024-03-18 ENCOUNTER — OFFICE VISIT (OUTPATIENT)
Dept: INTERNAL MEDICINE | Facility: CLINIC | Age: 34
End: 2024-03-18
Payer: MEDICAID

## 2024-03-18 ENCOUNTER — HOSPITAL ENCOUNTER (OUTPATIENT)
Dept: RADIOLOGY | Facility: OTHER | Age: 34
Discharge: HOME | End: 2024-03-18
Attending: OBSTETRICS & GYNECOLOGY
Payer: MEDICAID

## 2024-03-18 ENCOUNTER — PATIENT MESSAGE (OUTPATIENT)
Dept: OBSTETRICS AND GYNECOLOGY | Facility: HOSPITAL | Age: 34
End: 2024-03-18
Payer: MEDICAID

## 2024-03-18 VITALS
WEIGHT: 163.81 LBS | HEIGHT: 64 IN | BODY MASS INDEX: 27.96 KG/M2 | DIASTOLIC BLOOD PRESSURE: 72 MMHG | HEART RATE: 74 BPM | SYSTOLIC BLOOD PRESSURE: 124 MMHG | OXYGEN SATURATION: 99 %

## 2024-03-18 DIAGNOSIS — M54.50 BILATERAL LOW BACK PAIN WITHOUT SCIATICA, UNSPECIFIED CHRONICITY: ICD-10-CM

## 2024-03-18 DIAGNOSIS — Z00.00 ROUTINE MEDICAL EXAM: ICD-10-CM

## 2024-03-18 DIAGNOSIS — N93.9 ABNORMAL UTERINE BLEEDING (AUB): ICD-10-CM

## 2024-03-18 DIAGNOSIS — E66.3 OVERWEIGHT (BMI 25.0-29.9): Chronic | ICD-10-CM

## 2024-03-18 DIAGNOSIS — R42 DIZZINESS: Primary | ICD-10-CM

## 2024-03-18 DIAGNOSIS — N83.209 CYST OF OVARY, UNSPECIFIED LATERALITY: Primary | ICD-10-CM

## 2024-03-18 DIAGNOSIS — M65.319 TRIGGER FINGER OF THUMB, UNSPECIFIED LATERALITY: ICD-10-CM

## 2024-03-18 PROCEDURE — 99214 OFFICE O/P EST MOD 30 MIN: CPT | Mod: PBBFAC,25 | Performed by: INTERNAL MEDICINE

## 2024-03-18 PROCEDURE — 3074F SYST BP LT 130 MM HG: CPT | Mod: CPTII,,, | Performed by: INTERNAL MEDICINE

## 2024-03-18 PROCEDURE — 99214 OFFICE O/P EST MOD 30 MIN: CPT | Mod: S$PBB,,, | Performed by: INTERNAL MEDICINE

## 2024-03-18 PROCEDURE — 99999 PR PBB SHADOW E&M-EST. PATIENT-LVL IV: CPT | Mod: PBBFAC,,, | Performed by: INTERNAL MEDICINE

## 2024-03-18 PROCEDURE — 76856 US EXAM PELVIC COMPLETE: CPT | Mod: 26,,, | Performed by: RADIOLOGY

## 2024-03-18 PROCEDURE — 1159F MED LIST DOCD IN RCRD: CPT | Mod: CPTII,,, | Performed by: INTERNAL MEDICINE

## 2024-03-18 PROCEDURE — 3008F BODY MASS INDEX DOCD: CPT | Mod: CPTII,,, | Performed by: INTERNAL MEDICINE

## 2024-03-18 PROCEDURE — 3078F DIAST BP <80 MM HG: CPT | Mod: CPTII,,, | Performed by: INTERNAL MEDICINE

## 2024-03-18 PROCEDURE — 1160F RVW MEDS BY RX/DR IN RCRD: CPT | Mod: CPTII,,, | Performed by: INTERNAL MEDICINE

## 2024-03-18 PROCEDURE — 76856 US EXAM PELVIC COMPLETE: CPT | Mod: TC

## 2024-03-18 NOTE — PROGRESS NOTES
Assessment & Plan  Problem List Items Addressed This Visit          Endocrine    Overweight (BMI 25.0-29.9) (Chronic)    Current Assessment & Plan     The patient's BMI has been recorded in the chart. The patient has been provided educational materials regarding the benefits of attaining and maintaining a normal weight. We will continue to address and follow this issue during follow up visits.           Other Visit Diagnoses       Dizziness    -  Primary  -   Reassuring labs and physical exam.  Discussed maintaining hydration.  We will get some additional information with her upcoming CBC and TSH as ordered by her OBGYN    Bilateral low back pain without sciatica, unspecified chronicity    -  It has been going on since pregnancy.  No red flag signs for low back pain.  Start home stretching regimen.  I have given her written handout for this    Trigger finger of thumb, unspecified laterality    -  Previously evaluated by Physical therapy.  She hasHEP from them at home.  I instructed her to start this backup.  If there is no improvement within 4 weeks we will plan to refer her to hand specialist    Routine medical exam    -  Discussed healthy diet, regular exercise, necessary labs, age appropriate cancer screening, and routine vaccinations.                  Health Maintenance reviewed, declined COVID boost today.    Follow-up: Follow up in about 1 year (around 3/18/2025) for Routine Physical.  ______________________________________________________________________    Chief Complaint  Chief Complaint   Patient presents with    Dizziness       HPI  Mone June Chandler is a 33 y.o. female with medical diagnoses as listed in the medical history and problem list that presents for dizziness follow up.  Pt is known to me with their last appointment 2/21/2024.      Labs from last office visit returned reassuring.      Has continued to have dizziness symptoms.  This AM had an episode when she had to get out of the bed quickly to  "tend to her son.  Has some spinning sensation and feeling lightheaded/woozy.  Can't trigger it by ROM of the neck/head.  No vision change.  Feels she is well hydrated.  Drinking mostly Body [place] and [place]oconut water.      She is having some vaginal bleeding that is being evaluated by her OBGYN with labs and US.      ROS  Review of Systems        Physical Exam  Vitals:    03/18/24 1057   BP: 124/72   BP Location: Left arm   Patient Position: Sitting   BP Method: Large (Manual)   Pulse: 74   SpO2: 99%   Weight: 74.3 kg (163 lb 12.8 oz)   Height: 5' 4" (1.626 m)    Body mass index is 28.12 kg/m².  Weight: 74.3 kg (163 lb 12.8 oz)   Height: 5' 4" (162.6 cm)   Physical Exam  Constitutional:       General: She is not in acute distress.     Appearance: She is well-developed.   HENT:      Head: Normocephalic and atraumatic.   Eyes:      General: Lids are normal. No scleral icterus.     Extraocular Movements: Extraocular movements intact.      Conjunctiva/sclera: Conjunctivae normal.      Pupils: Pupils are equal, round, and reactive to light.   Neck:      Thyroid: No thyromegaly.      Vascular: No carotid bruit or JVD.   Cardiovascular:      Rate and Rhythm: Normal rate and regular rhythm.      Pulses: Normal pulses.      Heart sounds: Normal heart sounds. No murmur heard.     No friction rub. No S3 or S4 sounds.   Pulmonary:      Effort: Pulmonary effort is normal.      Breath sounds: Normal breath sounds. No wheezing, rhonchi or rales.   Abdominal:      General: Bowel sounds are normal.      Palpations: Abdomen is soft.      Tenderness: There is no abdominal tenderness.   Musculoskeletal:         General: No tenderness.      Cervical back: Full passive range of motion without pain and neck supple.      Right lower leg: No edema.      Left lower leg: No edema.   Skin:     General: Skin is warm and dry.      Findings: No rash.   Neurological:      Mental Status: She is alert and oriented to person, place, and time.      " Sensory: Sensation is intact.      Motor: Motor function is intact.      Coordination: Coordination is intact. Romberg sign negative. Finger-Nose-Finger Test normal.      Gait: Gait is intact.      Deep Tendon Reflexes:      Reflex Scores:       Bicep reflexes are 2+ on the right side and 2+ on the left side.       Patellar reflexes are 2+ on the right side and 2+ on the left side.     Comments: Symmetric facial movements palate elevated symmetrically tongue midline    Psychiatric:         Speech: Speech normal.         Behavior: Behavior normal.         Thought Content: Thought content normal.

## 2024-03-19 ENCOUNTER — TELEPHONE (OUTPATIENT)
Dept: OBSTETRICS AND GYNECOLOGY | Facility: HOSPITAL | Age: 34
End: 2024-03-19
Payer: MEDICAID

## 2024-03-19 NOTE — TELEPHONE ENCOUNTER
Please schedule a virtual follow up with me and see is she has been contacted by psych or if she has made an appointment.

## 2024-03-21 ENCOUNTER — PATIENT MESSAGE (OUTPATIENT)
Dept: OBSTETRICS AND GYNECOLOGY | Facility: CLINIC | Age: 34
End: 2024-03-21
Payer: MEDICAID

## 2024-03-25 ENCOUNTER — PATIENT MESSAGE (OUTPATIENT)
Dept: REHABILITATION | Facility: OTHER | Age: 34
End: 2024-03-25
Payer: MEDICAID

## 2024-04-11 ENCOUNTER — POSTPARTUM VISIT (OUTPATIENT)
Dept: OBSTETRICS AND GYNECOLOGY | Facility: CLINIC | Age: 34
End: 2024-04-11
Attending: OBSTETRICS & GYNECOLOGY
Payer: MEDICAID

## 2024-04-11 VITALS
BODY MASS INDEX: 28.38 KG/M2 | SYSTOLIC BLOOD PRESSURE: 122 MMHG | DIASTOLIC BLOOD PRESSURE: 82 MMHG | HEIGHT: 64 IN | WEIGHT: 166.25 LBS

## 2024-04-11 DIAGNOSIS — R10.2 PELVIC PAIN: ICD-10-CM

## 2024-04-11 PROCEDURE — 99213 OFFICE O/P EST LOW 20 MIN: CPT | Mod: S$PBB,,, | Performed by: OBSTETRICS & GYNECOLOGY

## 2024-04-11 PROCEDURE — 99999 PR PBB SHADOW E&M-EST. PATIENT-LVL III: CPT | Mod: PBBFAC,,, | Performed by: OBSTETRICS & GYNECOLOGY

## 2024-04-11 PROCEDURE — 99213 OFFICE O/P EST LOW 20 MIN: CPT | Mod: PBBFAC,PN | Performed by: OBSTETRICS & GYNECOLOGY

## 2024-04-11 NOTE — PROGRESS NOTES
Subjective     Patient ID: Mone Chandler is a 33 y.o. female.    Chief Complaint: Postpartum Care    HPI She is here for follow up of postpartum depression.  Her depression scale is still abnormal.  She hasn't seen psychiatry yet.  Denies si/si or hi/hi.  While she has help at home they are not always helpful.  She would like to restart pelvic floor physical therapy but closer to her home.    Review of Systems  ROS:  GENERAL: No fever, chills, fatigability or weight loss.  VULVAR: No pain, no lesions and no itching.  VAGINAL: No relaxation, no itching, no discharge, no abnormal bleeding and no lesions.  ABDOMEN: No abdominal pain. Denies nausea. Denies vomiting. No diarrhea. No constipation  BREAST: Denies pain. No lumps. No discharge.  URINARY: No incontinence, no nocturia, no frequency and no dysuria.  CARDIOVASCULAR: No chest pain. No shortness of breath. No leg cramps.  NEUROLOGICAL: no headaches. No vision changes.        Objective     Physical Exam  Constitutional:       Appearance: Normal appearance.   Abdominal:      Palpations: There is no hepatomegaly or mass.      Hernia: No hernia is present.   Genitourinary:     Labia:         Right: No rash, tenderness, lesion or injury.         Left: No rash, tenderness, lesion or injury.       Vagina: Normal.      Comments: normal urethral, normal urethral meatus    Neurological:      General: No focal deficit present.      Mental Status: She is alert and oriented to person, place, and time. Mental status is at baseline.   Psychiatric:         Attention and Perception: Attention normal.         Mood and Affect: Mood is depressed.         Speech: Speech normal.         Behavior: Behavior normal.         Thought Content: Thought content normal.         Cognition and Memory: Cognition normal.            Assessment and Plan     1. Postpartum depression    2. Pelvic pain  -     Ambulatory referral/consult to Physical/Occupational Therapy; Future; Expected date:  04/18/2024        Sent a staff message to Dr. Garcia about seeing her.  She declines medication for now as she wants to try a vitamin first.  If no relief after a couple of weeks, she might be amenable.  She would like to try counseling.  Will extend her disability until she sees psych.         No follow-ups on file.

## 2024-04-12 ENCOUNTER — PATIENT MESSAGE (OUTPATIENT)
Dept: OBSTETRICS AND GYNECOLOGY | Facility: CLINIC | Age: 34
End: 2024-04-12
Payer: MEDICAID

## 2024-04-12 ENCOUNTER — PATIENT MESSAGE (OUTPATIENT)
Dept: PSYCHIATRY | Facility: CLINIC | Age: 34
End: 2024-04-12
Payer: MEDICAID

## 2024-04-17 ENCOUNTER — OFFICE VISIT (OUTPATIENT)
Dept: BEHAVIORAL HEALTH | Facility: CLINIC | Age: 34
End: 2024-04-17
Payer: MEDICAID

## 2024-04-17 DIAGNOSIS — F41.8 POSTPARTUM ANXIETY: ICD-10-CM

## 2024-04-17 PROCEDURE — 90791 PSYCH DIAGNOSTIC EVALUATION: CPT | Mod: AH,HB,95, | Performed by: PSYCHOLOGIST

## 2024-04-17 NOTE — PROGRESS NOTES
"Psychiatry Initial Visit (PhD/LCSW)  Diagnostic Interview - CPT 56750    Date: 4/17/2024    Site: Telemed    The patient location is: Patient's home in [place], [place]  The chief complaint leading to consultation is: ppd/a    Visit type: audiovisual    Face to Face time with patient: 45 minutes of total time spent on the encounter, which includes face to face time and non-face to face time preparing to see the patient (eg, review of tests), Obtaining and/or reviewing separately obtained history, Documenting clinical information in the electronic or other health record, Independently interpreting results (not separately reported) and communicating results to the patient/family/caregiver, or Care coordination (not separately reported).     Each patient to whom he or she provides medical services by telemedicine is:  (1) informed of the relationship between the physician and patient and the respective role of any other health care provider with respect to management of the patient; and (2) notified that he or she may decline to receive medical services by telemedicine and may withdraw from such care at any time.    Referral source: Dr. Garcia    Clinical status of patient: Outpatient    Moneodette Chandler, a 33 y.o. female, for initial evaluation visit.  Met with patient.    Chief complaint/reason for encounter: depression and anxiety    History of present illness: Patient reported she was dealing with depression throughout pregnancy. It was an unplanned pregnancy, which led to some depression. She is in a good place now and loves her son, but still struggles with some thoughts and feelings. She works as a teacher and having "flashbacks" of anxiety working as a teaching she experienced dealing with work/life balance issues. Struggling with 5 years of self-neglect due to her job demands. She is worried about going back to school and work taking over her life, including evenings after work. She is trying to get to a good " "schedule with her son. She has support from her family and fiance but can only do so much "because I'm his mom." She reported nursing has been a stressor. She is dealing with depression and would like someone to talk to about her feelings. Her son is 2 months old. She moved from [employer] to [place] to have her mother watch her son when she goes back to work. She wants to avoid , she rather he be with family. She finds her fiance and family are not communicating well at times with his care. She feels has a good level of awareness of her mental health but it does not take away the struggle. She is suffering "severe burnout" as a teacher for the last five years. She had started at a new school right before pregnancy. At the new school she was dealing with rumors and coworkers talking about her. She was upset and not eating at times in that stressful environment. She teaches third grade. She plans to return to her old job and will have to commute over an hour one way. The school environment was/is toxic.    Symptoms:   Mood: She reported feeling "disgusted, just don't want to, thinking of packing my bags and going to a different place. I want to get away from everything. I'm just not happy." She denied SI. She denied thoughts of harming her baby or others.  Anxiety: She is feeling more anxious lately due to worrying about going back to work. She reported difficulty with focusing and memory, explaining, "forgetting that I did something. Putting something down, don't remember where."  Briseida/Hypomania: Denied.  Psychosis: Denied.     Obstetric History:  # of Pregnancies: 1 (She had septate uterus and he had FGR, both leading to induction at 38 weeks.)  # of living children: 1   Birth trauma: Denied. She stated, "It was a good, bad experience." She stated they increased her pitocin while she was asleep, and the pain was so bad and made decision to get the epidural. She stated she was mistreated by the nighttime " "staff and by the staff on her last day there.     Psychiatric history:  She saw a therapist prior to pregnancy to manage the stress. She has been prescribed Zoloft but does not take it.  She wants to be assessed for ADHD.    Family history of psychiatric illness: none    Does patient have access to firearms? no  Comments: n/a    Medical history: PCOS    Pain: noncontributory    Social history (marriage, employment, etc.): She is currently living with her family, her fiance, and her son in [place], [place].     Current coping skills: not assessed.    Substance use:   Alcohol: Denied.   Drugs: Denied.   Tobacco: Denied.     Current medications and drug reactions (include OTC, herbal): see medication list     Strengths and liabilities: Strength: Patient accepts guidance/feedback, Strength: Patient is expressive/articulate., Strength: Patient is intelligent., Strength: Patient is motivated for change., Strength: Patient is physically healthy., Strength: Patient has positive support network., Strength: Patient has reasonable judgment., Strength: Patient is stable.    Current Evaluation:     Mental Status Exam:  General Appearance:  unremarkable, age appropriate   Speech: normal tone, normal rate, normal pitch, normal volume      Level of Cooperation: cooperative      Thought Processes: normal and logical   Mood: depressed      Thought Content: normal, no suicidality, no homicidality, delusions, or paranoia   Affect: congruent and appropriate   Orientation: Oriented x3   Memory: recent >  intact   Attention Span & Concentration: intact   Fund of General Knowledge: intact and appropriate to age and level of education   Judgment & Insight: fair     Language  intact     Diagnostic Impression - Plan:       ICD-10-CM ICD-9-CM   1. Postpartum depression  F53.0 648.44     311   2. Postpartum anxiety  O99.345 648.44    F41.8 300.00       Patient-Stated Treatment Goals: "Being more accepting of my reality and the choices I've made. " "Help with managing my time and a schedule."    Plan:individual psychotherapy     Return to Clinic: 2 weeks    Length of Service (minutes): 45    "

## 2024-04-18 ENCOUNTER — HOSPITAL ENCOUNTER (OUTPATIENT)
Dept: RADIOLOGY | Facility: HOSPITAL | Age: 34
Discharge: HOME | End: 2024-04-18
Attending: OBSTETRICS & GYNECOLOGY
Payer: MEDICAID

## 2024-04-18 DIAGNOSIS — N83.209 CYST OF OVARY, UNSPECIFIED LATERALITY: ICD-10-CM

## 2024-04-18 PROCEDURE — 76856 US EXAM PELVIC COMPLETE: CPT | Mod: 26,,, | Performed by: RADIOLOGY

## 2024-04-18 PROCEDURE — 76830 TRANSVAGINAL US NON-OB: CPT | Mod: TC,PO

## 2024-04-18 PROCEDURE — 76830 TRANSVAGINAL US NON-OB: CPT | Mod: 26,,, | Performed by: RADIOLOGY

## 2024-04-19 ENCOUNTER — PATIENT MESSAGE (OUTPATIENT)
Dept: OBSTETRICS AND GYNECOLOGY | Facility: CLINIC | Age: 34
End: 2024-04-19
Payer: MEDICAID

## 2024-04-19 ENCOUNTER — PATIENT MESSAGE (OUTPATIENT)
Dept: BEHAVIORAL HEALTH | Facility: CLINIC | Age: 34
End: 2024-04-19
Payer: MEDICAID

## 2024-04-25 ENCOUNTER — OFFICE VISIT (OUTPATIENT)
Dept: PSYCHIATRY | Facility: CLINIC | Age: 34
End: 2024-04-25
Payer: MEDICAID

## 2024-04-25 DIAGNOSIS — F41.8 POSTPARTUM ANXIETY: Primary | ICD-10-CM

## 2024-04-25 DIAGNOSIS — R41.840 DIFFICULTY CONCENTRATING: ICD-10-CM

## 2024-04-25 PROCEDURE — 90834 PSYTX W PT 45 MINUTES: CPT | Mod: AH,HB,95, | Performed by: PSYCHOLOGIST

## 2024-04-25 NOTE — PROGRESS NOTES
Individual Psychotherapy (PhD/LCSW)    4/25/2024    Site:  Telemed         The patient location is: Patient's home in [place], [place].  The chief complaint leading to consultation is: ppa/d    Visit type: audiovisual    Face to Face time with patient: 45 minutes of total time spent on the encounter, which includes face to face time and non-face to face time preparing to see the patient (eg, review of tests), Obtaining and/or reviewing separately obtained history, Documenting clinical information in the electronic or other health record, Independently interpreting results (not separately reported) and communicating results to the patient/family/caregiver, or Care coordination (not separately reported).     Each patient to whom he or she provides medical services by telemedicine is:  (1) informed of the relationship between the physician and patient and the respective role of any other health care provider with respect to management of the patient; and (2) notified that he or she may decline to receive medical services by telemedicine and may withdraw from such care at any time.    Therapeutic Intervention: Met with patient.  Outpatient - Behavior modifying psychotherapy 45 min - CPT code 55544 and Outpatient - Supportive psychotherapy 45 min - CPT Code 73869    Chief complaint/reason for encounter: depression and anxiety     Interval history and content of current session: Patient was timely for her individual therapy session following her intake session two weeks ago. She reported feeling about the same. She acknowledged much of her stress and depression stems from relationship issues. Today's session focused on her sharing more background about her current relationship and her previous. She has been considering leaving the relationship and is trying to put forth as much effort as possible to see if it will work. She noted having great support from her mother with the baby, and she feels very capable of caring for the  baby. If she needs a break, her mother is there to help. Lastly, she shared she would like to get evaluated for ADHD and a referral was placed.    Treatment plan:  Target symptoms: depression, anxiety   Why chosen therapy is appropriate versus another modality: relevant to diagnosis  Outcome monitoring methods: self-report, observation  Therapeutic intervention type: behavior modifying psychotherapy, supportive psychotherapy    Risk parameters:  Patient reports no suicidal ideation  Patient reports no homicidal ideation  Patient reports no self-injurious behavior  Patient reports no violent behavior    Verbal deficits: None    Patient's response to intervention:  The patient's response to intervention is accepting.    Progress toward goals and other mental status changes:  The patient's progress toward goals is fair .    Mental Status Exam:  General Appearance:  unremarkable, age appropriate   Speech: normal tone, normal rate, normal pitch, normal volume      Level of Cooperation: cooperative      Thought Processes: normal and logical   Mood: steady      Thought Content: normal, no suicidality, no homicidality, delusions, or paranoia   Affect: congruent and appropriate   Orientation: Oriented x3   Attention Span & Concentration: intact   Judgment & Insight: fair     Language  intact     Diagnosis:     ICD-10-CM ICD-9-CM   1. Postpartum anxiety  O99.345 648.44    F41.8 300.00   2. Postpartum depression  F53.0 648.44     311   3. Difficulty concentrating  R41.840 799.51       Plan:  individual psychotherapy    Return to clinic: 2 weeks    Length of Service (minutes): 45

## 2024-05-06 ENCOUNTER — OFFICE VISIT (OUTPATIENT)
Dept: PSYCHIATRY | Facility: CLINIC | Age: 34
End: 2024-05-06
Payer: MEDICAID

## 2024-05-06 DIAGNOSIS — R41.840 DIFFICULTY CONCENTRATING: ICD-10-CM

## 2024-05-06 DIAGNOSIS — F41.8 POSTPARTUM ANXIETY: Primary | ICD-10-CM

## 2024-05-06 PROCEDURE — 90834 PSYTX W PT 45 MINUTES: CPT | Mod: AH,HB,95, | Performed by: PSYCHOLOGIST

## 2024-05-07 ENCOUNTER — PATIENT MESSAGE (OUTPATIENT)
Dept: PSYCHIATRY | Facility: CLINIC | Age: 34
End: 2024-05-07
Payer: MEDICAID

## 2024-05-07 NOTE — PROGRESS NOTES
"  Individual Psychotherapy (PhD/LCSW)    5/6/2024    Site:  Telemed         The patient location is: Patient's home in [place], [place].  The chief complaint leading to consultation is: ppa/d    Visit type: audiovisual    Face to Face time with patient: 45 minutes of total time spent on the encounter, which includes face to face time and non-face to face time preparing to see the patient (eg, review of tests), Obtaining and/or reviewing separately obtained history, Documenting clinical information in the electronic or other health record, Independently interpreting results (not separately reported) and communicating results to the patient/family/caregiver, or Care coordination (not separately reported).     Each patient to whom he or she provides medical services by telemedicine is:  (1) informed of the relationship between the physician and patient and the respective role of any other health care provider with respect to management of the patient; and (2) notified that he or she may decline to receive medical services by telemedicine and may withdraw from such care at any time.    Therapeutic Intervention: Met with patient.  Outpatient - Behavior modifying psychotherapy 45 min - CPT code 47474 and Outpatient - Supportive psychotherapy 45 min - CPT Code 16839    Chief complaint/reason for encounter: depression and anxiety     Interval history and content of current session: Patient was timely for her individual therapy session following her intake session two weeks ago. She reported "doing okay," still struggling with intrusive thoughts that impact her mood and can bring out irritable behavior towards others, mostly her partner and mother. She shared her struggles of trying to find a balance between caring for her son and taking care of herself. She feels low at times and other times okay. We discussed the intrusive thoughts she experiences regarding anxiety of parenting as well as regrets. We discussed challenging her " thoughts with objective evidence and cognitive framing, as well as utilizing emotion regulation skills to help her mood.    Treatment plan:  Target symptoms: depression, anxiety   Why chosen therapy is appropriate versus another modality: relevant to diagnosis  Outcome monitoring methods: self-report, observation  Therapeutic intervention type: behavior modifying psychotherapy, supportive psychotherapy    Risk parameters:  Patient reports no suicidal ideation  Patient reports no homicidal ideation  Patient reports no self-injurious behavior  Patient reports no violent behavior    Verbal deficits: None    Patient's response to intervention:  The patient's response to intervention is accepting.    Progress toward goals and other mental status changes:  The patient's progress toward goals is fair .    Mental Status Exam:  General Appearance:  unremarkable, age appropriate   Speech: normal tone, normal rate, normal pitch, normal volume      Level of Cooperation: cooperative      Thought Processes: normal and logical   Mood: steady      Thought Content: normal, no suicidality, no homicidality, delusions, or paranoia   Affect: congruent and appropriate   Orientation: Oriented x3   Attention Span & Concentration: intact   Judgment & Insight: fair     Language  intact     Diagnosis:     ICD-10-CM ICD-9-CM   1. Postpartum anxiety  O99.345 648.44    F41.8 300.00   2. Postpartum depression  F53.0 648.44     311   3. Difficulty concentrating  R41.840 799.51       Plan:  individual psychotherapy    Return to clinic: 2 weeks    Length of Service (minutes): 45

## 2024-05-17 ENCOUNTER — PATIENT MESSAGE (OUTPATIENT)
Dept: OBSTETRICS AND GYNECOLOGY | Facility: CLINIC | Age: 34
End: 2024-05-17
Payer: MEDICAID

## 2024-05-20 ENCOUNTER — OFFICE VISIT (OUTPATIENT)
Dept: PSYCHIATRY | Facility: CLINIC | Age: 34
End: 2024-05-20
Payer: MEDICAID

## 2024-05-20 DIAGNOSIS — R41.840 DIFFICULTY CONCENTRATING: ICD-10-CM

## 2024-05-20 DIAGNOSIS — F41.8 POSTPARTUM ANXIETY: Primary | ICD-10-CM

## 2024-05-20 PROCEDURE — 90834 PSYTX W PT 45 MINUTES: CPT | Mod: AH,HB,95, | Performed by: PSYCHOLOGIST

## 2024-05-26 NOTE — PROGRESS NOTES
Individual Psychotherapy (PhD/LCSW)    5/20/2024    Site:  Telemed         The patient location is: Patient's home in [place], [place].  The chief complaint leading to consultation is: ppa/d    Visit type: audiovisual    Face to Face time with patient: 45 minutes of total time spent on the encounter, which includes face to face time and non-face to face time preparing to see the patient (eg, review of tests), Obtaining and/or reviewing separately obtained history, Documenting clinical information in the electronic or other health record, Independently interpreting results (not separately reported) and communicating results to the patient/family/caregiver, or Care coordination (not separately reported).     Each patient to whom he or she provides medical services by telemedicine is:  (1) informed of the relationship between the physician and patient and the respective role of any other health care provider with respect to management of the patient; and (2) notified that he or she may decline to receive medical services by telemedicine and may withdraw from such care at any time.    Therapeutic Intervention: Met with patient.  Outpatient - Behavior modifying psychotherapy 45 min - CPT code 75379 and Outpatient - Supportive psychotherapy 45 min - CPT Code 08890    Chief complaint/reason for encounter: depression and anxiety     Interval history and content of current session: Patient was timely for her individual therapy session following her intake session two weeks ago. She reported her mood has been okay, not as much intrusive thoughts lately, trying to focus on being more present. She has also increased herself care and has joined a local gym to start exercising. She shared some frustration with her mother and MIL repeatedly telling her to put rice cereal in the baby's bottle and especially frustrated when finding out her MIL actually did it without her permission. She stated she is letting her partner talk to his  mother about this issue. We discussed setting firm boundaries and how to communicate them.    Treatment plan:  Target symptoms: depression, anxiety   Why chosen therapy is appropriate versus another modality: relevant to diagnosis  Outcome monitoring methods: self-report, observation  Therapeutic intervention type: behavior modifying psychotherapy, supportive psychotherapy    Risk parameters:  Patient reports no suicidal ideation  Patient reports no homicidal ideation  Patient reports no self-injurious behavior  Patient reports no violent behavior    Verbal deficits: None    Patient's response to intervention:  The patient's response to intervention is accepting.    Progress toward goals and other mental status changes:  The patient's progress toward goals is good.    Mental Status Exam:  General Appearance:  unremarkable, age appropriate   Speech: normal tone, normal rate, normal pitch, normal volume      Level of Cooperation: cooperative      Thought Processes: normal and logical   Mood: steady      Thought Content: normal, no suicidality, no homicidality, delusions, or paranoia   Affect: congruent and appropriate   Orientation: Oriented x3   Attention Span & Concentration: intact   Judgment & Insight: fair     Language  intact     Diagnosis:     ICD-10-CM ICD-9-CM   1. Postpartum anxiety  O99.345 648.44    F41.8 300.00   2. Postpartum depression  F53.0 648.44     311   3. Difficulty concentrating  R41.840 799.51       Plan:  individual psychotherapy    Return to clinic: 3 weeks    Length of Service (minutes): 45

## 2024-06-10 ENCOUNTER — OFFICE VISIT (OUTPATIENT)
Dept: PSYCHIATRY | Facility: CLINIC | Age: 34
End: 2024-06-10
Payer: MEDICAID

## 2024-06-10 DIAGNOSIS — R41.840 DIFFICULTY CONCENTRATING: ICD-10-CM

## 2024-06-10 DIAGNOSIS — F41.8 POSTPARTUM ANXIETY: Primary | ICD-10-CM

## 2024-06-10 PROBLEM — R10.2 PELVIC PAIN: Status: ACTIVE | Noted: 2024-06-10

## 2024-06-10 PROCEDURE — 90832 PSYTX W PT 30 MINUTES: CPT | Mod: AH,HB,95, | Performed by: PSYCHOLOGIST

## 2024-06-10 NOTE — PROGRESS NOTES
Individual Psychotherapy (PhD/LCSW)    6/10/2024    Site:  Telemed         The patient location is: Patient's home in [place], [place].  The chief complaint leading to consultation is: ppa/d    Visit type: audiovisual    Face to Face time with patient: 30 minutes of total time spent on the encounter, which includes face to face time and non-face to face time preparing to see the patient (eg, review of tests), Obtaining and/or reviewing separately obtained history, Documenting clinical information in the electronic or other health record, Independently interpreting results (not separately reported) and communicating results to the patient/family/caregiver, or Care coordination (not separately reported).     Each patient to whom he or she provides medical services by telemedicine is:  (1) informed of the relationship between the physician and patient and the respective role of any other health care provider with respect to management of the patient; and (2) notified that he or she may decline to receive medical services by telemedicine and may withdraw from such care at any time.    Therapeutic Intervention: Met with patient.  Outpatient - Behavior modifying psychotherapy 30 min - CPT code 66809 and Outpatient - Supportive psychotherapy 30 min - CPT Code 59240    Chief complaint/reason for encounter: depression and anxiety     Interval history and content of current session: Patient was timely for her individual therapy session. She reported doing well overall. Her mood has been better, and she has been finding more of her bearings. 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 They did not give a reason. She stated she is okay with this because she was strongly considering not returning next school year anyhow. This is one less decision for her to make. She has continued working on her self-care and started pelvic floor PT today. She reported  she would like to keep in touch and schedule for one month to see how she continues to progress or not.    Treatment plan:  Target symptoms: depression, anxiety   Why chosen therapy is appropriate versus another modality: relevant to diagnosis  Outcome monitoring methods: self-report, observation  Therapeutic intervention type: behavior modifying psychotherapy, supportive psychotherapy    Risk parameters:  Patient reports no suicidal ideation  Patient reports no homicidal ideation  Patient reports no self-injurious behavior  Patient reports no violent behavior    Verbal deficits: None    Patient's response to intervention:  The patient's response to intervention is accepting.    Progress toward goals and other mental status changes:  The patient's progress toward goals is good.    Mental Status Exam:  General Appearance:  unremarkable, age appropriate   Speech: normal tone, normal rate, normal pitch, normal volume      Level of Cooperation: cooperative      Thought Processes: normal and logical   Mood: steady      Thought Content: normal, no suicidality, no homicidality, delusions, or paranoia   Affect: congruent and appropriate   Orientation: Oriented x3   Attention Span & Concentration: intact   Judgment & Insight: fair     Language  intact     Diagnosis:     ICD-10-CM ICD-9-CM   1. Postpartum anxiety  O99.345 648.44    F41.8 300.00   2. Postpartum depression  F53.0 648.44     311   3. Difficulty concentrating  R41.840 799.51       Plan:  individual psychotherapy    Return to clinic: 1 month    Length of Service (minutes): 30             ambulatory

## 2024-06-11 ENCOUNTER — TELEPHONE (OUTPATIENT)
Dept: OBSTETRICS AND GYNECOLOGY | Facility: CLINIC | Age: 34
End: 2024-06-11
Payer: MEDICAID

## 2024-06-11 DIAGNOSIS — R10.2 PELVIC PAIN: Primary | ICD-10-CM

## 2024-06-11 NOTE — TELEPHONE ENCOUNTER
----- Message from Danielle Soriano, PT sent at 6/11/2024  9:27 AM CDT -----  Regarding: referral  Dr. Padilla,     I saw this pt for you for pelvic floor PT yesterday. She is requesting to be seen at a facility closer to her home. She requested that a referral for pelvic floor PT be sent to     [facility]   Phone: [phone]  Fax: [phone]    Thanks so much for the referral. Please let me know if there is anything you need from me.     Sincerely,   Danielle Soriano PT,DPT.

## 2024-06-11 NOTE — TELEPHONE ENCOUNTER
----- Message from Danielle Soriano, PT sent at 6/11/2024  9:27 AM CDT -----  Regarding: referral  Dr. Padilla,     I saw this pt for you for pelvic floor PT yesterday. She is requesting to be seen at a facility closer to her home. She requested that a referral for pelvic floor PT be sent to     [facility]   Phone: [phone]  Fax: [phone]    Thanks so much for the referral. Please let me know if there is anything you need from me.     Sincerely,   Danielle Soriano PT,DPT.

## 2024-06-24 ENCOUNTER — PATIENT MESSAGE (OUTPATIENT)
Dept: INTERNAL MEDICINE | Facility: CLINIC | Age: 34
End: 2024-06-24
Payer: MEDICAID

## 2024-06-28 ENCOUNTER — OFFICE VISIT (OUTPATIENT)
Dept: INTERNAL MEDICINE | Facility: CLINIC | Age: 34
End: 2024-06-28
Payer: MEDICAID

## 2024-06-28 DIAGNOSIS — R29.898 THUMB WEAKNESS: ICD-10-CM

## 2024-06-28 DIAGNOSIS — D50.9 IRON DEFICIENCY ANEMIA, UNSPECIFIED IRON DEFICIENCY ANEMIA TYPE: Primary | ICD-10-CM

## 2024-06-28 DIAGNOSIS — Z91.89 BREASTFEEDING PROBLEM: ICD-10-CM

## 2024-06-28 NOTE — PROGRESS NOTES
The patient location is: [place]  The chief complaint leading to consultation is: thumb weakness, lactation concerns, iron check    Visit type: audiovisual    Face to Face time with patient: 7 minutes  11 minutes of total time spent on the encounter, which includes face to face time and non-face to face time preparing to see the patient (eg, review of tests), Obtaining and/or reviewing separately obtained history, Documenting clinical information in the electronic or other health record, Independently interpreting results (not separately reported) and communicating results to the patient/family/caregiver, or Care coordination (not separately reported).         Each patient to whom he or she provides medical services by telemedicine is:  (1) informed of the relationship between the physician and patient and the respective role of any other health care provider with respect to management of the patient; and (2) notified that he or she may decline to receive medical services by telemedicine and may withdraw from such care at any time.    Notes:   History of Present Illness   Mone Chandler is a 33 y.o. woman with medical history as listed below with virtual visit today for several complaints.  Requesting a referral to hand therapy. Had trigger finger release bilateral thumb and now reports thumb weakness.  Would like to have her iron levels checked.  Would like referral to lactation services, needing help with breastfeeding.      Past Medical History:   Diagnosis Date    Allergic rhinitis, cause unspecified 6/17/2015    ADRIANO (generalized anxiety disorder) 6/17/2015    ADRIANO-7 score of 17     PCOS (polycystic ovarian syndrome)        Past Surgical History:   Procedure Laterality Date    NASAL ENDOSCOPY W/ BALLON SINUPLASTY Bilateral 2017    thumb Bilateral     TRIGGER FINGER RELEASE Bilateral     thumbs       Social History     Socioeconomic History    Marital status: Single   Tobacco Use    Smoking status: Never     Smokeless tobacco: Never   Substance and Sexual Activity    Alcohol use: No    Drug use: No    Sexual activity: Yes     Partners: Male     Birth control/protection: None     Social Determinants of Health     Financial Resource Strain: Low Risk  (11/22/2023)    Overall Financial Resource Strain (CARDIA)     Difficulty of Paying Living Expenses: Not very hard   Food Insecurity: No Food Insecurity (11/22/2023)    Hunger Vital Sign     Worried About Running Out of Food in the Last Year: Never true     Ran Out of Food in the Last Year: Never true   Transportation Needs: No Transportation Needs (11/22/2023)    PRAPARE - Transportation     Lack of Transportation (Medical): No     Lack of Transportation (Non-Medical): No   Physical Activity: Sufficiently Active (11/22/2023)    Exercise Vital Sign     Days of Exercise per Week: 3 days     Minutes of Exercise per Session: 50 min   Stress: Stress Concern Present (11/22/2023)    [employer] [place] [employer] - Occupational Stress Questionnaire     Feeling of Stress : Rather much   Housing Stability: Unknown (11/22/2023)    Housing Stability Vital Sign     Unable to Pay for Housing in the Last Year: No     Unstable Housing in the Last Year: No       Family History   Problem Relation Name Age of Onset    Hypertension Mother      Cancer Mother      Diverticulitis Father         Review of Systems  Review of Systems   HENT:  Negative for hearing loss.    Eyes:  Negative for discharge.   Respiratory:  Negative for wheezing.    Cardiovascular:  Negative for chest pain and palpitations.   Gastrointestinal:  Negative for blood in stool, constipation, diarrhea and vomiting.   Genitourinary:  Negative for dysuria and hematuria.   Musculoskeletal:  Positive for joint pain (thumb bilateral). Negative for neck pain.   Neurological:  Negative for weakness and headaches.   Endo/Heme/Allergies:  Negative for polydipsia.     A complete review of systems was otherwise  negative.    Physical Exam  General appearance: alert, appears stated age, cooperative, and no distress  Lungs:  respirations even and unlabored  Skin:  no visible rash or lesions  Neurologic: Grossly normal    Assessment/Plan  Iron deficiency anemia, unspecified iron deficiency anemia type  Check iron studies as below  -     IRON AND TIBC; Future; Expected date: 06/28/2024  -     FERRITIN; Future; Expected date: 06/28/2024    Thumb weakness  Referral placed, she will call us if she needs referral faxed to external location.  -     Ambulatory referral/consult to Physical/Occupational Therapy; Future; Expected date: 07/05/2024    Breastfeeding problem  Referral placed.  -     Ambulatory referral/consult to Outpatient Lactation Services; Future; Expected date: 07/05/2024    Patient has verbalized understanding and is in agreement with plan of care.    Follow up if symptoms worsen or fail to improve.      Answers submitted by the patient for this visit:  Review of Systems Questionnaire (Submitted on 6/28/2024)  activity change: No  unexpected weight change: No  rhinorrhea: No  trouble swallowing: No  visual disturbance: No  chest tightness: No  polyuria: No  difficulty urinating: No  menstrual problem: No  joint swelling: No  arthralgias: No  confusion: No  dysphoric mood: No

## 2024-07-08 ENCOUNTER — PATIENT MESSAGE (OUTPATIENT)
Dept: PSYCHIATRY | Facility: CLINIC | Age: 34
End: 2024-07-08

## 2024-07-09 ENCOUNTER — OFFICE VISIT (OUTPATIENT)
Dept: PSYCHIATRY | Facility: CLINIC | Age: 34
End: 2024-07-09
Payer: MEDICAID

## 2024-07-09 DIAGNOSIS — F41.8 POSTPARTUM ANXIETY: Primary | ICD-10-CM

## 2024-07-09 PROCEDURE — 90832 PSYTX W PT 30 MINUTES: CPT | Mod: AH,HB,95, | Performed by: PSYCHOLOGIST

## 2024-07-09 NOTE — PROGRESS NOTES
Individual Psychotherapy (PhD/LCSW)    7/9/2024    Site:  Telemed         The patient location is: Patient's home in [place], [place].  The chief complaint leading to consultation is: ppa/d    Visit type: audiovisual    Face to Face time with patient: 30 minutes of total time spent on the encounter, which includes face to face time and non-face to face time preparing to see the patient (eg, review of tests), Obtaining and/or reviewing separately obtained history, Documenting clinical information in the electronic or other health record, Independently interpreting results (not separately reported) and communicating results to the patient/family/caregiver, or Care coordination (not separately reported).     Each patient to whom he or she provides medical services by telemedicine is:  (1) informed of the relationship between the physician and patient and the respective role of any other health care provider with respect to management of the patient; and (2) notified that he or she may decline to receive medical services by telemedicine and may withdraw from such care at any time.    Therapeutic Intervention: Met with patient.  Outpatient - Behavior modifying psychotherapy 30 min - CPT code 06908 and Outpatient - Supportive psychotherapy 30 min - CPT Code 80998    Chief complaint/reason for encounter: depression and anxiety     Interval history and content of current session: Patient was timely for her individual therapy session. She reported overall doing well. She noted still experiencing negative thoughts or intrusive thoughts; however, she has learned how to manage and dismiss them quickly. Her overall mood has been stable. She has been focusing on her physical health, going to the gym regularly and enjoying dance classes. She and her partner are doing well and considering getting  later this year. She is looking forward to a trip plan with girlfriends in a couple months. She reported it is helpful to know she  has a monthly check-up scheduled and that she can reach out to this provider as needed. We will continue to meet weekly for now.    Treatment plan:  Target symptoms: depression, anxiety   Why chosen therapy is appropriate versus another modality: relevant to diagnosis  Outcome monitoring methods: self-report, observation  Therapeutic intervention type: behavior modifying psychotherapy, supportive psychotherapy    Risk parameters:  Patient reports no suicidal ideation  Patient reports no homicidal ideation  Patient reports no self-injurious behavior  Patient reports no violent behavior    Verbal deficits: None    Patient's response to intervention:  The patient's response to intervention is accepting.    Progress toward goals and other mental status changes:  The patient's progress toward goals is good.    Mental Status Exam:  General Appearance:  unremarkable, age appropriate   Speech: normal tone, normal rate, normal pitch, normal volume      Level of Cooperation: cooperative      Thought Processes: normal and logical   Mood: steady      Thought Content: normal, no suicidality, no homicidality, delusions, or paranoia   Affect: congruent and appropriate   Orientation: Oriented x3   Attention Span & Concentration: intact   Judgment & Insight: fair     Language  intact     Diagnosis:     ICD-10-CM ICD-9-CM   1. Postpartum anxiety  O99.345 648.44    F41.8 300.00   2. Postpartum depression  F53.0 648.44     311       Plan:  individual psychotherapy    Return to clinic: 1 month    Length of Service (minutes): 30

## 2024-07-09 NOTE — Clinical Note
Sonia Bonilla, could you please get this patient set up with the ADHD clinic orientation and screening process? I put in a referral a while back but realized that was likely not the best way to refer. Thank you in advance!

## 2024-07-10 ENCOUNTER — CLINICAL SUPPORT (OUTPATIENT)
Dept: REHABILITATION | Facility: HOSPITAL | Age: 34
End: 2024-07-10
Payer: MEDICAID

## 2024-07-10 DIAGNOSIS — R29.898 THUMB WEAKNESS: ICD-10-CM

## 2024-07-10 DIAGNOSIS — R27.8 DECREASED DEXTERITY: Primary | ICD-10-CM

## 2024-07-10 PROCEDURE — 97530 THERAPEUTIC ACTIVITIES: CPT | Mod: PN

## 2024-07-10 PROCEDURE — 97165 OT EVAL LOW COMPLEX 30 MIN: CPT | Mod: PN

## 2024-07-10 NOTE — PATIENT INSTRUCTIONS
[facility]  OCCUPATIONAL THERAPY  HOME EXERCISE PROGRAM     Complete the following strengthening program 1x/day.        2 minutes     5 repeat 10     5     3  _ 3   5 times   5 times      3 times     3  3

## 2024-07-11 PROBLEM — R27.8 DECREASED DEXTERITY: Status: ACTIVE | Noted: 2024-07-11

## 2024-07-12 NOTE — PLAN OF CARE
[facility]  Occupational Therapy Initial Evaluation     Date: 7/10/2024  Name: Mone Chandler  Clinic Number: [id number]    Therapy Diagnosis:   Encounter Diagnoses   Name Primary?    Thumb weakness     Decreased dexterity Yes     Physician: Shellie Odonnell NP    Physician Orders: OT eval and tx  Medical Diagnosis:   Diagnosis   R29.898 (ICD-10-CM) - Thumb weakness     Evaluation Date: 7/10/2024  Insurance Authorization Period Expiration: 6/28/2024-12/31/2024  Plan of Care Certification Period: 7/10/2024 to 8/21/2024  Progress Note Due: 7/31/2024     Visit # / Visits authorized: 1/1  FOTO: 1/1    Surgical Procedure: trigger finger release B thumbs approx. 10 years prior to today's eval   Date of Surgery:      Date of Return to MD: gavin    Precautions:  Standard    Time In: 11:00 am   Time Out: 12:00 pm   Total Appointment Time (timed & untimed codes): 60 minutes    Subjective     Date of Onset: per pt verbal report, she has been noticing weakness within her B thumbs since her procedure 10 years ago.     Per Shellie Odonnell NP at 6/28/2024 12:30 PM :  Notes:   History of Present Illness              Mone Chandler is a 33 y.o. woman with medical history as listed below with virtual visit today for several complaints.  Requesting a referral to hand therapy. Had trigger finger release bilateral thumb and now reports thumb weakness.  Would like to have her iron levels checked.  Would like referral to lactation services, needing help with breastfeeding.    Imaging: see emr    Mechanism of Injury/ History of Current Condition: Pt is a 34 yo female presnting to clinic on this date with concerns of a post hx trigger finger release of B thumbs. Pt  reports tightness on her R thumb and weakness on her L thumb.       Falls: [] Yes [x] No Comments:    Previous Therapy: [x] Yes [] No Comments: Pt participated therapy a few years after her procedure.      Involved Side: [] Right [] Left [x]  "Bilateral    Dominant Side: Right     Mechanism of Injury: [] Acute Trauma []  Surgical  [x] Cumulative/Repetitive Strain Injury [] Congenital  [] Degenerative Condition   [] ______     Pain:  Functional Pain Scale Rating 0-10:   5/10 on average more soreness than pain   1/10 at best  5/10 at worst  Location: B thumbs   Description: there is no strength within her thumbs per pt verbal report  Aggravating Factors: with use pt reports that her thumbs feel exhausted.; Lifting, pulling and pushing   Easing Factors: massage helps to ease the discomfort.      Occupation:    Working presently: Recently became a mother to a 5 month old little boy. She is new to the area and looking for work as a teacher.   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      Current Functional Status: Independent with all ADLs but she does note that their is weakness.                 Home/Living environment: lives with their family; mom and new born son                           - [x] One                            - DME: [x] None [] Wheelchair [] Walker [] Bedside Commode []Shower/Tub Chair [] Cane [] Adaptive Equipment                            Limitation of Functional Status as follows:              ADLs/IADLs:                           - Feeding: [x]No []Minimal []Moderate[]Significant                           - Bathing: [x]No []Minimal []Moderate[]Significant                           - Dressing/Grooming: [x]No []Minimal []Moderate[]Significant                           - Driving:  [x]No []Minimal []Moderate[]Significant                     Leisure: "I like to exercise and dance. Sometimes the weightbearing exercises concern me."     Patient's Goals for " "Therapy: "I want to build strength and to alleviate stress and the fear of my thumb popping out of place again."       Past Medical History/Physical Systems Review:   Medical History:   Past Medical History:   Diagnosis Date    Allergic rhinitis, cause unspecified 6/17/2015    ADRIANO (generalized anxiety disorder) 6/17/2015    ADRIANO-7 score of 17     PCOS (polycystic ovarian syndrome)        Surgical History:    has a past surgical history that includes thumb (Bilateral); Trigger finger release (Bilateral); and Nasal endoscopy w/ ballon sinuplasty (Bilateral, 2017).    Medications:   has a current medication list which includes the following prescription(s): norethindrone and pnv,calcium 72-iron-folic acid.    Allergies:   Review of patient's allergies indicates:  No Known Allergies       Objective     Inspection: L thumb has what feels like a bone spur in which pt reported former MD stated that it was bone related. Pt can perform B composite fists and full opposition.    Observation/Appearance:    No active triggering present; scar tissue palpated on B thumbs.     Sensation: Pt denies paresthesias. Pt denies hypersensitivity. Intact to light touch.  Scar: Moderate scaring along MP space of B thumbs. no pain with palpation. Scar is more prominent on the L than the R.     bilateral Upper Extremity Active Range of Motion:     Right Left   ROM (in degrees) 7/10/2024 7/10/2024   Wrist     Wrist flexion 75 75   Wrist extension 75 75       Right Thumb Active Range of Motion:   (Ext/Flex) 7/10/2024   MCP Jt 0/70°    IP Jt 0/80°   Opposition Kapandji score: 10/10   Palmar Abd 45°   Radial Abd 45°                        left Thumb Active Range of Motion:   (Ext/Flex) 7/10/2024   MCP Jt 0/66°    IP Jt 0/85°   Opposition Kapandji score: 10/10   Palmar Abd 45°   Radial Abd 45°      and Pinch Strength (in pounds, psi's):   Right Left    7/10/2024 7/10/2024    II 37 26   Lateral 10 12   Tripod 10 8   Tip 7 6      7/12/2024 " 7/12/2024   9 Peg Test Left  Right   Time 26 sec 24 sec       Intake Outcome Measure for FOTO hand Survey    Therapist reviewed FOTO scores for Mone Chandler on 7/10/2024.   FOTO documents entered into [employer] - see Media section.    Intake Score: 63%     Predicted Functional Score: 66% in 8 visits    Treatment     Total Treatment time (time-based codes) separate from Evaluation: 14 minutes    Mone received the treatments listed below:      therapeutic exercises to develop strength for 8 minutes including:  - therapy putty issued on this date for in hand manipulation and strengthening. Please see patient instructions for hand out provided on this date.     self-care techniques to improve independence and safety with ADL/IADL tasks for 6 minutes including:  - provided information on paraffin machine and its benefits as a pain modality.     neuromuscular re-education activities to improve Coordination and Proprioception for 0 minutes including:  -     therapeutic activities to improve functional performance for 0  minutes including:  -     Home Exercise Program/Education:    Education provided:   - Role of OT, goals for OT, scheduling/cancellations, therapy attendance policy    Written Home Exercises Provided: Yes  Exercises were reviewed and Mone was able to demonstrate them prior to the end of the session. Mone demonstrated good understanding of the education provided. See EMR under Patient Instructions for exercises provided during therapy sessions.     Pt was advised to perform these exercises free of pain, and to stop performing them if pain occurs.    Assessment     Mone Chandler is a 33 y.o. female referred to outpatient occupational therapy and presents with a medical diagnosis of thumb weakness.  Patient presents with the following therapy deficits: Decreased  strength, Decreased pinch strength, Decreased functional hand use, and Joint Stiffness and demonstrates limitations as described  in the chart below.     Following medical record review, it is determined that the pt will benefit from occupational therapy services in order to maximize pain free and/or functional use of her bilateral thumb strength. The following goals were discussed with the patient and patient is in agreement with them as to be addressed in the treatment plan. The patient's rehab potential is Good.     Anticipated barriers to occupational therapy: participation in HEP and attendance to therapy and transportation     Pt has no cultural, educational or language barriers to learning provided.    Medical Necessity is demonstrated by the following  Occupational Profile/History  Co-morbidities and personal factors that may impact the plan of care [x] LOW: Brief chart review  [] MODERATE: Expanded chart review   [] HIGH: Extensive chart review    Moderate / High Support Documentation: N/A     Examination  Performance deficits relating to physical, cognitive or psychosocial skills that result in activity limitations and/or participation restrictions  [] LOW: addressing 1-3 Performance deficits  [x] MODERATE: 3-5 Performance deficits  [] HIGH: 5+ Performance deficits (please support below)    Moderate / High Support Documentation:    Physical:  Muscle Power/Strength  Muscle Endurance   Strength  Pinch Strength    Cognitive:  No Deficits    Psychosocial:    No Deficits     Treatment Options [] LOW: Limited options  [x] MODERATE: Several options  [] HIGH: Multiple options      Decision Making/ Complexity Score: low       The following goals were discussed with the patient and patient is in agreement with them as to be addressed in the treatment plan.     Goals: strengthing, improvement with range of thumb; decrease scar adhesions, HEP educaiton, adaptive equipment for handwriting   Short Term Goals: (4 weeks)  1. Pt will be independent with HEP in 2 visits.  2. Pt will report decreased pain to a none or trace when performing all  ADL/IADL tasks.   3. Pt will increase B thumb AROM to further improve her  dressing, grooming activities, and  activities.   4. Pt will demonstrate understanding for scar massage and education on the importance of joint protection. 5. Pt will make a full, flat, composite fist to enable grasping and squeezing objects for self-care.   5. The patient will demonstrate improved letter formation and legibility when writing practice sentences while using writing aids. She will correctly form all upper and lowercase letters with appropriate shape, spacing and size reporting decreased fatigue when using pencil  or other writing aids.     Long Term Goals: (8 weeks)  1. Pt will report decreased pain to 1-2/10 with ADL/IADL tasks.   2. Pt will exhibit 5-10# of R and L  strength to allow a firm grasp on cooking utensils, steering wheel, etc.  3. Pt will exhibit a 1-2# of L tripod and tip pinch to demonstrate improved  functional pinch strength to allow writing, opening containers, and turning keys etc.  4. Pt will report an increase in FOTO intake score of > 66%, which would indicate an improvement in quality of life.      Plan   Plan of Care Certification: 7/10/2024 to 8/21/2024     Outpatient Occupational Therapy 1-2 times weekly for 6  weeks to include the following interventions PRN: Manual Therapy, Moist Heat/ Ice, Neuromuscular Re-ed, Orthotic Management and Training, Paraffin, Patient Education, Self Care, Therapeutic Activities, Therapeutic Exercise, and Ultrasound      Varsha Mi OT      I CERTIFY THE NEED FOR THESE SERVICES FURNISHED UNDER THIS PLAN OF TREATMENT AND WHILE UNDER MY CARE  Physician's comments:      Physician's Signature: ___________________________________________________

## 2024-07-15 ENCOUNTER — CLINICAL SUPPORT (OUTPATIENT)
Dept: PSYCHIATRY | Facility: CLINIC | Age: 34
End: 2024-07-15
Payer: MEDICAID

## 2024-07-15 ENCOUNTER — PATIENT MESSAGE (OUTPATIENT)
Dept: REHABILITATION | Facility: OTHER | Age: 34
End: 2024-07-15
Payer: MEDICAID

## 2024-07-15 DIAGNOSIS — R41.840 DIFFICULTY CONCENTRATING: ICD-10-CM

## 2024-07-15 PROCEDURE — 99499 UNLISTED E&M SERVICE: CPT | Mod: S$PBB,HB,, | Performed by: PSYCHOLOGIST

## 2024-07-15 PROCEDURE — 99211 OFF/OP EST MAY X REQ PHY/QHP: CPT | Mod: PBBFAC

## 2024-07-15 PROCEDURE — 99999 PR PBB SHADOW E&M-EST. PATIENT-LVL I: CPT | Mod: PBBFAC,HB,,

## 2024-07-15 NOTE — PROGRESS NOTES
Adult ADHD Clinic Orientation Seminar   Orientation/Psychoeducation    Patient's attendance: Attended in Full    Seminar/Group Focus: ADHD Clinic Orientation Seminar  Target symptoms: ADHD    Site: [facility]  Clinical status of patient: Outpatient  No LOS. Billing Provider and Co-signer: Zuleima Thrasher, PhD (see signature below)  Length of Service: 35 minutes    Seminar/Group Topic:  Behavioral Health  Seminar/Group Department: [address]  Seminar/Group Facilitators:  Johana Steiner MS; Camelia Naranjo LMSW  # of patients: 10    Interval history: Patient presented for the Adult ADHD Clinic orientation seminar. The seminar provided information regarding guidelines and structure of assessment, diagnosis, and treatment in this clinic.  Diagnosis:   1. Difficulty concentrating  Ambulatory referral/consult to Psychology        Patient's response: Accepting  Progress toward goals and other mental status changes: As expected    Plan: Patient will indicate whether to proceed with the Adult ADHD Clinic pre-screen  Return to clinic: as scheduled            Zuleima Thrasher, PhD  Clinical Psychologist

## 2024-07-16 ENCOUNTER — PATIENT MESSAGE (OUTPATIENT)
Dept: PSYCHIATRY | Facility: CLINIC | Age: 34
End: 2024-07-16
Payer: MEDICAID

## 2024-07-17 ENCOUNTER — PATIENT MESSAGE (OUTPATIENT)
Dept: PSYCHIATRY | Facility: CLINIC | Age: 34
End: 2024-07-17
Payer: MEDICAID

## 2024-07-24 ENCOUNTER — CLINICAL SUPPORT (OUTPATIENT)
Dept: REHABILITATION | Facility: HOSPITAL | Age: 34
End: 2024-07-24
Payer: MEDICAID

## 2024-07-24 DIAGNOSIS — R27.8 DECREASED DEXTERITY: Primary | ICD-10-CM

## 2024-07-24 PROCEDURE — 97530 THERAPEUTIC ACTIVITIES: CPT | Mod: PN

## 2024-07-24 NOTE — PROGRESS NOTES
"  [facility]  Occupational Therapy Treatment Note     Date: 7/24/2024  Name: Mone Chandler  Clinic Number: [id number]    Therapy Diagnosis:   Encounter Diagnosis   Name Primary?    Decreased dexterity Yes     Physician: Shellie Odonnell NP  Physician Orders: OT eval and tx  Medical Diagnosis:   Diagnosis   R29.898 (ICD-10-CM) - Thumb weakness      Evaluation Date: 7/10/2024  Insurance Authorization Period Expiration: 7/11/2024-12/31/2024  Plan of Care Certification Period: 7/10/2024 to 8/21/2024  Progress Note Due: 7/31/2024      Visit # / Visits authorized: 1/20  FOTO: 1/1     Surgical Procedure: trigger finger release B thumbs approx. 10 years prior to today's eval   Date of Surgery:                                  Date of Return to MD: gavin     Precautions:  Standard       Time In: 8:00 am  Time Out: 8:55 am   Total Billable Time: 55 minutes    Subjective     Pt reports: "I have to be honest I have not been doing my exercises."    she  reported fair compliance with her  with her home exercise program given on evaluation.  Response to previous treatment: good   Functional change: increased pain due to slamming her hand on a car door.     Pain: 6/10 (5/10 on evaluation)  Location: R thumb     Objective   Objective measures updated at progress report unless specified.    Treatment     Mone received the treatments listed below:         Supervised Modalities: Modalities such as hot/cold packs, traction, unattended electrical stimulation, paraffin bath, fluidotherapy to reduce pain and increase soft tissue extensibility. Mone  received (8) minutes of modalities listed below after being cleared for contraindications.  x = modalities performed      Supervised Modalities 7/24/2024     Paraffin  x                               Direct Contact Modalities: Modalities such as attended electrical stimulation, iontophoresis, ultrasound to reduce pain and increase soft tissue " extensibility. Mone received (0) minutes of modalities listed below after being cleared for contraindications. x = modalities performed      Direct Contact Modalities [unfilled]      MHz,  W/cm2, continuous to  surgical site, to decrease pain & edema, increase circulation and tissue extensibility.                                      Manual Therapy Techniques: Joint mobilizations, Soft tissue Mobilization, and mobilization with movement applied to the area listed below. Mone received (20) minutes of interventions. x = intervention performed today     Manual Intervention 7/24/2024       Extensive Soft Tissue Mobilization, Myofacial Release, Manual Lymphatic Drainage, IASTM, Cupping, Vibration x B thumb/hand  to increase blood flow/circulation, improve soft tissue pliability and decrease pain   Joint Mobilizations       Mobilization with movement       Scar Management x            Therapeutic Exercises: to develop strength, endurance, ROM, flexibility, posture and core stabilization. Valerieceived (6) minutes of exercises listed below. x = performed today * = level of progression of activity      Therapeutic Exercise 7/24/2024     Thumb AROM - palmar and radial abduction x    Thumb circumduction  x    Joint blocking for the thumb  x                                              Therapeutic Activities: Everyday tasks to address the combined need of improved strength, range of motion, and endurance to achieve increased independence. While simulating these activities, the person is applying the gained skills of strength and improved range of motion in a practical way.  Mone received (16) minutes of activities listed below. x = activities performed today * = level of progression of activity      Therapeutic Activities 7/24/2024        pom pom's  x       handwriting x  reviewed  and weighted pen     ABC clothes pins  X                 Neuromuscular Re-education: the use of functional strengthening,  stretching, balancing and coordination activities that train the nerves and muscles to react and communicate to restore normal body movement patterns to increase self care independence. Mone received (0) minutes of interventions listed below.  x = interventions performed today * = level of progression of activity      Neuromuscular Re-education [unfilled]       [facility] activity incorporating posture, coordination and movement patterns with velcro board                                  Self Care: Fundamental skills required to independently care for oneself. Activities of daily living such as eating, bathing, dressing, toileting, grooming, sleeping, transfers and mobility. Instrumental activities of daily living such as driving, medication management, pet care, home management/cleaning, financial management, using the phone, meal preparation and shopping. Mone received (5) minutes of interventions listed below.  x = interventions performed * = level of progression of activity      Self Care 7/24/2024       Provision of information on paraffin machine  x See pt instructions    Information on scar away  x See pt instructions                      Home Exercises and Education Provided     Education provided:   - reviewed HEP issued at evaluation  -see pt instructions for info on scarmanagement   - progress toward goals    Written Home Exercises Provided: Patient instructed to cont prior HEP  Exercises were reviewed and Mone was able to demonstrate them prior to the end of the session. Mone demonstrated good understanding of the HEP provided.     See EMR under Patient Instructions for exercises provided during prior visit.        Assessment     Mone was seen for her first tx session since initial evaluation on this date. She displayed excellent participation with all the above listed exercises.Her HEP was reviewed for clarity. Scar massage took place for B thumbs and increased adhesions/ scar tissue was felt  during manual therapy. She will continue to benefit from the manual therapy to help decrease her fear of her digit popping out of place but to also improve her mobility.     Mone is progressing towards her goals and there are no updates to goals at this time. Pt prognosis is Good.     Mone will continue to benefit from skilled outpatient occupational therapy services to address the deficits listed in the problem list on initial evaluation, to provide pt/family education, and to maximize pt's level of independence in the home and community environment.     Pt's spiritual, cultural and educational needs considered and pt agreeable to plan of care and goals.    Anticipated barriers to occupational therapy: participation in HEP and attendance to therapy and transportation       Goals:  Short Term Goals: (4 weeks)  1. Pt will be independent with HEP in 2 visits.  2. Pt will report decreased pain to a none or trace when performing all ADL/IADL tasks.   3. Pt will increase B thumb AROM to further improve her  dressing, grooming activities, and  activities.   4. Pt will demonstrate understanding for scar massage and education on the importance of joint protection. 5. Pt will make a full, flat, composite fist to enable grasping and squeezing objects for self-care.   5. The patient will demonstrate improved letter formation and legibility when writing practice sentences while using writing aids. She will correctly form all upper and lowercase letters with appropriate shape, spacing and size reporting decreased fatigue when using pencil  or other writing aids.      Long Term Goals: (8 weeks)  1. Pt will report decreased pain to 1-2/10 with ADL/IADL tasks.   2. Pt will exhibit 5-10# of R and L  strength to allow a firm grasp on cooking utensils, steering wheel, etc.  3. Pt will exhibit a 1-2# of L tripod and tip pinch to demonstrate improved  functional pinch strength to allow writing, opening  containers, and turning keys etc.  4. Pt will report an increase in FOTO intake score of > 66%, which would indicate an improvement in quality of life.    Plan     Plan of Care Certification: 7/10/2024 to 8/21/2024      Outpatient Occupational Therapy 1-2 times weekly for 6  weeks to include the following interventions PRN: Manual Therapy, Moist Heat/ Ice, Neuromuscular Re-ed, Orthotic Management and Training, Paraffin, Patient Education, Self Care, Therapeutic Activities, Therapeutic Exercise, and Ultrasound     Updates/Grading for next session: progress occupational therapy as tolerated    Varsha Mi OT

## 2024-08-06 ENCOUNTER — PATIENT MESSAGE (OUTPATIENT)
Dept: PSYCHIATRY | Facility: CLINIC | Age: 34
End: 2024-08-06
Payer: MEDICAID

## 2024-08-06 ENCOUNTER — PATIENT MESSAGE (OUTPATIENT)
Dept: OBSTETRICS AND GYNECOLOGY | Facility: CLINIC | Age: 34
End: 2024-08-06
Payer: MEDICAID

## 2024-08-07 ENCOUNTER — CLINICAL SUPPORT (OUTPATIENT)
Dept: REHABILITATION | Facility: HOSPITAL | Age: 34
End: 2024-08-07
Payer: MEDICAID

## 2024-08-07 DIAGNOSIS — R27.8 DECREASED DEXTERITY: Primary | ICD-10-CM

## 2024-08-07 PROCEDURE — 97530 THERAPEUTIC ACTIVITIES: CPT | Mod: PN

## 2024-08-12 ENCOUNTER — CLINICAL SUPPORT (OUTPATIENT)
Dept: REHABILITATION | Facility: HOSPITAL | Age: 34
End: 2024-08-12
Payer: MEDICAID

## 2024-08-12 DIAGNOSIS — R27.8 DECREASED DEXTERITY: Primary | ICD-10-CM

## 2024-08-12 PROCEDURE — 97530 THERAPEUTIC ACTIVITIES: CPT | Mod: PN

## 2024-08-12 NOTE — PROGRESS NOTES
"    [facility]  Occupational Therapy Treatment Note/progress report      Date: 8/12/2024  Name: Mone Chandler  Clinic Number: [id number]    Therapy Diagnosis:   Encounter Diagnosis   Name Primary?    Decreased dexterity Yes       Physician: Shellie Odonnell NP  Physician Orders: OT eval and tx  Medical Diagnosis:   Diagnosis   R29.898 (ICD-10-CM) - Thumb weakness      Evaluation Date: 7/10/2024  Insurance Authorization Period Expiration: 7/11/2024-12/31/2024  Plan of Care Certification Period: 7/10/2024 to 8/21/2024  Progress Note Due: 8/12/2024  Visit # / Visits authorized: 3/20  FOTO: 1/1     Surgical Procedure: trigger finger release B thumbs approx. 10 years prior to today's eval   Date of Surgery:                                  Date of Return to MD: gavin     Precautions:  Standard     Time In: 12:37 pm  Time Out: 1:30 pm   Total Time: 53 minutes    Subjective     Pt reports: "I have to be honest I have not been doing my exercises as well as I should be. I am trying to incorporate them into my daily life."    she  reported fair compliance with her  with her home exercise program given on evaluation.  Response to previous treatment: good   Functional change: decreased discomfort but tightness is still present     Pain:pt did not provide a number (5/10 on evaluation)  Location: R thumb     Objective   Objective measures updated at progress report unless specified.  Inspection: L thumb has what feels like a bone spur in which pt reported former MD stated that it was bone related. Pt can perform B composite fists and full opposition.    Observation/Appearance:    No active triggering present; scar tissue palpated on B thumbs.      Sensation: Pt denies paresthesias. Pt denies hypersensitivity. Intact to light touch.  Scar: Moderate scaring along MP space of B thumbs. no pain with palpation. Scar is more prominent on the L than the R.      bilateral Upper Extremity Active Range of " Motion:      Right Left     ROM (in degrees) 7/10/2024 7/10/2024     Wrist         Wrist flexion 75 75     Wrist extension 75 75         Right Thumb Active Range of Motion:   (Ext/Flex) 7/10/2024 8/12/2024   MCP Jt 0/70°  0/72   IP Jt 0/80° 0/85   Opposition Kapandji score: 10/10    Palmar Abd 45°    Radial Abd 45°                         left Thumb Active Range of Motion:   (Ext/Flex) 7/10/2024 8/12/2024   MCP Jt 0/66°  0/72   IP Jt 0/85° 0/90   Opposition Kapandji score: 10/10    Palmar Abd 45°    Radial Abd 45°        and Pinch Strength (in pounds, psi's):    Right Left Right  Left      7/10/2024 7/10/2024 8/12/2024 8/12/2024    II 37 26 37 27   Lateral 10 12     Tripod 10 8  8   Tip 7 6  4        7/12/2024 7/12/2024   9 Peg Test Left  Right   Time 26 sec 24 sec         Intake Outcome Measure for FOTO hand Survey     Therapist reviewed FOTO scores for Mone Chandler on 8/12/2024.   FOTO documents entered into [employer] - see Media section.     Intake Score: 63%  Score: 63%       Predicted Functional Score: 66% in 8 visits  Treatment     Mone received the treatments listed below:      Pt was seen simultaneously with one other patient. A fully supervised OT tech assisted therapist with treatment.    Supervised Modalities: Modalities such as hot/cold packs, traction, unattended electrical stimulation, paraffin bath, fluidotherapy to reduce pain and increase soft tissue extensibility. Mone  received (8) minutes of modalities listed below after being cleared for contraindications.  x = modalities performed      Supervised Modalities      Paraffin  x                               Direct Contact Modalities: Modalities such as attended electrical stimulation, iontophoresis, ultrasound to reduce pain and increase soft tissue extensibility. Mone received (0) minutes of modalities listed below after being cleared for contraindications. x = modalities performed      Direct Contact Modalities       MHz,   W/cm2, continuous to  surgical site, to decrease pain & edema, increase circulation and tissue extensibility.                                      Manual Therapy Techniques: Joint mobilizations, Soft tissue Mobilization, and mobilization with movement applied to the area listed below. Mone received (8) minutes of interventions. x = intervention performed today     Manual Intervention      Extensive Soft Tissue Mobilization, Myofacial Release, Manual Lymphatic Drainage, IASTM, Cupping, Vibration x B thumb/hand  to increase blood flow/circulation, improve soft tissue pliability and decrease pain   Joint Mobilizations       Mobilization with movement       Scar Management x            Therapeutic Exercises: to develop strength, endurance, ROM, flexibility, posture and core stabilization. Jocyived (10) minutes of exercises listed below. x = performed today * = level of progression of activity      Therapeutic Exercise      Thumb AROM - palmar and radial abduction x    Thumb circumduction  x    Joint blocking for the thumb  x    Reviewed all exercises for the painful thumb x See patient instructions                     Therapeutic Activities: Everyday tasks to address the combined need of improved strength, range of motion, and endurance to achieve increased independence. While simulating these activities, the person is applying the gained skills of strength and improved range of motion in a pra ctical way.  Mone received (27) minutes of activities listed below. x = activities performed today * = level of progression of activity      Therapeutic Activities       pom pom's  x       handwriting   reviewed  and weighted pen     ABC clothes pins  X       towel walks  x 5  minutes    Objective measures x See above       Neuromuscular Re-education: the use of functional strengthening, stretching, balancing and coordination activities that train the nerves and muscles to react and communicate to  restore normal body movement patterns to increase self care independence. Mone received (0) minutes of interventions listed below.  x = interventions performed today * = level of progression of activity      Neuromuscular Re-education      [facility] activity incorporating posture, coordination and movement patterns with velcro board                                  Self Care: Fundamental skills required to independently care for oneself. Activities of daily living such as eating, bathing, dressing, toileting, grooming, sleeping, transfers and mobility. Instrumental activities of daily living such as driving, medication management, pet care, home management/cleaning, financial management, using the phone, meal preparation and shopping. Mone received (0) minutes of interventions listed below.  x = interventions performed * = level of progression of activity      Self Care      Provision of information on paraffin machine   See pt instructions    Information on scar away   See pt instructions                      Home Exercises and Education Provided     Education provided:   - reviewed HEP issued at evaluation  -reprinted previous HEPs and added to program please see patient  instructions for additional exercises provided on this date.   - progress toward goals    Written Home Exercises Provided: Patient instructed to cont prior HEP  Exercises were reviewed and Mone was able to demonstrate them prior to the end of the session. Mone demonstrated good understanding of the HEP provided.     See EMR under Patient Instructions for exercises provided during prior visit.        Assessment     Mone was seen for her third tx session since initial evaluation on this date. She displayed excellent participation with all the above listed exercises.Her HEP was reviewed for clarity.r massage took place for B thumbs and increased adhesions/ scar tissue was felt during manual therapy. She will continue to benefit from  the manual therapy to help decrease her fear of her digit popping out of place but to also improve her mobility. All goals were reviewed on this date for her progress assessment. Please see objective measures listed above. Mone is progressing towards her goals and there are no updates to goals at this time. Pt prognosis is Good.     Mone will continue to benefit from skilled outpatient occupational therapy services to address the deficits listed in the problem list on initial evaluation, to provide pt/family education, and to maximize pt's level of independence in the home and community environment.     Pt's spiritual, cultural and educational needs considered and pt agreeable to plan of care and goals.    Anticipated barriers to occupational therapy: participation in HEP and attendance to therapy and transportation       Goals:  Short Term Goals:   1. Pt will be independent with HEP in 2 visits.- MET 8/12/2024  2. Pt will report decreased pain to a none or trace when performing all ADL/IADL tasks. - Progressing 8/12/2024  3. Pt will increase B thumb AROM to further improve her  dressing, grooming activities, and  activities. MET 8/12/2024  4. Pt will demonstrate understanding for scar massage and education on the importance of joint protection. MET 8/12/2024   5. Pt will make a full, flat, composite fist to enable grasping and squeezing objects for self-care. MET 8/12/2024   5. The patient will demonstrate improved letter formation and legibility when writing practice sentences while using writing aids. She will correctly form all upper and lowercase letters with appropriate shape, spacing and size reporting decreased fatigue when using pencil  or other writing aids. - MET 8/12/2024     Long Term Goals:   1. Pt will report decreased pain to 1-2/10 with ADL/IADL tasks. - Progressing 8/12/2024  2. Pt will exhibit 5-10# of R and L  strength to allow a firm grasp on cooking utensils, steering  wheel, etc. - Progressing 8/12/2024  3. Pt will exhibit a 1-2# of L tripod and tip pinch to demonstrate improved  functional pinch strength to allow writing, opening containers, and turning keys etc. - Progressing 8/12/2024  4. Pt will report an increase in FOTO intake score of > 66%, which would indicate an improvement in quality of life. - Not MET 8/12/2024    Plan     Plan of Care Certification: 7/10/2024 to 8/21/2024      Outpatient Occupational Therapy 1-2 times weekly for 6  weeks to include the following interventions PRN: Manual Therapy, Moist Heat/ Ice, Neuromuscular Re-ed, Orthotic Management and Training, Paraffin, Patient Education, Self Care, Therapeutic Activities, Therapeutic Exercise, and Ultrasound     Updates/Grading for next session: progress occupational therapy as tolerated; progress assessment next treatment session     Varsha Mi, OT

## 2024-08-19 ENCOUNTER — CLINICAL SUPPORT (OUTPATIENT)
Dept: REHABILITATION | Facility: HOSPITAL | Age: 34
End: 2024-08-19
Payer: MEDICAID

## 2024-08-19 DIAGNOSIS — R27.8 DECREASED DEXTERITY: Primary | ICD-10-CM

## 2024-08-19 PROCEDURE — 97530 THERAPEUTIC ACTIVITIES: CPT | Mod: PN

## 2024-08-19 NOTE — PROGRESS NOTES
"    [facility]  Occupational Therapy Treatment note/update POC      Date: 8/19/2024  Name: Mone Chandler  Clinic Number: [id number]    Therapy Diagnosis:   Encounter Diagnosis   Name Primary?    Decreased dexterity Yes         Physician: Shellie Odonnell NP  Physician Orders: OT eval and tx  Medical Diagnosis:   Diagnosis   R29.898 (ICD-10-CM) - Thumb weakness      Evaluation Date: 7/10/2024  Insurance Authorization Period Expiration: 7/11/2024-12/31/2024  Plan of Care Certification Period:  8/19/2024- 9/30/2024   Progress Note Due: 8/12/2024  Visit # / Visits authorized: 4/20  FOTO: 1/1     Surgical Procedure: trigger finger release B thumbs approx. 10 years prior to today's eval   Date of Surgery:                                  Date of Return to MD: gavin     Precautions:  Standard     Time In:12:37 pm  Time Out: 1:30 pm   Total Time: 53 minutes    Subjective     Pt reports: "I have to be honest I have not been doing my exercises as well as I should be. I am trying to incorporate them into my daily life. I have been using the putty "    she  reported fair compliance with her  with her home exercise program given on evaluation.  Response to previous treatment: good   Functional change: decreased discomfort but tightness is still present     Pain:pt did not provide a number (5/10 on evaluation)  Location: R thumb     Objective   Objective measures updated at progress report unless specified.  Inspection: L thumb has what feels like a bone spur in which pt reported former MD stated that it was bone related. Pt can perform B composite fists and full opposition.    Observation/Appearance:    No active triggering present; scar tissue palpated on B thumbs.      Sensation: Pt denies paresthesias. Pt denies hypersensitivity. Intact to light touch.  Scar: Moderate scaring along MP space of B thumbs. no pain with palpation. Scar is more prominent on the L than the R.      bilateral Upper " Extremity Active Range of Motion:      Right Left       ROM (in degrees) 7/10/2024 7/10/2024       Wrist           Wrist flexion 75 75       Wrist extension 75 75           Right Thumb Active Range of Motion:   (Ext/Flex) 7/10/2024 8/12/2024   MCP Jt 0/70°  0/72   IP Jt 0/80° 0/85   Opposition Kapandji score: 10/10     Palmar Abd 45°     Radial Abd 45°                          left Thumb Active Range of Motion:   (Ext/Flex) 7/10/2024 8/12/2024   MCP Jt 0/66°  0/72   IP Jt 0/85° 0/90   Opposition Kapandji score: 10/10     Palmar Abd 45°     Radial Abd 45°         and Pinch Strength (in pounds, psi's):    Right Left Right  Left      7/10/2024 7/10/2024 8/12/2024 8/12/2024    II 37 26 37 27   Lateral 10 12       Tripod 10 8   8   Tip 7 6   4        7/12/2024 7/12/2024   9 Peg Test Left  Right   Time 26 sec 24 sec         Intake Outcome Measure for FOTO hand Survey     Therapist reviewed FOTO scores for Mone Chandler on 8/12/2024.   FOTO documents entered into [employer] - see Media section.     Intake Score: 63%  Score: 63%       Predicted Functional Score: 66% in 8 visits  Treatment     Mone received the treatments listed below:      Pt was seen simultaneously with one other patient. A fully supervised OT tech assisted therapist with treatment.    Supervised Modalities: Modalities such as hot/cold packs, traction, unattended electrical stimulation, paraffin bath, fluidotherapy to reduce pain and increase soft tissue extensibility. Mone  received (8) minutes of modalities listed below after being cleared for contraindications.  x = modalities performed      Supervised Modalities      Paraffin  x                               Direct Contact Modalities: Modalities such as attended electrical stimulation, iontophoresis, ultrasound to reduce pain and increase soft tissue extensibility. Mone received (0) minutes of modalities listed below after being cleared for contraindications. x = modalities performed       Direct Contact Modalities       MHz,  W/cm2, continuous to  surgical site, to decrease pain & edema, increase circulation and tissue extensibility.                                      Manual Therapy Techniques: Joint mobilizations, Soft tissue Mobilization, and mobilization with movement applied to the area listed below. Mone received (8) minutes of interventions. x = intervention performed today     Manual Intervention      Extensive Soft Tissue Mobilization, Myofacial Release, Manual Lymphatic Drainage, IASTM, Cupping, Vibration x B thumb/hand  to increase blood flow/circulation, improve soft tissue pliability and decrease pain   Joint Mobilizations       Mobilization with movement       Scar Management x            Therapeutic Exercises: to develop strength, endurance, ROM, flexibility, posture and core stabilization. Jocyived (10) minutes of exercises listed below. x = performed today * = level of progression of activity      Therapeutic Exercise      Thumb AROM - palmar and radial abduction x    Thumb circumduction  x    Joint blocking for the thumb  x    Reviewed all exercises for the painful thumb x See patient instructions                     Therapeutic Activities: Everyday tasks to address the combined need of improved strength, range of motion, and endurance to achieve increased independence. While simulating these activities, the person is applying the gained skills of strength and improved range of motion in a pra ctical way.  Mone received (27) minutes of activities listed below. x = activities performed today * = level of progression of activity      Therapeutic Activities       pom pom's  x       handwriting   reviewed  and weighted pen     ABC clothes pins  X       towel walks  x 5  minutes    Objective measures x Reviewed all goals and objective measures taken last treatment       Neuromuscular Re-education: the use of functional strengthening, stretching,  balancing and coordination activities that train the nerves and muscles to react and communicate to restore normal body movement patterns to increase self care independence. Mone received (0) minutes of interventions listed below.  x = interventions performed today * = level of progression of activity      Neuromuscular Re-education      FMC activity incorporating posture, coordination and movement patterns with velcro board                                  Self Care: Fundamental skills required to independently care for oneself. Activities of daily living such as eating, bathing, dressing, toileting, grooming, sleeping, transfers and mobility. Instrumental activities of daily living such as driving, medication management, pet care, home management/cleaning, financial management, using the phone, meal preparation and shopping. Mone received (0) minutes of interventions listed below.  x = interventions performed * = level of progression of activity      Self Care      Provision of information on paraffin machine   See pt instructions    Information on scar away   See pt instructions                      Home Exercises and Education Provided     Education provided:   - reviewed HEP issued at evaluation  -reprinted previous HEPs and added to program please see patient  instructions for additional exercises provided on this date.   - progress toward goals    Written Home Exercises Provided: Patient instructed to cont prior HEP  Exercises were reviewed and Mone was able to demonstrate them prior to the end of the session. Mone demonstrated good understanding of the HEP provided.     See EMR under Patient Instructions for exercises provided during prior visit.        Assessment     Mone was seen for her fourth tx session since initial evaluation on this date. She displayed excellent participation with all the above listed exercises.Her HEP was reviewed for clarity again on this date. A massage took  place for B thumbs and increased adhesions/ scar tissue was felt during manual therapy. She will continue to benefit from the manual therapy to help decrease her fear of her digit popping out of place but to also improve her mobility. All goals were reviewed on this date. Her POC has been extended to address the time that has been missed in therapy. Please see objective measures listed above taken at previous treatment session.  Further assessment will be performed later this week. Mone is progressing towards her goals and there are no updates to goals at this time. Pt prognosis is Good.     Mone will continue to benefit from skilled outpatient occupational therapy services to address the deficits listed in the problem list on initial evaluation, to provide pt/family education, and to maximize pt's level of independence in the home and community environment.     Pt's spiritual, cultural and educational needs considered and pt agreeable to plan of care and goals.    Anticipated barriers to occupational therapy: participation in HEP and attendance to therapy and transportation       Goals:  Short Term Goals:   1. Pt will be independent with HEP in 2 visits.- MET 8/12/2024  2. Pt will report decreased pain to a none or trace when performing all ADL/IADL tasks. - Progressing 8/12/2024  3. Pt will increase B thumb AROM to further improve her  dressing, grooming activities, and  activities. MET 8/12/2024  4. Pt will demonstrate understanding for scar massage and education on the importance of joint protection. MET 8/12/2024   5. Pt will make a full, flat, composite fist to enable grasping and squeezing objects for self-care. MET 8/12/2024   5. The patient will demonstrate improved letter formation and legibility when writing practice sentences while using writing aids. She will correctly form all upper and lowercase letters with appropriate shape, spacing and size reporting decreased fatigue when using  pencil  or other writing aids. - MET 8/12/2024     Long Term Goals:   1. Pt will report decreased pain to 1-2/10 with ADL/IADL tasks. - Progressing 8/12/2024  2. Pt will exhibit 5-10# of R and L  strength to allow a firm grasp on cooking utensils, steering wheel, etc. - Progressing 8/12/2024  3. Pt will exhibit a 1-2# of L tripod and tip pinch to demonstrate improved  functional pinch strength to allow writing, opening containers, and turning keys etc. - Progressing 8/12/2024  4. Pt will report an increase in FOTO intake score of > 66%, which would indicate an improvement in quality of life. - Not MET 8/12/2024    Plan      Updated Plan of Care Certification: 8/19/2024- 9/30/2024   Outpatient Occupational Therapy 1 times weekly for 6  weeks to include the following interventions PRN: Manual Therapy, Moist Heat/ Ice, Neuromuscular Re-ed, Orthotic Management and Training, Paraffin, Patient Education, Self Care, Therapeutic Activities, Therapeutic Exercise, and Ultrasound     Updates/Grading for next session: progress occupational therapy as tolerated;   Varsha Mi, OT

## 2024-08-20 ENCOUNTER — PATIENT MESSAGE (OUTPATIENT)
Dept: PSYCHIATRY | Facility: CLINIC | Age: 34
End: 2024-08-20
Payer: MEDICAID

## 2024-08-21 ENCOUNTER — PATIENT MESSAGE (OUTPATIENT)
Dept: PSYCHIATRY | Facility: CLINIC | Age: 34
End: 2024-08-21

## 2024-08-21 ENCOUNTER — CLINICAL SUPPORT (OUTPATIENT)
Dept: PSYCHIATRY | Facility: CLINIC | Age: 34
End: 2024-08-21
Payer: MEDICAID

## 2024-08-21 DIAGNOSIS — Z00.8 ENCOUNTER FOR PSYCHOLOGICAL EVALUATION: Primary | ICD-10-CM

## 2024-08-21 NOTE — PLAN OF CARE
"    [facility]  Occupational Therapy Treatment note/update POC      Date: 8/19/2024  Name: Mone Chandler  Clinic Number: [id number]    Therapy Diagnosis:   Encounter Diagnosis   Name Primary?    Decreased dexterity Yes         Physician: Shellie Odonnell NP  Physician Orders: OT eval and tx  Medical Diagnosis:   Diagnosis   R29.898 (ICD-10-CM) - Thumb weakness      Evaluation Date: 7/10/2024  Insurance Authorization Period Expiration: 7/11/2024-12/31/2024  Plan of Care Certification Period:  8/19/2024- 9/30/2024   Progress Note Due: 8/12/2024  Visit # / Visits authorized: 4/20  FOTO: 1/1     Surgical Procedure: trigger finger release B thumbs approx. 10 years prior to today's eval   Date of Surgery:                                  Date of Return to MD: gavin     Precautions:  Standard     Time In:12:37 pm  Time Out: 1:30 pm   Total Time: 53 minutes    Subjective     Pt reports: "I have to be honest I have not been doing my exercises as well as I should be. I am trying to incorporate them into my daily life. I have been using the putty "    she reported fair compliance with her with her home exercise program given on evaluation.  Response to previous treatment: good   Functional change: decreased discomfort but tightness is still present     Pain:pt did not provide a number (5/10 on evaluation)  Location: R thumb     Objective   Objective measures updated at progress report unless specified.  Inspection: L thumb has what feels like a bone spur in which pt reported former MD stated that it was bone related. Pt can perform B composite fists and full opposition.    Observation/Appearance:    No active triggering present; scar tissue palpated on B thumbs.      Sensation: Pt denies paresthesias. Pt denies hypersensitivity. Intact to light touch.  Scar: Moderate scaring along MP space of B thumbs. no pain with palpation. Scar is more prominent on the L than the R.      bilateral Upper " Extremity Active Range of Motion:      Right Left       ROM (in degrees) 7/10/2024 7/10/2024       Wrist           Wrist flexion 75 75       Wrist extension 75 75           Right Thumb Active Range of Motion:   (Ext/Flex) 7/10/2024 8/12/2024   MCP Jt 0/70°  0/72   IP Jt 0/80° 0/85   Opposition Kapandji score: 10/10     Palmar Abd 45°     Radial Abd 45°                          left Thumb Active Range of Motion:   (Ext/Flex) 7/10/2024 8/12/2024   MCP Jt 0/66°  0/72   IP Jt 0/85° 0/90   Opposition Kapandji score: 10/10     Palmar Abd 45°     Radial Abd 45°         and Pinch Strength (in pounds, psi's):    Right Left Right  Left      7/10/2024 7/10/2024 8/12/2024 8/12/2024    II 37 26 37 27   Lateral 10 12       Tripod 10 8   8   Tip 7 6   4        7/12/2024 7/12/2024   9 Peg Test Left  Right   Time 26 sec 24 sec         Intake Outcome Measure for FOTO hand Survey     Therapist reviewed FOTO scores for Mone Chandler on 8/12/2024.   FOTO documents entered into [employer] - see Media section.     Intake Score: 63%  Score: 63%       Predicted Functional Score: 66% in 8 visits      Home Exercises and Education Provided     Education provided:   - reviewed HEP issued at evaluation  -reprinted previous HEPs and added to program please see patient  instructions for additional exercises provided on this date.   - progress toward goals    Written Home Exercises Provided: Patient instructed to cont prior HEP  Exercises were reviewed and Mone was able to demonstrate them prior to the end of the session. Mone demonstrated good understanding of the HEP provided.     See EMR under Patient Instructions for exercises provided during prior visit.        Assessment     Mone was seen for her fourth tx session since initial evaluation on this date. She displayed excellent participation with all the above listed exercises.Her HEP was reviewed for clarity again on this date. A massage took place for B thumbs and increased  adhesions/ scar tissue was felt during manual therapy. She will continue to benefit from the manual therapy to help decrease her fear of her digit popping out of place but to also improve her mobility. All goals were reviewed on this date. Her POC has been extended to address the time that has been missed in therapy. Please see objective measures listed above taken at previous treatment session.  Further assessment will be performed later this week. Mone is progressing towards her goals and there are no updates to goals at this time. Pt prognosis is Good.     Mone will continue to benefit from skilled outpatient occupational therapy services to address the deficits listed in the problem list on initial evaluation, to provide pt/family education, and to maximize pt's level of independence in the home and community environment.     Pt's spiritual, cultural and educational needs considered and pt agreeable to plan of care and goals.    Anticipated barriers to occupational therapy: participation in HEP and attendance to therapy and transportation       Goals:  Short Term Goals:   1. Pt will be independent with HEP in 2 visits.- MET 8/12/2024  2. Pt will report decreased pain to a none or trace when performing all ADL/IADL tasks. - Progressing 8/12/2024  3. Pt will increase B thumb AROM to further improve her  dressing, grooming activities, and  activities. MET 8/12/2024  4. Pt will demonstrate understanding for scar massage and education on the importance of joint protection. MET 8/12/2024   5. Pt will make a full, flat, composite fist to enable grasping and squeezing objects for self-care. MET 8/12/2024   5. The patient will demonstrate improved letter formation and legibility when writing practice sentences while using writing aids. She will correctly form all upper and lowercase letters with appropriate shape, spacing and size reporting decreased fatigue when using pencil  or other writing  aids. - MET 8/12/2024     Long Term Goals:   1. Pt will report decreased pain to 1-2/10 with ADL/IADL tasks. - Progressing 8/12/2024  2. Pt will exhibit 5-10# of R and L  strength to allow a firm grasp on cooking utensils, steering wheel, etc. - Progressing 8/12/2024  3. Pt will exhibit a 1-2# of L tripod and tip pinch to demonstrate improved  functional pinch strength to allow writing, opening containers, and turning keys etc. - Progressing 8/12/2024  4. Pt will report an increase in FOTO intake score of > 66%, which would indicate an improvement in quality of life. - Not MET 8/12/2024    Plan      Updated Plan of Care Certification: 8/19/2024- 9/30/2024   Outpatient Occupational Therapy 1 times weekly for 6  weeks to include the following interventions PRN: Manual Therapy, Moist Heat/ Ice, Neuromuscular Re-ed, Orthotic Management and Training, Paraffin, Patient Education, Self Care, Therapeutic Activities, Therapeutic Exercise, and Ultrasound     Updates/Grading for next session: progress occupational therapy as tolerated;   Varsha Mi, OT
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
## 2024-08-26 ENCOUNTER — OFFICE VISIT (OUTPATIENT)
Dept: PSYCHIATRY | Facility: CLINIC | Age: 34
End: 2024-08-26
Payer: MEDICAID

## 2024-08-26 ENCOUNTER — CLINICAL SUPPORT (OUTPATIENT)
Dept: REHABILITATION | Facility: HOSPITAL | Age: 34
End: 2024-08-26
Payer: MEDICAID

## 2024-08-26 DIAGNOSIS — F41.8 POSTPARTUM ANXIETY: Primary | ICD-10-CM

## 2024-08-26 DIAGNOSIS — R27.8 DECREASED DEXTERITY: Primary | ICD-10-CM

## 2024-08-26 PROCEDURE — 90832 PSYTX W PT 30 MINUTES: CPT | Mod: AH,HB,95, | Performed by: PSYCHOLOGIST

## 2024-08-26 PROCEDURE — 97530 THERAPEUTIC ACTIVITIES: CPT | Mod: PN

## 2024-08-26 NOTE — PROGRESS NOTES
"    [facility]  Occupational Therapy Treatment note     Date: 8/26/2024  Name: Mone Chandler  Clinic Number: [id number]    Therapy Diagnosis:   Encounter Diagnosis   Name Primary?    Decreased dexterity Yes           Physician: Shellie Odonnell NP  Physician Orders: OT eval and tx  Medical Diagnosis:   Diagnosis   R29.898 (ICD-10-CM) - Thumb weakness      Evaluation Date: 7/10/2024  Insurance Authorization Period Expiration: 7/11/2024-12/31/2024  Plan of Care Certification Period:  8/19/2024- 9/30/2024   Progress Note Due: 8/12/2024  Visit # / Visits authorized: 4/20  FOTO: 1/1     Surgical Procedure: trigger finger release B thumbs approx. 10 years prior to today's eval   Date of Surgery:                                  Date of Return to MD: gavin     Precautions:  Standard     Time In:1:10 pm  Time Out: 2:00 pm   Total Time: 50 minutes    Subjective     Pt reports: "I really like the putty I have been doing the putty consistently and I am feeling much better."     she  reported fair compliance with her  with her home exercise program given on evaluation.  Response to previous treatment: good   Functional change: decreased fear about thumbs popping.     Pain:pt did not provide a number (5/10 on evaluation)  Location: R thumb     Objective   Objective measures updated at progress report unless specified.  I  Treatment     Moen received the treatments listed below:      Pt was seen simultaneously with one other patient. A fully supervised OT tech assisted therapist with treatment.    Supervised Modalities: Modalities such as hot/cold packs, traction, unattended electrical stimulation, paraffin bath, fluidotherapy to reduce pain and increase soft tissue extensibility. Mone  received (8) minutes of modalities listed below after being cleared for contraindications.  x = modalities performed      Supervised Modalities      Paraffin  x                               Direct " Contact Modalities: Modalities such as attended electrical stimulation, iontophoresis, ultrasound to reduce pain and increase soft tissue extensibility. Mone received (0) minutes of modalities listed below after being cleared for contraindications. x = modalities performed      Direct Contact Modalities       MHz,  W/cm2, continuous to  surgical site, to decrease pain & edema, increase circulation and tissue extensibility.                                      Manual Therapy Techniques: Joint mobilizations, Soft tissue Mobilization, and mobilization with movement applied to the area listed below. Mone received (2) minutes of interventions. x = intervention performed today     Manual Intervention      Extensive Soft Tissue Mobilization, Myofacial Release, Manual Lymphatic Drainage, IASTM, Cupping, Vibration x B thumb/hand  to increase blood flow/circulation, improve soft tissue pliability and decrease pain   Joint Mobilizations       Mobilization with movement       Scar Management x            Therapeutic Exercises: to develop strength, endurance, ROM, flexibility, posture and core stabilization. Valerieceived (5) minutes of exercises listed below. x = performed today * = level of progression of activity      Therapeutic Exercise      Thumb AROM - palmar and radial abduction x 2 sets of 10   Thumb circumduction  x 2 sets of 10    Joint blocking for the thumb  x 2 sets of 10    Reviewed all exercises for the painful thumb  See patient instructions                     Therapeutic Activities: Everyday tasks to address the combined need of improved strength, range of motion, and endurance to achieve increased independence. While simulating these activities, the person is applying the gained skills of strength and improved range of motion in a pra ctical way.  Mone received (10) minutes of activities listed below. x = activities performed today * = level of progression of activity      Therapeutic  Activities       pom pom's  x   black clothespin with pom-pom   resistive     handwriting   reviewed  and weighted pen     ABC clothes pins         towel walks  x 5  minutes    Objective measures  Reviewed all goals and objective measures taken last treatment    Abc ball  x       Neuromuscular Re-education: the use of functional strengthening, stretching, balancing and coordination activities that train the nerves and muscles to react and communicate to restore normal body movement patterns to increase self care independence. Mone received (0) minutes of interventions listed below.  x = interventions performed today * = level of progression of activity      Neuromuscular Re-education      FMC activity incorporating posture, coordination and movement patterns with velcro board                                  Self Care: Fundamental skills required to independently care for oneself. Activities of daily living such as eating, bathing, dressing, toileting, grooming, sleeping, transfers and mobility. Instrumental activities of daily living such as driving, medication management, pet care, home management/cleaning, financial management, using the phone, meal preparation and shopping. Mone received (0) minutes of interventions listed below.  x = interventions performed * = level of progression of activity      Self Care      Provision of information on paraffin machine   See pt instructions    Information on scar away   See pt instructions                      Home Exercises and Education Provided     Education provided:   - reviewed HEP issued at evaluation  - progress toward goals    Written Home Exercises Provided: Patient instructed to cont prior HEP  Exercises were reviewed and Mone was able to demonstrate them prior to the end of the session. Mone demonstrated good understanding of the HEP provided.     See EMR under Patient Instructions for exercises provided during prior visit.         Assessment     Mone was seen for her fifth tx session since initial evaluation on this date. A massage took place for B thumbs and increased adhesions/ scar tissue was felt during manual therapy. It was recommended that she continue with her HEP. Strengthening was the focus of today's treatment and she demonstrated good understanding of all putty exercises.  Mone is progressing towards her goals and there are no updates to goals at this time. Pt prognosis is Good.     Mone will continue to benefit from skilled outpatient occupational therapy services to address the deficits listed in the problem list on initial evaluation, to provide pt/family education, and to maximize pt's level of independence in the home and community environment.     Pt's spiritual, cultural and educational needs considered and pt agreeable to plan of care and goals.    Anticipated barriers to occupational therapy: participation in HEP and attendance to therapy and transportation       Goals:  Short Term Goals:   1. Pt will be independent with HEP in 2 visits.- MET 8/12/2024  2. Pt will report decreased pain to a none or trace when performing all ADL/IADL tasks. - Progressing 8/12/2024  3. Pt will increase B thumb AROM to further improve her  dressing, grooming activities, and  activities. MET 8/12/2024  4. Pt will demonstrate understanding for scar massage and education on the importance of joint protection. MET 8/12/2024   5. Pt will make a full, flat, composite fist to enable grasping and squeezing objects for self-care. MET 8/12/2024   5. The patient will demonstrate improved letter formation and legibility when writing practice sentences while using writing aids. She will correctly form all upper and lowercase letters with appropriate shape, spacing and size reporting decreased fatigue when using pencil  or other writing aids. - MET 8/12/2024     Long Term Goals:   1. Pt will report decreased pain to 1-2/10  with ADL/IADL tasks. - Progressing 8/12/2024  2. Pt will exhibit 5-10# of R and L  strength to allow a firm grasp on cooking utensils, steering wheel, etc. - Progressing 8/12/2024  3. Pt will exhibit a 1-2# of L tripod and tip pinch to demonstrate improved  functional pinch strength to allow writing, opening containers, and turning keys etc. - Progressing 8/12/2024  4. Pt will report an increase in FOTO intake score of > 66%, which would indicate an improvement in quality of life. - Not MET 8/12/2024    Plan      Updated Plan of Care Certification: 8/19/2024- 9/30/2024   Outpatient Occupational Therapy 1 times weekly for 6  weeks to include the following interventions PRN: Manual Therapy, Moist Heat/ Ice, Neuromuscular Re-ed, Orthotic Management and Training, Paraffin, Patient Education, Self Care, Therapeutic Activities, Therapeutic Exercise, and Ultrasound     Updates/Grading for next session: progress occupational therapy as tolerated;   Varsha Mi OT

## 2024-08-26 NOTE — PROGRESS NOTES
Individual Psychotherapy (PhD/LCSW)    8/26/2024    Site:  Telemed         The patient location is: Patient's home in [place], [place].  The chief complaint leading to consultation is: ppa/d    Visit type: audiovisual    Face to Face time with patient: 30 minutes of total time spent on the encounter, which includes face to face time and non-face to face time preparing to see the patient (eg, review of tests), Obtaining and/or reviewing separately obtained history, Documenting clinical information in the electronic or other health record, Independently interpreting results (not separately reported) and communicating results to the patient/family/caregiver, or Care coordination (not separately reported).     Each patient to whom he or she provides medical services by telemedicine is:  (1) informed of the relationship between the physician and patient and the respective role of any other health care provider with respect to management of the patient; and (2) notified that he or she may decline to receive medical services by telemedicine and may withdraw from such care at any time.    Therapeutic Intervention: Met with patient.  Outpatient - Behavior modifying psychotherapy 30 min - CPT code 42877 and Outpatient - Supportive psychotherapy 30 min - CPT Code 62782    Chief complaint/reason for encounter: depression and anxiety     Interval history and content of current session: Patient was timely for her individual therapy session. She reported doing well overall. She noted her son has been experiencing sleep issues, and she is trying to get this figured out in order to return to work part-time. She wants to sleep train; however, her mother and her partner  the baby when he cries during the night, interfering with her plan. We discussed how to have a conversation with them to all be on the same page moving forward. She acknowledges her sleep impacts her mood and does not want to go down the road of being depressed  again.     Treatment plan:  Target symptoms: depression, anxiety   Why chosen therapy is appropriate versus another modality: relevant to diagnosis  Outcome monitoring methods: self-report, observation  Therapeutic intervention type: behavior modifying psychotherapy, supportive psychotherapy    Risk parameters:  Patient reports no suicidal ideation  Patient reports no homicidal ideation  Patient reports no self-injurious behavior  Patient reports no violent behavior    Verbal deficits: None    Patient's response to intervention:  The patient's response to intervention is accepting.    Progress toward goals and other mental status changes:  The patient's progress toward goals is good.    Mental Status Exam:  General Appearance:  unremarkable, age appropriate   Speech: normal tone, normal rate, normal pitch, normal volume      Level of Cooperation: cooperative      Thought Processes: normal and logical   Mood: steady      Thought Content: normal, no suicidality, no homicidality, delusions, or paranoia   Affect: congruent and appropriate   Orientation: Oriented x3   Attention Span & Concentration: intact   Judgment & Insight: fair     Language  intact     Diagnosis:     ICD-10-CM ICD-9-CM   1. Postpartum anxiety  O99.345 648.44    F41.8 300.00   2. Postpartum depression  F53.0 648.44     311       Plan:  individual psychotherapy    Return to clinic: 3 weeks    Length of Service (minutes): 30

## 2024-09-18 ENCOUNTER — PATIENT MESSAGE (OUTPATIENT)
Dept: BEHAVIORAL HEALTH | Facility: CLINIC | Age: 34
End: 2024-09-18

## 2024-09-23 ENCOUNTER — CLINICAL SUPPORT (OUTPATIENT)
Dept: REHABILITATION | Facility: HOSPITAL | Age: 34
End: 2024-09-23
Payer: MEDICAID

## 2024-09-23 DIAGNOSIS — R29.898 THUMB WEAKNESS: ICD-10-CM

## 2024-09-23 DIAGNOSIS — R27.8 DECREASED DEXTERITY: Primary | ICD-10-CM

## 2024-09-23 PROCEDURE — 97530 THERAPEUTIC ACTIVITIES: CPT | Mod: PN

## 2024-09-23 NOTE — PROGRESS NOTES
"    [facility]  Occupational Therapy Treatment note     Date: 9/23/2024  Name: Mnoe Chandler  Clinic Number: [id number]    Therapy Diagnosis:   Encounter Diagnoses   Name Primary?    Decreased dexterity Yes    Thumb weakness        Physician: Shellie Odonnell NP  Physician Orders: OT eval and tx  Medical Diagnosis:   Diagnosis   R29.898 (ICD-10-CM) - Thumb weakness      Evaluation Date: 7/10/2024  Insurance Authorization Period Expiration: 7/11/2024-12/31/2024  Plan of Care Certification Period:  8/19/2024- 9/30/2024   Progress Note Due: 8/12/2024  Visit # / Visits authorized: 6/20  FOTO: 1/1     Surgical Procedure: trigger finger release B thumbs approx. 10 years prior to today's eval   Date of Surgery:                                  Date of Return to MD: gavin     Precautions:  Standard     Time In: 1:04 pm  Time Out: 1:58 pm   Total Time: 54 minutes    Subjective     Pt reports: "I really like the putty and I have been doing my exercises consistently but I still feel so much tightness."     she  reported fair compliance with her  with her home exercise program given on evaluation.  Response to previous treatment: good   Functional change: decreased fear about thumbs popping but tightness overall in B hands    Pain:pt did not provide a number (5/10 on evaluation)  Location: R thumb     Objective   Objective measures updated at progress report unless specified.  I  Treatment     Mone received the treatments listed below:      Pt was seen simultaneously with one other patient. A fully supervised OT tech assisted therapist with treatment.    Supervised Modalities: Modalities such as hot/cold packs, traction, unattended electrical stimulation, paraffin bath, fluidotherapy to reduce pain and increase soft tissue extensibility. Mone  received (8) minutes of modalities listed below after being cleared for contraindications.  x = modalities performed      Supervised Modalities  "     Paraffin  x                               Direct Contact Modalities: Modalities such as attended electrical stimulation, iontophoresis, ultrasound to reduce pain and increase soft tissue extensibility. Mone received (0) minutes of modalities listed below after being cleared for contraindications. x = modalities performed      Direct Contact Modalities       MHz,  W/cm2, continuous to  surgical site, to decrease pain & edema, increase circulation and tissue extensibility.                                      Manual Therapy Techniques: Joint mobilizations, Soft tissue Mobilization, and mobilization with movement applied to the area listed below. Mone received (14) minutes of interventions. x = intervention performed today     Manual Intervention      Extensive Soft Tissue Mobilization, Myofacial Release, Manual Lymphatic Drainage, IASTM, Cupping, Vibration x B thumb/hand  to increase blood flow/circulation, improve soft tissue pliability and decrease pain 7 minutes each hand    Joint Mobilizations       Mobilization with movement       Scar Management x            Therapeutic Exercises: to develop strength, endurance, ROM, flexibility, posture and core stabilization. Monicayreceived (8) minutes of exercises listed below. x = performed today * = level of progression of activity      Therapeutic Exercise      Thumb AROM - palmar and radial abduction x 2 sets of 10   Thumb circumduction  x 2 sets of 10    Joint blocking for the thumb   2 sets of 10    Reviewed all exercises for the painful thumb x See patient instructions chip clip and cmc stretch                     Therapeutic Activities: Everyday tasks to address the combined need of improved strength, range of motion, and endurance to achieve increased independence. While simulating these activities, the person is applying the gained skills of strength and improved range of motion in a pra ctical way.  Mone received (16) minutes of activities listed  below. x = activities performed today * = level of progression of activity      Therapeutic Activities       pom pom's  x   black clothespin with pom-pom   resistive green     handwriting   reviewed  and weighted pen     ABC clothes pins         towel walks  x 5  minutes    Objective measures  Reviewed all goals and objective measures taken last treatment    Dowel board  x Phg 1rst rung B hands    Abc ball  x       Neuromuscular Re-education: the use of functional strengthening, stretching, balancing and coordination activities that train the nerves and muscles to react and communicate to restore normal body movement patterns to increase self care independence. Mone received (8) minutes of interventions listed below.  x = interventions performed today * = level of progression of activity      Neuromuscular Re-education      FMC activity incorporating posture, coordination and movement patterns with velcro board  x  B hands                               Self Care: Fundamental skills required to independently care for oneself. Activities of daily living such as eating, bathing, dressing, toileting, grooming, sleeping, transfers and mobility. Instrumental activities of daily living such as driving, medication management, pet care, home management/cleaning, financial management, using the phone, meal preparation and shopping. Mone received (0) minutes of interventions listed below.  x = interventions performed * = level of progression of activity      Self Care      Provision of information on paraffin machine   See pt instructions    Information on scar away   See pt instructions                      Home Exercises and Education Provided     Education provided:   - reviewed HEP issued at evaluation  - progress toward goals  - provided local massage therapist information for after therapy care.     Written Home Exercises Provided: Patient instructed to cont prior HEP  Exercises were reviewed  and Mone was able to demonstrate them prior to the end of the session. Mone demonstrated good understanding of the HEP provided.     See EMR under Patient Instructions for exercises provided during prior visit.        Assessment     Mone was seen for her sixth tx session since initial evaluation on this date. A massage took place for B thumbs and increased adhesions/ scar tissue was felt during manual therapy. Education was provided again on this date for her to stretch for B thumbs.  It was recommended that she continue with her HEP. Strengthening was the focus of today's treatment. Mone is progressing towards her goals and there are no updates to goals at this time. Pt prognosis is Good.     Mone will continue to benefit from skilled outpatient occupational therapy services to address the deficits listed in the problem list on initial evaluation, to provide pt/family education, and to maximize pt's level of independence in the home and community environment.     Pt's spiritual, cultural and educational needs considered and pt agreeable to plan of care and goals.    Anticipated barriers to occupational therapy: participation in HEP and attendance to therapy and transportation       Goals:  Short Term Goals:   1. Pt will be independent with HEP in 2 visits.- MET 8/12/2024  2. Pt will report decreased pain to a none or trace when performing all ADL/IADL tasks. - Progressing 8/12/2024  3. Pt will increase B thumb AROM to further improve her  dressing, grooming activities, and  activities. MET 8/12/2024  4. Pt will demonstrate understanding for scar massage and education on the importance of joint protection. MET 8/12/2024   5. Pt will make a full, flat, composite fist to enable grasping and squeezing objects for self-care. MET 8/12/2024   5. The patient will demonstrate improved letter formation and legibility when writing practice sentences while using writing aids. She will correctly  form all upper and lowercase letters with appropriate shape, spacing and size reporting decreased fatigue when using pencil  or other writing aids. - MET 8/12/2024     Long Term Goals:   1. Pt will report decreased pain to 1-2/10 with ADL/IADL tasks. - Progressing 8/12/2024  2. Pt will exhibit 5-10# of R and L  strength to allow a firm grasp on cooking utensils, steering wheel, etc. - Progressing 8/12/2024  3. Pt will exhibit a 1-2# of L tripod and tip pinch to demonstrate improved  functional pinch strength to allow writing, opening containers, and turning keys etc. - Progressing 8/12/2024  4. Pt will report an increase in FOTO intake score of > 66%, which would indicate an improvement in quality of life. - Not MET 8/12/2024    Plan      Updated Plan of Care Certification: 8/19/2024- 9/30/2024   Outpatient Occupational Therapy 1 times weekly for 6  weeks to include the following interventions PRN: Manual Therapy, Moist Heat/ Ice, Neuromuscular Re-ed, Orthotic Management and Training, Paraffin, Patient Education, Self Care, Therapeutic Activities, Therapeutic Exercise, and Ultrasound     Updates/Grading for next session: progress occupational therapy as tolerated;   Varsha Mi, OT

## 2024-10-01 ENCOUNTER — PATIENT MESSAGE (OUTPATIENT)
Dept: PSYCHIATRY | Facility: CLINIC | Age: 34
End: 2024-10-01
Payer: MEDICAID

## 2024-10-04 ENCOUNTER — PATIENT MESSAGE (OUTPATIENT)
Dept: OBSTETRICS AND GYNECOLOGY | Facility: CLINIC | Age: 34
End: 2024-10-04
Payer: MEDICAID

## 2024-10-07 ENCOUNTER — CLINICAL SUPPORT (OUTPATIENT)
Dept: REHABILITATION | Facility: HOSPITAL | Age: 34
End: 2024-10-07
Payer: MEDICAID

## 2024-10-07 DIAGNOSIS — R27.8 DECREASED DEXTERITY: Primary | ICD-10-CM

## 2024-10-07 PROCEDURE — 97530 THERAPEUTIC ACTIVITIES: CPT | Mod: PN

## 2024-10-07 NOTE — PROGRESS NOTES
"    [facility]  Occupational Therapy Treatment note/Updated POC       Date: 10/7/2024  Name: Mone Chandler  Clinic Number: [id number]    Therapy Diagnosis:   Encounter Diagnosis   Name Primary?    Decreased dexterity Yes       Physician: Shellie Odonnell NP  Physician Orders: OT eval and tx  Medical Diagnosis:   Diagnosis   R29.898 (ICD-10-CM) - Thumb weakness      Evaluation Date: 7/10/2024  Insurance Authorization Period Expiration: 7/11/2024-12/31/2024  Plan of Care Certification Period: 10/7/2024-11/7/2024  Progress Note Due: tbd  Visit # / Visits authorized: 7/20  FOTO: 1/1     Surgical Procedure: trigger finger release B thumbs approx. 10 years prior to today's eval   Date of Surgery:                                  Date of Return to MD: gavin     Precautions:  Standard     Time In: 1:10 pm  Time Out: 2:00 pm   Total Time: 50 minutes    Subjective     Pt reports: "I really like the putty and I have been doing my exercises consistently but I still feel so much tightness." "I plan to follow up with getting a massage as well once I complete therapy."     she  reported fair compliance with her  with her home exercise program given on evaluation.  Response to previous treatment: good   Functional change: decreased fear about thumbs popping but tightness overall in B hands within her thumbs.     Pain:pt did not provide a number (5/10 on evaluation)  Location: R thumb     Objective   Objective measures updated at progress report unless specified.  Inspection: L thumb has what feels like a bone spur in which pt reported former MD stated that it was bone related. Pt can perform B composite fists and full opposition.    Observation/Appearance:    No active triggering present; scar tissue palpated on B thumbs.      Sensation: Pt denies paresthesias. Pt denies hypersensitivity. Intact to light touch.  Scar: Moderate scaring along MP space of B thumbs. no pain with palpation. Scar is " more prominent on the L than the R.      bilateral Upper Extremity Active Range of Motion:      Right Left       ROM (in degrees) 7/10/2024 7/10/2024       Wrist           Wrist flexion 75 75       Wrist extension 75 75           Right Thumb Active Range of Motion:   (Ext/Flex) 7/10/2024 8/12/2024    MCP Jt 0/70°  0/72    IP Jt 0/80° 0/85    Opposition Kapandji score: 10/10      Palmar Abd 45°      Radial Abd 45°                           left Thumb Active Range of Motion:   (Ext/Flex) 7/10/2024 8/12/2024    MCP Jt 0/66°  0/72    IP Jt 0/85° 0/90    Opposition Kapandji score: 10/10      Palmar Abd 45°      Radial Abd 45°          and Pinch Strength (in pounds, psi's):    Right Left Right  Left  Right Left     7/10/2024 7/10/2024 8/12/2024 8/12/2024 10/7/2024 10/7/2024    II 37 26 37 27 40 33   Lateral 10 12         Tripod 10 8   8 8 6   Tip 7 6   4 10 6        7/12/2024 7/12/2024 10/7/2024 10/7/2024   9 Peg Test Left  Right Left Right    Time 26 sec 24 sec 23 sec  18 sec         Intake Outcome Measure for FOTO hand Survey     Therapist reviewed FOTO scores for Mone Chandler on 10/7/2024  FOTO documents entered into [employer] - see Media section.     Score: 59%       Predicted Functional Score: 66% in 8 visits  Treatment     Mone received the treatments listed below:        Supervised Modalities: Modalities such as hot/cold packs, traction, unattended electrical stimulation, paraffin bath, fluidotherapy to reduce pain and increase soft tissue extensibility. Mone  received (8) minutes of modalities listed below after being cleared for contraindications.  x = modalities performed      Supervised Modalities      Paraffin  x                               Direct Contact Modalities: Modalities such as attended electrical stimulation, iontophoresis, ultrasound to reduce pain and increase soft tissue extensibility. Mone received (0) minutes of modalities listed below after being cleared for  contraindications. x = modalities performed      Direct Contact Modalities       MHz,  W/cm2, continuous to  surgical site, to decrease pain & edema, increase circulation and tissue extensibility.                                      Manual Therapy Techniques: Joint mobilizations, Soft tissue Mobilization, and mobilization with movement applied to the area listed below. Mone received (10) minutes of interventions. x = intervention performed today     Manual Intervention      Extensive Soft Tissue Mobilization, Myofacial Release, Manual Lymphatic Drainage, IASTM, Cupping, Vibration x B thumb/hand  to increase blood flow/circulation, improve soft tissue pliability and decrease pain 7 minutes each hand    Joint Mobilizations       Mobilization with movement       Scar Management x            Therapeutic Exercises: to develop strength, endurance, ROM, flexibility, posture and core stabilization. Monicayreceived (8) minutes of exercises listed below. x = performed today * = level of progression of activity      Therapeutic Exercise      Thumb AROM - palmar and radial abduction  2 sets of 10   Thumb circumduction   2 sets of 10    Joint blocking for the thumb   2 sets of 10    Reviewed all exercises for the painful thumb  See patient instructions chip clip and cmc stretch    Therapy putty (red) x 8 minutes                     Therapeutic Activities: Everyday tasks to address the combined need of improved strength, range of motion, and endurance to achieve increased independence. While simulating these activities, the person is applying the gained skills of strength and improved range of motion in a pra ctical way.  Mone received (16) minutes of activities listed below. x = activities performed today * = level of progression of activity      Therapeutic Activities       pom pom's  x   black clothespin with pom-pom   resistive green     handwriting   reviewed  and weighted pen     ABC clothes  pins         towel walks  x 5  minutes    Objective measures x Reviewed all goals and objective measures taken last treatment   Dowel board  x Phg 3rd rung    Abc ball         Neuromuscular Re-education: the use of functional strengthening, stretching, balancing and coordination activities that train the nerves and muscles to react and communicate to restore normal body movement patterns to increase self care independence. Mone received (8) minutes of interventions listed below.  x = interventions performed today * = level of progression of activity      Neuromuscular Re-education      FMC activity incorporating posture, coordination and movement patterns with velcro board  x  B hands                               Self Care: Fundamental skills required to independently care for oneself. Activities of daily living such as eating, bathing, dressing, toileting, grooming, sleeping, transfers and mobility. Instrumental activities of daily living such as driving, medication management, pet care, home management/cleaning, financial management, using the phone, meal preparation and shopping. Mone received (0) minutes of interventions listed below.  x = interventions performed * = level of progression of activity      Self Care      Provision of information on paraffin machine   See pt instructions    Information on scar away   See pt instructions                      Home Exercises and Education Provided     Education provided:   - reviewed HEP issued at evaluation  - progress toward goals  - provided local massage therapist information for after therapy care.     Written Home Exercises Provided: Patient instructed to cont prior HEP  Exercises were reviewed and Mone was able to demonstrate them prior to the end of the session. Mone demonstrated good understanding of the HEP provided.     See EMR under Patient Instructions for exercises provided during prior visit.        Assessment     Mone was seen for  her seventh tx session since initial evaluation on this date. An updated POC took place during today's session. It was made clear to the patient that attendance and follow through with her appointments is paramount in her receiving the most out of treatment. She appeared to display understanding and due to her improved transportation circumstances she should be able to attend all future scheduled appointments. Please see above for her progress thus far since therapy began. She has improved not only in strength but in dexterity as well. Increased scar adhesions are still able to be palpated beneath her well healed incisions but they are steadily improving from the start of care. Education was provided again on this date addressing the benefits of stretching her B thumbs.  It was recommended that she continue with her HEP. Strengthening was the focus of today's treatment and will continue to be for her remaining POC. Mone is progressing towards her goals and there are no updates to goals at this time. Pt prognosis is Good.     Mone will continue to benefit from skilled outpatient occupational therapy services to address the deficits listed in the problem list on initial evaluation, to provide pt/family education, and to maximize pt's level of independence in the home and community environment.     Pt's spiritual, cultural and educational needs considered and pt agreeable to plan of care and goals.    Anticipated barriers to occupational therapy: participation in HEP and attendance to therapy and transportation       Goals:  Short Term Goals:   1. Pt will be independent with HEP in 2 visits.- MET 8/12/2024  2. Pt will report decreased pain to a none or trace when performing all ADL/IADL tasks. - Progressing 8/12/2024  3. Pt will increase B thumb AROM to further improve her  dressing, grooming activities, and  activities. MET 8/12/2024  4. Pt will demonstrate understanding for scar massage and  education on the importance of joint protection. MET 8/12/2024   5. Pt will make a full, flat, composite fist to enable grasping and squeezing objects for self-care. MET 8/12/2024   5. The patient will demonstrate improved letter formation and legibility when writing practice sentences while using writing aids. She will correctly form all upper and lowercase letters with appropriate shape, spacing and size reporting decreased fatigue when using pencil  or other writing aids. - MET 8/12/2024     Long Term Goals:   1. Pt will report decreased pain to 1-2/10 with ADL/IADL tasks. - Progressing 10/7/2024  2. Pt will exhibit 5-10# of R and L  strength to allow a firm grasp on cooking utensils, steering wheel, etc. - Progressing 10/7/2024  3. Pt will exhibit a 1-2# of L tripod and tip pinch to demonstrate improved  functional pinch strength to allow writing, opening containers, and turning keys etc. - Progressing 10/7/2024  4. Pt will report an increase in FOTO intake score of > 66%, which would indicate an improvement in quality of life. - Not MET 10/7/2024    Plan      Updated Plan of Care Certification: 10/7/2024-11/7/2024  Outpatient Occupational Therapy 1 times weekly for 4  weeks to include the following interventions PRN: Manual Therapy, Moist Heat/ Ice, Neuromuscular Re-ed, Orthotic Management and Training, Paraffin, Patient Education, Self Care, Therapeutic Activities, Therapeutic Exercise, and Ultrasound     Updates/Grading for next session: progress occupational therapy as tolerated;   Varsha Mi OT

## 2024-10-09 NOTE — PLAN OF CARE
"    [facility]  Occupational Therapy Treatment note/Updated POC       Date: 10/7/2024  Name: Mone Chandler  Clinic Number: [id number]    Therapy Diagnosis:   Encounter Diagnosis   Name Primary?    Decreased dexterity Yes       Physician: Shelile Odonnell NP  Physician Orders: OT eval and tx  Medical Diagnosis:   Diagnosis   R29.898 (ICD-10-CM) - Thumb weakness      Evaluation Date: 7/10/2024  Insurance Authorization Period Expiration: 7/11/2024-12/31/2024  Plan of Care Certification Period: 10/7/2024-11/7/2024  Progress Note Due: tbd  Visit # / Visits authorized: 7/20  FOTO: 1/1     Surgical Procedure: trigger finger release B thumbs approx. 10 years prior to today's eval   Date of Surgery:                                  Date of Return to MD: gavin     Precautions:  Standard     Time In: 1:10 pm  Time Out: 2:00 pm   Total Time: 50 minutes    Subjective     Pt reports: "I really like the putty and I have been doing my exercises consistently but I still feel so much tightness." "I plan to follow up with getting a massage as well once I complete therapy."     she reported fair compliance with her with her home exercise program given on evaluation.  Response to previous treatment: good   Functional change: decreased fear about thumbs popping but tightness overall in B hands within her thumbs.     Pain:pt did not provide a number (5/10 on evaluation)  Location: R thumb     Objective   Objective measures updated at progress report unless specified.  Inspection: L thumb has what feels like a bone spur in which pt reported former MD stated that it was bone related. Pt can perform B composite fists and full opposition.    Observation/Appearance:    No active triggering present; scar tissue palpated on B thumbs.      Sensation: Pt denies paresthesias. Pt denies hypersensitivity. Intact to light touch.  Scar: Moderate scaring along MP space of B thumbs. no pain with palpation. Scar is more " prominent on the L than the R.      bilateral Upper Extremity Active Range of Motion:      Right Left       ROM (in degrees) 7/10/2024 7/10/2024       Wrist           Wrist flexion 75 75       Wrist extension 75 75           Right Thumb Active Range of Motion:   (Ext/Flex) 7/10/2024 8/12/2024    MCP Jt 0/70°  0/72    IP Jt 0/80° 0/85    Opposition Kapandji score: 10/10      Palmar Abd 45°      Radial Abd 45°                           left Thumb Active Range of Motion:   (Ext/Flex) 7/10/2024 8/12/2024    MCP Jt 0/66°  0/72    IP Jt 0/85° 0/90    Opposition Kapandji score: 10/10      Palmar Abd 45°      Radial Abd 45°          and Pinch Strength (in pounds, psi's):    Right Left Right  Left  Right Left     7/10/2024 7/10/2024 8/12/2024 8/12/2024 10/7/2024 10/7/2024    II 37 26 37 27 40 33   Lateral 10 12         Tripod 10 8   8 8 6   Tip 7 6   4 10 6        7/12/2024 7/12/2024 10/7/2024 10/7/2024   9 Peg Test Left  Right Left Right    Time 26 sec 24 sec 23 sec  18 sec         Intake Outcome Measure for FOTO hand Survey     Therapist reviewed FOTO scores for Mone Chandler on 10/7/2024  FOTO documents entered into [employer] - see Media section.     Score: 59%       Predicted Functional Score: 66% in 8 visits      Home Exercises and Education Provided     Education provided:   - reviewed HEP issued at evaluation  - progress toward goals  - provided local massage therapist information for after therapy care.     Written Home Exercises Provided: Patient instructed to cont prior HEP  Exercises were reviewed and Mone was able to demonstrate them prior to the end of the session. Mone demonstrated good understanding of the HEP provided.     See EMR under Patient Instructions for exercises provided during prior visit.        Assessment     Mone was seen for her seventh tx session since initial evaluation on this date. An updated POC took place during today's session. It was made clear to the patient that  attendance and follow through with her appointments is paramount in her receiving the most out of treatment. She appeared to display understanding and due to her improved transportation circumstances she should be able to attend all future scheduled appointments. Please see above for her progress thus far since therapy began. She has improved not only in strength but in dexterity as well. Increased scar adhesions are still able to be palpated beneath her well healed incisions but they are steadily improving from the start of care. Education was provided again on this date addressing the benefits of stretching her B thumbs.  It was recommended that she continue with her HEP. Strengthening was the focus of today's treatment and will continue to be for her remaining POC. Mone is progressing towards her goals and there are no updates to goals at this time. Pt prognosis is Good.     Mone will continue to benefit from skilled outpatient occupational therapy services to address the deficits listed in the problem list on initial evaluation, to provide pt/family education, and to maximize pt's level of independence in the home and community environment.     Pt's spiritual, cultural and educational needs considered and pt agreeable to plan of care and goals.    Anticipated barriers to occupational therapy: participation in HEP and attendance to therapy and transportation       Goals:  Short Term Goals:   1. Pt will be independent with HEP in 2 visits.- MET 8/12/2024  2. Pt will report decreased pain to a none or trace when performing all ADL/IADL tasks. - Progressing 8/12/2024  3. Pt will increase B thumb AROM to further improve her  dressing, grooming activities, and  activities. MET 8/12/2024  4. Pt will demonstrate understanding for scar massage and education on the importance of joint protection. MET 8/12/2024   5. Pt will make a full, flat, composite fist to enable grasping and squeezing objects for  self-care. MET 8/12/2024   5. The patient will demonstrate improved letter formation and legibility when writing practice sentences while using writing aids. She will correctly form all upper and lowercase letters with appropriate shape, spacing and size reporting decreased fatigue when using pencil  or other writing aids. - MET 8/12/2024     Long Term Goals:   1. Pt will report decreased pain to 1-2/10 with ADL/IADL tasks. - Progressing 10/7/2024  2. Pt will exhibit 5-10# of R and L  strength to allow a firm grasp on cooking utensils, steering wheel, etc. - Progressing 10/7/2024  3. Pt will exhibit a 1-2# of L tripod and tip pinch to demonstrate improved  functional pinch strength to allow writing, opening containers, and turning keys etc. - Progressing 10/7/2024  4. Pt will report an increase in FOTO intake score of > 66%, which would indicate an improvement in quality of life. - Not MET 10/7/2024    Plan      Updated Plan of Care Certification: 10/7/2024-11/7/2024  Outpatient Occupational Therapy 1 times weekly for 4  weeks to include the following interventions PRN: Manual Therapy, Moist Heat/ Ice, Neuromuscular Re-ed, Orthotic Management and Training, Paraffin, Patient Education, Self Care, Therapeutic Activities, Therapeutic Exercise, and Ultrasound     Updates/Grading for next session: progress occupational therapy as tolerated;   Varsha Mi, OT

## 2024-10-11 ENCOUNTER — E-VISIT (OUTPATIENT)
Dept: INTERNAL MEDICINE | Facility: CLINIC | Age: 34
End: 2024-10-11
Payer: MEDICAID

## 2024-10-11 ENCOUNTER — PATIENT MESSAGE (OUTPATIENT)
Dept: INTERNAL MEDICINE | Facility: CLINIC | Age: 34
End: 2024-10-11
Payer: MEDICAID

## 2024-10-11 NOTE — PROGRESS NOTES
Patient ID: Mone Chandler is a 34 y.o. female.    Chief Complaint: General Illness (Entered automatically based on patient selection in [employer].)    The patient initiated a request through [employer] on 10/11/2024 for evaluation and management with a chief complaint of General Illness (Entered automatically based on patient selection in [employer].)     I evaluated the questionnaire submission on 10/11/2024  .    [facility] Peq Evisit General    10/11/2024  1:33 PM CDT - Filed by Patient   Do you agree to participate in an E-Visit? Yes   If you have any of the following symptoms, please present to your local emergency room or call 911:  I acknowledge   Choose the state of your primary residence [place]   Are you pregnant, could you be pregnant, or are you breast feeding? Breast feeding   What is the main issue you would like addressed today? dehydration   Please describe your symptoms no motivation brain fog high anxiety   Where is your problem located? head   How severe are your symptoms? Moderate   Have you had these symptoms before? Yes   How long have you been having these symptoms? For more than a month   Please list any medications or treatments you have used for your condition and indicate if it was effective or not. none   What makes this feel better? none   What makes this feel worse? none   Are these symptoms related to a condition that you currently have? No   Please describe any probable cause for these symptoms breastfeeding   Provide any additional information you feel is important. none   Please attach any relevant images or files    Are you able to take your vital signs? No         Encounter Diagnosis   Name Primary?    Postpartum care and examination Yes      Counseled on maintaining hydration with an electrolyte solution as well as water with a target of 4 quarts a day.      No orders of the defined types were placed in this encounter.           Follow up if symptoms worsen or fail to  improve.      E-Visit Time Tracking:    Day 1 Time (in minutes): 5    Total Time (in minutes): 5

## 2024-10-16 ENCOUNTER — CLINICAL SUPPORT (OUTPATIENT)
Dept: REHABILITATION | Facility: HOSPITAL | Age: 34
End: 2024-10-16
Payer: MEDICAID

## 2024-10-16 DIAGNOSIS — R27.8 DECREASED DEXTERITY: Primary | ICD-10-CM

## 2024-10-16 PROCEDURE — 97530 THERAPEUTIC ACTIVITIES: CPT | Mod: PN

## 2024-10-16 NOTE — PROGRESS NOTES
"    [facility]  Occupational Therapy Treatment note     Date: 10/16/2024  Name: Mone Chandler  Clinic Number: [id number]    Therapy Diagnosis:   Encounter Diagnosis   Name Primary?    Decreased dexterity Yes       Physician: Shellie Odonnell NP  Physician Orders: OT eval and tx  Medical Diagnosis:   Diagnosis   R29.898 (ICD-10-CM) - Thumb weakness      Evaluation Date: 7/10/2024  Insurance Authorization Period Expiration: 7/11/2024-12/31/2024  Plan of Care Certification Period: 10/7/2024-11/7/2024  Progress Note Due: tbd  Visit # / Visits authorized: 8/20  FOTO: 1/1     Surgical Procedure: trigger finger release B thumbs approx. 10 years prior to today's eval   Date of Surgery:                                  Date of Return to MD: gavin     Precautions:  Standard     Time In: 2:08 pm  Time Out: 3:00 pm   Total Time: 52 minutes    Subjective     Pt reports: "I had some pain in my L thumb where it was stinging kind of pain. I had to lay off my exercises for a few days."     she  reported fair compliance with her  with her home exercise program given on evaluation.  Response to previous treatment: good   Functional change: decreased fear about thumbs popping but tightness overall in B hands within her thumbs.     Pain:pt did not provide a number (5/10 on evaluation)  Location: R thumb     Objective   Objective measures updated at progress report unless specified.    Treatment     Mone received the treatments listed below:        Supervised Modalities: Modalities such as hot/cold packs, traction, unattended electrical stimulation, paraffin bath, fluidotherapy to reduce pain and increase soft tissue extensibility. Mone  received (8) minutes of modalities listed below after being cleared for contraindications.  x = modalities performed      Supervised Modalities      Paraffin  x                               Direct Contact Modalities: Modalities such as attended electrical " stimulation, iontophoresis, ultrasound to reduce pain and increase soft tissue extensibility. Mone received (0) minutes of modalities listed below after being cleared for contraindications. x = modalities performed      Direct Contact Modalities       MHz,  W/cm2, continuous to  surgical site, to decrease pain & edema, increase circulation and tissue extensibility.                                      Manual Therapy Techniques: Joint mobilizations, Soft tissue Mobilization, and mobilization with movement applied to the area listed below. Mone received (12) minutes of interventions. x = intervention performed today     Manual Intervention      Extensive Soft Tissue Mobilization, Myofacial Release, Manual Lymphatic Drainage, IASTM, Cupping, Vibration x B thumb/hand  to increase blood flow/circulation, improve soft tissue pliability and decrease pain 7 minutes each hand    Joint Mobilizations       Mobilization with movement       Scar Management x            Therapeutic Exercises: to develop strength, endurance, ROM, flexibility, posture and core stabilization. Monicaeceived (8) minutes of exercises listed below. x = performed today * = level of progression of activity      Therapeutic Exercise      Thumb AROM - palmar and radial abduction  2 sets of 10   Thumb circumduction   2 sets of 10    Joint blocking for the thumb   2 sets of 10    Reviewed all exercises for the painful thumb  See patient instructions chip clip and cmc stretch    Therapy putty (red) x 8 minutes                     Therapeutic Activities: Everyday tasks to address the combined need of improved strength, range of motion, and endurance to achieve increased independence. While simulating these activities, the person is applying the gained skills of strength and improved range of motion in a pra ctical way.  Mone received (16) minutes of activities listed below. x = activities performed today * = level of progression of activity       Therapeutic Activities       pom pom's  x   black clothespin with pom-pom   resistive green     handwriting   reviewed  and weighted pen     ABC clothes pins         towel walks  x 5  minutes    Objective measures  Reviewed all goals and objective measures taken last treatment   Dowel board  x Phg 3rd rung    Abc ball         Neuromuscular Re-education: the use of functional strengthening, stretching, balancing and coordination activities that train the nerves and muscles to react and communicate to restore normal body movement patterns to increase self care independence. Mone received (8) minutes of interventions listed below.  x = interventions performed today * = level of progression of activity      Neuromuscular Re-education      FMC activity incorporating posture, coordination and movement patterns with velcro board  x  B hands                               Self Care: Fundamental skills required to independently care for oneself. Activities of daily living such as eating, bathing, dressing, toileting, grooming, sleeping, transfers and mobility. Instrumental activities of daily living such as driving, medication management, pet care, home management/cleaning, financial management, using the phone, meal preparation and shopping. Mone received (0) minutes of interventions listed below.  x = interventions performed * = level of progression of activity      Self Care      Provision of information on paraffin machine   See pt instructions    Information on scar away   See pt instructions                      Home Exercises and Education Provided     Education provided:   - reviewed HEP issued at evaluation  - progress toward goals    Written Home Exercises Provided: Patient instructed to cont prior HEP  Exercises were reviewed and Mone was able to demonstrate them prior to the end of the session. Mone demonstrated good understanding of the HEP provided.     See EMR under Patient  Instructions for exercises provided during prior visit.        Assessment     Mone was seen for her eighth tx session since initial evaluation on this date. She participated well with treatment and reported no discomfort. It was recommended that she continue with her HEP. Strengthening was the focus of today's treatment and will continue to be for her remaining POC. Mone is progressing towards her goals and there are no updates to goals at this time. Pt prognosis is Good.     Mone will continue to benefit from skilled outpatient occupational therapy services to address the deficits listed in the problem list on initial evaluation, to provide pt/family education, and to maximize pt's level of independence in the home and community environment.     Pt's spiritual, cultural and educational needs considered and pt agreeable to plan of care and goals.    Anticipated barriers to occupational therapy: participation in HEP and attendance to therapy and transportation       Goals:  Short Term Goals:   1. Pt will be independent with HEP in 2 visits.- MET 8/12/2024  2. Pt will report decreased pain to a none or trace when performing all ADL/IADL tasks. - Progressing 8/12/2024  3. Pt will increase B thumb AROM to further improve her  dressing, grooming activities, and  activities. MET 8/12/2024  4. Pt will demonstrate understanding for scar massage and education on the importance of joint protection. MET 8/12/2024   5. Pt will make a full, flat, composite fist to enable grasping and squeezing objects for self-care. MET 8/12/2024   5. The patient will demonstrate improved letter formation and legibility when writing practice sentences while using writing aids. She will correctly form all upper and lowercase letters with appropriate shape, spacing and size reporting decreased fatigue when using pencil  or other writing aids. - MET 8/12/2024     Long Term Goals:   1. Pt will report decreased pain to  1-2/10 with ADL/IADL tasks. - Progressing 10/7/2024  2. Pt will exhibit 5-10# of R and L  strength to allow a firm grasp on cooking utensils, steering wheel, etc. - Progressing 10/7/2024  3. Pt will exhibit a 1-2# of L tripod and tip pinch to demonstrate improved  functional pinch strength to allow writing, opening containers, and turning keys etc. - Progressing 10/7/2024  4. Pt will report an increase in FOTO intake score of > 66%, which would indicate an improvement in quality of life. - Not MET 10/7/2024    Plan      Updated Plan of Care Certification: 10/7/2024-11/7/2024  Outpatient Occupational Therapy 1 times weekly for 4  weeks to include the following interventions PRN: Manual Therapy, Moist Heat/ Ice, Neuromuscular Re-ed, Orthotic Management and Training, Paraffin, Patient Education, Self Care, Therapeutic Activities, Therapeutic Exercise, and Ultrasound     Updates/Grading for next session: progress occupational therapy as tolerated;   Varsha Mi OT

## 2024-10-22 ENCOUNTER — OFFICE VISIT (OUTPATIENT)
Dept: PSYCHIATRY | Facility: CLINIC | Age: 34
End: 2024-10-22
Payer: MEDICAID

## 2024-10-22 ENCOUNTER — LAB VISIT (OUTPATIENT)
Dept: LAB | Facility: HOSPITAL | Age: 34
End: 2024-10-22
Payer: MEDICAID

## 2024-10-22 VITALS
BODY MASS INDEX: 28.76 KG/M2 | HEART RATE: 75 BPM | SYSTOLIC BLOOD PRESSURE: 108 MMHG | WEIGHT: 167.56 LBS | DIASTOLIC BLOOD PRESSURE: 70 MMHG

## 2024-10-22 DIAGNOSIS — D50.9 IRON DEFICIENCY ANEMIA, UNSPECIFIED IRON DEFICIENCY ANEMIA TYPE: ICD-10-CM

## 2024-10-22 DIAGNOSIS — F90.2 ATTENTION DEFICIT HYPERACTIVITY DISORDER (ADHD), COMBINED TYPE, MODERATE: Primary | ICD-10-CM

## 2024-10-22 DIAGNOSIS — F41.9 ANXIETY: ICD-10-CM

## 2024-10-22 LAB
FERRITIN SERPL-MCNC: 11 NG/ML (ref 20–300)
IRON SERPL-MCNC: 103 UG/DL (ref 30–160)
SATURATED IRON: 26 % (ref 20–50)
TOTAL IRON BINDING CAPACITY: 392 UG/DL (ref 250–450)
TRANSFERRIN SERPL-MCNC: 265 MG/DL (ref 200–375)

## 2024-10-22 PROCEDURE — 83540 ASSAY OF IRON: CPT | Performed by: NURSE PRACTITIONER

## 2024-10-22 PROCEDURE — 99211 OFF/OP EST MAY X REQ PHY/QHP: CPT | Mod: PBBFAC

## 2024-10-22 PROCEDURE — 99999 PR PBB SHADOW E&M-EST. PATIENT-LVL I: CPT | Mod: PBBFAC,SA,HB,

## 2024-10-22 PROCEDURE — 3078F DIAST BP <80 MM HG: CPT | Mod: SA,HB,CPTII,

## 2024-10-22 PROCEDURE — 90791 PSYCH DIAGNOSTIC EVALUATION: CPT | Mod: SA,HB,,

## 2024-10-22 PROCEDURE — 3074F SYST BP LT 130 MM HG: CPT | Mod: SA,HB,CPTII,

## 2024-10-22 PROCEDURE — 82728 ASSAY OF FERRITIN: CPT | Performed by: NURSE PRACTITIONER

## 2024-10-22 PROCEDURE — 36415 COLL VENOUS BLD VENIPUNCTURE: CPT | Performed by: NURSE PRACTITIONER

## 2024-10-23 ENCOUNTER — CLINICAL SUPPORT (OUTPATIENT)
Dept: REHABILITATION | Facility: HOSPITAL | Age: 34
End: 2024-10-23
Payer: MEDICAID

## 2024-10-23 DIAGNOSIS — R27.8 DECREASED DEXTERITY: Primary | ICD-10-CM

## 2024-10-23 DIAGNOSIS — R29.898 THUMB WEAKNESS: ICD-10-CM

## 2024-10-23 PROCEDURE — 97530 THERAPEUTIC ACTIVITIES: CPT | Mod: PN

## 2024-10-23 NOTE — PROGRESS NOTES
"    [facility]  Occupational Therapy Treatment note     Date: 10/23/2024  Name: Mone Chandler  Clinic Number: [id number]    Therapy Diagnosis:   Encounter Diagnoses   Name Primary?    Decreased dexterity Yes    Thumb weakness        Physician: Shellie Odonnell NP  Physician Orders: OT eval and tx  Medical Diagnosis:   Diagnosis   R29.898 (ICD-10-CM) - Thumb weakness      Evaluation Date: 7/10/2024  Insurance Authorization Period Expiration: 7/11/2024-12/31/2024  Plan of Care Certification Period: 10/7/2024-11/7/2024  Progress Note Due: tbd  Visit # / Visits authorized: 9/20  FOTO: 1/1     Surgical Procedure: trigger finger release B thumbs approx. 10 years prior to today's eval   Date of Surgery:                                  Date of Return to MD: gavin     Precautions:  Standard     Time In: 1:10 pm  Time Out: 2:00 pm   Total Time: 50 minutes    Subjective     Pt reports: "I had some pain in my L thumb where it was stinging kind of pain. I had to lay off my exercises for a few days."     she  reported fair compliance with her  with her home exercise program given on evaluation.  Response to previous treatment: good   Functional change: decreased fear about thumbs popping but tightness overall in B hands within her thumbs.     Pain:pt did not provide a number (5/10 on evaluation)  Location: R thumb     Objective   Objective measures updated at progress report unless specified.    Treatment     Mone received the treatments listed below:        Supervised Modalities: Modalities such as hot/cold packs, traction, unattended electrical stimulation, paraffin bath, fluidotherapy to reduce pain and increase soft tissue extensibility. Mone  received (9) minutes of modalities listed below after being cleared for contraindications.  x = modalities performed      Supervised Modalities      Paraffin  x At the end of treatment     MHP at the start of tx x 5 minutes                     "   Direct Contact Modalities: Modalities such as attended electrical stimulation, iontophoresis, ultrasound to reduce pain and increase soft tissue extensibility. Mone received (0) minutes of modalities listed below after being cleared for contraindications. x = modalities performed      Direct Contact Modalities       MHz,  W/cm2, continuous to  surgical site, to decrease pain & edema, increase circulation and tissue extensibility.                                      Manual Therapy Techniques: Joint mobilizations, Soft tissue Mobilization, and mobilization with movement applied to the area listed below. Mone received (10) minutes of interventions. x = intervention performed today     Manual Intervention      Extensive Soft Tissue Mobilization, Myofacial Release, Manual Lymphatic Drainage, IASTM, Cupping, Vibration x B thumb/hand  to increase blood flow/circulation, improve soft tissue pliability and decrease pain 5 minutes each hand    Joint Mobilizations       Mobilization with movement       Scar Management x            Therapeutic Exercises: to develop strength, endurance, ROM, flexibility, posture and core stabilization. Monicayreceived (10) minutes of exercises listed below. x = performed today * = level of progression of activity      Therapeutic Exercise      Thumb AROM - palmar and radial abduction x 2 sets of 10   Thumb circumduction   2 sets of 10    Joint blocking for the thumb   2 sets of 10    Reviewed all exercises for the painful thumb x See patient instructions chip clip and cmc stretch    Therapy putty (red) x 8 minutes                     Therapeutic Activities: Everyday tasks to address the combined need of improved strength, range of motion, and endurance to achieve increased independence. While simulating these activities, the person is applying the gained skills of strength and improved range of motion in a pra ctical way.  Mone received (16) minutes of activities listed below. x  = activities performed today * = level of progression of activity      Therapeutic Activities       pom pom's  x   black clothespin with pom-pom   resistive green     handwriting   reviewed  and weighted pen     ABC clothes pins         towel walks  x 5  minutes    Objective measures  Reviewed all goals and objective measures taken last treatment   Dowel board  x Phg 3rd rung    Abc ball         Neuromuscular Re-education: the use of functional strengthening, stretching, balancing and coordination activities that train the nerves and muscles to react and communicate to restore normal body movement patterns to increase self care independence. Mone received (0) minutes of interventions listed below.  x = interventions performed today * = level of progression of activity      Neuromuscular Re-education      FMC activity incorporating posture, coordination and movement patterns with velcro board    B hands                               Self Care: Fundamental skills required to independently care for oneself. Activities of daily living such as eating, bathing, dressing, toileting, grooming, sleeping, transfers and mobility. Instrumental activities of daily living such as driving, medication management, pet care, home management/cleaning, financial management, using the phone, meal preparation and shopping. Mone received (0) minutes of interventions listed below.  x = interventions performed * = level of progression of activity      Self Care      Provision of information on paraffin machine   See pt instructions    Information on scar away   See pt instructions                      Home Exercises and Education Provided     Education provided:   - reviewed HEP issued at evaluation  - progress toward goals    Written Home Exercises Provided: Patient instructed to cont prior HEP  Exercises were reviewed and Mone was able to demonstrate them prior to the end of the session. Mone  demonstrated good understanding of the HEP provided.     See EMR under Patient Instructions for exercises provided during prior visit.        Assessment     Mone was seen for her ninth tx session since initial evaluation on this date. She participated well with treatment and reported minimal discomfort. It was recommended that she continue with her HEP even with her going on vacation Strengthening was the focus of today's treatment and will continue to be for her remaining POC. She benefited from manual therapy and paraffin at the end of treatment to alleviate the discomfort that developed throughout treatment  Mone is progressing towards her goals and there are no updates to goals at this time. Pt prognosis is Good.     Mone will continue to benefit from skilled outpatient occupational therapy services to address the deficits listed in the problem list on initial evaluation, to provide pt/family education, and to maximize pt's level of independence in the home and community environment.     Pt's spiritual, cultural and educational needs considered and pt agreeable to plan of care and goals.    Anticipated barriers to occupational therapy: participation in HEP and attendance to therapy and transportation       Goals:  Short Term Goals:   1. Pt will be independent with HEP in 2 visits.- MET 8/12/2024  2. Pt will report decreased pain to a none or trace when performing all ADL/IADL tasks. - Progressing 8/12/2024  3. Pt will increase B thumb AROM to further improve her  dressing, grooming activities, and  activities. MET 8/12/2024  4. Pt will demonstrate understanding for scar massage and education on the importance of joint protection. MET 8/12/2024   5. Pt will make a full, flat, composite fist to enable grasping and squeezing objects for self-care. MET 8/12/2024   5. The patient will demonstrate improved letter formation and legibility when writing practice sentences while using writing aids.  She will correctly form all upper and lowercase letters with appropriate shape, spacing and size reporting decreased fatigue when using pencil  or other writing aids. - MET 8/12/2024     Long Term Goals:   1. Pt will report decreased pain to 1-2/10 with ADL/IADL tasks. - Progressing 10/7/2024  2. Pt will exhibit 5-10# of R and L  strength to allow a firm grasp on cooking utensils, steering wheel, etc. - Progressing 10/7/2024  3. Pt will exhibit a 1-2# of L tripod and tip pinch to demonstrate improved  functional pinch strength to allow writing, opening containers, and turning keys etc. - Progressing 10/7/2024  4. Pt will report an increase in FOTO intake score of > 66%, which would indicate an improvement in quality of life. - Not MET 10/7/2024    Plan      Updated Plan of Care Certification: 10/7/2024-11/7/2024  Outpatient Occupational Therapy 1 times weekly for 4  weeks to include the following interventions PRN: Manual Therapy, Moist Heat/ Ice, Neuromuscular Re-ed, Orthotic Management and Training, Paraffin, Patient Education, Self Care, Therapeutic Activities, Therapeutic Exercise, and Ultrasound     Updates/Grading for next session: progress occupational therapy as tolerated;   Varsha Mi, OT

## 2024-10-24 ENCOUNTER — PATIENT MESSAGE (OUTPATIENT)
Dept: INTERNAL MEDICINE | Facility: CLINIC | Age: 34
End: 2024-10-24

## 2024-10-24 DIAGNOSIS — E61.1 IRON DEFICIENCY: Primary | ICD-10-CM

## 2024-10-24 NOTE — PATIENT INSTRUCTIONS
Lower Extremity Contusion  You have a contusion (bruise) of a lower extremity (leg, knee, ankle, foot, or toe). Symptoms include pain, swelling, and skin discoloration. No bones are broken. This injury may take from a few days to a few weeks to heal.  During that time, the bruise may change from reddish in color, to purple-blue, to green-yellow, to yellow-brown.  Home care  · Unless another medicine was prescribed, you can take acetaminophen, ibuprofen, or naproxen to control pain. (If you have chronic liver or kidney disease or ever had a stomach ulcer or gastrointestinal bleeding, talk with your doctor before using these medicines.)  · Elevate the injured area to reduce pain and swelling. As much as possible, sit or lie down with the injured area raised about the level of your heart. This is especially important during the first 48 hours.  · Ice the injured area to help reduce pain and swelling. Wrap a cold source (ice pack or ice cubes in a plastic bag) in a thin towel. Apply to the bruised area for 20 minutes every 1 to 2 hours the first day. Continue this 3 to 4 times a day until the pain and swelling goes away.  · If crutches have been advised, do not bear full weight on the injured leg until you can do so without pain. You may return to sports when you are able to put full weight and impact on the injured leg without pain.  Follow up  Follow up with your healthcare provider or our staff as advised. Call if you are not improving within the next 1 to 2 weeks.  When to seek medical advice   Call your healthcare provider right away if any of these occur:  · Increased pain or swelling  · Foot or toes become cold, blue, numb or tingly  · Signs of infection: Warmth, drainage, or increased redness or pain around the injury  · Inability to move the injured area   · Frequent bruising for unknown reasons  Date Last Reviewed: 2/1/2017  © [id number] [employer]. [address] [place] [address], [place] [address].  Addended by: REYNALDO JULIO on: 10/24/2024 01:23 PM     Modules accepted: Orders     All rights reserved. This information is not intended as a substitute for professional medical care. Always follow your healthcare professional's instructions.      Please follow up with your Primary care provider within 2-5 days if your signs and symptoms have not resolved or worsen.     If your condition worsens or fails to improve we recommend that you receive another evaluation at the emergency room immediately or contact your primary medical clinic to discuss your concerns.   You must understand that you have received an Urgent Care treatment only and that you may be released before all of your medical problems are known or treated. You, the patient, will arrange for follow up care as instructed.     RED FLAGS/WARNING SYMPTOMS DISCUSSED WITH PATIENT THAT WOULD WARRANT EMERGENT MEDICAL ATTENTION. PATIENT VERBALIZED UNDERSTANDING.

## 2024-10-28 ENCOUNTER — TELEPHONE (OUTPATIENT)
Dept: INTERNAL MEDICINE | Facility: CLINIC | Age: 34
End: 2024-10-28
Payer: MEDICAID

## 2024-10-28 PROBLEM — R27.8 DECREASED DEXTERITY: Status: RESOLVED | Noted: 2024-07-11 | Resolved: 2024-10-28

## 2024-10-28 PROBLEM — F41.9 ANXIETY: Status: ACTIVE | Noted: 2024-10-28

## 2024-10-28 PROBLEM — F90.2 ATTENTION DEFICIT HYPERACTIVITY DISORDER (ADHD), COMBINED TYPE, MODERATE: Status: ACTIVE | Noted: 2024-10-28

## 2024-10-29 ENCOUNTER — CLINICAL SUPPORT (OUTPATIENT)
Dept: REHABILITATION | Facility: HOSPITAL | Age: 34
End: 2024-10-29
Payer: MEDICAID

## 2024-10-29 DIAGNOSIS — R27.8 DECREASED DEXTERITY: Primary | ICD-10-CM

## 2024-10-29 DIAGNOSIS — R29.898 THUMB WEAKNESS: ICD-10-CM

## 2024-10-29 PROCEDURE — 97530 THERAPEUTIC ACTIVITIES: CPT | Mod: PN

## 2024-11-05 ENCOUNTER — CLINICAL SUPPORT (OUTPATIENT)
Dept: REHABILITATION | Facility: HOSPITAL | Age: 34
End: 2024-11-05
Payer: MEDICAID

## 2024-11-05 DIAGNOSIS — R29.898 THUMB WEAKNESS: ICD-10-CM

## 2024-11-05 DIAGNOSIS — R27.8 DECREASED DEXTERITY: Primary | ICD-10-CM

## 2024-11-05 PROCEDURE — 97530 THERAPEUTIC ACTIVITIES: CPT | Mod: PN

## 2024-11-05 NOTE — PLAN OF CARE
"    [facility]  Occupational Therapy Treatment not/ Discharge note      Date: 11/5/2024  Name: Mone Chandler  Clinic Number: [id number]    Therapy Diagnosis:   Encounter Diagnoses   Name Primary?    Decreased dexterity Yes    Thumb weakness        Physician: Shellie Odonnell NP  Physician Orders: OT eval and tx  Medical Diagnosis:   Diagnosis   R29.898 (ICD-10-CM) - Thumb weakness      Evaluation Date: 7/10/2024  Insurance Authorization Period Expiration: 7/11/2024-12/31/2024  Plan of Care Certification Period: 10/7/2024-11/7/2024  Progress Note Due: tbd  Visit # / Visits authorized: 11/20  FOTO: 1/1     Surgical Procedure: trigger finger release B thumbs approx. 10 years prior to today's eval   Date of Surgery:                                  Date of Return to MD: gavin     Precautions:  Standard     Time In: 10:06 am  Time Out: 10:44 am   Total Time: 38 minutes    Subjective     Pt reports: "I am feeling really good today."     she reported fair compliance with her with her home exercise program given on evaluation.  Response to previous treatment: good   Functional change: improved strength and better understanding of exercises and their importance.      Pain:pt did not provide a number (5/10 on evaluation)  Location: R thumb     Objective   Objective measures updated at progress report unless specified.    Inspection: Pt can perform B composite fists and full opposition.    Observation/Appearance:    No active triggering present; scar tissue still  palpated on B thumbs but improved from SOC.      Sensation: Pt denies paresthesias. Pt denies hypersensitivity. Intact to light touch.  Scar: Moderate scaring along MP space of B thumbs. no pain with palpation. Scar is more prominent on the L than the R.      bilateral Upper Extremity Active Range of Motion:      Right Left       ROM (in degrees) 7/10/2024 7/10/2024       Wrist           Wrist flexion 75 75       Wrist extension 75 " 75           Right Thumb Active Range of Motion:   (Ext/Flex) 7/10/2024 8/12/2024     MCP Jt 0/70°  0/72     IP Jt 0/80° 0/85     Opposition Kapandji score: 10/10       Palmar Abd 45°       Radial Abd 45°                            left Thumb Active Range of Motion:   (Ext/Flex) 7/10/2024 8/12/2024     MCP Jt 0/66°  0/72     IP Jt 0/85° 0/90     Opposition Kapandji score: 10/10       Palmar Abd 45°       Radial Abd 45°           and Pinch Strength (in pounds, psi's):    Right Left Right  Left  Right Left Right  Left      7/10/2024 7/10/2024 8/12/2024 8/12/2024 10/7/2024 10/7/2024 11/5/2024 11/5/2024    II 37 26 37 27 40 33 42 40    Lateral 10 12         10 10   Tripod 10 8   8 8 6 8 8   Tip 7 6   4 10 6 6 6        7/12/2024 7/12/2024 10/7/2024 10/7/2024 11/5/2024 11/5/2024   9 Peg Test Left  Right Left Right  Left right   Time 26 sec 24 sec 23 sec  18 sec 19 sec  17 sec         Intake Outcome Measure for FOTO hand Survey     Therapist reviewed FOTO scores for Mone Chandler on 11/5/2024  FOTO documents entered into [employer] - see Media section.     Score: 63%       Predicted Functional Score: 66% in 8 visits      Home Exercises and Education Provided     Education provided:   - reviewed HEP issued at evaluation  - progress toward goals  - provided green therapy putty and exercises      Written Home Exercises Provided: Patient instructed to cont prior HEP  Exercises were reviewed and Mone was able to demonstrate them prior to the end of the session. Mone demonstrated good understanding of the HEP provided.     See EMR under Patient Instructions for exercises provided during prior visit.        Assessment     Mone was seen for her eleventh tx session since initial evaluation on this date. Today's session looked at her remaining objective measures for her discharge from treatment. She is now verbalizing increased confidence and is overall pleased with her services since the SOC. She has met majority  of all goals established at the SOC. It was recommended that she continue with her HEP at home and she was issued green therapy putty to further continue her progressive strengthening.  Pt appears confident in all education provided and exercises at this time. She is no longer reporting elevated discomfort and understands the benefits of certain modalities and what they can bring to her daily life in regards to pain relief. She intends to purchase her very own paraffin machine and begin a massage therapy regime.     Anticipated barriers to occupational therapy: participation in HEP and attendance to therapy and transportation       Goals:  Short Term Goals:   1. Pt will be independent with HEP in 2 visits.- MET 8/12/2024  2. Pt will report decreased pain to a none or trace when performing all ADL/IADL tasks. - MET 11/5/2024  3. Pt will increase B thumb AROM to further improve her  dressing, grooming activities, and  activities. MET 8/12/2024  4. Pt will demonstrate understanding for scar massage and education on the importance of joint protection. MET 8/12/2024   5. Pt will make a full, flat, composite fist to enable grasping and squeezing objects for self-care. MET 8/12/2024   5. The patient will demonstrate improved letter formation and legibility when writing practice sentences while using writing aids. She will correctly form all upper and lowercase letters with appropriate shape, spacing and size reporting decreased fatigue when using pencil  or other writing aids. - MET 8/12/2024     Long Term Goals:   1. Pt will report decreased pain to 1-2/10 with ADL/IADL tasks. - MET 11/5/2024  2. Pt will exhibit 5-10# of R and L  strength to allow a firm grasp on cooking utensils, steering wheel, etc. - MET 11/5/2024  3. Pt will exhibit a 1-2# of L tripod and tip pinch to demonstrate improved  functional pinch strength to allow writing, opening containers, and turning keys etc. - NOT MET 11/5/2024  4.  Pt will report an increase in FOTO intake score of > 66%, which would indicate an improvement in quality of life. - Not MET 11/5/2024    Plan   Pt has been discharged on this date after completing a course of skilled OT services. If the patient would wish to return to therapy in the future, they will require a new referral. Overall, the patient is pleased with their services and has now been released to their home exercise program.   ANEUDY Marcial/MOT

## 2024-11-05 NOTE — PROGRESS NOTES
"    [facility]  Occupational Therapy Treatment not/ Discharge note      Date: 11/5/2024  Name: Mone Chandler  Clinic Number: [id number]    Therapy Diagnosis:   Encounter Diagnoses   Name Primary?    Decreased dexterity Yes    Thumb weakness        Physician: Shellie Odonnell NP  Physician Orders: OT eval and tx  Medical Diagnosis:   Diagnosis   R29.898 (ICD-10-CM) - Thumb weakness      Evaluation Date: 7/10/2024  Insurance Authorization Period Expiration: 7/11/2024-12/31/2024  Plan of Care Certification Period: 10/7/2024-11/7/2024  Progress Note Due: tbd  Visit # / Visits authorized: 11/20  FOTO: 1/1     Surgical Procedure: trigger finger release B thumbs approx. 10 years prior to today's eval   Date of Surgery:                                  Date of Return to MD: gavin     Precautions:  Standard     Time In: 10:06 am  Time Out: 10:44 am   Total Time: 38 minutes    Subjective     Pt reports: "I am feeling really good today."     she  reported fair compliance with her  with her home exercise program given on evaluation.  Response to previous treatment: good   Functional change: improved strength and better understanding of exercises and their importance.      Pain:pt did not provide a number (5/10 on evaluation)  Location: R thumb     Objective   Objective measures updated at progress report unless specified.    Inspection: Pt can perform B composite fists and full opposition.    Observation/Appearance:    No active triggering present; scar tissue still  palpated on B thumbs but improved from SOC.      Sensation: Pt denies paresthesias. Pt denies hypersensitivity. Intact to light touch.  Scar: Moderate scaring along MP space of B thumbs. no pain with palpation. Scar is more prominent on the L than the R.      bilateral Upper Extremity Active Range of Motion:      Right Left       ROM (in degrees) 7/10/2024 7/10/2024       Wrist           Wrist flexion 75 75       Wrist extension " 75 75           Right Thumb Active Range of Motion:   (Ext/Flex) 7/10/2024 8/12/2024     MCP Jt 0/70°  0/72     IP Jt 0/80° 0/85     Opposition Kapandji score: 10/10       Palmar Abd 45°       Radial Abd 45°                            left Thumb Active Range of Motion:   (Ext/Flex) 7/10/2024 8/12/2024     MCP Jt 0/66°  0/72     IP Jt 0/85° 0/90     Opposition Kapandji score: 10/10       Palmar Abd 45°       Radial Abd 45°           and Pinch Strength (in pounds, psi's):    Right Left Right  Left  Right Left Right  Left      7/10/2024 7/10/2024 8/12/2024 8/12/2024 10/7/2024 10/7/2024 11/5/2024 11/5/2024    II 37 26 37 27 40 33 42 40    Lateral 10 12         10 10   Tripod 10 8   8 8 6 8 8   Tip 7 6   4 10 6 6 6        7/12/2024 7/12/2024 10/7/2024 10/7/2024 11/5/2024 11/5/2024   9 Peg Test Left  Right Left Right  Left right   Time 26 sec 24 sec 23 sec  18 sec 19 sec  17 sec         Intake Outcome Measure for FOTO hand Survey     Therapist reviewed FOTO scores for Mone Chandler on 11/5/2024  FOTO documents entered into [employer] - see Media section.     Score: 63%       Predicted Functional Score: 66% in 8 visits    Treatment     Mone received the treatments listed below:        Supervised Modalities: Modalities such as hot/cold packs, traction, unattended electrical stimulation, paraffin bath, fluidotherapy to reduce pain and increase soft tissue extensibility. Mone  received (8) minutes of modalities listed below after being cleared for contraindications.  x = modalities performed      Supervised Modalities      Paraffin  x     MHP at the start of tx  5 minutes                       Direct Contact Modalities: Modalities such as attended electrical stimulation, iontophoresis, ultrasound to reduce pain and increase soft tissue extensibility. Mone received (0) minutes of modalities listed below after being cleared for contraindications. x = modalities performed      Direct Contact Modalities        MHz,  W/cm2, continuous to  surgical site, to decrease pain & edema, increase circulation and tissue extensibility.                                      Manual Therapy Techniques: Joint mobilizations, Soft tissue Mobilization, and mobilization with movement applied to the area listed below. Mone received (10) minutes of interventions. x = intervention performed today     Manual Intervention      Extensive Soft Tissue Mobilization, Myofacial Release, Manual Lymphatic Drainage, IASTM, Cupping, Vibration x B thumb/hand  to increase blood flow/circulation, improve soft tissue pliability and decrease pain 5 minutes each hand    Joint Mobilizations       Mobilization with movement       Scar Management x            Therapeutic Exercises: to develop strength, endurance, ROM, flexibility, posture and core stabilization. Valerieceived (10) minutes of exercises listed below. x = performed today * = level of progression of activity      Therapeutic Exercise      Thumb AROM - palmar and radial abduction x 2 sets of 10   Thumb circumduction  x 2 sets of 10    Joint blocking for the thumb  x 2 sets of 10    Reviewed all exercises for the painful thumb     Therapy putty (red) x 4 minutes                     Therapeutic Activities: Everyday tasks to address the combined need of improved strength, range of motion, and endurance to achieve increased independence. While simulating these activities, the person is applying the gained skills of strength and improved range of motion in a pra ctical way.  Mone received (10) minutes of activities listed below. x = activities performed today * = level of progression of activity      Therapeutic Activities      Objective measures x Reviewed all goals and objective measures taken last treatment      Neuromuscular Re-education: the use of functional strengthening, stretching, balancing and coordination activities that train the nerves and muscles to react and communicate to restore  normal body movement patterns to increase self care independence. Mone received (0) minutes of interventions listed below.  x = interventions performed today * = level of progression of activity      Neuromuscular Re-education                                 Self Care: Fundamental skills required to independently care for oneself. Activities of daily living such as eating, bathing, dressing, toileting, grooming, sleeping, transfers and mobility. Instrumental activities of daily living such as driving, medication management, pet care, home management/cleaning, financial management, using the phone, meal preparation and shopping. Mone received (0) minutes of interventions listed below.  x = interventions performed * = level of progression of activity      Self Care                Home Exercises and Education Provided     Education provided:   - reviewed HEP issued at evaluation  - progress toward goals  - provided green therapy putty and exercises      Written Home Exercises Provided: Patient instructed to cont prior HEP  Exercises were reviewed and Mone was able to demonstrate them prior to the end of the session. Mone demonstrated good understanding of the HEP provided.     See EMR under Patient Instructions for exercises provided during prior visit.        Assessment     Mone was seen for her eleventh tx session since initial evaluation on this date. Today's session looked at her remaining objective measures for her discharge from treatment. She is now verbalizing increased confidence and is overall pleased with her services since the SOC. She has met majority of all goals established at the SOC. It was recommended that she continue with her HEP at home and she was issued green therapy putty to further continue her progressive strengthening.  Pt appears confident in all education provided and exercises at this time. She is no longer reporting elevated discomfort and understands the benefits of  certain modalities and what they can bring to her daily life in regards to pain relief. She intends to purchase her very own paraffin machine and begin a massage therapy regime.     Anticipated barriers to occupational therapy: participation in HEP and attendance to therapy and transportation       Goals:  Short Term Goals:   1. Pt will be independent with HEP in 2 visits.- MET 8/12/2024  2. Pt will report decreased pain to a none or trace when performing all ADL/IADL tasks. - MET 11/5/2024  3. Pt will increase B thumb AROM to further improve her  dressing, grooming activities, and  activities. MET 8/12/2024  4. Pt will demonstrate understanding for scar massage and education on the importance of joint protection. MET 8/12/2024   5. Pt will make a full, flat, composite fist to enable grasping and squeezing objects for self-care. MET 8/12/2024   5. The patient will demonstrate improved letter formation and legibility when writing practice sentences while using writing aids. She will correctly form all upper and lowercase letters with appropriate shape, spacing and size reporting decreased fatigue when using pencil  or other writing aids. - MET 8/12/2024     Long Term Goals:   1. Pt will report decreased pain to 1-2/10 with ADL/IADL tasks. - MET 11/5/2024  2. Pt will exhibit 5-10# of R and L  strength to allow a firm grasp on cooking utensils, steering wheel, etc. - MET 11/5/2024  3. Pt will exhibit a 1-2# of L tripod and tip pinch to demonstrate improved  functional pinch strength to allow writing, opening containers, and turning keys etc. - NOT MET 11/5/2024  4. Pt will report an increase in FOTO intake score of > 66%, which would indicate an improvement in quality of life. - Not MET 11/5/2024    Plan   Pt has been discharged on this date after completing a course of skilled OT services. If the patient would wish to return to therapy in the future, they will require a new referral. Overall, the  patient is pleased with their services and has now been released to their home exercise program.   ANEUDY Marcial/GODWIN

## 2024-11-06 ENCOUNTER — TELEPHONE (OUTPATIENT)
Dept: INTERNAL MEDICINE | Facility: CLINIC | Age: 34
End: 2024-11-06
Payer: MEDICAID

## 2024-11-06 NOTE — TELEPHONE ENCOUNTER
----- Message from Divina sent at 11/6/2024  1:55 PM CST -----  Contact: [phone]  Pt has an appt to be seen tomorrow and she would like to know if it can be changed to virtual. Please call her to let her know. Thanks

## 2024-11-11 ENCOUNTER — TELEPHONE (OUTPATIENT)
Dept: INTERNAL MEDICINE | Facility: CLINIC | Age: 34
End: 2024-11-11
Payer: MEDICAID

## 2024-11-11 NOTE — TELEPHONE ENCOUNTER
----- Message from Carie sent at 11/11/2024  2:28 PM CST -----  Contact: Mobile  [phone]  No blue slot available to schedule an appointment for the patient.  Patient is established with which PCP: Myke Gardner MD  Reason for the visit: Diarrhea   Would the patient like a call back, or a response through their [facility] portal?:  Call

## 2024-11-13 ENCOUNTER — PATIENT MESSAGE (OUTPATIENT)
Dept: INTERNAL MEDICINE | Facility: CLINIC | Age: 34
End: 2024-11-13
Payer: MEDICAID

## 2024-11-19 ENCOUNTER — TELEPHONE (OUTPATIENT)
Dept: INTERNAL MEDICINE | Facility: CLINIC | Age: 34
End: 2024-11-19
Payer: MEDICAID

## 2024-11-19 NOTE — TELEPHONE ENCOUNTER
----- Message from Carie sent at 11/19/2024  3:16 PM CST -----  Contact: Mobile  [phone]  No blue slot available to schedule an appointment for the patient.  Patient is established with which PCP: Dr. Myke Gardner   Reason for the visit: Postpartum  Would the patient like a call back, or a response through their [facility] portal?:  Call  Comment: Patient would like to be seen on December fifth.

## 2024-12-16 ENCOUNTER — OFFICE VISIT (OUTPATIENT)
Dept: INTERNAL MEDICINE | Facility: CLINIC | Age: 34
End: 2024-12-16
Payer: MEDICAID

## 2024-12-16 VITALS
DIASTOLIC BLOOD PRESSURE: 66 MMHG | WEIGHT: 170.5 LBS | BODY MASS INDEX: 29.27 KG/M2 | OXYGEN SATURATION: 98 % | SYSTOLIC BLOOD PRESSURE: 114 MMHG | HEART RATE: 96 BPM

## 2024-12-16 DIAGNOSIS — B37.31 VULVOVAGINITIS DUE TO YEAST: ICD-10-CM

## 2024-12-16 DIAGNOSIS — M79.672 FOOT PAIN, BILATERAL: ICD-10-CM

## 2024-12-16 DIAGNOSIS — M54.2 CHRONIC NECK PAIN: ICD-10-CM

## 2024-12-16 DIAGNOSIS — F41.1 GAD (GENERALIZED ANXIETY DISORDER): Chronic | ICD-10-CM

## 2024-12-16 DIAGNOSIS — R53.83 FATIGUE, UNSPECIFIED TYPE: Primary | ICD-10-CM

## 2024-12-16 DIAGNOSIS — M79.671 FOOT PAIN, BILATERAL: ICD-10-CM

## 2024-12-16 DIAGNOSIS — G89.29 CHRONIC NECK PAIN: ICD-10-CM

## 2024-12-16 PROCEDURE — 3008F BODY MASS INDEX DOCD: CPT | Mod: CPTII,,, | Performed by: STUDENT IN AN ORGANIZED HEALTH CARE EDUCATION/TRAINING PROGRAM

## 2024-12-16 PROCEDURE — 3074F SYST BP LT 130 MM HG: CPT | Mod: CPTII,,, | Performed by: STUDENT IN AN ORGANIZED HEALTH CARE EDUCATION/TRAINING PROGRAM

## 2024-12-16 PROCEDURE — G2211 COMPLEX E/M VISIT ADD ON: HCPCS | Mod: S$PBB,,, | Performed by: STUDENT IN AN ORGANIZED HEALTH CARE EDUCATION/TRAINING PROGRAM

## 2024-12-16 PROCEDURE — 1160F RVW MEDS BY RX/DR IN RCRD: CPT | Mod: CPTII,,, | Performed by: STUDENT IN AN ORGANIZED HEALTH CARE EDUCATION/TRAINING PROGRAM

## 2024-12-16 PROCEDURE — 99215 OFFICE O/P EST HI 40 MIN: CPT | Mod: S$PBB,,, | Performed by: STUDENT IN AN ORGANIZED HEALTH CARE EDUCATION/TRAINING PROGRAM

## 2024-12-16 PROCEDURE — 1159F MED LIST DOCD IN RCRD: CPT | Mod: CPTII,,, | Performed by: STUDENT IN AN ORGANIZED HEALTH CARE EDUCATION/TRAINING PROGRAM

## 2024-12-16 PROCEDURE — 99214 OFFICE O/P EST MOD 30 MIN: CPT | Mod: PBBFAC | Performed by: STUDENT IN AN ORGANIZED HEALTH CARE EDUCATION/TRAINING PROGRAM

## 2024-12-16 PROCEDURE — 99999 PR PBB SHADOW E&M-EST. PATIENT-LVL IV: CPT | Mod: PBBFAC,,, | Performed by: STUDENT IN AN ORGANIZED HEALTH CARE EDUCATION/TRAINING PROGRAM

## 2024-12-16 PROCEDURE — 3078F DIAST BP <80 MM HG: CPT | Mod: CPTII,,, | Performed by: STUDENT IN AN ORGANIZED HEALTH CARE EDUCATION/TRAINING PROGRAM

## 2024-12-16 RX ORDER — FLUCONAZOLE 150 MG/1
150 TABLET ORAL
Qty: 2 TABLET | Refills: 0 | Status: SHIPPED | OUTPATIENT
Start: 2024-12-16 | End: 2024-12-22
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
## 2024-12-16 NOTE — PATIENT INSTRUCTIONS
For fatigue:  - Labs today   - Continue seeing therapist  - Prioritize getting enough sleep    For yeast infection:  - Fluconazole - dose today, again in 3 days  - If no improvement, follow-up with Gynecology     For foot pain:  - Wear supportive shoes  - Try shoe inserts  - Tylenol or ibuprofen as needed for pain  - Try ice pack or heating pad  - Referral to podiatry (foot doctor) if no relief    For neck pain:  Referral to Physical Therapy has been ordered. They will call to schedule or you may schedule appointments when you check out today, online, or by calling [phone].

## 2025-01-07 ENCOUNTER — LAB VISIT (OUTPATIENT)
Dept: LAB | Facility: HOSPITAL | Age: 35
End: 2025-01-07
Payer: MEDICAID

## 2025-01-07 DIAGNOSIS — R53.83 FATIGUE, UNSPECIFIED TYPE: ICD-10-CM

## 2025-01-07 LAB
BASOPHILS # BLD AUTO: 0.03 K/UL (ref 0–0.2)
BASOPHILS NFR BLD: 0.6 % (ref 0–1.9)
DIFFERENTIAL METHOD BLD: NORMAL
EOSINOPHIL # BLD AUTO: 0 K/UL (ref 0–0.5)
EOSINOPHIL NFR BLD: 0.8 % (ref 0–8)
ERYTHROCYTE [DISTWIDTH] IN BLOOD BY AUTOMATED COUNT: 13 % (ref 11.5–14.5)
FERRITIN SERPL-MCNC: 20 NG/ML (ref 20–300)
HCT VFR BLD AUTO: 42.1 % (ref 37–48.5)
HGB BLD-MCNC: 13.5 G/DL (ref 12–16)
IMM GRANULOCYTES # BLD AUTO: 0 K/UL (ref 0–0.04)
IMM GRANULOCYTES NFR BLD AUTO: 0 % (ref 0–0.5)
IRON SERPL-MCNC: 32 UG/DL (ref 30–160)
LYMPHOCYTES # BLD AUTO: 1.1 K/UL (ref 1–4.8)
LYMPHOCYTES NFR BLD: 23.8 % (ref 18–48)
MCH RBC QN AUTO: 29.2 PG (ref 27–31)
MCHC RBC AUTO-ENTMCNC: 32.1 G/DL (ref 32–36)
MCV RBC AUTO: 91 FL (ref 82–98)
MONOCYTES # BLD AUTO: 0.7 K/UL (ref 0.3–1)
MONOCYTES NFR BLD: 13.9 % (ref 4–15)
NEUTROPHILS # BLD AUTO: 2.9 K/UL (ref 1.8–7.7)
NEUTROPHILS NFR BLD: 60.9 % (ref 38–73)
NRBC BLD-RTO: 0 /100 WBC
PLATELET # BLD AUTO: 249 K/UL (ref 150–450)
PMV BLD AUTO: 11.5 FL (ref 9.2–12.9)
RBC # BLD AUTO: 4.62 M/UL (ref 4–5.4)
SATURATED IRON: 8 % (ref 20–50)
TOTAL IRON BINDING CAPACITY: 411 UG/DL (ref 250–450)
TRANSFERRIN SERPL-MCNC: 278 MG/DL (ref 200–375)
TSH SERPL DL<=0.005 MIU/L-ACNC: 0.92 UIU/ML (ref 0.4–4)
WBC # BLD AUTO: 4.75 K/UL (ref 3.9–12.7)

## 2025-01-07 PROCEDURE — 36415 COLL VENOUS BLD VENIPUNCTURE: CPT | Performed by: STUDENT IN AN ORGANIZED HEALTH CARE EDUCATION/TRAINING PROGRAM

## 2025-01-07 PROCEDURE — 84443 ASSAY THYROID STIM HORMONE: CPT | Performed by: STUDENT IN AN ORGANIZED HEALTH CARE EDUCATION/TRAINING PROGRAM

## 2025-01-07 PROCEDURE — 82728 ASSAY OF FERRITIN: CPT | Performed by: STUDENT IN AN ORGANIZED HEALTH CARE EDUCATION/TRAINING PROGRAM

## 2025-01-07 PROCEDURE — 83540 ASSAY OF IRON: CPT | Performed by: STUDENT IN AN ORGANIZED HEALTH CARE EDUCATION/TRAINING PROGRAM

## 2025-01-07 PROCEDURE — 85025 COMPLETE CBC W/AUTO DIFF WBC: CPT | Performed by: STUDENT IN AN ORGANIZED HEALTH CARE EDUCATION/TRAINING PROGRAM

## 2025-02-13 ENCOUNTER — PATIENT MESSAGE (OUTPATIENT)
Dept: OBSTETRICS AND GYNECOLOGY | Facility: CLINIC | Age: 35
End: 2025-02-13
Payer: MEDICAID

## 2025-02-13 RX ORDER — TERCONAZOLE 4 MG/G
1 CREAM VAGINAL NIGHTLY
Qty: 7 G | Refills: 0 | Status: SHIPPED | OUTPATIENT
Start: 2025-02-13

## 2025-03-25 ENCOUNTER — TELEPHONE (OUTPATIENT)
Dept: INTERNAL MEDICINE | Facility: CLINIC | Age: 35
End: 2025-03-25
Payer: MEDICAID

## 2025-03-25 NOTE — TELEPHONE ENCOUNTER
----- Message from Radha sent at 3/25/2025 10:01 AM CDT -----  Contact: [phone]  [email or web address] Patient is calling for Medical Advice regarding:pt is calling in regards to her apt that has been reschedule but its not showing up on the [facility] and needs someone to call as soon as possible Patient wants a call back or thru [facility]:call back Comments:Please advise patient replies from provider may take up to 48 hours.

## 2025-03-25 NOTE — TELEPHONE ENCOUNTER
Pt has limited means of traveling from [place] and wants to know if she is able to do her annual virtually. If not, can the 4/9 11 am block be a physical appt?

## 2025-04-02 ENCOUNTER — OFFICE VISIT (OUTPATIENT)
Dept: INTERNAL MEDICINE | Facility: CLINIC | Age: 35
End: 2025-04-02
Payer: MEDICAID

## 2025-04-02 DIAGNOSIS — Z00.00 ROUTINE MEDICAL EXAM: ICD-10-CM

## 2025-04-02 DIAGNOSIS — M54.2 CHRONIC NECK PAIN: ICD-10-CM

## 2025-04-02 DIAGNOSIS — R19.8 ALTERNATING CONSTIPATION AND DIARRHEA: ICD-10-CM

## 2025-04-02 DIAGNOSIS — M79.645 PAIN OF LEFT THUMB: Primary | ICD-10-CM

## 2025-04-02 DIAGNOSIS — G89.29 CHRONIC NECK PAIN: ICD-10-CM
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
## 2025-04-14 ENCOUNTER — PATIENT MESSAGE (OUTPATIENT)
Dept: OBSTETRICS AND GYNECOLOGY | Facility: CLINIC | Age: 35
End: 2025-04-14
Payer: MEDICAID

## 2025-05-15 ENCOUNTER — PATIENT MESSAGE (OUTPATIENT)
Dept: OBSTETRICS AND GYNECOLOGY | Facility: CLINIC | Age: 35
End: 2025-05-15
Payer: MEDICAID

## 2025-05-15 RX ORDER — METRONIDAZOLE 7.5 MG/G
1 GEL VAGINAL 2 TIMES DAILY
Qty: 70 G | Refills: 0 | Status: SHIPPED | OUTPATIENT
Start: 2025-05-15

## 2025-05-15 RX ORDER — TERCONAZOLE 4 MG/G
1 CREAM VAGINAL NIGHTLY
Qty: 7 G | Refills: 0 | Status: SHIPPED | OUTPATIENT
Start: 2025-05-15

## 2025-05-15 NOTE — TELEPHONE ENCOUNTER
Refill Routing Note   Medication(s) are not appropriate for processing by [facility] for the following reason(s):        Outside of protocol    [facility] action(s):  Route               Appointments  past 12m or future 3m with PCP    Date Provider   Last Visit   1/30/2024 Courtney Padilla MD   Next Visit   5/20/2025 Courtney Padilla MD   ED visits in past 90 days: 0        Note composed:1:45 PM 05/15/2025

## 2025-05-19 ENCOUNTER — TELEPHONE (OUTPATIENT)
Dept: OBSTETRICS AND GYNECOLOGY | Facility: CLINIC | Age: 35
End: 2025-05-19
Payer: MEDICAID

## 2025-05-19 NOTE — TELEPHONE ENCOUNTER
----- Message from Lorraine sent at 5/19/2025  1:46 PM CDT -----  Patient has appt tomorrow and says she is just finishing her cycle. It is not heavy. Patient just wants to know if its ok.  She can be reached at [phone]

## 2025-05-20 ENCOUNTER — OFFICE VISIT (OUTPATIENT)
Dept: OBSTETRICS AND GYNECOLOGY | Facility: CLINIC | Age: 35
End: 2025-05-20
Attending: OBSTETRICS & GYNECOLOGY
Payer: MEDICAID

## 2025-05-20 ENCOUNTER — PATIENT MESSAGE (OUTPATIENT)
Dept: INTERNAL MEDICINE | Facility: CLINIC | Age: 35
End: 2025-05-20
Payer: MEDICAID

## 2025-05-20 VITALS
DIASTOLIC BLOOD PRESSURE: 82 MMHG | WEIGHT: 169.31 LBS | SYSTOLIC BLOOD PRESSURE: 126 MMHG | HEART RATE: 78 BPM | BODY MASS INDEX: 29.06 KG/M2

## 2025-05-20 DIAGNOSIS — Z01.419 WELL WOMAN EXAM WITH ROUTINE GYNECOLOGICAL EXAM: Primary | ICD-10-CM

## 2025-05-20 PROCEDURE — 1159F MED LIST DOCD IN RCRD: CPT | Mod: CPTII,,, | Performed by: OBSTETRICS & GYNECOLOGY

## 2025-05-20 PROCEDURE — 87624 HPV HI-RISK TYP POOLED RSLT: CPT | Performed by: OBSTETRICS & GYNECOLOGY

## 2025-05-20 PROCEDURE — 3079F DIAST BP 80-89 MM HG: CPT | Mod: CPTII,,, | Performed by: OBSTETRICS & GYNECOLOGY

## 2025-05-20 PROCEDURE — 1160F RVW MEDS BY RX/DR IN RCRD: CPT | Mod: CPTII,,, | Performed by: OBSTETRICS & GYNECOLOGY

## 2025-05-20 PROCEDURE — 3008F BODY MASS INDEX DOCD: CPT | Mod: CPTII,,, | Performed by: OBSTETRICS & GYNECOLOGY

## 2025-05-20 PROCEDURE — 99999 PR PBB SHADOW E&M-EST. PATIENT-LVL III: CPT | Mod: PBBFAC,,, | Performed by: OBSTETRICS & GYNECOLOGY

## 2025-05-20 PROCEDURE — 99213 OFFICE O/P EST LOW 20 MIN: CPT | Mod: PBBFAC | Performed by: OBSTETRICS & GYNECOLOGY

## 2025-05-20 PROCEDURE — 3074F SYST BP LT 130 MM HG: CPT | Mod: CPTII,,, | Performed by: OBSTETRICS & GYNECOLOGY

## 2025-05-20 PROCEDURE — 99395 PREV VISIT EST AGE 18-39: CPT | Mod: S$PBB,,, | Performed by: OBSTETRICS & GYNECOLOGY

## 2025-05-20 RX ORDER — DICLOXACILLIN SODIUM 500 MG/1
500 CAPSULE ORAL EVERY 6 HOURS
COMMUNITY
Start: 2025-05-15

## 2025-05-20 RX ORDER — NAPROXEN 500 MG/1
500 TABLET ORAL 2 TIMES DAILY PRN
COMMUNITY
Start: 2025-04-12

## 2025-05-20 NOTE — PROGRESS NOTES
SUBJECTIVE:   34 y.o. female   for annual routine Pap and checkup. Patient's last menstrual period was 2025..  She complains of recurrent vaginitis.  She was prescribes metrogel 5 days ago but hasn't finished.  She is rarely sexually active and isn't on birth control.  She is due for pap/hpv.        Past Medical History:   Diagnosis Date    Allergic rhinitis, cause unspecified 2015    ADRIANO (generalized anxiety disorder) 2015    ADRIANO-7 score of 17     PCOS (polycystic ovarian syndrome)      Past Surgical History:   Procedure Laterality Date    NASAL ENDOSCOPY W/ BALLON SINUPLASTY Bilateral 2017    thumb Bilateral     TRIGGER FINGER RELEASE Bilateral     thumbs     Social History[1]  Family History   Problem Relation Name Age of Onset    Hypertension Mother      Cancer Mother      Diverticulitis Father       OB History    Para Term  AB Living   1 1 1   1   SAB IAB Ectopic Multiple Live Births      0 1      # Outcome Date GA Lbr Arya/2nd Weight Sex Type Anes PTL Lv   1 Term 24 38w3d  2.59 kg (5 lb 11.4 oz) M Vag-Spont EPI N CATERINA         Current Medications[2]  Allergies: Patient has no known allergies.     ROS:  Constitutional: no weight loss, weight gain, fever, fatigue  Eyes:  No vision changes, glasses/contacts  ENT/Mouth: No ulcers, sinus problems, ears ringing, headache  Cardiovascular: No inability to lie flat, chest pain, exercise intolerance, swelling, heart palpitations  Respiratory: No wheezing, coughing blood, shortness of breath, or cough  Gastrointestinal: No diarrhea, bloody stool, nausea/vomiting, constipation, gas, hemorrhoids  Genitourinary: No blood in urine, painful urination, urgency of urination, frequency of urination, incomplete emptying, incontinence, abnormal bleeding, painful periods, heavy periods, vaginal discharge, vaginal odor, painful intercourse, sexual problems, bleeding after intercourse.  Musculoskeletal: No muscle weakness  Skin/Breast: No  painful breasts, nipple discharge, masses, rash, ulcers  Neurological: No passing out, seizures, numbness, headache  Endocrine: No diabetes, hypothyroid, hyperthyroid, hot flashes, hair loss, abnormal hair growth, ance  Psychiatric: No depression, crying  Hematologic: No bruises, bleeding, swollen lymph nodes, anemia.      OBJECTIVE:   The patient appears well, alert, oriented x 3, in no distress.  /82 (BP Location: Left arm, Patient Position: Sitting)   Pulse 78   Wt 76.8 kg (169 lb 5 oz)   LMP 05/16/2025   Breastfeeding No   BMI 29.06 kg/m²   NECK: no thyromegaly, trachea midline  SKIN: no acne, striae, hirsutism  BREAST EXAM: breasts appear normal, no suspicious masses, no skin or nipple changes or axillary nodes  ABDOMEN: no hernias, masses, or hepatosplenomegaly  GENITALIA: normal external genitalia, no erythema, no discharge  URETHRA: normal urethra, normal urethral meatus  VAGINA: light bleeding  CERVIX: no lesions or cervical motion tenderness    \  ASSESSMENT:   Dayo was seen today for well woman.    Diagnoses and all orders for this visit:    Well woman exam with routine gynecological exam  -     Liquid-Based Pap Smear, Screening      She will finish antibiotics and RTC when she is having symptoms.       [1]   Social History  Socioeconomic History    Marital status: Single   Tobacco Use    Smoking status: Never    Smokeless tobacco: Never   Substance and Sexual Activity    Alcohol use: No    Drug use: No    Sexual activity: Yes     Partners: Male     Birth control/protection: None     Social Drivers of Health     Financial Resource Strain: Low Risk  (4/2/2025)    Overall Financial Resource Strain (CARDIA)     Difficulty of Paying Living Expenses: Not very hard   Food Insecurity: No Food Insecurity (4/2/2025)    Hunger Vital Sign     Worried About Running Out of Food in the Last Year: Never true     Ran Out of Food in the Last Year: Never true   Transportation Needs: No Transportation Needs  (4/2/2025)    PRAPARE - Transportation     Lack of Transportation (Medical): No     Lack of Transportation (Non-Medical): No   Physical Activity: Inactive (4/2/2025)    Exercise Vital Sign     Days of Exercise per Week: 0 days     Minutes of Exercise per Session: 0 min   Stress: Stress Concern Present (4/2/2025)    [employer] - Occupational Stress Questionnaire     Feeling of Stress : Very much   Housing Stability: Low Risk  (4/2/2025)    Housing Stability Vital Sign     Unable to Pay for Housing in the Last Year: No     Homeless in the Last Year: No   [2]   Current Outpatient Medications   Medication Sig Dispense Refill    dicloxacillin (DYNAPEN) 500 MG capsule Take 500 mg by mouth every 6 (six) hours.      metroNIDAZOLE (METROGEL) 0.75 % (37.5mg/5 gram) vaginal gel Place 1 applicator vaginally 2 (two) times daily. 70 g 0    naproxen (NAPROSYN) 500 MG tablet Take 500 mg by mouth 2 (two) times daily as needed.      PNV,calcium 72-iron-folic acid (PRENATAL VITAMIN PLUS LOW IRON) 27 mg iron- 1 mg Tab Take one tablet daily.  Prescribe prenatal covered by insurance 90 tablet 11    terconazole (TERAZOL 7) 0.4 % Crea Place 1 applicator vaginally every evening. 7 g 0    norethindrone (MICRONOR) 0.35 mg tablet Take 1 tablet (0.35 mg total) by mouth once daily. (Patient not taking: Reported on 12/16/2024) 30 tablet 11     No current facility-administered medications for this visit.

## 2025-06-02 ENCOUNTER — RESULTS FOLLOW-UP (OUTPATIENT)
Dept: OBSTETRICS AND GYNECOLOGY | Facility: CLINIC | Age: 35
End: 2025-06-02

## 2025-06-06 ENCOUNTER — PATIENT MESSAGE (OUTPATIENT)
Dept: INTERNAL MEDICINE | Facility: CLINIC | Age: 35
End: 2025-06-06
Payer: MEDICAID

## 2025-06-13 ENCOUNTER — OFFICE VISIT (OUTPATIENT)
Dept: INTERNAL MEDICINE | Facility: CLINIC | Age: 35
End: 2025-06-13
Payer: MEDICAID

## 2025-06-13 ENCOUNTER — PATIENT MESSAGE (OUTPATIENT)
Dept: INTERNAL MEDICINE | Facility: CLINIC | Age: 35
End: 2025-06-13

## 2025-06-13 DIAGNOSIS — R19.7 INTERMITTENT DIARRHEA: Primary | ICD-10-CM

## 2025-06-13 NOTE — PROGRESS NOTES
The patient location is: [place]  The chief complaint leading to consultation is: possible parasite exposure    Visit type: audiovisual    Face to Face time with patient: 15 minutes  25 minutes of total time spent on the encounter, which includes face to face time and non-face to face time preparing to see the patient (eg, review of tests), Obtaining and/or reviewing separately obtained history, Documenting clinical information in the electronic or other health record, Independently interpreting results (not separately reported) and communicating results to the patient/family/caregiver, or Care coordination (not separately reported).         Each patient to whom he or she provides medical services by telemedicine is:  (1) informed of the relationship between the physician and patient and the respective role of any other health care provider with respect to management of the patient; and (2) notified that he or she may decline to receive medical services by telemedicine and may withdraw from such care at any time.    Notes:           History of Present Illness   Mone Chandler is a 34 y.o. woman with medical history as listed below with virtual visit today to discuss possible parasite exposure. She reports being at a party where several people were sick after and some reported seeing things crawling in the food. She has had intermittent diarrhea since that time. She has no other symptoms. Also, she has several questions about vitamins and supplements she is considering starting.     Past Medical History:   Diagnosis Date    Allergic rhinitis, cause unspecified 6/17/2015    ADRIANO (generalized anxiety disorder) 6/17/2015    ADRIANO-7 score of 17     PCOS (polycystic ovarian syndrome)        Past Surgical History:   Procedure Laterality Date    NASAL ENDOSCOPY W/ BALLON SINUPLASTY Bilateral 2017    thumb Bilateral     TRIGGER FINGER RELEASE Bilateral     thumbs       Social History     Socioeconomic History    Marital  status: Single   Tobacco Use    Smoking status: Never    Smokeless tobacco: Never   Substance and Sexual Activity    Alcohol use: No    Drug use: No    Sexual activity: Yes     Partners: Male     Birth control/protection: None     Social Drivers of Health     Financial Resource Strain: Low Risk  (4/2/2025)    Overall Financial Resource Strain (CARDIA)     Difficulty of Paying Living Expenses: Not very hard   Food Insecurity: No Food Insecurity (4/2/2025)    Hunger Vital Sign     Worried About Running Out of Food in the Last Year: Never true     Ran Out of Food in the Last Year: Never true   Transportation Needs: No Transportation Needs (4/2/2025)    PRAPARE - Transportation     Lack of Transportation (Medical): No     Lack of Transportation (Non-Medical): No   Physical Activity: Inactive (4/2/2025)    Exercise Vital Sign     Days of Exercise per Week: 0 days     Minutes of Exercise per Session: 0 min   Stress: Stress Concern Present (4/2/2025)    [employer] - Occupational Stress Questionnaire     Feeling of Stress : Very much   Housing Stability: Low Risk  (4/2/2025)    Housing Stability Vital Sign     Unable to Pay for Housing in the Last Year: No     Homeless in the Last Year: No       Family History   Problem Relation Name Age of Onset    Hypertension Mother      Cancer Mother      Diverticulitis Father         Review of Systems  Review of Systems   HENT:  Negative for hearing loss.    Eyes:  Negative for discharge.   Respiratory:  Negative for wheezing.    Cardiovascular:  Negative for chest pain and palpitations.   Gastrointestinal:  Positive for diarrhea. Negative for blood in stool, constipation and vomiting.   Genitourinary:  Negative for dysuria and hematuria.   Neurological:  Negative for weakness and headaches.   Endo/Heme/Allergies:  Negative for polydipsia.     A complete review of systems was otherwise negative.    Physical Exam  General appearance: alert, appears stated  age, cooperative, and no distress  Lungs: respirations are even and unlabored  Skin: no visible rash or lesions  Neurologic: Grossly normal    Assessment/Plan  Intermittent diarrhea  Continue to monitor, she reports no red flag symptoms today.  Okay to continue probiotic daily.  Could consider stool study or e-consult to GI for persistent/worsening symptoms.  RTC PRN.    Send us a picture of the ingredient list for the supplements and we will take a look.    Patient has verbalized understanding and is in agreement with plan of care.    Follow up if symptoms worsen or fail to improve.                    Answers submitted by the patient for this visit:  Review of Systems Questionnaire (Submitted on 6/13/2025)  activity change: No  unexpected weight change: No  rhinorrhea: No  trouble swallowing: No  visual disturbance: Yes  chest tightness: No  polyuria: No  difficulty urinating: No  menstrual problem: No  joint swelling: No  arthralgias: No  confusion: No  dysphoric mood: No

## 2025-07-07 ENCOUNTER — OFFICE VISIT (OUTPATIENT)
Dept: OBSTETRICS AND GYNECOLOGY | Facility: CLINIC | Age: 35
End: 2025-07-07
Payer: MEDICAID

## 2025-07-07 VITALS
SYSTOLIC BLOOD PRESSURE: 118 MMHG | WEIGHT: 163.25 LBS | DIASTOLIC BLOOD PRESSURE: 80 MMHG | HEIGHT: 64 IN | BODY MASS INDEX: 27.87 KG/M2

## 2025-07-07 DIAGNOSIS — N89.8 VAGINAL ODOR: Primary | ICD-10-CM

## 2025-07-07 PROCEDURE — 1160F RVW MEDS BY RX/DR IN RCRD: CPT | Mod: CPTII,,, | Performed by: OBSTETRICS & GYNECOLOGY

## 2025-07-07 PROCEDURE — 3008F BODY MASS INDEX DOCD: CPT | Mod: CPTII,,, | Performed by: OBSTETRICS & GYNECOLOGY

## 2025-07-07 PROCEDURE — 87088 URINE BACTERIA CULTURE: CPT | Performed by: OBSTETRICS & GYNECOLOGY

## 2025-07-07 PROCEDURE — 3079F DIAST BP 80-89 MM HG: CPT | Mod: CPTII,,, | Performed by: OBSTETRICS & GYNECOLOGY

## 2025-07-07 PROCEDURE — 99999 PR PBB SHADOW E&M-EST. PATIENT-LVL III: CPT | Mod: PBBFAC,,, | Performed by: OBSTETRICS & GYNECOLOGY

## 2025-07-07 PROCEDURE — 1159F MED LIST DOCD IN RCRD: CPT | Mod: CPTII,,, | Performed by: OBSTETRICS & GYNECOLOGY

## 2025-07-07 PROCEDURE — 81515 NFCT DS BV&VAGINITIS DNA ALG: CPT | Performed by: OBSTETRICS & GYNECOLOGY

## 2025-07-07 PROCEDURE — 99213 OFFICE O/P EST LOW 20 MIN: CPT | Mod: S$PBB,,, | Performed by: OBSTETRICS & GYNECOLOGY

## 2025-07-07 PROCEDURE — 99213 OFFICE O/P EST LOW 20 MIN: CPT | Mod: PBBFAC,PN | Performed by: OBSTETRICS & GYNECOLOGY

## 2025-07-07 PROCEDURE — 3074F SYST BP LT 130 MM HG: CPT | Mod: CPTII,,, | Performed by: OBSTETRICS & GYNECOLOGY

## 2025-07-07 PROCEDURE — 87491 CHLMYD TRACH DNA AMP PROBE: CPT | Performed by: OBSTETRICS & GYNECOLOGY

## 2025-07-07 NOTE — PROGRESS NOTES
Subjective:       Patient ID: Mone Chandler is a 34 y.o. female.    Chief Complaint:  No chief complaint on file.      History of Present Illness  HPI  Here for problem  C/o vaginal odor--musty  Reports minimal intake of water  Denies douche  Denies discharge, itching  Has not used any otc meds    GYN & OB History  Patient's last menstrual period was 2025 (approximate).   Date of Last Pap: 2025    OB History    Para Term  AB Living   1 1 1   1   SAB IAB Ectopic Multiple Live Births      0 1      # Outcome Date GA Lbr Arya/2nd Weight Sex Type Anes PTL Lv   1 Term 24 38w3d  2.59 kg (5 lb 11.4 oz) M Vag-Spont EPI N CATERINA       Review of Systems  Review of Systems   Genitourinary:  Positive for vaginal odor.   All other systems reviewed and are negative.          Objective:      Physical Exam:   Constitutional: She appears well-developed.     Eyes: Pupils are equal, round, and reactive to light. Conjunctivae and EOM are normal.      Pulmonary/Chest: Effort normal. Right breast exhibits no mass, no nipple discharge, no skin change, no tenderness and presence. Left breast exhibits no mass, no nipple discharge, no skin change, no tenderness and presence. Breasts are symmetrical.        Abdominal: Soft.     Genitourinary:    Inguinal canal, urethra, bladder, uterus, right adnexa, left adnexa and rectum normal.      Pelvic exam was performed with patient supine.   The external female genitalia was normal.     Labial bartholins normal.Cervix is normal. There is vaginal discharge (mucousy) in the vagina. Normal urethral meatus.          Musculoskeletal: Normal range of motion.       Neurological: She is alert.    Skin: Skin is warm.    Psychiatric: She has a normal mood and affect.           Assessment:     Encounter Diagnosis   Name Primary?    Vaginal odor Yes             Plan:    Increase water intake to 74 oz daily  Affirm today  Gc/ct today  Safe sex  Ww exam due after 2026  Continue  menstrual calendar

## 2025-07-08 ENCOUNTER — PATIENT MESSAGE (OUTPATIENT)
Dept: OBSTETRICS AND GYNECOLOGY | Facility: CLINIC | Age: 35
End: 2025-07-08
Payer: MEDICAID

## 2025-07-08 DIAGNOSIS — E28.2 PCOS (POLYCYSTIC OVARIAN SYNDROME): Primary | Chronic | ICD-10-CM

## 2025-07-08 DIAGNOSIS — E66.3 OVERWEIGHT (BMI 25.0-29.9): Chronic | ICD-10-CM

## 2025-07-09 ENCOUNTER — TELEPHONE (OUTPATIENT)
Dept: OBSTETRICS AND GYNECOLOGY | Facility: CLINIC | Age: 35
End: 2025-07-09
Payer: MEDICAID

## 2025-07-09 NOTE — TELEPHONE ENCOUNTER
Spoke with pt, advised that referral was placed and not with a specific provider, if she would like a outside provider we can fax the orders, pt voiced understanding.     Copied from CRM #[id number]. Topic: Appointments - Amb Referral  >> Jul 9, 2025  3:35 PM Argelia wrote:  Patient calling to receive name of facility/ provider she was referred to for endocrinology. Please call back at [phone]

## 2025-07-10 LAB — BACTERIA UR CULT: ABNORMAL

## 2025-07-11 LAB
BACTERIAL VAGINOSIS DNA (OHS): NOT DETECTED
C TRACH DNA SPEC QL NAA+PROBE: NOT DETECTED
CANDIDA GLABRATA/KRUSEI DNA (OHS): NOT DETECTED
CANDIDA SPECIES DNA (OHS): NOT DETECTED
CTGC SOURCE (OHS) ORD-325: NORMAL
N GONORRHOEA DNA UR QL NAA+PROBE: NOT DETECTED
TRICHOMONAS VAGINALIS DNA (OHS): NOT DETECTED

## 2025-07-15 ENCOUNTER — PATIENT MESSAGE (OUTPATIENT)
Dept: OBSTETRICS AND GYNECOLOGY | Facility: CLINIC | Age: 35
End: 2025-07-15
Payer: MEDICAID

## 2025-07-21 ENCOUNTER — PATIENT MESSAGE (OUTPATIENT)
Dept: OBSTETRICS AND GYNECOLOGY | Facility: CLINIC | Age: 35
End: 2025-07-21
Payer: MEDICAID

## 2025-07-28 ENCOUNTER — PATIENT MESSAGE (OUTPATIENT)
Dept: OBSTETRICS AND GYNECOLOGY | Facility: CLINIC | Age: 35
End: 2025-07-28
Payer: MEDICAID

## 2025-07-28 DIAGNOSIS — N90.89 VULVAR IRRITATION: Primary | ICD-10-CM

## 2025-07-29 RX ORDER — FLUCONAZOLE 200 MG/1
200 TABLET ORAL DAILY
Qty: 3 TABLET | Refills: 0 | Status: SHIPPED | OUTPATIENT
Start: 2025-07-29 | End: 2025-08-01

## 2025-07-29 RX ORDER — NYSTATIN AND TRIAMCINOLONE ACETONIDE 100000; 1 [USP'U]/G; MG/G
CREAM TOPICAL
Qty: 60 G | Refills: 1 | Status: SHIPPED | OUTPATIENT
Start: 2025-07-29 | End: 2026-07-29

## 2025-08-11 ENCOUNTER — PATIENT MESSAGE (OUTPATIENT)
Dept: OBSTETRICS AND GYNECOLOGY | Facility: CLINIC | Age: 35
End: 2025-08-11
Payer: MEDICAID

## 2025-08-13 ENCOUNTER — TELEPHONE (OUTPATIENT)
Dept: OBSTETRICS AND GYNECOLOGY | Facility: CLINIC | Age: 35
End: 2025-08-13
Payer: MEDICAID

## 2025-08-15 ENCOUNTER — OFFICE VISIT (OUTPATIENT)
Dept: OBSTETRICS AND GYNECOLOGY | Facility: CLINIC | Age: 35
End: 2025-08-15
Payer: MEDICAID

## 2025-08-15 ENCOUNTER — LAB VISIT (OUTPATIENT)
Dept: LAB | Facility: HOSPITAL | Age: 35
End: 2025-08-15
Attending: STUDENT IN AN ORGANIZED HEALTH CARE EDUCATION/TRAINING PROGRAM
Payer: MEDICAID

## 2025-08-15 DIAGNOSIS — E28.2 PCOS (POLYCYSTIC OVARIAN SYNDROME): ICD-10-CM

## 2025-08-15 DIAGNOSIS — N92.6 IRREGULAR MENSES: ICD-10-CM

## 2025-08-15 PROBLEM — Q51.3 BICORNATE UTERUS: Status: ACTIVE | Noted: 2025-08-15

## 2025-08-15 PROCEDURE — 83001 ASSAY OF GONADOTROPIN (FSH): CPT

## 2025-08-15 PROCEDURE — 36415 COLL VENOUS BLD VENIPUNCTURE: CPT

## 2025-08-15 PROCEDURE — 84402 ASSAY OF FREE TESTOSTERONE: CPT

## 2025-08-15 PROCEDURE — 83002 ASSAY OF GONADOTROPIN (LH): CPT

## 2025-08-15 PROCEDURE — 83498 ASY HYDROXYPROGESTERONE 17-D: CPT

## 2025-08-15 PROCEDURE — 83525 ASSAY OF INSULIN: CPT

## 2025-08-15 PROCEDURE — 84443 ASSAY THYROID STIM HORMONE: CPT

## 2025-08-15 PROCEDURE — 82670 ASSAY OF TOTAL ESTRADIOL: CPT

## 2025-08-15 PROCEDURE — 84702 CHORIONIC GONADOTROPIN TEST: CPT

## 2025-08-15 PROCEDURE — 84146 ASSAY OF PROLACTIN: CPT

## 2025-08-15 PROCEDURE — 84270 ASSAY OF SEX HORMONE GLOBUL: CPT

## 2025-08-16 LAB
ESTRADIOL SERPL HS-MCNC: 82 PG/ML
FSH SERPL-ACNC: 4.68 MIU/ML
HCG INTACT+B SERPL-ACNC: <2.42 MIU/ML
INSULIN SERPL-ACNC: 4.5 UU/ML
LH SERPL-ACNC: 13.6 MIU/ML
PROLACTIN SERPL IA-MCNC: 7.4 NG/ML (ref 5.2–26.5)
TSH SERPL-ACNC: 1.1 UIU/ML (ref 0.4–4)

## 2025-08-25 ENCOUNTER — TELEPHONE (OUTPATIENT)
Dept: OBSTETRICS AND GYNECOLOGY | Facility: CLINIC | Age: 35
End: 2025-08-25
Payer: MEDICAID

## 2025-09-05 ENCOUNTER — TELEPHONE (OUTPATIENT)
Dept: OBSTETRICS AND GYNECOLOGY | Facility: CLINIC | Age: 35
End: 2025-09-05
Payer: MEDICAID